# Patient Record
Sex: MALE | Race: BLACK OR AFRICAN AMERICAN | NOT HISPANIC OR LATINO | Employment: OTHER | URBAN - METROPOLITAN AREA
[De-identification: names, ages, dates, MRNs, and addresses within clinical notes are randomized per-mention and may not be internally consistent; named-entity substitution may affect disease eponyms.]

---

## 2016-09-08 LAB — CRC RECOMMENDATION EXT: NORMAL

## 2017-01-19 PROBLEM — G89.29 CHRONIC RIGHT SHOULDER PAIN: Status: ACTIVE | Noted: 2017-01-19

## 2017-01-19 PROBLEM — N52.1 ERECTILE DISORDER DUE TO MEDICAL CONDITION IN MALE: Status: ACTIVE | Noted: 2017-01-19

## 2017-01-19 PROBLEM — M25.511 CHRONIC RIGHT SHOULDER PAIN: Status: ACTIVE | Noted: 2017-01-19

## 2017-02-04 PROBLEM — M25.512 CHRONIC LEFT SHOULDER PAIN: Status: ACTIVE | Noted: 2017-01-19

## 2017-04-27 PROBLEM — E55.9 VITAMIN D DEFICIENCY: Status: ACTIVE | Noted: 2017-04-27

## 2017-05-25 PROBLEM — Z01.30 BP CHECK: Status: ACTIVE | Noted: 2017-05-25

## 2017-12-07 PROBLEM — Z01.30 BP CHECK: Status: RESOLVED | Noted: 2017-05-25 | Resolved: 2017-12-07

## 2018-04-26 PROBLEM — Z91.148 NON COMPLIANCE W MEDICATION REGIMEN: Status: ACTIVE | Noted: 2018-04-26

## 2018-04-26 PROBLEM — Z01.30 BP CHECK: Status: RESOLVED | Noted: 2017-05-25 | Resolved: 2018-04-26

## 2018-08-02 PROBLEM — Z53.21 PATIENT LEFT WITHOUT BEING SEEN: Status: ACTIVE | Noted: 2018-08-02

## 2019-10-25 PROBLEM — E87.5 HYPERKALEMIA: Status: ACTIVE | Noted: 2019-10-25

## 2019-10-25 PROBLEM — E87.70 VOLUME OVERLOAD: Status: ACTIVE | Noted: 2019-10-25

## 2019-10-26 PROBLEM — E87.5 HYPERKALEMIA: Status: RESOLVED | Noted: 2019-10-25 | Resolved: 2019-10-26

## 2020-05-26 PROBLEM — N18.5 CKD (CHRONIC KIDNEY DISEASE), STAGE V: Status: ACTIVE | Noted: 2020-05-26

## 2020-06-17 PROBLEM — H21.41: Status: ACTIVE | Noted: 2020-06-17

## 2020-06-17 PROBLEM — H40.51X3: Status: ACTIVE | Noted: 2020-06-17

## 2020-06-30 ENCOUNTER — NURSE TRIAGE (OUTPATIENT)
Dept: ADMINISTRATIVE | Facility: CLINIC | Age: 60
End: 2020-06-30

## 2020-09-14 ENCOUNTER — OFFICE VISIT (OUTPATIENT)
Dept: URGENT CARE | Facility: CLINIC | Age: 60
End: 2020-09-14
Payer: MEDICARE

## 2020-09-14 VITALS
BODY MASS INDEX: 30.61 KG/M2 | TEMPERATURE: 98 F | HEART RATE: 101 BPM | OXYGEN SATURATION: 99 % | WEIGHT: 195 LBS | SYSTOLIC BLOOD PRESSURE: 135 MMHG | HEIGHT: 67 IN | DIASTOLIC BLOOD PRESSURE: 78 MMHG

## 2020-09-14 DIAGNOSIS — H60.331 ACUTE SWIMMER'S EAR OF RIGHT SIDE: Primary | ICD-10-CM

## 2020-09-14 DIAGNOSIS — H61.23 EXCESSIVE CERUMEN IN EAR CANAL, BILATERAL: ICD-10-CM

## 2020-09-14 PROCEDURE — 99214 PR OFFICE/OUTPT VISIT, EST, LEVL IV, 30-39 MIN: ICD-10-PCS | Mod: 25,S$GLB,, | Performed by: NURSE PRACTITIONER

## 2020-09-14 PROCEDURE — 69209 EAR CERUMEN REMOVAL: ICD-10-PCS | Mod: 50,S$GLB,, | Performed by: NURSE PRACTITIONER

## 2020-09-14 PROCEDURE — 69209 REMOVE IMPACTED EAR WAX UNI: CPT | Mod: 50,S$GLB,, | Performed by: NURSE PRACTITIONER

## 2020-09-14 PROCEDURE — 99214 OFFICE O/P EST MOD 30 MIN: CPT | Mod: 25,S$GLB,, | Performed by: NURSE PRACTITIONER

## 2020-09-14 RX ORDER — NEOMYCIN SULFATE, POLYMYXIN B SULFATE, HYDROCORTISONE 3.5; 10000; 1 MG/ML; [USP'U]/ML; MG/ML
4 SOLUTION/ DROPS AURICULAR (OTIC) 3 TIMES DAILY
Qty: 10 ML | Refills: 0 | Status: SHIPPED | OUTPATIENT
Start: 2020-09-14 | End: 2020-09-24

## 2020-09-14 NOTE — PATIENT INSTRUCTIONS
External Ear Infection (Adult)    External otitis (also called swimmers ear) is an infection in the ear canal. It is often caused by bacteria or fungus. It can occur a few days after water gets trapped in the ear canal (from swimming or bathing). It can also occur after cleaning too deeply in the ear canal with a cotton swab or other object. Sometimes, hair care products get into the ear canal and cause this problem.  Symptoms can include pain, fever, itching, redness, drainage, or swelling of the ear canal. Temporary hearing loss may also occur.  Home care  Do not try to clean the ear canal. This can push pus and bacteria deeper into the canal.  Use prescribed ear drops as directed. These help reduce swelling and fight the infection. If an ear wick was placed in the ear canal, apply drops right onto the end of the wick. The wick will draw the medication into the ear canal even if it is swollen closed.  A cotton ball may be loosely placed in the outer ear to absorb any drainage.  You may use acetaminophen or ibuprofen to control pain, unless another medication was prescribed. Note: If you have chronic liver or kidney disease or ever had a stomach ulcer or GI bleeding, talk to your health care provider before taking any of these medications.  Do not allow water to get into your ear when bathing. Also, avoid swimming until the infection has cleared.  Prevention  Keep your ears dry. This helps lower the risk of infection. Dry your ears with a towel or hair dryer after getting wet. Also, use ear plugs when swimming.  Do not stick any objects in the ear to remove wax.  If you feel water trapped in your ear, use ear drops right away. You can get these drops over the counter at most drugstores. They work by removing water from the ear canal.  Follow-up care  Follow up with your health care provider in one week, or as advised.  When to seek medical advice  Call your health care provider right away if any of these  occur:  Ear pain becomes worse or doesnt improve after 3 days of treatment  Redness or swelling of the outer ear occurs or gets worse  Headache  Painful or stiff neck  Drowsiness or confusion  Fever of 100.4ºF (38ºC) or higher, or as directed by your health care provider  Seizure  Date Last Reviewed: 3/22/2015  © 2046-9904 [employer]. [address] [place] [address]. All rights reserved. This information is not intended as a substitute for professional medical care. Always follow your healthcare professional's instructions.        Earwax Removal    The ear canal makes earwax from the canals lining. The ears make wax to lubricate and protect the ear canal. The ear canal is the tube that connects the middle ear to the outside of the ear. The wax protects the ear from bacteria, infection, and damage from water or trauma.  The wax that forms in the canal naturally moves toward the outside of the ear and falls out. In some cases, the ear may make too much wax. If the wax causes problems or keeps the healthcare provider from seeing into the ear, the extra wax may be removed.  Too much wax can affect your hearing. It can cause itching. In rare cases, it can be painful. Earwax should not be removed unless it is causing a problem. You should not stick objects into your ear to remove wax unless told to do so by your healthcare provider.  Healthcare providers can remove earwax safely. It is important to stay still during the procedure to avoid damage to the ear canal. But removing earwax generally doesnt hurt. You will not usually need anesthesia or pain medicine when the provider removes the earwax.  A number of conditions lead to earwax buildup. These include some skin problems, a narrow ear canal, or ears that make too much earwax. Using cotton swabs in the canal pushes earwax deeper into the ear and contributes to the buildup of earwax.  Home care  The healthcare provider may recommend mineral  oil or an over-the-counter eardrop to use at home to soften the earwax. Use these products only if the provider recommends them. Use these products only if the provider recommends them. Carefully follow the instructions given.  Dont use mineral oil or OTC eardrops if you might have an ear infection or a ruptured eardrum. Tell your healthcare provider right away if you have diabetes or an immune disorder.  Dont use cotton swabs in your ears. Cotton swabs may push wax deeper into the ear canal or damage the eardrum. Use cotton gauze or a wet washcloth  to gently remove wax on the outside of the ear and around the opening to the ear canal.  Don't use any probing device or object such as cotton-tipped swabs or kailee pins to clean the inside of your ears.  Dont use ear candles to clean your ears. Candling can be dangerous. It can burn the ear canal. It can also make the condition worse instead of better.  Dont use cold water to rinse the ear. This will make you dizzy. If your provider tells you to rinse your ear, use only warm water or follow his or her instructions.  Check the ear for signs of infection or irritation listed below under When to seek medical advice.  Steps for using eardrops  Warm the medicine bottle by rubbing it between your hands for a few minutes.  Lie down on your side, with the affected ear up.  Place the recommended number of drops in the ear. Wet a cotton ball with the medicine. Gently put the cotton ball into the ear opening.  Follow-up care  Follow up with your healthcare provider, or as directed.  When to seek medical advice  Call the provider right away if you have:  Ear pain that gets worse  Fever of 100.4F°F (38°C) or higher, or as directed by your healthcare provider  Worsening wax buildup  Severe pain, dizziness, or nausea  Bleeding from the ear  Hearing problems  Signs of irritation from the eardrops, such as burning, stinging, or swelling and tenderness  Foul-smelling fluid draining  from the ear  Swelling, redness, or tenderness of the outer ear  Headache, neck pain, or stiff neck  Date Last Reviewed: 3/22/2015  © 0896-9297 [employer]. [address] [place] [address], [place] [address]. All rights reserved. This information is not intended as a substitute for professional medical care. Always follow your healthcare professional's instructions.      ·   ·   · Follow up with your primary care in 2-5 days if symptoms have not improved, or you may return here.  · If you were referred to a specialist, please follow up with that specialty.  · If you were prescribed antibiotics, please take them to completion.  · If you were prescribed a narcotic or any medication with sedative effects, do not drive or operate heavy equipment or machinery while taking these medications.  · You must understand that you have received treatment at an Urgent Care facility only, and that you may be released before all of your medical problems are known or treated. Urgent Care facilities are not equipped to handle life threatening emergencies. It is recommended that you go to an Emergency Department for further evaluation of worsening or concerning symptoms, or possibly life threatening conditions as discussed.                                        If you  smoke, please stop smoking

## 2020-09-14 NOTE — PROGRESS NOTES
"Subjective:       Patient ID: Murphy Ha is a 60 y.o. male.    Vitals:  height is 5' 7" (1.702 m) and weight is 88.5 kg (195 lb). His temperature is 98.4 °F (36.9 °C). His blood pressure is 135/78 and his pulse is 101. His oxygen saturation is 99%.     Chief Complaint: Otalgia    Pt c/o right ear pain/popping sensation, ringing in ear x3 days with decreased hearing. H/o cerumen impaction to left ear approx 3-4 years ago.  Pt diabetic, blood sugar 137 today.    Otalgia   There is pain in the right ear. This is a new problem. The current episode started in the past 7 days (2-3 days). The problem has been unchanged. There has been no fever. The pain is mild. Associated symptoms include hearing loss (decreased hearing to right ear). Pertinent negatives include no abdominal pain, coughing, diarrhea, ear discharge, headaches, neck pain, rash, rhinorrhea, sore throat or vomiting. He has tried ear drops and NSAIDs for the symptoms. The treatment provided no relief.       Constitution: Negative for activity change, appetite change, chills, sweating, fatigue, fever, unexpected weight change, generalized weakness and international travel in last 60 days.   HENT: Positive for ear pain (AD/popping sensation ), tinnitus (right ear) and hearing loss (decreased hearing to right ear). Negative for ear discharge, foreign body in ear, dental problem, facial swelling, facial trauma, congestion, sinus pain, sinus pressure, sore throat and voice change.    Neck: Negative for neck pain, neck stiffness and painful lymph nodes.   Cardiovascular: Negative for chest pain and palpitations.   Eyes: Negative for eye discharge, eye itching, eye redness and vision loss.   Respiratory: Negative for chest tightness, cough, sputum production, bloody sputum, COPD, shortness of breath, stridor, wheezing and asthma.    Gastrointestinal: Negative for abdominal pain, nausea, vomiting and diarrhea.   Genitourinary: Negative for dysuria and " hematuria.   Musculoskeletal: Negative for joint pain, joint swelling and muscle ache.   Skin: Negative for pale and rash.   Allergic/Immunologic: Negative for seasonal allergies, asthma, immunizations up-to-date and flu shot.   Neurological: Negative for headaches and altered mental status.   Hematologic/Lymphatic: Negative for swollen lymph nodes and easy bruising/bleeding. Does not bruise/bleed easily.   Psychiatric/Behavioral: Negative for altered mental status.       Objective:      Physical Exam   Constitutional: He is oriented to person, place, and time.  Non-toxic appearance. He does not appear ill. No distress.   HENT:   Head: Normocephalic and atraumatic.   Ears:   Right Ear: There is tenderness (tragus ttp) and cerumen present. No drainage or swelling. No mastoid tenderness. Decreased hearing is noted. impacted cerumen  Left Ear: Hearing normal. There is cerumen present. No drainage, swelling or tenderness. No mastoid tenderness. No decreased hearing is noted. impacted cerumen  Nose: No rhinorrhea or congestion.   Mouth/Throat: Mucous membranes are moist.   Eyes: Conjunctivae are normal. Right eye exhibits no discharge. Left eye exhibits no discharge. No scleral icterus.   Neck: Normal range of motion. Neck supple. No muscular tenderness present. No neck rigidity.   Cardiovascular: Normal rate, regular rhythm and normal pulses.   No murmur heard.  Pulmonary/Chest: Effort normal and breath sounds normal. No respiratory distress.   Abdominal: Normal appearance. He exhibits no distension. There is no abdominal tenderness.   Musculoskeletal: Normal range of motion.         General: No swelling, tenderness or signs of injury.   Lymphadenopathy:        Head (right side): No submental, no submandibular, no tonsillar, no preauricular and no posterior auricular adenopathy present.        Head (left side): No submental, no submandibular, no tonsillar, no preauricular and no posterior auricular adenopathy present.  "    He has no cervical adenopathy.        Right cervical: No superficial cervical and no posterior cervical adenopathy present.       Left cervical: No superficial cervical and no posterior cervical adenopathy present.   Neurological: He is alert and oriented to person, place, and time. He displays no weakness. Gait normal.   Skin: Skin is warm, not diaphoretic and not pale. jaundicePsychiatric: His behavior is normal. Mood normal.   Nursing note and vitals reviewed.        Assessment:       1. Acute swimmer's ear of right side    2. Excessive cerumen in ear canal, bilateral        Plan:         Ear Cerumen Removal    Date/Time: 9/14/2020 3:20 PM  Performed by: Mel Haas NP  Authorized by: Mel Haas NP     Time out: Immediately prior to procedure a "time out" was called to verify the correct patient, procedure, equipment, support staff and site/side marked as required.    Consent Done?:  Yes (Verbal)    Local anesthetic:  None  Ceruminolytics applied: Ceruminolytics applied prior to the procedure    Location details:  Both ears  Procedure type: irrigation    Cerumen  Removal Results:  Cerumen completely removed  Patient tolerance:  Patient tolerated the procedure well with no immediate complications     Left TM and canal exam normal post cerumen removal. Right TM intact, normal with moderate erythema to ear canal, no swelling.             Acute swimmer's ear of right side  -     neomycin-polymyxin-hydrocortisone (CORTISPORIN) otic solution; Place 4 drops into the left ear 3 (three) times daily. for 10 days  Dispense: 10 mL; Refill: 0    Excessive cerumen in ear canal, bilateral  -     Ear wax removal         Patient Instructions     External Ear Infection (Adult)    External otitis (also called swimmers ear) is an infection in the ear canal. It is often caused by bacteria or fungus. It can occur a few days after water gets trapped in the ear canal (from swimming or bathing). It can also occur after " cleaning too deeply in the ear canal with a cotton swab or other object. Sometimes, hair care products get into the ear canal and cause this problem.  Symptoms can include pain, fever, itching, redness, drainage, or swelling of the ear canal. Temporary hearing loss may also occur.  Home care  Do not try to clean the ear canal. This can push pus and bacteria deeper into the canal.  Use prescribed ear drops as directed. These help reduce swelling and fight the infection. If an ear wick was placed in the ear canal, apply drops right onto the end of the wick. The wick will draw the medication into the ear canal even if it is swollen closed.  A cotton ball may be loosely placed in the outer ear to absorb any drainage.  You may use acetaminophen or ibuprofen to control pain, unless another medication was prescribed. Note: If you have chronic liver or kidney disease or ever had a stomach ulcer or GI bleeding, talk to your health care provider before taking any of these medications.  Do not allow water to get into your ear when bathing. Also, avoid swimming until the infection has cleared.  Prevention  Keep your ears dry. This helps lower the risk of infection. Dry your ears with a towel or hair dryer after getting wet. Also, use ear plugs when swimming.  Do not stick any objects in the ear to remove wax.  If you feel water trapped in your ear, use ear drops right away. You can get these drops over the counter at most drugstores. They work by removing water from the ear canal.  Follow-up care  Follow up with your health care provider in one week, or as advised.  When to seek medical advice  Call your health care provider right away if any of these occur:  Ear pain becomes worse or doesnt improve after 3 days of treatment  Redness or swelling of the outer ear occurs or gets worse  Headache  Painful or stiff neck  Drowsiness or confusion  Fever of 100.4ºF (38ºC) or higher, or as directed by your health care  provider  Seizure  Date Last Reviewed: 3/22/2015  © 4854-9878 [employer]. [address] [place] [address], [place] [address]. All rights reserved. This information is not intended as a substitute for professional medical care. Always follow your healthcare professional's instructions.        Earwax Removal    The ear canal makes earwax from the canals lining. The ears make wax to lubricate and protect the ear canal. The ear canal is the tube that connects the middle ear to the outside of the ear. The wax protects the ear from bacteria, infection, and damage from water or trauma.  The wax that forms in the canal naturally moves toward the outside of the ear and falls out. In some cases, the ear may make too much wax. If the wax causes problems or keeps the healthcare provider from seeing into the ear, the extra wax may be removed.  Too much wax can affect your hearing. It can cause itching. In rare cases, it can be painful. Earwax should not be removed unless it is causing a problem. You should not stick objects into your ear to remove wax unless told to do so by your healthcare provider.  Healthcare providers can remove earwax safely. It is important to stay still during the procedure to avoid damage to the ear canal. But removing earwax generally doesnt hurt. You will not usually need anesthesia or pain medicine when the provider removes the earwax.  A number of conditions lead to earwax buildup. These include some skin problems, a narrow ear canal, or ears that make too much earwax. Using cotton swabs in the canal pushes earwax deeper into the ear and contributes to the buildup of earwax.  Home care  The healthcare provider may recommend mineral oil or an over-the-counter eardrop to use at home to soften the earwax. Use these products only if the provider recommends them. Use these products only if the provider recommends them. Carefully follow the instructions given.  Dont use mineral oil or OTC eardrops  if you might have an ear infection or a ruptured eardrum. Tell your healthcare provider right away if you have diabetes or an immune disorder.  Dont use cotton swabs in your ears. Cotton swabs may push wax deeper into the ear canal or damage the eardrum. Use cotton gauze or a wet washcloth  to gently remove wax on the outside of the ear and around the opening to the ear canal.  Don't use any probing device or object such as cotton-tipped swabs or kailee pins to clean the inside of your ears.  Dont use ear candles to clean your ears. Candling can be dangerous. It can burn the ear canal. It can also make the condition worse instead of better.  Dont use cold water to rinse the ear. This will make you dizzy. If your provider tells you to rinse your ear, use only warm water or follow his or her instructions.  Check the ear for signs of infection or irritation listed below under When to seek medical advice.  Steps for using eardrops  Warm the medicine bottle by rubbing it between your hands for a few minutes.  Lie down on your side, with the affected ear up.  Place the recommended number of drops in the ear. Wet a cotton ball with the medicine. Gently put the cotton ball into the ear opening.  Follow-up care  Follow up with your healthcare provider, or as directed.  When to seek medical advice  Call the provider right away if you have:  Ear pain that gets worse  Fever of 100.4F°F (38°C) or higher, or as directed by your healthcare provider  Worsening wax buildup  Severe pain, dizziness, or nausea  Bleeding from the ear  Hearing problems  Signs of irritation from the eardrops, such as burning, stinging, or swelling and tenderness  Foul-smelling fluid draining from the ear  Swelling, redness, or tenderness of the outer ear  Headache, neck pain, or stiff neck  Date Last Reviewed: 3/22/2015  © 0656-8097 [employer]. [address] [place] [address], [place] [address]. All rights reserved. This information is not  intended as a substitute for professional medical care. Always follow your healthcare professional's instructions.      ·   ·   · Follow up with your primary care in 2-5 days if symptoms have not improved, or you may return here.  · If you were referred to a specialist, please follow up with that specialty.  · If you were prescribed antibiotics, please take them to completion.  · If you were prescribed a narcotic or any medication with sedative effects, do not drive or operate heavy equipment or machinery while taking these medications.  · You must understand that you have received treatment at an Urgent Care facility only, and that you may be released before all of your medical problems are known or treated. Urgent Care facilities are not equipped to handle life threatening emergencies. It is recommended that you go to an Emergency Department for further evaluation of worsening or concerning symptoms, or possibly life threatening conditions as discussed.                                        If you  smoke, please stop smoking

## 2020-09-14 NOTE — PROCEDURES
"Ear Cerumen Removal    Date/Time: 9/14/2020 3:20 PM  Performed by: Mel Haas NP  Authorized by: Mel Haas NP     Time out: Immediately prior to procedure a "time out" was called to verify the correct patient, procedure, equipment, support staff and site/side marked as required.    Consent Done?:  Yes (Verbal)    Local anesthetic:  None  Ceruminolytics applied: Ceruminolytics applied prior to the procedure    Location details:  Both ears  Procedure type: irrigation    Cerumen  Removal Results:  Cerumen completely removed  Patient tolerance:  Patient tolerated the procedure well with no immediate complications     Left TM and canal exam normal post cerumen removal. Right TM intact, normal with moderate erythema to ear canal, no swelling.       "

## 2020-09-28 ENCOUNTER — TELEPHONE (OUTPATIENT)
Dept: NEPHROLOGY | Facility: CLINIC | Age: 60
End: 2020-09-28

## 2020-09-28 NOTE — TELEPHONE ENCOUNTER
----- Message from Marlen Vázquez sent at 9/28/2020  9:21 AM CDT -----  Regarding: R/S 9/29/20 appabdulaziz  Pt called to r/s his 9/29/20 appts    Pt's ph#[phone]

## 2021-01-25 ENCOUNTER — PATIENT MESSAGE (OUTPATIENT)
Dept: TRANSPLANT | Facility: CLINIC | Age: 61
End: 2021-01-25

## 2021-01-25 DIAGNOSIS — Z76.82 ORGAN TRANSPLANT CANDIDATE: Primary | ICD-10-CM

## 2021-01-27 ENCOUNTER — TELEPHONE (OUTPATIENT)
Dept: TRANSPLANT | Facility: CLINIC | Age: 61
End: 2021-01-27

## 2021-02-09 DIAGNOSIS — Z76.82 ORGAN TRANSPLANT CANDIDATE: Primary | ICD-10-CM

## 2021-02-22 ENCOUNTER — LAB VISIT (OUTPATIENT)
Dept: LAB | Facility: HOSPITAL | Age: 61
End: 2021-02-22
Payer: MEDICARE

## 2021-02-22 DIAGNOSIS — R63.5 ABNORMAL WEIGHT GAIN: ICD-10-CM

## 2021-02-22 DIAGNOSIS — Z76.82 KIDNEY TRANSPLANT CANDIDATE: ICD-10-CM

## 2021-02-22 PROCEDURE — 86706 HEP B SURFACE ANTIBODY: CPT | Mod: NTX

## 2021-02-22 PROCEDURE — 80074 ACUTE HEPATITIS PANEL: CPT | Mod: TXP

## 2021-02-22 PROCEDURE — 86704 HEP B CORE ANTIBODY TOTAL: CPT | Mod: NTX

## 2021-02-23 LAB
HAV IGM SERPL QL IA: NEGATIVE
HBV CORE AB SERPL QL IA: NEGATIVE
HBV CORE IGM SERPL QL IA: NEGATIVE
HBV SURFACE AB SER-ACNC: NEGATIVE M[IU]/ML
HBV SURFACE AG SERPL QL IA: NEGATIVE
HCV AB SERPL QL IA: NEGATIVE

## 2021-03-03 NOTE — PROGRESS NOTES
Spoke to patient to complete history for his upcoming appointment his wife will come with him to his appointment he is aware that he have to bring a small breakfast/snack to eat after blood is drawn he will not need a

## 2021-03-04 ENCOUNTER — HOSPITAL ENCOUNTER (OUTPATIENT)
Dept: RADIOLOGY | Facility: HOSPITAL | Age: 61
Discharge: HOME | End: 2021-03-04
Attending: NURSE PRACTITIONER
Payer: MEDICARE

## 2021-03-04 ENCOUNTER — TELEPHONE (OUTPATIENT)
Dept: TRANSPLANT | Facility: CLINIC | Age: 61
End: 2021-03-04

## 2021-03-04 ENCOUNTER — OFFICE VISIT (OUTPATIENT)
Dept: TRANSPLANT | Facility: CLINIC | Age: 61
End: 2021-03-04
Payer: MEDICARE

## 2021-03-04 VITALS
SYSTOLIC BLOOD PRESSURE: 150 MMHG | WEIGHT: 194.69 LBS | OXYGEN SATURATION: 100 % | TEMPERATURE: 98 F | HEART RATE: 76 BPM | HEIGHT: 67 IN | RESPIRATION RATE: 18 BRPM | DIASTOLIC BLOOD PRESSURE: 73 MMHG | BODY MASS INDEX: 30.56 KG/M2

## 2021-03-04 DIAGNOSIS — N18.5 CKD (CHRONIC KIDNEY DISEASE), STAGE V: ICD-10-CM

## 2021-03-04 DIAGNOSIS — M25.512 CHRONIC LEFT SHOULDER PAIN: ICD-10-CM

## 2021-03-04 DIAGNOSIS — Z76.82 ORGAN TRANSPLANT CANDIDATE: ICD-10-CM

## 2021-03-04 DIAGNOSIS — G89.29 CHRONIC LEFT SHOULDER PAIN: ICD-10-CM

## 2021-03-04 DIAGNOSIS — I15.0 RENOVASCULAR HYPERTENSION: Chronic | ICD-10-CM

## 2021-03-04 DIAGNOSIS — N25.81 SECONDARY HYPERPARATHYROIDISM: ICD-10-CM

## 2021-03-04 DIAGNOSIS — Z01.818 PRE-TRANSPLANT EVALUATION FOR KIDNEY TRANSPLANT: Primary | ICD-10-CM

## 2021-03-04 DIAGNOSIS — E11.3299 NONPROLIFERATIVE DIABETIC RETINOPATHY ASSOCIATED WITH TYPE 2 DIABETES MELLITUS: ICD-10-CM

## 2021-03-04 LAB
CREAT UR-MCNC: 57 MG/DL (ref 23–375)
PROT UR-MCNC: 147 MG/DL (ref 0–15)
PROT/CREAT UR: 2.58 MG/G{CREAT} (ref 0–0.2)

## 2021-03-04 PROCEDURE — 76770 US EXAM ABDO BACK WALL COMP: CPT | Mod: 26,TXP,, | Performed by: RADIOLOGY

## 2021-03-04 PROCEDURE — 72170 X-RAY EXAM OF PELVIS: CPT | Mod: 26,TXP,, | Performed by: RADIOLOGY

## 2021-03-04 PROCEDURE — 72170 XR PELVIS ROUTINE AP: ICD-10-PCS | Mod: 26,TXP,, | Performed by: RADIOLOGY

## 2021-03-04 PROCEDURE — 99215 OFFICE O/P EST HI 40 MIN: CPT | Mod: S$PBB,TXP,, | Performed by: NURSE PRACTITIONER

## 2021-03-04 PROCEDURE — 99215 OFFICE O/P EST HI 40 MIN: CPT | Mod: PBBFAC,25,TXP | Performed by: NURSE PRACTITIONER

## 2021-03-04 PROCEDURE — 93978 VASCULAR STUDY: CPT | Mod: 26,TXP,, | Performed by: RADIOLOGY

## 2021-03-04 PROCEDURE — 72170 X-RAY EXAM OF PELVIS: CPT | Mod: TC,TXP

## 2021-03-04 PROCEDURE — 93978 US DOPP ILIACS BILATERAL: ICD-10-PCS | Mod: 26,TXP,, | Performed by: RADIOLOGY

## 2021-03-04 PROCEDURE — 93978 VASCULAR STUDY: CPT | Mod: TC,TXP

## 2021-03-04 PROCEDURE — 99203 PR OFFICE/OUTPT VISIT, NEW, LEVL III, 30-44 MIN: ICD-10-PCS | Mod: S$PBB,TXP,, | Performed by: TRANSPLANT SURGERY

## 2021-03-04 PROCEDURE — 99999 PR PBB SHADOW E&M-EST. PATIENT-LVL V: CPT | Mod: PBBFAC,TXP,, | Performed by: NURSE PRACTITIONER

## 2021-03-04 PROCEDURE — 99215 PR OFFICE/OUTPT VISIT, EST, LEVL V, 40-54 MIN: ICD-10-PCS | Mod: S$PBB,TXP,, | Performed by: NURSE PRACTITIONER

## 2021-03-04 PROCEDURE — 76770 US EXAM ABDO BACK WALL COMP: CPT | Mod: TC,TXP

## 2021-03-04 PROCEDURE — 82570 ASSAY OF URINE CREATININE: CPT | Mod: TXP | Performed by: NURSE PRACTITIONER

## 2021-03-04 PROCEDURE — 99203 OFFICE O/P NEW LOW 30 MIN: CPT | Mod: S$PBB,TXP,, | Performed by: TRANSPLANT SURGERY

## 2021-03-04 PROCEDURE — 76770 US RETROPERITONEAL COMPLETE: ICD-10-PCS | Mod: 26,TXP,, | Performed by: RADIOLOGY

## 2021-03-04 PROCEDURE — 99999 PR PBB SHADOW E&M-EST. PATIENT-LVL V: ICD-10-PCS | Mod: PBBFAC,TXP,, | Performed by: NURSE PRACTITIONER

## 2021-03-04 RX ORDER — INSULIN ASPART 100 [IU]/ML
4-8 INJECTION, SOLUTION INTRAVENOUS; SUBCUTANEOUS
COMMUNITY
End: 2021-07-06

## 2021-03-04 RX ORDER — CARVEDILOL 25 MG/1
12.5 TABLET ORAL 2 TIMES DAILY WITH MEALS
COMMUNITY
End: 2021-04-05 | Stop reason: SDUPTHER

## 2021-03-04 RX ORDER — HYDRALAZINE HYDROCHLORIDE 25 MG/1
12.5 TABLET, FILM COATED ORAL 2 TIMES DAILY
COMMUNITY
End: 2021-04-05

## 2021-03-04 RX ORDER — DORZOLAMIDE HCL 20 MG/ML
1 SOLUTION/ DROPS OPHTHALMIC 2 TIMES DAILY
COMMUNITY
End: 2021-10-27 | Stop reason: ALTCHOICE

## 2021-03-22 ENCOUNTER — TELEPHONE (OUTPATIENT)
Dept: TRANSPLANT | Facility: CLINIC | Age: 61
End: 2021-03-22

## 2021-04-01 ENCOUNTER — TELEPHONE (OUTPATIENT)
Dept: TRANSPLANT | Facility: CLINIC | Age: 61
End: 2021-04-01

## 2021-04-27 ENCOUNTER — OFFICE VISIT (OUTPATIENT)
Dept: CARDIOLOGY | Facility: CLINIC | Age: 61
End: 2021-04-27
Payer: MEDICARE

## 2021-04-27 ENCOUNTER — HOSPITAL ENCOUNTER (OUTPATIENT)
Dept: CARDIOLOGY | Facility: HOSPITAL | Age: 61
Discharge: HOME | End: 2021-04-27
Attending: NURSE PRACTITIONER
Payer: MEDICARE

## 2021-04-27 VITALS
WEIGHT: 193.13 LBS | HEIGHT: 67 IN | BODY MASS INDEX: 30.31 KG/M2 | DIASTOLIC BLOOD PRESSURE: 78 MMHG | SYSTOLIC BLOOD PRESSURE: 154 MMHG | HEART RATE: 72 BPM | RESPIRATION RATE: 16 BRPM | OXYGEN SATURATION: 97 %

## 2021-04-27 VITALS — BODY MASS INDEX: 30.25 KG/M2 | HEIGHT: 67 IN

## 2021-04-27 DIAGNOSIS — N18.5 CKD (CHRONIC KIDNEY DISEASE), STAGE V: ICD-10-CM

## 2021-04-27 DIAGNOSIS — Z76.82 ORGAN TRANSPLANT CANDIDATE: ICD-10-CM

## 2021-04-27 DIAGNOSIS — N18.6 ESRD (END STAGE RENAL DISEASE) ON DIALYSIS: Primary | ICD-10-CM

## 2021-04-27 DIAGNOSIS — N18.4 STAGE 4 CHRONIC KIDNEY DISEASE: Chronic | ICD-10-CM

## 2021-04-27 DIAGNOSIS — E78.2 MIXED HYPERLIPIDEMIA: ICD-10-CM

## 2021-04-27 DIAGNOSIS — E11.8 TYPE 2 DIABETES WITH COMPLICATION: ICD-10-CM

## 2021-04-27 DIAGNOSIS — E87.5 HYPERKALEMIA: ICD-10-CM

## 2021-04-27 DIAGNOSIS — I15.0 RENOVASCULAR HYPERTENSION: Chronic | ICD-10-CM

## 2021-04-27 DIAGNOSIS — E11.42 DIABETIC POLYNEUROPATHY ASSOCIATED WITH TYPE 2 DIABETES MELLITUS: ICD-10-CM

## 2021-04-27 DIAGNOSIS — G47.33 OSA (OBSTRUCTIVE SLEEP APNEA): Chronic | ICD-10-CM

## 2021-04-27 DIAGNOSIS — Z99.2 ESRD (END STAGE RENAL DISEASE) ON DIALYSIS: Primary | ICD-10-CM

## 2021-04-27 DIAGNOSIS — E87.70 HYPERVOLEMIA, UNSPECIFIED HYPERVOLEMIA TYPE: ICD-10-CM

## 2021-04-27 LAB
AORTIC ROOT ANNULUS: 2.86 CM
AORTIC VALVE CUSP SEPERATION: 1.4 CM
AV INDEX (PROSTH): 0.91
AV MEAN GRADIENT: 6 MMHG
AV PEAK GRADIENT: 10 MMHG
AV VALVE AREA: 2.87 CM2
AV VELOCITY RATIO: 0.87
BSA FOR ECHO PROCEDURE: 2.03 M2
CV ECHO LV RWT: 0.42 CM
DOP CALC AO PEAK VEL: 1.59 M/S
DOP CALC AO VTI: 34.71 CM
DOP CALC LVOT AREA: 3.1 CM2
DOP CALC LVOT DIAMETER: 2 CM
DOP CALC LVOT PEAK VEL: 1.39 M/S
DOP CALC LVOT STROKE VOLUME: 99.48 CM3
DOP CALCLVOT PEAK VEL VTI: 31.68 CM
E WAVE DECELERATION TIME: 194.86 MSEC
E/A RATIO: 1.15
E/E' RATIO: 10.22 M/S
ECHO LV POSTERIOR WALL: 1.1 CM (ref 0.6–1.1)
EJECTION FRACTION: 55 %
FRACTIONAL SHORTENING: 32 % (ref 28–44)
INTERVENTRICULAR SEPTUM: 1.1 CM (ref 0.6–1.1)
IVRT: 92.27 MSEC
LA MAJOR: 5.59 CM
LA MINOR: 4.85 CM
LA WIDTH: 4.31 CM
LEFT ATRIUM SIZE: 3.61 CM
LEFT ATRIUM VOLUME INDEX: 34.5 ML/M2
LEFT ATRIUM VOLUME: 68.69 CM3
LEFT INTERNAL DIMENSION IN SYSTOLE: 3.5 CM (ref 2.1–4)
LEFT VENTRICLE DIASTOLIC VOLUME INDEX: 64.63 ML/M2
LEFT VENTRICLE DIASTOLIC VOLUME: 128.62 ML
LEFT VENTRICLE MASS INDEX: 110 G/M2
LEFT VENTRICLE SYSTOLIC VOLUME INDEX: 25.5 ML/M2
LEFT VENTRICLE SYSTOLIC VOLUME: 50.7 ML
LEFT VENTRICULAR INTERNAL DIMENSION IN DIASTOLE: 5.18 CM (ref 3.5–6)
LEFT VENTRICULAR MASS: 219.38 G
LV LATERAL E/E' RATIO: 8.36 M/S
LV SEPTAL E/E' RATIO: 13.14 M/S
MV PEAK A VEL: 0.8 M/S
MV PEAK E VEL: 0.92 M/S
MV STENOSIS PRESSURE HALF TIME: 56.51 MS
MV VALVE AREA P 1/2 METHOD: 3.89 CM2
PISA TR MAX VEL: 2.77 M/S
PV PEAK VELOCITY: 1.24 CM/S
RA MAJOR: 5.35 CM
RA PRESSURE: 8 MMHG
RA WIDTH: 4.14 CM
TDI LATERAL: 0.11 M/S
TDI SEPTAL: 0.07 M/S
TDI: 0.09 M/S
TR MAX PG: 31 MMHG
TV REST PULMONARY ARTERY PRESSURE: 39 MMHG

## 2021-04-27 PROCEDURE — 99204 PR OFFICE/OUTPT VISIT, NEW, LEVL IV, 45-59 MIN: ICD-10-PCS | Mod: S$PBB,TXP,, | Performed by: INTERNAL MEDICINE

## 2021-04-27 PROCEDURE — 99204 OFFICE O/P NEW MOD 45 MIN: CPT | Mod: S$PBB,TXP,, | Performed by: INTERNAL MEDICINE

## 2021-04-27 PROCEDURE — 99999 PR PBB SHADOW E&M-EST. PATIENT-LVL IV: CPT | Mod: PBBFAC,TXP,, | Performed by: INTERNAL MEDICINE

## 2021-04-27 PROCEDURE — 93306 ECHO (CUPID ONLY): ICD-10-PCS | Mod: 26,TXP,, | Performed by: INTERNAL MEDICINE

## 2021-04-27 PROCEDURE — 99999 PR PBB SHADOW E&M-EST. PATIENT-LVL IV: ICD-10-PCS | Mod: PBBFAC,TXP,, | Performed by: INTERNAL MEDICINE

## 2021-04-27 PROCEDURE — 93306 TTE W/DOPPLER COMPLETE: CPT | Mod: TXP

## 2021-04-27 PROCEDURE — 93306 TTE W/DOPPLER COMPLETE: CPT | Mod: 26,TXP,, | Performed by: INTERNAL MEDICINE

## 2021-04-27 PROCEDURE — 99214 OFFICE O/P EST MOD 30 MIN: CPT | Mod: PBBFAC,TXP,25 | Performed by: INTERNAL MEDICINE

## 2021-04-27 RX ORDER — HYDRALAZINE HYDROCHLORIDE 50 MG/1
50 TABLET, FILM COATED ORAL EVERY 12 HOURS
Qty: 60 TABLET | Refills: 6 | Status: SHIPPED | OUTPATIENT
Start: 2021-04-27 | End: 2021-08-25

## 2021-05-19 ENCOUNTER — TELEPHONE (OUTPATIENT)
Dept: TRANSPLANT | Facility: CLINIC | Age: 61
End: 2021-05-19

## 2021-06-09 ENCOUNTER — SOCIAL WORK (OUTPATIENT)
Dept: TRANSPLANT | Facility: CLINIC | Age: 61
End: 2021-06-09

## 2021-06-11 ENCOUNTER — COMMITTEE REVIEW (OUTPATIENT)
Dept: TRANSPLANT | Facility: CLINIC | Age: 61
End: 2021-06-11

## 2021-06-14 ENCOUNTER — TELEPHONE (OUTPATIENT)
Dept: TRANSPLANT | Facility: CLINIC | Age: 61
End: 2021-06-14

## 2021-06-15 ENCOUNTER — TELEPHONE (OUTPATIENT)
Dept: TRANSPLANT | Facility: CLINIC | Age: 61
End: 2021-06-15

## 2021-06-15 DIAGNOSIS — Z76.82 ORGAN TRANSPLANT CANDIDATE: Primary | ICD-10-CM

## 2021-07-08 ENCOUNTER — TELEPHONE (OUTPATIENT)
Dept: TRANSPLANT | Facility: CLINIC | Age: 61
End: 2021-07-08

## 2021-07-08 DIAGNOSIS — Z76.82 ORGAN TRANSPLANT CANDIDATE: Primary | ICD-10-CM

## 2021-07-15 DIAGNOSIS — Z76.82 PRE-KIDNEY TRANSPLANT, LISTED: Primary | ICD-10-CM

## 2021-07-21 DIAGNOSIS — Z76.82 ORGAN TRANSPLANT CANDIDATE: Primary | ICD-10-CM

## 2021-11-10 PROBLEM — H43.12 VITREOUS HEMORRHAGE, LEFT: Status: ACTIVE | Noted: 2021-11-10

## 2021-11-10 PROBLEM — E11.3292 MILD NONPROLIFERATIVE DIABETIC RETINOPATHY OF LEFT EYE WITHOUT MACULAR EDEMA ASSOCIATED WITH TYPE 2 DIABETES MELLITUS: Status: ACTIVE | Noted: 2021-11-10

## 2021-11-10 PROBLEM — Z90.01 H/O ENUCLEATION OF RIGHT EYEBALL: Status: ACTIVE | Noted: 2021-11-10

## 2021-11-10 PROBLEM — H40.1121 PRIMARY OPEN ANGLE GLAUCOMA (POAG) OF LEFT EYE, MILD STAGE: Status: ACTIVE | Noted: 2021-11-10

## 2021-11-18 ENCOUNTER — OFFICE VISIT (OUTPATIENT)
Dept: OPHTHALMOLOGY | Facility: CLINIC | Age: 61
End: 2021-11-18
Payer: MEDICARE

## 2021-11-18 DIAGNOSIS — H43.12 VITREOUS HEMORRHAGE, LEFT EYE: Primary | ICD-10-CM

## 2021-11-18 DIAGNOSIS — Z90.01 H/O ENUCLEATION OF RIGHT EYEBALL: ICD-10-CM

## 2021-11-18 DIAGNOSIS — H40.1121 PRIMARY OPEN ANGLE GLAUCOMA (POAG) OF LEFT EYE, MILD STAGE: ICD-10-CM

## 2021-11-18 DIAGNOSIS — E11.3592 PROLIFERATIVE DIABETIC RETINOPATHY OF LEFT EYE ASSOCIATED WITH TYPE 2 DIABETES MELLITUS, MACULAR EDEMA PRESENCE UNSPECIFIED: ICD-10-CM

## 2021-11-18 PROCEDURE — 99204 OFFICE O/P NEW MOD 45 MIN: CPT | Mod: S$PBB,,, | Performed by: OPHTHALMOLOGY

## 2021-11-18 PROCEDURE — 99999 PR PBB SHADOW E&M-EST. PATIENT-LVL III: ICD-10-PCS | Mod: PBBFAC,,, | Performed by: OPHTHALMOLOGY

## 2021-11-18 PROCEDURE — 99204 PR OFFICE/OUTPT VISIT, NEW, LEVL IV, 45-59 MIN: ICD-10-PCS | Mod: S$PBB,,, | Performed by: OPHTHALMOLOGY

## 2021-11-18 PROCEDURE — 99213 OFFICE O/P EST LOW 20 MIN: CPT | Mod: PBBFAC,25 | Performed by: OPHTHALMOLOGY

## 2021-11-18 PROCEDURE — 99999 PR PBB SHADOW E&M-EST. PATIENT-LVL III: CPT | Mod: PBBFAC,,, | Performed by: OPHTHALMOLOGY

## 2021-11-18 PROCEDURE — 92235 FLUORESCEIN ANGIOGRAPHY - OS - LEFT EYE: ICD-10-PCS | Mod: 26,S$PBB,, | Performed by: OPHTHALMOLOGY

## 2021-11-18 PROCEDURE — 92235 FLUORESCEIN ANGRPH MLTIFRAME: CPT | Mod: PBBFAC | Performed by: OPHTHALMOLOGY

## 2021-11-18 RX ORDER — FLUORESCEIN 500 MG/ML
5 INJECTION INTRAVENOUS
Status: COMPLETED | OUTPATIENT
Start: 2021-11-18 | End: 2021-11-18

## 2021-11-18 RX ADMIN — FLUORESCEIN 500 MG: 500 INJECTION INTRAVENOUS at 02:11

## 2022-01-27 DIAGNOSIS — Z76.82 ORGAN TRANSPLANT CANDIDATE: Primary | ICD-10-CM

## 2022-02-02 ENCOUNTER — TELEPHONE (OUTPATIENT)
Dept: TRANSPLANT | Facility: CLINIC | Age: 62
End: 2022-02-02
Payer: MEDICARE

## 2022-04-07 ENCOUNTER — EPISODE CHANGES (OUTPATIENT)
Dept: TRANSPLANT | Facility: CLINIC | Age: 62
End: 2022-04-07

## 2022-04-08 ENCOUNTER — DOCUMENTATION ONLY (OUTPATIENT)
Dept: CARDIOLOGY | Facility: HOSPITAL | Age: 62
End: 2022-04-08
Payer: MEDICARE

## 2022-04-08 ENCOUNTER — HOSPITAL ENCOUNTER (OUTPATIENT)
Dept: CARDIOLOGY | Facility: HOSPITAL | Age: 62
Discharge: HOME | End: 2022-04-08
Attending: NURSE PRACTITIONER
Payer: MEDICARE

## 2022-04-08 ENCOUNTER — OFFICE VISIT (OUTPATIENT)
Dept: TRANSPLANT | Facility: CLINIC | Age: 62
End: 2022-04-08
Payer: MEDICARE

## 2022-04-08 ENCOUNTER — HOSPITAL ENCOUNTER (OUTPATIENT)
Dept: RADIOLOGY | Facility: HOSPITAL | Age: 62
Discharge: HOME | End: 2022-04-08
Attending: NURSE PRACTITIONER
Payer: MEDICARE

## 2022-04-08 ENCOUNTER — TELEPHONE (OUTPATIENT)
Dept: CARDIOLOGY | Facility: HOSPITAL | Age: 62
End: 2022-04-08

## 2022-04-08 VITALS
SYSTOLIC BLOOD PRESSURE: 159 MMHG | HEIGHT: 67 IN | RESPIRATION RATE: 16 BRPM | HEART RATE: 70 BPM | WEIGHT: 190.94 LBS | TEMPERATURE: 97 F | DIASTOLIC BLOOD PRESSURE: 80 MMHG | BODY MASS INDEX: 29.97 KG/M2 | OXYGEN SATURATION: 98 %

## 2022-04-08 VITALS
HEART RATE: 68 BPM | BODY MASS INDEX: 29.66 KG/M2 | HEIGHT: 67 IN | SYSTOLIC BLOOD PRESSURE: 112 MMHG | WEIGHT: 189 LBS | DIASTOLIC BLOOD PRESSURE: 64 MMHG

## 2022-04-08 DIAGNOSIS — I15.0 RENOVASCULAR HYPERTENSION: Chronic | ICD-10-CM

## 2022-04-08 DIAGNOSIS — N25.81 SECONDARY HYPERPARATHYROIDISM: ICD-10-CM

## 2022-04-08 DIAGNOSIS — N18.6 ESRD ON PERITONEAL DIALYSIS: ICD-10-CM

## 2022-04-08 DIAGNOSIS — Z76.82 PATIENT ON WAITING LIST FOR KIDNEY TRANSPLANT: Primary | ICD-10-CM

## 2022-04-08 DIAGNOSIS — Z76.82 ORGAN TRANSPLANT CANDIDATE: ICD-10-CM

## 2022-04-08 DIAGNOSIS — Z99.2 ESRD ON PERITONEAL DIALYSIS: ICD-10-CM

## 2022-04-08 DIAGNOSIS — E11.8 TYPE 2 DIABETES WITH COMPLICATION: ICD-10-CM

## 2022-04-08 LAB
ASCENDING AORTA: 3.14 CM
AV INDEX (PROSTH): 0.85
AV MEAN GRADIENT: 4 MMHG
AV PEAK GRADIENT: 7 MMHG
AV VALVE AREA: 2.59 CM2
AV VELOCITY RATIO: 0.76
BSA FOR ECHO PROCEDURE: 2.01 M2
CV ECHO LV RWT: 0.45 CM
DOP CALC AO PEAK VEL: 1.32 M/S
DOP CALC AO VTI: 25.52 CM
DOP CALC LVOT AREA: 3 CM2
DOP CALC LVOT DIAMETER: 1.97 CM
DOP CALC LVOT PEAK VEL: 1 M/S
DOP CALC LVOT STROKE VOLUME: 66.08 CM3
DOP CALCLVOT PEAK VEL VTI: 21.69 CM
E WAVE DECELERATION TIME: 220.97 MSEC
E/A RATIO: 1.03
E/E' RATIO: 12.2 M/S
ECHO LV POSTERIOR WALL: 1.05 CM (ref 0.6–1.1)
EJECTION FRACTION: 65 %
FRACTIONAL SHORTENING: 39 % (ref 28–44)
INTERVENTRICULAR SEPTUM: 1.01 CM (ref 0.6–1.1)
LA MAJOR: 5.3 CM
LA MINOR: 4.91 CM
LA WIDTH: 3.88 CM
LEFT ATRIUM SIZE: 3.6 CM
LEFT ATRIUM VOLUME INDEX MOD: 27.5 ML/M2
LEFT ATRIUM VOLUME INDEX: 30.7 ML/M2
LEFT ATRIUM VOLUME MOD: 54.14 CM3
LEFT ATRIUM VOLUME: 60.52 CM3
LEFT INTERNAL DIMENSION IN SYSTOLE: 2.85 CM (ref 2.1–4)
LEFT VENTRICLE DIASTOLIC VOLUME INDEX: 52.06 ML/M2
LEFT VENTRICLE DIASTOLIC VOLUME: 102.56 ML
LEFT VENTRICLE MASS INDEX: 87 G/M2
LEFT VENTRICLE SYSTOLIC VOLUME INDEX: 15.6 ML/M2
LEFT VENTRICLE SYSTOLIC VOLUME: 30.82 ML
LEFT VENTRICULAR INTERNAL DIMENSION IN DIASTOLE: 4.7 CM (ref 3.5–6)
LEFT VENTRICULAR MASS: 171.24 G
LV LATERAL E/E' RATIO: 12.2 M/S
LV SEPTAL E/E' RATIO: 12.2 M/S
MV PEAK A VEL: 0.59 M/S
MV PEAK E VEL: 0.61 M/S
MV STENOSIS PRESSURE HALF TIME: 64.08 MS
MV VALVE AREA P 1/2 METHOD: 3.43 CM2
PISA TR MAX VEL: 2.31 M/S
RA MAJOR: 4.45 CM
RA PRESSURE: 3 MMHG
RA WIDTH: 3.96 CM
RIGHT VENTRICULAR END-DIASTOLIC DIMENSION: 4.6 CM
RV TISSUE DOPPLER FREE WALL SYSTOLIC VELOCITY 1 (APICAL 4 CHAMBER VIEW): 9.47 CM/S
SINUS: 2.91 CM
STJ: 2.58 CM
TDI LATERAL: 0.05 M/S
TDI SEPTAL: 0.05 M/S
TDI: 0.05 M/S
TR MAX PG: 21 MMHG
TRICUSPID ANNULAR PLANE SYSTOLIC EXCURSION: 1.91 CM
TV REST PULMONARY ARTERY PRESSURE: 24 MMHG

## 2022-04-08 PROCEDURE — 71046 X-RAY EXAM CHEST 2 VIEWS: CPT | Mod: 26,TXP,, | Performed by: RADIOLOGY

## 2022-04-08 PROCEDURE — 71046 XR CHEST PA AND LATERAL: ICD-10-PCS | Mod: 26,TXP,, | Performed by: RADIOLOGY

## 2022-04-08 PROCEDURE — 99999 PR PBB SHADOW E&M-EST. PATIENT-LVL V: ICD-10-PCS | Mod: PBBFAC,TXP,, | Performed by: NURSE PRACTITIONER

## 2022-04-08 PROCEDURE — 71046 X-RAY EXAM CHEST 2 VIEWS: CPT | Mod: TC,TXP

## 2022-04-08 PROCEDURE — 76770 US EXAM ABDO BACK WALL COMP: CPT | Mod: 26,TXP,, | Performed by: RADIOLOGY

## 2022-04-08 PROCEDURE — 76770 US RETROPERITONEAL COMPLETE: ICD-10-PCS | Mod: 26,TXP,, | Performed by: RADIOLOGY

## 2022-04-08 PROCEDURE — 93306 ECHO (CUPID ONLY): ICD-10-PCS | Mod: 26,TXP,, | Performed by: INTERNAL MEDICINE

## 2022-04-08 PROCEDURE — 76770 US EXAM ABDO BACK WALL COMP: CPT | Mod: TC,TXP

## 2022-04-08 PROCEDURE — 99215 OFFICE O/P EST HI 40 MIN: CPT | Mod: PBBFAC,25,TXP | Performed by: NURSE PRACTITIONER

## 2022-04-08 PROCEDURE — 93306 TTE W/DOPPLER COMPLETE: CPT | Mod: 26,TXP,, | Performed by: INTERNAL MEDICINE

## 2022-04-08 PROCEDURE — 99215 PR OFFICE/OUTPT VISIT, EST, LEVL V, 40-54 MIN: ICD-10-PCS | Mod: S$PBB,TXP,, | Performed by: NURSE PRACTITIONER

## 2022-04-08 PROCEDURE — 99215 OFFICE O/P EST HI 40 MIN: CPT | Mod: S$PBB,TXP,, | Performed by: NURSE PRACTITIONER

## 2022-04-08 PROCEDURE — 99999 PR PBB SHADOW E&M-EST. PATIENT-LVL V: CPT | Mod: PBBFAC,TXP,, | Performed by: NURSE PRACTITIONER

## 2022-04-08 PROCEDURE — 93306 TTE W/DOPPLER COMPLETE: CPT | Mod: TXP

## 2022-04-08 NOTE — PROGRESS NOTES
Pt nuclear stress r/s'd sec. From today To pt drank 12 oz coke around MN last pm.  Pt instructed in person regarding nuclear stress test r/s'd to 4/12/22 at 8 am. Fast after MN except meds with a sip of water, hold diabetic meds. NO caffeine/decaf or chocolate for 24 hrs before test. Verbal and written instructions given to pt and also explained to wife. Both VU

## 2022-04-08 NOTE — PROGRESS NOTES
Kidney Transplant Recipient Reevalulation    Referring Physician: Cathryn Adair  Current Nephrologist: Cathryn Adair  Waitlist Status: active  Dialysis Start Date: 2/24/2021    Subjective:     CC:  Annual reassessment of kidney transplant candidacy.    HPI:  Mr. Ha is a 61 y.o. year old Black or  male with ESRD secondary to diabetic nephropathy.  He has been on the wait list for a kidney transplant at [facility] since 2/24/2021. Patient is currently on peritoneal dialysis started on 2/24/2021. Patient is dialyzing on cyclic peritoneal dialysis.  Patient reports that he is tolerating dialysis well. . He has a PD catheter. Patient denies any recent hospitalizations or ED visits. Denies peritonitis or exit site infections.    Remains active including cutting grass, painting, doing housework. Looks good, not frail.    CXR 4/8/2022: Left basilar subsegmental atelectasis versus scar.    Renal US 4/8/2022: Findings consistent with chronic medical renal disease. Bilateral renal simple cysts.  No evidence of mass. Prostatomegaly.    Echo 4/8/2022: EF 65%, PA 24    Stress 4/8/2022: has to be rescheduled due to drinking coke before test    Had cataract sx on bilateral eyes. 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 Has pain contract with Dr. Trevino for [facility].    Current Outpatient Medications   Medication Sig Dispense Refill    aspirin (ECOTRIN) 81 MG EC tablet Take 1 tablet (81 mg total) by mouth once daily.      atorvastatin (LIPITOR) 80 MG tablet Take 1 tablet (80 mg total) by mouth every evening. 90 tablet 3    blood sugar diagnostic Strp True Metrix test strips; 3 times a day 100 strip 5    calcitRIOL (ROCALTROL) 0.25 MCG Cap Take by mouth.      carvediloL (COREG) 25 MG tablet Take 1 tablet (25 mg total) by mouth 2 (two) times daily with meals. 60 tablet 2     dicyclomine (BENTYL) 20 mg tablet Take 20 mg by mouth daily as needed.       dorzolamide-timolol 2-0.5% (COSOPT) 22.3-6.8 mg/mL ophthalmic solution Place 1 drop into the left eye 2 (two) times daily. 10 mL 1    furosemide (LASIX) 40 MG tablet Take 1 tablet (40 mg total) by mouth once daily. 90 tablet 3    gabapentin (NEURONTIN) 300 MG capsule Take 1 capsule (300 mg total) by mouth 3 (three) times daily. 90 capsule 11    gentamicin (GARAMYCIN) 0.1 % cream Apply topically.      HYDROcodone-acetaminophen (NORCO) 5-325 mg per tablet Take 1 tablet by mouth every 12 (twelve) hours as needed for Pain. 120 tablet 0    insulin lispro (HUMALOG KWIKPEN INSULIN) 100 unit/mL pen Inject 6-10 units sq with meals. (Patient taking differently: Inject 4-6 units sq with meals.) 15 mL 5    lancets (ACCU-CHEK SOFTCLIX LANCETS) Misc True metrix lancets; 3 times a day 100 each 5    LEVEMIR U-100 INSULIN 100 unit/mL injection INJECT 34-38 UNITS INTO THE SKIN EVERY EVENING 10 mL 3    NIFEdipine (PROCARDIA-XL) 60 MG (OSM) 24 hr tablet Take 1 tablet (60 mg total) by mouth once daily. 30 tablet 11    ondansetron (ZOFRAN-ODT) 4 MG TbDL Take 4 mg by mouth every 6 (six) hours as needed.       tamsulosin (FLOMAX) 0.4 mg Cap Take 1 capsule by mouth once daily 90 capsule 3    TRADJENTA 5 mg Tab tablet TAKE 1 TABLET BY MOUTH EVERY DAY 30 tablet 2    AURYXIA 210 mg iron Tab Take 1 tablet by mouth once daily.       insulin lispro 100 unit/mL injection Inject 6-10 Units into the skin 3 (three) times daily before meals.        Current Facility-Administered Medications   Medication Dose Route Frequency Provider Last Rate Last Admin    bevacizumab (AVASTIN) 2.5 mg/0.10 mL injection 1.25 mg  1.25 mg Intraocular 1 time in Clinic/HOD Kayden Street MD           Past Medical History:   Diagnosis Date    Anemia     Aqueous misdirection of right eye     Arthritis     Blind painful eye     Cataract     CKD (chronic kidney disease)      "Diabetes mellitus, type 2     Disorder of kidney and ureter     Fever blister     Hyperlipidemia     Hypertension     Joint pain     Neuropathy        Review of Systems   Constitutional: Positive for fatigue. Negative for activity change, appetite change and fever.   HENT: Negative for congestion, mouth sores and sore throat.    Eyes: Positive for visual disturbance.        Right eye enucleation and now with prosthesis.   Wears glasses   Respiratory: Negative for cough, chest tightness and shortness of breath.    Cardiovascular: Negative for chest pain, palpitations and leg swelling.   Gastrointestinal: Negative for abdominal distention, abdominal pain, constipation, diarrhea and nausea.   Genitourinary: Negative for difficulty urinating, frequency and hematuria.        Reports no decrease in urine production   Musculoskeletal: Positive for arthralgias. Negative for gait problem.        Chronic left shoulder pain   Skin: Negative for wound.   Allergic/Immunologic: Negative for environmental allergies, food allergies and immunocompromised state.   Neurological: Negative for dizziness, weakness and numbness.   Hematological: Bruises/bleeds easily (on ASA ).   Psychiatric/Behavioral: Negative for sleep disturbance. The patient is not nervous/anxious.        Objective:   body mass index is 29.55 kg/m².  BP (!) 159/80 (BP Location: Right arm, Patient Position: Sitting, BP Method: Medium (Automatic))   Pulse 70   Temp 97.2 °F (36.2 °C) (Tympanic)   Resp 16   Ht 5' 7.4" (1.712 m)   Wt 86.6 kg (190 lb 14.7 oz)   SpO2 98%   BMI 29.55 kg/m²     Physical Exam  Vitals and nursing note reviewed.   Constitutional:       Appearance: Normal appearance.   HENT:      Head: Normocephalic.   Eyes:      Pupils: Pupils are equal, round, and reactive to light.      Comments: Right eye prosthesis   Cardiovascular:      Rate and Rhythm: Normal rate and regular rhythm.      Heart sounds: Normal heart sounds.   Pulmonary:      " Effort: Pulmonary effort is normal.      Breath sounds: Normal breath sounds.   Abdominal:      General: Bowel sounds are normal. There is no distension.      Palpations: Abdomen is soft.      Tenderness: There is no abdominal tenderness.      Comments: PD catheter , no erythema, edema, tenderness or drainage.    Musculoskeletal:         General: Normal range of motion.      Cervical back: Normal range of motion and neck supple.      Right lower leg: No edema.      Left lower leg: No edema.   Skin:     General: Skin is warm and dry.   Neurological:      General: No focal deficit present.      Mental Status: He is alert and oriented to person, place, and time.   Psychiatric:         Behavior: Behavior normal.         Labs:  Lab Results   Component Value Date    WBC 6.04 03/04/2021    HGB 8.8 (L) 03/04/2021    HCT 26.8 (L) 03/04/2021     03/04/2021    K 4.9 03/04/2021     (H) 03/04/2021    CO2 17 (L) 03/04/2021    BUN 56 (H) 03/04/2021    CREATININE 7.9 (H) 03/04/2021    EGFRNONAA 6.7 (A) 03/04/2021    CALCIUM 7.7 (L) 03/04/2021    PHOS 5.5 (H) 03/04/2021    MG 2.1 05/26/2020    ALBUMIN 2.9 (L) 03/04/2021    AST 14 03/04/2021    ALT 10 03/04/2021    UTPCR 2.58 (H) 03/04/2021    .0 (H) 03/04/2021       Lab Results   Component Value Date    BILIRUBINUA Negative 10/26/2019    PROTEINUA 2+ (A) 10/26/2019    NITRITE neg 07/07/2021    RBCUA 2 10/26/2019    WBCUA 1 10/26/2019       Lab Results   Component Value Date    CPRA 0 02/04/2022    VM2QTSG Negative 02/04/2022    CIABCLM A11 10/06/2021    CIIAB DQA1*03:02 12/02/2021    ABCMT DQ6 02/04/2022       Labs were reviewed with the patient.    Pre-transplant Workup:   Reviewed with the patient.    Assessment:     1. Patient on waiting list for kidney transplant    2. ESRD on peritoneal dialysis    3. Renovascular hypertension    4. Type 2 diabetes with complication    5. Secondary hyperparathyroidism    6. BMI 29.0-29.9,adult        Plan:   Lexiscan  cancelled this morning, would like to have it re-scheduled in [place].     Transplant Candidacy:   Mr. Ha is a suitable kidney transplant candidate.  Meets center eligibility for accepting HCV+ donor offer - yes.  Patient educated on HCV+ donors. Murphy is willing  to accept HCV+ donor offer -  yes   Patient is a candidate for KDPI > 85 kidney donor offer - yes.  He remains in overall stable health, and will remain active on the transplant list.    Patient advised that it is recommended that all transplant candidates, and their close contacts and household members receive Covid vaccination.    Samia Wilkinson NP       Follow-up:   In addition to the tests noted in the plan, Mr. Ha will continue to have reevaluation as per the standing pre-kidney transplant protocol:  1. Monthly blood for PRA  2. Annual return to clinic, except HIV positive, > 65 years of age, or pancreas transplant candidates who will be scheduled to see transplant every 6 months while in pre-transplant phase  3. Annual re-testing: CXR, EKG, yearly mammograms for women over 40 and PSA for males over 40, cardiology follow-up as recommended by initial cardiology pre-transplant evaluation  4. Renal ultrasound every 2 years  5. Baseline colonoscopy after age 50 and repeated as recommended    [facility] Patient Status  Functional Status: 60% - Requires occasional assistance but is able to care for needs  Physical Capacity: No Limitations

## 2022-04-12 ENCOUNTER — HOSPITAL ENCOUNTER (OUTPATIENT)
Dept: CARDIOLOGY | Facility: HOSPITAL | Age: 62
Discharge: HOME | End: 2022-04-12
Attending: NURSE PRACTITIONER
Payer: MEDICARE

## 2022-04-12 VITALS
HEIGHT: 67 IN | DIASTOLIC BLOOD PRESSURE: 63 MMHG | SYSTOLIC BLOOD PRESSURE: 165 MMHG | WEIGHT: 189 LBS | HEART RATE: 90 BPM | BODY MASS INDEX: 29.66 KG/M2

## 2022-04-12 LAB
CV PHARM DOSE: 0.4 MG
CV STRESS BASE HR: 78 BPM
DIASTOLIC BLOOD PRESSURE: 89 MMHG
EJECTION FRACTION- HIGH: 65 %
END DIASTOLIC INDEX-HIGH: 153 ML/M2
END DIASTOLIC INDEX-LOW: 93 ML/M2
END SYSTOLIC INDEX-HIGH: 71 ML/M2
END SYSTOLIC INDEX-LOW: 31 ML/M2
NUC REST DIASTOLIC VOLUME INDEX: 60
NUC REST EJECTION FRACTION: 60
NUC REST SYSTOLIC VOLUME INDEX: 24
NUC STRESS DIASTOLIC VOLUME INDEX: 63
NUC STRESS EJECTION FRACTION: 67 %
NUC STRESS SYSTOLIC VOLUME INDEX: 21
OHS CV CPX 85 PERCENT MAX PREDICTED HEART RATE MALE: 136
OHS CV CPX MAX PREDICTED HEART RATE: 160
OHS CV CPX PATIENT IS FEMALE: 0
OHS CV CPX PATIENT IS MALE: 1
OHS CV CPX PEAK DIASTOLIC BLOOD PRESSURE: 71 MMHG
OHS CV CPX PEAK HEAR RATE: 83 BPM
OHS CV CPX PEAK RATE PRESSURE PRODUCT: NORMAL
OHS CV CPX PEAK SYSTOLIC BLOOD PRESSURE: 171 MMHG
OHS CV CPX PERCENT MAX PREDICTED HEART RATE ACHIEVED: 52
OHS CV CPX RATE PRESSURE PRODUCT PRESENTING: NORMAL
RETIRED EF AND QEF - SEE NOTES: 53 %
SYSTOLIC BLOOD PRESSURE: 158 MMHG

## 2022-04-12 PROCEDURE — 93016 STRESS TEST WITH MYOCARDIAL PERFUSION (CUPID ONLY): ICD-10-PCS | Mod: TXP,,, | Performed by: INTERNAL MEDICINE

## 2022-04-12 PROCEDURE — 78452 HT MUSCLE IMAGE SPECT MULT: CPT | Mod: 26,TXP,, | Performed by: INTERNAL MEDICINE

## 2022-04-12 PROCEDURE — 78452 STRESS TEST WITH MYOCARDIAL PERFUSION (CUPID ONLY): ICD-10-PCS | Mod: 26,TXP,, | Performed by: INTERNAL MEDICINE

## 2022-04-12 PROCEDURE — 93018 CV STRESS TEST I&R ONLY: CPT | Mod: TXP,,, | Performed by: INTERNAL MEDICINE

## 2022-04-12 PROCEDURE — A9502 TC99M TETROFOSMIN: HCPCS | Mod: TXP

## 2022-04-12 PROCEDURE — 63600175 PHARM REV CODE 636 W HCPCS: Mod: TXP | Performed by: NURSE PRACTITIONER

## 2022-04-12 PROCEDURE — 93018 STRESS TEST WITH MYOCARDIAL PERFUSION (CUPID ONLY): ICD-10-PCS | Mod: TXP,,, | Performed by: INTERNAL MEDICINE

## 2022-04-12 PROCEDURE — 93016 CV STRESS TEST SUPVJ ONLY: CPT | Mod: TXP,,, | Performed by: INTERNAL MEDICINE

## 2022-04-12 RX ORDER — REGADENOSON 0.08 MG/ML
0.4 INJECTION, SOLUTION INTRAVENOUS ONCE
Status: COMPLETED | OUTPATIENT
Start: 2022-04-12 | End: 2022-04-12

## 2022-04-12 RX ORDER — AMINOPHYLLINE 25 MG/ML
75 INJECTION, SOLUTION INTRAVENOUS ONCE
Status: COMPLETED | OUTPATIENT
Start: 2022-04-12 | End: 2022-04-12

## 2022-04-12 RX ADMIN — AMINOPHYLLINE 75 MG: 25 INJECTION, SOLUTION INTRAVENOUS at 10:04

## 2022-04-12 RX ADMIN — REGADENOSON 0.4 MG: 0.08 INJECTION, SOLUTION INTRAVENOUS at 09:04

## 2022-04-12 NOTE — NURSING
0825 Pt states he felt that his sugar might be low, accucheck 63.  Pt denies any complaints prior to testing, during testing, and during recovery from testing.  NAD, VSS. ASG.    1000 Pt returned for last set of imaging and states he has a little headache, Aminophylline given, no neuro deficits noted, VSS, NAD.    1005 Pt denies any complaints prior to discharge, NAD, VSS, ASG, No neuro deficits noted.

## 2022-05-19 NOTE — PROGRESS NOTES
Transplant Psychosocial Evaluation Update:  Last psychosocial evaluation for transplant was completed on 3/4/21 by GOYO Steven    Pt presents today in company of Marjorie Ha, wife. Pt and wife  present as alert, oriented to person, place, time, purpose of visit. Pt and wife deny concerns about going through with transplant and state motivation and sense of preparedness for transplantation, caregiver role, psychosocial risk factors, medical risk factors, financial aspects, and lifetime commitments. All concerns and education points as per transplant psychosocial evaluation process addressed (also refer to psychosocial dated 3/12/2021). Pt actively participated in today's interview, with wife participating as caregiver. Pt asking questions appropriately and denies changes to previous psychosocial evaluation for transplantation which we reviewed line by line with pt and, per pt choice, with pts caregiver today.    Primary Caregivers and Transportation for Transplant:  1. Marjorie Ha, 65, wife, [phone], drives  2. Homer Stewart, 23, dtr, [facility] [phone], drives.  3. Amber Goetz, 63, sister, [place], [phone], drives    Both pt and caregiver/s plan to stay locally at [facility] - information reviewed in depth. Caregivers plan to stay at hospital as appropriate for transplant and post-transplant process.    Pts Vocation: Pt is disabled Last day worked:  3/1/17.    DME: diabetic supplies and equipment, PD supplies and equipment.     ADLS:  Pt states ability to independently accomplish all adls.    Household and Dependents: pt resides with wife and has no dependents at this time.    Income: Pt states ability to afford all monthly expenses and costs including for medications now and if transplanted based on income and on savings and assets.    Dialysis Adherence: Pt states current and expected compliance with all healthcare recommendations.    Pt and wife deny any additional concerns,  stating preparedness and motivation to proceed with organ transplant.     Suitability for Transplant:   Pt remains low risk for transplant at this time based on psychosocial risk factors and strengths.    Pt ananth well overall with health needs and life in general, Pt denies current or past suicidal/homicidal ideation.has stable supports, adequate insurance, financial stability, transportation and caregiver plans in place, and is using no substances unless prescribed.     Payer/Plan Subscr  Sex Relation Sub. Ins. ID Effective Group Num   1. [place] - [place]* VELMA TORRESDERIC* 1960 Male Self [id number]                                    [address]   2. [place]* 1960 Male Self [id number]* [id number]                                   [address] [address]

## 2022-06-06 ENCOUNTER — TELEPHONE (OUTPATIENT)
Dept: OPHTHALMOLOGY | Facility: CLINIC | Age: 62
End: 2022-06-06
Payer: MEDICARE

## 2022-06-06 NOTE — TELEPHONE ENCOUNTER
----- Message from Pilar Mosqueda sent at 6/6/2022 10:32 AM CDT -----  Contact: Murphy @[phone]  Pt needs to be seen today as per our [facility] location due to having a bleed in the left eye which is his good eye . Please contact the pt with the appt Marjorie @ [phone]

## 2022-06-06 NOTE — TELEPHONE ENCOUNTER
----- Message from Pilar Mosqueda sent at 6/6/2022 11:47 AM CDT -----  Contact: Carla @[phone]  Pt returning phone call regarding a same day appt for bleed in the eye

## 2022-06-06 NOTE — TELEPHONE ENCOUNTER
----- Message from Taylor Mosqueda sent at 6/6/2022 12:01 PM CDT -----  Regarding: rtn call  Pt returning call to Raquel Amador (wife)  @  [phone]

## 2022-06-07 ENCOUNTER — OFFICE VISIT (OUTPATIENT)
Dept: OPHTHALMOLOGY | Facility: CLINIC | Age: 62
End: 2022-06-07
Payer: MEDICARE

## 2022-06-07 DIAGNOSIS — E11.3592 PROLIFERATIVE DIABETIC RETINOPATHY OF LEFT EYE WITHOUT MACULAR EDEMA ASSOCIATED WITH TYPE 2 DIABETES MELLITUS: ICD-10-CM

## 2022-06-07 DIAGNOSIS — H40.1121 PRIMARY OPEN ANGLE GLAUCOMA (POAG) OF LEFT EYE, MILD STAGE: ICD-10-CM

## 2022-06-07 DIAGNOSIS — H43.12 VITREOUS HEMORRHAGE, LEFT EYE: Primary | ICD-10-CM

## 2022-06-07 PROCEDURE — 76512 B SCAN: ICD-10-PCS | Mod: 26,50,S$PBB, | Performed by: OPHTHALMOLOGY

## 2022-06-07 PROCEDURE — 99211 OFF/OP EST MAY X REQ PHY/QHP: CPT | Mod: PBBFAC,25 | Performed by: OPHTHALMOLOGY

## 2022-06-07 PROCEDURE — 67028 INJECTION EYE DRUG: CPT | Mod: S$PBB,LT,, | Performed by: OPHTHALMOLOGY

## 2022-06-07 PROCEDURE — 99999 PR PBB SHADOW E&M-EST. PATIENT-LVL I: ICD-10-PCS | Mod: PBBFAC,,, | Performed by: OPHTHALMOLOGY

## 2022-06-07 PROCEDURE — 67028 INJECTION EYE DRUG: CPT | Mod: PBBFAC,LT | Performed by: OPHTHALMOLOGY

## 2022-06-07 PROCEDURE — 99214 PR OFFICE/OUTPT VISIT, EST, LEVL IV, 30-39 MIN: ICD-10-PCS | Mod: 25,S$PBB,, | Performed by: OPHTHALMOLOGY

## 2022-06-07 PROCEDURE — 99999 PR PBB SHADOW E&M-EST. PATIENT-LVL I: CPT | Mod: PBBFAC,,, | Performed by: OPHTHALMOLOGY

## 2022-06-07 PROCEDURE — 76512 OPH US DX B-SCAN: CPT | Mod: 50,PBBFAC | Performed by: OPHTHALMOLOGY

## 2022-06-07 PROCEDURE — 99214 OFFICE O/P EST MOD 30 MIN: CPT | Mod: 25,S$PBB,, | Performed by: OPHTHALMOLOGY

## 2022-06-07 PROCEDURE — 67028 PR INJECT INTRAVITREAL PHARMCOLOGIC: ICD-10-PCS | Mod: S$PBB,LT,, | Performed by: OPHTHALMOLOGY

## 2022-06-07 RX ADMIN — BEVACIZUMAB 1.25 MG: 100 INJECTION, SOLUTION INTRAVENOUS at 11:06

## 2022-06-07 NOTE — PROGRESS NOTES
HPI     Patient began to have floaters and blurred VA OS on Sunday.   He went to ER because that is his only good eye due to enucleation OD in   2014   He was told he had a bleed in back of eye and he remembers having   something like this happen OS before that was previously treated with   laser.   He presents today to discuss options for treatment for VH OS.    Cosopt BID OS    Last edited by Bethany Mosley on 6/7/2022 10:07 AM. (History)         A/P    ICD-10-CM ICD-9-CM   1. Vitreous hemorrhage, left eye  H43.12 379.23   2. Proliferative diabetic retinopathy of left eye without macular edema associated with type 2 diabetes mellitus  E11.3592 250.50     362.02   3. Primary open angle glaucoma (POAG) of left eye, mild stage  H40.1121 365.11     365.71       1. Vitreous hemorrhage, left eye  2. Proliferative diabetic retinopathy of left eye without macular edema associated with type 2 diabetes mellitus  Triage visit, patient was seen at [facility] and concern for new VH in only seeing eye  Last BARNEY was 11/2022, has had PRP since    Today new VH, retina attached, B scan with subhyaloid heme     Needs BARNEY OS for new VH, discussed possible need for PPV in coming weeks if not improving    Based on todays exam, diagnostic studies, and review of records, the determination was made for treatment today.  Schedule Avastin Injection today Left Eye Patient chooses to proceed with injection R/B/A discussed include infection retinal detachment and stroke    Patient identified.  Timeout performed.    Risks, benefits, and alternatives to treatment were discussed in detail with the patient, including bleeding/infection (endophthalmitis)/etc.  The patient voiced understanding and wished to proceed with the procedure.  See separate consent form.    Injection Procedure Note:  Diagnosis: vitreous hemorrhage Left Eye    Topical Proparacaine drop placed then topical 5% Betadine and then subconjunctival lidocaine 2% injection  Sterile gloves  used, and sterile lid speculum placed.  5% Betadine placed at injection site again prior to injection.  Avastin 1.25mg in 0.05cc Injected inferotemporally 3.5-4mm posterior to the limbus.  Complications: None  Va at least CF at 5 feet post injection.  Retina, ONH, IOP normal after injection.    Followup as below.  Patient should return immediately PRN.  Retinal Detachment and Endophthalmitis precautions given.     Sleep HOB elevated    3. Primary open angle glaucoma (POAG) of left eye, mild stage  IOP 20, followed by Hilary  Plan: Continue Present Management     RTC next Friday 6/17/22 DFE OS      I saw and examined the patient and reviewed in detail the findings documented. The final examination findings, image interpretations, and plan as documented in the record represent my personal judgment and conclusions.    Kayden Street MD  Vitreoretinal Surgery   [facility]

## 2022-07-06 ENCOUNTER — TELEPHONE (OUTPATIENT)
Dept: TRANSPLANT | Facility: CLINIC | Age: 62
End: 2022-07-06
Payer: MEDICARE

## 2022-12-15 LAB
CHOLEST SERPL-MSCNC: 97 MG/DL (ref 0–200)
HBA1C MFR BLD: 9.8 % (ref 4–6)
HDLC SERPL-MCNC: 40 MG/DL
LDLC SERPL CALC-MCNC: 44 MG/DL (ref 0–160)
TRIGL SERPL-MCNC: 63 MG/DL

## 2023-03-02 ENCOUNTER — PATIENT OUTREACH (OUTPATIENT)
Dept: FAMILY MEDICINE | Facility: CLINIC | Age: 63
End: 2023-03-02
Payer: MEDICARE

## 2023-03-06 ENCOUNTER — TELEPHONE (OUTPATIENT)
Dept: ADMINISTRATIVE | Facility: HOSPITAL | Age: 63
End: 2023-03-06
Payer: MEDICARE

## 2023-03-06 ENCOUNTER — PATIENT OUTREACH (OUTPATIENT)
Dept: ADMINISTRATIVE | Facility: HOSPITAL | Age: 63
End: 2023-03-06
Payer: MEDICARE

## 2023-03-06 DIAGNOSIS — E11.8 TYPE 2 DIABETES WITH COMPLICATION: Primary | ICD-10-CM

## 2023-03-15 ENCOUNTER — TELEPHONE (OUTPATIENT)
Dept: TRANSPLANT | Facility: CLINIC | Age: 63
End: 2023-03-15
Payer: MEDICARE

## 2023-03-15 DIAGNOSIS — Z12.5 SCREENING PSA (PROSTATE SPECIFIC ANTIGEN): Primary | ICD-10-CM

## 2023-03-15 DIAGNOSIS — Z76.82 ORGAN TRANSPLANT CANDIDATE: ICD-10-CM

## 2023-04-13 DIAGNOSIS — Z76.82 PRE-KIDNEY TRANSPLANT, LISTED: Primary | ICD-10-CM

## 2023-04-27 ENCOUNTER — TELEPHONE (OUTPATIENT)
Dept: TRANSPLANT | Facility: CLINIC | Age: 63
End: 2023-04-27
Payer: MEDICARE

## 2023-05-23 ENCOUNTER — TELEPHONE (OUTPATIENT)
Dept: TRANSPLANT | Facility: CLINIC | Age: 63
End: 2023-05-23
Payer: MEDICARE

## 2023-05-23 NOTE — TELEPHONE ENCOUNTER
Spoke to pt confirming rescheduled appts on 07/28/2023. Appt reminders were mailed on 05/23/2023 and pt is aware to bring care giver.

## 2023-06-07 ENCOUNTER — SOCIAL WORK (OUTPATIENT)
Dept: TRANSPLANT | Facility: CLINIC | Age: 63
End: 2023-06-07
Payer: MEDICARE

## 2023-06-20 PROBLEM — H04.122: Status: ACTIVE | Noted: 2023-06-20

## 2023-07-21 ENCOUNTER — TELEPHONE (OUTPATIENT)
Dept: TRANSPLANT | Facility: CLINIC | Age: 63
End: 2023-07-21
Payer: MEDICARE

## 2023-07-24 PROBLEM — H18.49: Status: ACTIVE | Noted: 2023-07-24

## 2023-07-27 ENCOUNTER — DOCUMENTATION ONLY (OUTPATIENT)
Dept: TRANSPLANT | Facility: CLINIC | Age: 63
End: 2023-07-27
Payer: MEDICARE

## 2023-07-27 ENCOUNTER — TELEPHONE (OUTPATIENT)
Dept: TRANSPLANT | Facility: CLINIC | Age: 63
End: 2023-07-27
Payer: MEDICARE

## 2023-07-27 NOTE — TELEPHONE ENCOUNTER
.MA notes per adherence form     FOR THE PAST THREE MONTHS:    0-AMA's  2-No-shows both missed tx's were approved by the doctor    No concerns with care giving, transportation, or mental health    Brought over to clinic to be scanned in.    Mary Ann Sanchez  Abdominal Transplant MA

## 2023-07-27 NOTE — PROGRESS NOTES
Medical record, including care everywhere, reviewed for lab results which meet criteria for an eGFR Waiting Time Modification based on OPTN Policy 8.3.A  Lab testing from 8/23/19 found to meet criteria. Reviewed with nephrology and Waiting Time Modification Form was submitted to [employer].

## 2023-07-28 ENCOUNTER — HOSPITAL ENCOUNTER (OUTPATIENT)
Dept: RADIOLOGY | Facility: HOSPITAL | Age: 63
Discharge: HOME | End: 2023-07-28
Attending: NURSE PRACTITIONER
Payer: MEDICARE

## 2023-07-28 ENCOUNTER — HOSPITAL ENCOUNTER (OUTPATIENT)
Dept: CARDIOLOGY | Facility: HOSPITAL | Age: 63
Discharge: HOME | End: 2023-07-28
Attending: NURSE PRACTITIONER
Payer: MEDICARE

## 2023-07-28 ENCOUNTER — OFFICE VISIT (OUTPATIENT)
Dept: TRANSPLANT | Facility: CLINIC | Age: 63
End: 2023-07-28
Payer: MEDICARE

## 2023-07-28 VITALS
OXYGEN SATURATION: 99 % | HEIGHT: 68 IN | DIASTOLIC BLOOD PRESSURE: 63 MMHG | HEART RATE: 70 BPM | WEIGHT: 194 LBS | RESPIRATION RATE: 20 BRPM | TEMPERATURE: 98 F | SYSTOLIC BLOOD PRESSURE: 117 MMHG | BODY MASS INDEX: 29.4 KG/M2

## 2023-07-28 VITALS
HEIGHT: 67 IN | BODY MASS INDEX: 29.98 KG/M2 | DIASTOLIC BLOOD PRESSURE: 63 MMHG | SYSTOLIC BLOOD PRESSURE: 117 MMHG | HEART RATE: 70 BPM | WEIGHT: 191 LBS

## 2023-07-28 DIAGNOSIS — Z76.82 ORGAN TRANSPLANT CANDIDATE: ICD-10-CM

## 2023-07-28 DIAGNOSIS — N18.6 ESRD (END STAGE RENAL DISEASE) ON DIALYSIS: ICD-10-CM

## 2023-07-28 DIAGNOSIS — E11.8 TYPE 2 DIABETES WITH COMPLICATION: ICD-10-CM

## 2023-07-28 DIAGNOSIS — Z99.2 ESRD (END STAGE RENAL DISEASE) ON DIALYSIS: ICD-10-CM

## 2023-07-28 DIAGNOSIS — Z76.82 PATIENT ON WAITING LIST FOR KIDNEY TRANSPLANT: Primary | ICD-10-CM

## 2023-07-28 LAB
ASCENDING AORTA: 2.72 CM
AV INDEX (PROSTH): 0.74
AV MEAN GRADIENT: 4 MMHG
AV PEAK GRADIENT: 8 MMHG
AV VALVE AREA: 2.19 CM2
AV VELOCITY RATIO: 0.7
BSA FOR ECHO PROCEDURE: 2.02 M2
CV ECHO LV RWT: 0.44 CM
DOP CALC AO PEAK VEL: 1.39 M/S
DOP CALC AO VTI: 32.08 CM
DOP CALC LVOT AREA: 3 CM2
DOP CALC LVOT DIAMETER: 1.94 CM
DOP CALC LVOT PEAK VEL: 0.97 M/S
DOP CALC LVOT STROKE VOLUME: 70.4 CM3
DOP CALCLVOT PEAK VEL VTI: 23.83 CM
E WAVE DECELERATION TIME: 225.25 MSEC
E/A RATIO: 0.85
E/E' RATIO: 8.12 M/S
ECHO LV POSTERIOR WALL: 1.03 CM (ref 0.6–1.1)
EJECTION FRACTION: 63 %
FRACTIONAL SHORTENING: 26 % (ref 28–44)
INTERVENTRICULAR SEPTUM: 0.66 CM (ref 0.6–1.1)
IVRT: 91.34 MSEC
LA MAJOR: 5.5 CM
LA MINOR: 5.44 CM
LA WIDTH: 3.91 CM
LEFT ATRIUM SIZE: 3.55 CM
LEFT ATRIUM VOLUME INDEX MOD: 25 ML/M2
LEFT ATRIUM VOLUME INDEX: 32.6 ML/M2
LEFT ATRIUM VOLUME MOD: 49.43 CM3
LEFT ATRIUM VOLUME: 64.54 CM3
LEFT INTERNAL DIMENSION IN SYSTOLE: 3.44 CM (ref 2.1–4)
LEFT VENTRICLE DIASTOLIC VOLUME INDEX: 50.6 ML/M2
LEFT VENTRICLE DIASTOLIC VOLUME: 100.18 ML
LEFT VENTRICLE MASS INDEX: 65 G/M2
LEFT VENTRICLE SYSTOLIC VOLUME INDEX: 24.6 ML/M2
LEFT VENTRICLE SYSTOLIC VOLUME: 48.73 ML
LEFT VENTRICULAR INTERNAL DIMENSION IN DIASTOLE: 4.66 CM (ref 3.5–6)
LEFT VENTRICULAR MASS: 129.44 G
LV LATERAL E/E' RATIO: 6.9 M/S
LV SEPTAL E/E' RATIO: 9.86 M/S
MV A" WAVE DURATION": 8.56 MSEC
MV PEAK A VEL: 0.81 M/S
MV PEAK E VEL: 0.69 M/S
MV STENOSIS PRESSURE HALF TIME: 65.32 MS
MV VALVE AREA P 1/2 METHOD: 3.37 CM2
PISA TR MAX VEL: 2.38 M/S
PULM VEIN S/D RATIO: 0.86
PV PEAK D VEL: 0.49 M/S
PV PEAK S VEL: 0.42 M/S
RA MAJOR: 4.96 CM
RA PRESSURE: 3 MMHG
RA WIDTH: 4.18 CM
RIGHT VENTRICULAR END-DIASTOLIC DIMENSION: 3.76 CM
SINUS: 2.59 CM
STJ: 2.3 CM
TDI LATERAL: 0.1 M/S
TDI SEPTAL: 0.07 M/S
TDI: 0.09 M/S
TR MAX PG: 23 MMHG
TRICUSPID ANNULAR PLANE SYSTOLIC EXCURSION: 2.11 CM
TV REST PULMONARY ARTERY PRESSURE: 26 MMHG

## 2023-07-28 PROCEDURE — 3046F PR MOST RECENT HEMOGLOBIN A1C LEVEL > 9.0%: ICD-10-PCS | Mod: CPTII,S$GLB,TXP, | Performed by: NURSE PRACTITIONER

## 2023-07-28 PROCEDURE — 72170 X-RAY EXAM OF PELVIS: CPT | Mod: TC,TXP

## 2023-07-28 PROCEDURE — 3060F POS MICROALBUMINURIA REV: CPT | Mod: CPTII,S$GLB,TXP, | Performed by: NURSE PRACTITIONER

## 2023-07-28 PROCEDURE — 3046F HEMOGLOBIN A1C LEVEL >9.0%: CPT | Mod: CPTII,S$GLB,TXP, | Performed by: NURSE PRACTITIONER

## 2023-07-28 PROCEDURE — 76770 US EXAM ABDO BACK WALL COMP: CPT | Mod: TC,TXP

## 2023-07-28 PROCEDURE — 3008F PR BODY MASS INDEX (BMI) DOCUMENTED: ICD-10-PCS | Mod: CPTII,S$GLB,TXP, | Performed by: NURSE PRACTITIONER

## 2023-07-28 PROCEDURE — 93306 TTE W/DOPPLER COMPLETE: CPT | Mod: TXP

## 2023-07-28 PROCEDURE — 71046 X-RAY EXAM CHEST 2 VIEWS: CPT | Mod: 26,TXP,, | Performed by: RADIOLOGY

## 2023-07-28 PROCEDURE — 71046 XR CHEST PA AND LATERAL: ICD-10-PCS | Mod: 26,TXP,, | Performed by: RADIOLOGY

## 2023-07-28 PROCEDURE — 99215 PR OFFICE/OUTPT VISIT, EST, LEVL V, 40-54 MIN: ICD-10-PCS | Mod: S$GLB,TXP,, | Performed by: NURSE PRACTITIONER

## 2023-07-28 PROCEDURE — 72170 XR PELVIS ROUTINE AP: ICD-10-PCS | Mod: 26,TXP,, | Performed by: RADIOLOGY

## 2023-07-28 PROCEDURE — 3066F NEPHROPATHY DOC TX: CPT | Mod: CPTII,S$GLB,TXP, | Performed by: NURSE PRACTITIONER

## 2023-07-28 PROCEDURE — 99999 PR PBB SHADOW E&M-EST. PATIENT-LVL III: CPT | Mod: PBBFAC,TXP,, | Performed by: NURSE PRACTITIONER

## 2023-07-28 PROCEDURE — 93306 ECHO (CUPID ONLY): ICD-10-PCS | Mod: 26,TXP,, | Performed by: INTERNAL MEDICINE

## 2023-07-28 PROCEDURE — 1159F MED LIST DOCD IN RCRD: CPT | Mod: CPTII,S$GLB,TXP, | Performed by: NURSE PRACTITIONER

## 2023-07-28 PROCEDURE — 3074F PR MOST RECENT SYSTOLIC BLOOD PRESSURE < 130 MM HG: ICD-10-PCS | Mod: CPTII,S$GLB,TXP, | Performed by: NURSE PRACTITIONER

## 2023-07-28 PROCEDURE — 99215 OFFICE O/P EST HI 40 MIN: CPT | Mod: S$GLB,TXP,, | Performed by: NURSE PRACTITIONER

## 2023-07-28 PROCEDURE — 72170 X-RAY EXAM OF PELVIS: CPT | Mod: 26,TXP,, | Performed by: RADIOLOGY

## 2023-07-28 PROCEDURE — 3066F PR DOCUMENTATION OF TREATMENT FOR NEPHROPATHY: ICD-10-PCS | Mod: CPTII,S$GLB,TXP, | Performed by: NURSE PRACTITIONER

## 2023-07-28 PROCEDURE — 99999 PR PBB SHADOW E&M-EST. PATIENT-LVL III: ICD-10-PCS | Mod: PBBFAC,TXP,, | Performed by: NURSE PRACTITIONER

## 2023-07-28 PROCEDURE — 3008F BODY MASS INDEX DOCD: CPT | Mod: CPTII,S$GLB,TXP, | Performed by: NURSE PRACTITIONER

## 2023-07-28 PROCEDURE — 76770 US EXAM ABDO BACK WALL COMP: CPT | Mod: 26,TXP,, | Performed by: RADIOLOGY

## 2023-07-28 PROCEDURE — 3074F SYST BP LT 130 MM HG: CPT | Mod: CPTII,S$GLB,TXP, | Performed by: NURSE PRACTITIONER

## 2023-07-28 PROCEDURE — 3078F PR MOST RECENT DIASTOLIC BLOOD PRESSURE < 80 MM HG: ICD-10-PCS | Mod: CPTII,S$GLB,TXP, | Performed by: NURSE PRACTITIONER

## 2023-07-28 PROCEDURE — 3060F PR POS MICROALBUMINURIA RESULT DOCUMENTED/REVIEW: ICD-10-PCS | Mod: CPTII,S$GLB,TXP, | Performed by: NURSE PRACTITIONER

## 2023-07-28 PROCEDURE — 76770 US RETROPERITONEAL COMPLETE: ICD-10-PCS | Mod: 26,TXP,, | Performed by: RADIOLOGY

## 2023-07-28 PROCEDURE — 71046 X-RAY EXAM CHEST 2 VIEWS: CPT | Mod: TC,TXP

## 2023-07-28 PROCEDURE — 3078F DIAST BP <80 MM HG: CPT | Mod: CPTII,S$GLB,TXP, | Performed by: NURSE PRACTITIONER

## 2023-07-28 PROCEDURE — 1159F PR MEDICATION LIST DOCUMENTED IN MEDICAL RECORD: ICD-10-PCS | Mod: CPTII,S$GLB,TXP, | Performed by: NURSE PRACTITIONER

## 2023-07-28 PROCEDURE — 93306 TTE W/DOPPLER COMPLETE: CPT | Mod: 26,TXP,, | Performed by: INTERNAL MEDICINE

## 2023-07-28 NOTE — LETTER
July 31, 2023        Cathryn Adair  [address]  [place] [address]  Phone: [phone]  Fax: [phone]             [facility] [address]  [address]  [facility] [address]  Phone: [phone]   Patient: Murphy Ha   MR Number: [id number]   YOB: 1960   Date of Visit: 7/28/2023       Dear Dr. Cathryn Adair    Thank you for referring Murphy Ha to me for evaluation. Attached you will find relevant portions of my assessment and plan of care.    If you have questions, please do not hesitate to call me. I look forward to following Murphy Ha along with you.    Sincerely,    Paz Paez, NP    Enclosure    If you would like to receive this communication electronically, please contact [email or web address] or [phone] to request [employer] access.    [employer] is a tool which provides read-only access to select patient information with whom you have a relationship. Its easy to use and provides real time access to review your patients record including encounter summaries, notes, results, and demographic information.    If you feel you have received this communication in error or would no longer like to receive these types of communications, please e-mail [email or web address]

## 2023-07-28 NOTE — PROGRESS NOTES
Kidney Transplant Recipient Reevalulation    Referring Physician: Cathryn Adair  Current Nephrologist: Cathryn Adair  Waitlist Status: active  Dialysis Start Date: 2/24/2021    Subjective:     CC:  Annual reassessment of kidney transplant candidacy.    HPI:  Mr. Ha is a 62 y.o. year old Black or  male with ESRD secondary to diabetic nephropathy.  He has been on the wait list for a kidney transplant at [facility] since 2/24/2021. Patient is currently on peritoneal dialysis started on 2/24/2021. Patient is dialyzing on cyclic peritoneal dialysis.  Patient reports that he is tolerating dialysis well. . He has a PD catheter. Patient denies any recent hospitalizations or ED visits. Denies peritonitis or exit site infections.    Remains active including cutting grass, painting, doing housework. Looks good, not frail.    Hospitalizations/ED visits:  none    Interval History:   Reports doing well. PD going well no infections. Stays active. Not frail.     CXR: 7/28/23 unremarkable  PXR: 7/28/23 No significant atherosclerotic calcification.  Renal US: 7/28/23 Left renal simple cyst.  Iliac Doppler: triphasic bilaterally   Echo:  Scheduled at 4pm      Stress:  Results for orders placed during the hospital encounter of 04/08/22    Lexiscan Card Interp    Interpretation Summary    Normal myocardial perfusion scan. There is no evidence of myocardial ischemia or infarction.    There is a  mild intensity fixed perfusion abnormality in the inferobasilar wall of the left ventricle secondary to diaphragm attenuation.    The gated perfusion images showed an ejection fraction of 60% at rest. The gated perfusion images showed an ejection fraction of 67% post stress. Normal ejection fraction is greater than 53%.    There is normal wall motion at rest and post stress.    LV cavity size is normal at rest and normal at stress.    The EKG portion of this study is negative for ischemia.    The patient reported no  chest pain during the stress test.    There were no arrhythmias during stress.    When compared to the previous study from 6/3/2020, there are no significant changes.      Colonoscopy:9/8/16:Description of the findings of the procedure. Normal colon , no abnormality noted   Recommendation:Repeat screening colonoscopy in 7-10 years   PTH:  Lab Results   Component Value Date    .1 (H) 07/28/2023    CALCIUM 9.1 07/17/2023    PHOS 5.5 (H) 03/04/2021     PSA:  PSA, Screen (ng/mL)   Date Value   07/28/2023 2.2         Current Outpatient Medications:     aspirin (ECOTRIN) 81 MG EC tablet, Take 1 tablet (81 mg total) by mouth once daily., Disp: , Rfl:     atorvastatin (LIPITOR) 80 MG tablet, Take 1 tablet (80 mg total) by mouth every evening., Disp: 90 tablet, Rfl: 3    AURYXIA 210 mg iron Tab, Take 1 tablet by mouth once daily. , Disp: , Rfl:     blood sugar diagnostic (TRUE METRIX GLUCOSE TEST STRIP) Strp, 1 each by Misc.(Non-Drug; Combo Route) route 3 (three) times daily. Dx E11.65 (Patient not taking: Reported on 7/24/2023), Disp: 300 strip, Rfl: 1    blood-glucose meter,continuous (DEXCOM G7 ) Misc, Use to check glucose with sensors, Disp: 1 each, Rfl: 0    blood-glucose sensor (DEXCOM G7 SENSOR) Lois, 1 Device by Misc.(Non-Drug; Combo Route) route every 10 days., Disp: 3 each, Rfl: 11    calcitRIOL (ROCALTROL) 0.25 MCG Cap, Take by mouth., Disp: , Rfl:     carvediloL (COREG) 25 MG tablet, Take 1 tablet (25 mg total) by mouth 2 (two) times daily with meals., Disp: 180 tablet, Rfl: 3    dicyclomine (BENTYL) 20 mg tablet, Take 20 mg by mouth daily as needed. , Disp: , Rfl:     divalproex (DEPAKOTE) 250 MG EC tablet, Take 250 mg by mouth 2 (two) times daily., Disp: , Rfl:     dorzolamide-timolol 2-0.5% (COSOPT) 22.3-6.8 mg/mL ophthalmic solution, Place 1 drop into the left eye 2 (two) times daily., Disp: 10 mL, Rfl: 5    erythromycin (ROMYCIN) ophthalmic ointment, Place into the left eye 4 (four) times  "daily., Disp: 3.5 g, Rfl: 2    fluorometholone 0.1% (FML) 0.1 % DrpS, INSTILL 1 DROP INTO LEFT EYE 4 TIMES DAILY FOR 10 DAYS, Disp: , Rfl:     furosemide (LASIX) 40 MG tablet, Take 1 tablet (40 mg total) by mouth once daily., Disp: 90 tablet, Rfl: 3    gabapentin (NEURONTIN) 300 MG capsule, Take 1 capsule (300 mg total) by mouth once daily. (Patient taking differently: Take 300 mg by mouth 3 (three) times daily.), Disp: 90 capsule, Rfl: 0    gentamicin (GARAMYCIN) 0.1 % cream, Apply topically., Disp: , Rfl:     glucagon 3 mg/actuation Spry, 1 each by Nasal route as needed., Disp: 1 each, Rfl: 11    HYDROcodone-acetaminophen (NORCO)  mg per tablet, Take 0.5 tablets by mouth every 12 (twelve) hours as needed for Pain., Disp: 30 tablet, Rfl: 0    insulin aspart U-100 (NOVOLOG FLEXPEN U-100 INSULIN) 100 unit/mL (3 mL) InPn pen, Inject 4-6 Units into the skin 3 (three) times daily with meals., Disp: 16.2 mL, Rfl: 3    lancets 32 gauge Misc, 1 lancet by Misc.(Non-Drug; Combo Route) route 2 (two) times a day., Disp: 100 each, Rfl: 3    LEVEMIR U-100 INSULIN 100 unit/mL injection, INJECT 34-38 UNITS INTO THE SKIN EVERY EVENING, Disp: 10 mL, Rfl: 3    LORazepam (ATIVAN) 1 MG tablet, Take 1 mg by mouth., Disp: , Rfl:     ofloxacin (OCUFLOX) 0.3 % ophthalmic solution, Place 1 drop into the right eye 2 (two) times a day. (Patient not taking: Reported on 7/24/2023), Disp: 5 mL, Rfl: 0    pen needle, diabetic 32 gauge x 5/32" Ndle, 1 each by Misc.(Non-Drug; Combo Route) route 2 hours after meals and at bedtime., Disp: 360 each, Rfl: 3    sevelamer carbonate (RENVELA) 800 mg Tab, Take 2 tablets by mouth., Disp: , Rfl:     tamsulosin (FLOMAX) 0.4 mg Cap, Take 1 capsule (0.4 mg total) by mouth once daily., Disp: 90 capsule, Rfl: 3    TRADJENTA 5 mg Tab tablet, Take 1 tablet by mouth once daily, Disp: 90 tablet, Rfl: 3    valproic acid (DEPAKENE) 250 mg capsule, Take 500 mg by mouth 2 (two) times daily., Disp: , Rfl:       Past " Medical History:   Diagnosis Date    Anemia     Aqueous misdirection of right eye     Arthritis     Blind painful eye     Cataract     CKD (chronic kidney disease)     Diabetes mellitus, type 2     Disorder of kidney and ureter     Fever blister     Hyperlipidemia     Hypertension     Joint pain     Neuropathy        Past Surgical History:   Procedure Laterality Date    AIR FLUID EXCHANGE OF EYE Left 6/24/2022    Procedure: AIR FLUID EXCHANGE, EYE;  Surgeon: Arun Veronica MD;  Location: [facility];  Service: Ophthalmology;  Laterality: Left;    CATARACT EXTRACTION Left 8/27/2014    COLONOSCOPY N/A 9/8/2016    Procedure: COLONOSCOPY;  Surgeon: Luis Bogran-Reyes, MD;  [facility];  Service: Endoscopy;  Laterality: N/A;    CONJUNCTIVOPLASTY Right 6/17/2020    Procedure: CONJUNCTIVOPLASTY;  Surgeon: Myrna Alba MD;  Location: [facility];  Service: Ophthalmology;  Laterality: Right;    ENDOLASER PHOTOCOAGULATION Left 6/24/2022    Procedure: PHOTOCOAGULATION, ENDOLASER;  Surgeon: Arun Veronica MD;  Location: [facility];  Service: Ophthalmology;  Laterality: Left;    ENUCLEATION Right 6/17/2020    Procedure: ENUCLEATION, EYE;  Surgeon: Myrna Alba MD;  Location: [facility];  Service: Ophthalmology;  Laterality: Right;    EXPLORATION OF ORBIT Right 6/17/2020    Procedure: EXPLORATION, ORBIT;  Surgeon: Myrna Alba MD;  Location: [facility];  Service: Ophthalmology;  Laterality: Right;    EYE SURGERY      TARSORRHAPHY Right 6/17/2020    Procedure: BLEPHARORRHAPHY;  Surgeon: Myrna Alba MD;  Location: [facility];  Service: Ophthalmology;  Laterality: Right;    VITRECTOMY Right     VITRECTOMY BY PARS PLANA APPROACH Left 6/24/2022    Procedure: VITRECTOMY, PARS PLANA APPROACH;  Surgeon: Arun Veronica MD;  Location: [facility];  Service: Ophthalmology;  Laterality: Left;         Review of Systems   Constitutional:  Positive for fatigue. Negative for activity change, appetite change and  "fever.   HENT:  Negative for congestion, mouth sores and sore throat.    Eyes:  Positive for visual disturbance.        Right eye enucleation and now with prosthesis.   Wears glasses   Respiratory:  Negative for cough, chest tightness and shortness of breath.    Cardiovascular:  Negative for chest pain, palpitations and leg swelling.   Gastrointestinal:  Negative for abdominal distention, abdominal pain, constipation, diarrhea and nausea.   Genitourinary:  Negative for difficulty urinating, frequency and hematuria.        Reports no decrease in urine production   Musculoskeletal:  Positive for arthralgias. Negative for gait problem.        Chronic left shoulder pain   Skin:  Negative for wound.   Allergic/Immunologic: Negative for environmental allergies, food allergies and immunocompromised state.   Neurological:  Negative for dizziness, weakness and numbness.   Hematological:  Bruises/bleeds easily (on ASA ).   Psychiatric/Behavioral:  Negative for sleep disturbance. The patient is not nervous/anxious.      Objective:   body mass index is 29.78 kg/m².  /63 (BP Location: Right arm, Patient Position: Sitting, BP Method: Medium (Automatic))   Pulse 70   Temp 97.6 °F (36.4 °C) (Skin)   Resp 20   Ht 5' 7.68" (1.719 m)   Wt 88 kg (194 lb 0.1 oz)   SpO2 99%   BMI 29.78 kg/m²     Physical Exam  Vitals and nursing note reviewed.   Constitutional:       Appearance: Normal appearance.   HENT:      Head: Normocephalic.   Eyes:      Pupils: Pupils are equal, round, and reactive to light.      Comments: Right eye prosthesis   Cardiovascular:      Rate and Rhythm: Normal rate and regular rhythm.      Heart sounds: Normal heart sounds.   Pulmonary:      Effort: Pulmonary effort is normal.      Breath sounds: Normal breath sounds.   Abdominal:      General: Bowel sounds are normal. There is no distension.      Palpations: Abdomen is soft.      Tenderness: There is no abdominal tenderness.      Comments: PD catheter , " no erythema, edema, tenderness or drainage.    Musculoskeletal:         General: Normal range of motion.      Cervical back: Normal range of motion and neck supple.      Right lower leg: No edema.      Left lower leg: No edema.   Skin:     General: Skin is warm and dry.   Neurological:      General: No focal deficit present.      Mental Status: He is alert and oriented to person, place, and time.   Psychiatric:         Behavior: Behavior normal.       Labs:  Lab Results   Component Value Date    WBC 7.48 07/17/2023    HGB 12.1 (L) 07/17/2023    HCT 37.0 (L) 07/17/2023     07/17/2023    K 4.4 07/17/2023     07/17/2023    CO2 22 (L) 07/17/2023    BUN 60 (H) 07/17/2023    CREATININE 10.3 (H) 07/17/2023    EGFRNONAA 5.6 (A) 06/17/2022    CALCIUM 9.1 07/17/2023    PHOS 5.5 (H) 03/04/2021    MG 2.1 05/26/2020    ALBUMIN 3.2 (L) 07/17/2023    AST 12 07/17/2023    ALT 29 07/17/2023    UTPCR 2.58 (H) 03/04/2021    .1 (H) 07/28/2023       Lab Results   Component Value Date    BILIRUBINUA Negative 03/23/2023    PROTEINUA 1+ (A) 03/23/2023    NITRITE Negative 03/23/2023    NITRITE Negative 03/23/2023    RBCUA 0 03/23/2023    WBCUA 0 03/23/2023       Lab Results   Component Value Date    CPRA 0 04/06/2023    BB5GTYY Negative 04/06/2023    CIABCLM WEAK---A11 05/04/2022    CIIAB DQ6,DQA1*03:02 09/14/2022    ABCMT DQ6 04/06/2023       Labs were reviewed with the patient.    Pre-transplant Workup:   Reviewed with the patient.    Assessment:     1. Patient on waiting list for kidney transplant    2. ESRD (end stage renal disease) on dialysis    3. Type 2 diabetes with complication        Plan:   Pending echo that is scheduled 4pm    Transplant Candidacy:   Mr. Ha is a suitable kidney transplant candidate.  Meets center eligibility for accepting HCV+ donor offer - yes.  Patient educated on HCV+ donors. Murphy is willing  to accept HCV+ donor offer -  yes   Patient is a candidate for KDPI > 85 kidney donor  offer - yes.  He remains in overall stable health, and will remain active on the transplant list.    Patient advised that it is recommended that all transplant candidates, and their close contacts and household members receive Covid vaccination.    Paz Paez NP       Follow-up:   In addition to the tests noted in the plan, Mr. Ha will continue to have reevaluation as per the standing pre-kidney transplant protocol:  Monthly blood for PRA  Annual return to clinic, except HIV positive, > 65 years of age, or pancreas transplant candidates who will be scheduled to see transplant every 6 months while in pre-transplant phase  Annual re-testing: CXR, EKG, yearly mammograms for women over 40 and PSA for males over 40, cardiology follow-up as recommended by initial cardiology pre-transplant evaluation  Renal ultrasound every 2 years  Baseline colonoscopy after age 50 and repeated as recommended    [facility] Patient Status  Functional Status: 60% - Requires occasional assistance but is able to care for needs  Physical Capacity: No Limitations

## 2023-07-31 NOTE — PROGRESS NOTES
Transplant Psychosocial Evaluation Update:  Last psychosocial evaluation for transplant was completed on 4/8/2022 by CHEO Cerda LCSW.    Pt presents today in company of wife/caregiver, Marjorie Ha. Pt and wife  present as alert, oriented to person, place, time, purpose of visit. Pt and wife deny concerns about going through with transplant and state motivation and sense of preparedness for transplantation, caregiver role, psychosocial risk factors, medical risk factors, financial aspects, and lifetime commitments. All concerns and education points as per transplant psychosocial evaluation process addressed (also refer to psychosocial dated 4/8/2022). Pt actively participated in today's interview, with wife, Marjorie participating as caregiver. Pt asking questions appropriately and denies changes to previous psychosocial evaluation for transplantation which we reviewed line by line with pt and, per pt choice, with pts caregiver today.    Primary Caregivers and Transportation for Transplant:  1. Marjorie Ha ([phone]) 64 y/o wife. Resides with patient  in [place], [place]. Drives and has reliable vehicle.  2. Wilma Ha ([phone]) 25 y/o daughter. Resides in [place], [place]. Drives and has reliable vehicle.   3. Amber Goetz ([phone]) 62 y/o sister. Resides in [place], [place].     Both pt and caregiver/s plan to stay locally at lodging arranged by themselves - information reviewed in depth. Caregivers plan to stay at hospital as appropriate for transplant and post-transplant process.    Pts Vocation: Pt is disabled since 4/20/2018 Last day worked:  3/1/2017 as a .    DME: [employer], diabetic equipment and supplies, PD equipment and supplies.     ADLS:  Pt reports independent with all ADLS, drives, and handles own medication management.      Household and Dependents: pt resides with wife and has no dependents at this time.    Income: Pt states ability to afford all monthly expenses and  "costs including for medications now and if transplanted based on income and on savings and assets.    Dialysis Adherence: Dialysis adherence form indicates 0 AMAs and 2 no-shows "approved by the doctor." (See note dated 7/27/2023 by CHEO Jordan MA)    Pt and wife deny any additional concerns, stating preparedness and motivation to proceed with organ transplant.     Suitability for Transplant:   Pt remains suitable for transplant at this time based on lack psychosocial risk factors and strengths.    Pt ananth well overall with health needs and life in general, has stable supports, adequate insurance, financial stability, transportation and caregiver plans in place, and reports using no substances unless prescribed. Pt denies hx of experiencing SI/HI. Pt and caregiver presents with no acute mental health concerns at time of assessment.        "

## 2023-08-01 ENCOUNTER — TELEPHONE (OUTPATIENT)
Dept: TRANSPLANT | Facility: CLINIC | Age: 63
End: 2023-08-01
Payer: MEDICARE

## 2023-08-01 DIAGNOSIS — Z76.82 AWAITING ORGAN TRANSPLANT STATUS: Primary | ICD-10-CM

## 2023-08-01 DIAGNOSIS — Z76.82 ORGAN TRANSPLANT CANDIDATE: Primary | ICD-10-CM

## 2023-08-02 ENCOUNTER — TELEPHONE (OUTPATIENT)
Dept: TRANSPLANT | Facility: CLINIC | Age: 63
End: 2023-08-02
Payer: MEDICARE

## 2023-08-02 DIAGNOSIS — Z76.82 PRE-KIDNEY TRANSPLANT, LISTED: Primary | ICD-10-CM

## 2023-08-02 NOTE — PROGRESS NOTES
INITIAL PATIENT EDUCATION NOTE    Mr. Murphy Ha was seen in pre-kidney transplant clinic for evaluation for kidney, kidney/pancreas or pancreas only transplant.  The patient attended a an individual video education session that discussed/reviewed the following aspects of transplantation: evaluation including diagnostic and laboratory testing,( Chemistries, Hematology, Serologies including HIV and Hepatitis and HLA) required for transplantation and selection committee process, [facility] waitlist management/multiple listings, types of organs offered (KDPI < 85%, KDPI > 85%, PHS risk, DCD, HCV+, HIV+ for HIV+ recipients and enbloc/dual), financial aspects, surgical procedures, dietary instruction pre- and post-transplant, health maintenance pre- and post-transplant, post-transplant hospitalization and outpatient follow-up, potential to participate in a research protocol, and medication management and side effects.  A question and answer session was provided after the presentation.    The patient was seen by all members of the multi-disciplinary team to include: Nephrologist/REESE, Surgeon, , Transplant Coordinator, , Pharmacist and Dietician (if applicable).    The patient reviewed and signed all consents for evaluation which were witnessed and sent to scanning into the Psychiatric chart.    The patient was given an education book and plan for further evaluation based on his individual assessment.      Reviewed program requirement for complete COVID vaccination with documentation prior to listing.  COVID education information reviewed with patient. Patient encouraged to be up to date on all vaccinations.       The patient was informed that the transplant team would manage immediate post op pain. If the patient requires long term pain management, they will need to have that pain management addressed by their PCP or previous provider who wrote for long term pain medicines.    The patient was  encouraged to call with any questions or concerns.

## 2023-08-02 NOTE — TELEPHONE ENCOUNTER
ON-CALL NOTE    [facility]# [id number]    0900 - Received case from CHEO Tavarez. Pt on standby at home as backup.    1405 - Primary recipient transplanted. Notified by Jaime WHALEY, , to release backups. Called and notified pt, informed him he is still active on waitlist. Pt verbalized understanding.

## 2023-08-02 NOTE — PROGRESS NOTES
YEARLY LIST MANAGEMENT NOTE    Murphy Leland's kidney transplant listing status reviewed.  Patient is due for follow-up appointments on 7/28/24.  Appointments will be scheduled per protocol.

## 2023-08-02 NOTE — TELEPHONE ENCOUNTER
ON-CALL NOTE    [facility]# [id number]    Notified by Jaime Perez, , that Murphy Ha is eligible for kidney offer.  Spoke with patient and identified no acute medical issues with telephone assessment. Protocol script read to patient regarding  risk criteria . Patient verbalized understanding, all questions answered, patient accepts organ offer. Notified by Jaime Perez that virtual crossmatch is  will be drawn . Current sample of blood is available from date July 17, 2023 for crossmatch.  Patient reports no sensitizing event since last blood sample for PRA received. Notified Floyd Lim in HLA Lab to perform a prospective  crossmatch per guideline. Orders placed    Patient was asked if they have had a positive COVID-19 test or if they have any signs or symptoms. Informed patient that they will be tested for COVID-19 upon arrival to the hospital, unless have a previous positive result. If tested and result is positive, the transplant will not be able to occur, they will be inactivated on the wait list for 21 days per protocol and required to quarantine.     Patient notified of plan to remain at home and continue normal activities and await updates. Verbalized understanding.

## 2023-08-03 ENCOUNTER — TELEPHONE (OUTPATIENT)
Dept: TRANSPLANT | Facility: CLINIC | Age: 63
End: 2023-08-03
Payer: MEDICARE

## 2023-08-03 NOTE — TELEPHONE ENCOUNTER
Called patient, explained what happens during an organ offer when he is called as a backup. Informed him that he can be called as a backup several times before he actually gets transplanted. We discussed that it is never a guarantee until he wakes up from the surgery with a transplanted kidney in place. Patient appreciated the explanation and voiced his understanding.

## 2023-08-03 NOTE — TELEPHONE ENCOUNTER
----- Message from Tomas Feliciano RN sent at 8/3/2023  1:55 PM CDT -----  Regarding: FW: Questions    ----- Message -----  From: Kirsten Cox  Sent: 8/3/2023  12:06 PM CDT  To: [facility] Post-Kidney Transplant Clinical  Subject: Questions                                            Name Of Caller:   Patient      Contact Preference:  [phone]       Nature of call:   Pt has questions about the Transplant Backup process. He was on standby yesterday.

## 2023-08-08 ENCOUNTER — DOCUMENTATION ONLY (OUTPATIENT)
Dept: TRANSPLANT | Facility: CLINIC | Age: 63
End: 2023-08-08
Payer: MEDICARE

## 2023-08-08 NOTE — PROGRESS NOTES
Patient notified that the review of their medical record was completed and it was found that additional time may be due based on past lab results. Informed that the information was submitted to [facility] and approved.    Their waiting time for a kidney transplant previously began on 2/24/21. This has now been adjusted to 8/23/19 based on past lab values.

## 2023-08-17 ENCOUNTER — TELEPHONE (OUTPATIENT)
Dept: TRANSPLANT | Facility: CLINIC | Age: 63
End: 2023-08-17
Payer: MEDICARE

## 2023-08-17 NOTE — TELEPHONE ENCOUNTER
MA notes per adherence form    FOR THE PAST THREE MONTHS:    0-AMA's    12-No-shows 05/21/23, 05/22/23, 05/28/23, 05/30/23, 06/02/23, 06/09/23, 06/18/23, 06/23/23, 06/24/23, 07/02/23, 07/07/23, 08/06/23 due to grandchild playing with machine and pulled card    No concerns with care giving, transportation, or mental health    Brought over to clinic to be scanned in.    Mary Ann Sanchez  Abdominal Transplant MA

## 2023-08-26 ENCOUNTER — TELEPHONE (OUTPATIENT)
Dept: TRANSPLANT | Facility: CLINIC | Age: 63
End: 2023-08-26
Payer: MEDICARE

## 2023-08-26 DIAGNOSIS — Z76.82 AWAITING ORGAN TRANSPLANT STATUS: Primary | ICD-10-CM

## 2023-08-27 ENCOUNTER — TELEPHONE (OUTPATIENT)
Dept: TRANSPLANT | Facility: CLINIC | Age: 63
End: 2023-08-27
Payer: MEDICARE

## 2023-08-27 NOTE — TELEPHONE ENCOUNTER
ON-CALL NOTE    [facility]# [id number]    Notified by Jaime Harley, , that Murphy Ha is eligible for kidney offer.  Spoke with patient and identified no acute medical issues with telephone assessment. Protocol script read to patient regarding N/A, standard donor offer. Patient verbalized understanding, all questions answered, patient accepts organ offer. Notified by Jaime Harley that virtual crossmatch is negative.  Current sample of blood is available from date 8/14/23 for crossmatch.  Patient reports no sensitizing event since last blood sample for PRA received. Notified Sergio in HLA Lab to perform a prospective  crossmatch per guideline.    Patient was asked if they have had a positive COVID-19 test or if they have any signs or symptoms. Informed patient that they will be tested for COVID-19 upon arrival to the hospital, unless have a previous positive result. If tested and result is positive, the transplant will not be able to occur, they will be inactivated on the wait list for 21 days per protocol and required to quarantine.     Patient notified of plan to standby but hold ASA, he states understanding.

## 2023-08-27 NOTE — TELEPHONE ENCOUNTER
Case shut down d/t donor quality. Pt verbalizes understanding he is no longer NPO and can resume meds. Pt verbalized understanding.

## 2023-08-29 ENCOUNTER — EPISODE CHANGES (OUTPATIENT)
Dept: TRANSPLANT | Facility: CLINIC | Age: 63
End: 2023-08-29

## 2023-10-04 ENCOUNTER — EPISODE CHANGES (OUTPATIENT)
Dept: TRANSPLANT | Facility: CLINIC | Age: 63
End: 2023-10-04

## 2023-10-04 ENCOUNTER — TELEPHONE (OUTPATIENT)
Dept: TRANSPLANT | Facility: CLINIC | Age: 63
End: 2023-10-04
Payer: MEDICARE

## 2023-10-04 DIAGNOSIS — Z76.82 AWAITING ORGAN TRANSPLANT STATUS: Primary | ICD-10-CM

## 2023-10-05 ENCOUNTER — NURSE TRIAGE (OUTPATIENT)
Dept: ADMINISTRATIVE | Facility: CLINIC | Age: 63
End: 2023-10-05
Payer: MEDICARE

## 2023-10-05 NOTE — TELEPHONE ENCOUNTER
ON-CALL NOTE    [facility]# [id number]    Notified by Zabrina Ellis, , that Murphy Ha is eligible for kidney offer.  Spoke with patient and identified no acute medical issues with telephone assessment. Protocol script read to patient regarding N/A, standard donor offer. Patient verbalized understanding, all questions answered, patient accepts organ offer. Notified by Zabrina Ellis that virtual crossmatch is positive for weak DSAs.  Current sample of blood is available from date 9/13/23 for crossmatch.  Patient reports no sensitizing event since last blood sample for PRA received. Notified Regi in HLA Lab to perform a prospective  crossmatch per guideline.    Patient was asked if they have had a positive COVID-19 test or if they have any signs or symptoms. Informed patient that they will be tested for COVID-19 upon arrival to the hospital, unless have a previous positive result. If tested and result is positive, the transplant will not be able to occur, they will be inactivated on the wait list for 21 days per protocol and required to quarantine.     Patient notified of plan to standby at home as backup, HOLD aspirin, and continue cyclic PD; patient states understanding.

## 2023-10-05 NOTE — TELEPHONE ENCOUNTER
Patient is calling because he is awaiting a call from the Transplant as to velia he is the recipient of a kidney.   I called the coordinator she said she was about to call patient. I informed patient he would be getting a call from the coordinator shortly. Patient was appreciative.    Reason for Disposition   RN needs further essential information from caller in order to complete triage    Additional Information   Negative: [1] Caller is not with the adult (patient) AND [2] reporting urgent symptoms   Negative: Lab result questions   Negative: Medication questions   Negative: Caller can't be reached by phone   Negative: Caller has already spoken to PCP or another triager    Protocols used: Information Only Call-A-

## 2023-10-06 ENCOUNTER — TELEPHONE (OUTPATIENT)
Dept: TRANSPLANT | Facility: CLINIC | Age: 63
End: 2023-10-06
Payer: MEDICARE

## 2023-10-06 NOTE — TELEPHONE ENCOUNTER
[facility] # [id number]    On call update:  Notified patient and wife that the kidney for this case has gone to an individual higher on list. Advised patient to resume ASA. He is released from this case and remains actively listed on transplant wait list. They state understanding and deny any questions at this time.

## 2023-10-09 ENCOUNTER — EPISODE CHANGES (OUTPATIENT)
Dept: TRANSPLANT | Facility: CLINIC | Age: 63
End: 2023-10-09

## 2023-10-09 ENCOUNTER — TELEPHONE (OUTPATIENT)
Dept: TRANSPLANT | Facility: CLINIC | Age: 63
End: 2023-10-09
Payer: MEDICARE

## 2023-10-09 DIAGNOSIS — Z76.82 AWAITING ORGAN TRANSPLANT STATUS: Primary | ICD-10-CM

## 2023-10-09 NOTE — TELEPHONE ENCOUNTER
ON-CALL NOTE     UNOS# [id number]    1600 - Called and updated pt about donor OR time for 10/9 1600. Informed pt he is to remain standing by at home as backup and await further updates.    10/10 @ 0110 - Notified by Jaime PICHARDO, , that case is being shut down due to poor donor organ quality. Called and notified patient - instructed him to continue meds/HD as normal. Patient verbalized understanding.

## 2023-10-09 NOTE — TELEPHONE ENCOUNTER
ON-CALL NOTE    UNOS# [id number]    Notified by Zabrina Ellis, , that [facility] is eligible for kidney offer.  Spoke with patient and identified no acute medical issues with telephone assessment. Protocol script read to patient regarding N/A, standard donor offer. Patient verbalized understanding, all questions answered, patient accepts organ offer. Notified by Zabrina Ellis that virtual crossmatch is positive for weak DSAs.  Current sample of blood is available from date 9/13/23 for crossmatch.  Patient reports no sensitizing event since last blood sample for PRA received. Notified Sergio in [facility] to perform a prospective  crossmatch per guideline.    Patient was asked if they have had a positive COVID-19 test or if they have any signs or symptoms. Informed patient that they will be tested for COVID-19 upon arrival to the hospital, unless have a previous positive result. If tested and result is positive, the transplant will not be able to occur, they will be inactivated on the wait list for 21 days per protocol and required to quarantine.     Patient notified of plan to standby at home as backup and hold aspirin until notified. Patient states understanding.  Patient is on cyclic PD.

## 2023-10-12 ENCOUNTER — HOSPITAL ENCOUNTER (EMERGENCY)
Facility: HOSPITAL | Age: 63
Discharge: HOME | End: 2023-10-12
Payer: MEDICARE

## 2023-10-12 ENCOUNTER — TELEPHONE (OUTPATIENT)
Dept: TRANSPLANT | Facility: CLINIC | Age: 63
End: 2023-10-12
Payer: MEDICARE

## 2023-10-12 DIAGNOSIS — Z76.82 AWAITING ORGAN TRANSPLANT STATUS: Primary | ICD-10-CM

## 2023-10-12 PROCEDURE — 99999 HC NO LEVEL OF SERVICE - ED ONLY: CPT | Mod: NTX

## 2023-10-12 PROCEDURE — 36415 COLL VENOUS BLD VENIPUNCTURE: CPT | Mod: NTX

## 2023-10-12 NOTE — PROGRESS NOTES
ON-CALL NOTE    UNOS# [id number]    Notified by Aaron Christianson, , that Murphy Ha is eligible for kidney offer.  Spoke with patient and identified no acute medical issues with telephone assessment. Protocol script read to patient regarding N/A, standard donor offer. Patient verbalized understanding, all questions answered, patient accepts organ offer. Notified by Aaron Christianson that virtual crossmatch is negative.  Current sample of blood is available for crossmatch, but Patient reports that he received covid vaccination since last blood sample for PRA received, instructed him to come to [facility] for lab draw to collect fresh sample for prospective crossmatch, pt verbalized understanding and agreed, estimated time of arrival = 1:00am-1:30am.  Orders placed and notified Nohemy in [facility] to perform a prospective  crossmatch per guideline.  draw                                               v]=    Patient was asked if they have had a positive COVID-19 test or if they have any signs or symptoms. Informed patient that they will be tested for COVID-19 upon arrival to the hospital, unless have a previous positive result. If tested and result is positive, the transplant will not be able to occur, they will be inactivated on the wait list for 21 days per protocol and required to quarantine.     10/12/23- @13:10: Patient notified of plan to remain on standby as backup candidate, he verbalized understanding and agreed.

## 2023-10-13 ENCOUNTER — TELEPHONE (OUTPATIENT)
Dept: TRANSPLANT | Facility: CLINIC | Age: 63
End: 2023-10-13
Payer: MEDICARE

## 2023-10-13 NOTE — TELEPHONE ENCOUNTER
[facility]# [id number]    On call note:  Patient made aware that the kidney for this case has gone to an individual higher on wait list. He is released from this case and remains actively listed on transplant wait list.

## 2023-10-13 NOTE — TELEPHONE ENCOUNTER
ON-CALL NOTE    OS# [id number]    Notified by Jaime Perez, , that Murphy Ha is eligible for kidney offer.  Spoke with patient and identified no acute medical issues with telephone assessment. Protocol script read to patient regarding N/A, standard donor offer. Patient verbalized understanding, all questions answered, patient accepts organ offer. Notified by Jaime Perez that virtual crossmatch is negative.  Current sample of blood is available from date 10/12/23 for crossmatch.  Patient reports no sensitizing event since last blood sample for PRA received. Notified Nohemy in [facility] to perform a retrospective  crossmatch per guideline.    Patient was asked if they have had a positive COVID-19 test or if they have any signs or symptoms. Informed patient that they will be tested for COVID-19 upon arrival to the hospital, unless have a previous positive result. If tested and result is positive, the transplant will not be able to occur, they will be inactivated on the wait list for 21 days per protocol and required to quarantine.     Patient notified of plan to remain at home on standby and states understanding. Dialysis unit will need to be notified if patient is transplanted.      On call update 10/13/23 @ 7:30pm  Patient notified that donor OR time has been set for this case for 7am on 10/14/23. Coordinator will likely call with updates tomorrow. Patient states understanding and will remain at home on standby.    On call update 10/14/23 @7:00am  Report handed off to Buster Tam RN and Lali Fallon RN to continue this case.

## 2023-10-13 NOTE — TELEPHONE ENCOUNTER
Patient phoned and informed no new updates at this time. Patient instructed to remain at home on standby and await further updates. Patient instructed to allow 250-300 cc of fluid to instill after PD in case he is called in for admit. Verbalized understanding. Instructed to call for any questions or concerns.

## 2023-10-14 ENCOUNTER — TELEPHONE (OUTPATIENT)
Dept: TRANSPLANT | Facility: CLINIC | Age: 63
End: 2023-10-14
Payer: MEDICARE

## 2023-10-14 NOTE — TELEPHONE ENCOUNTER
ON-CALL NOTE    UNOS# [id number]    Notified by Jaime Perez, , that Murphy Ha is to be released from  donor kidney transplant offer. Donor not acceptable quality per Dr. Lala.   Murphy Ha is aware of cancellation of transplant surgery at this time.

## 2023-10-18 ENCOUNTER — TELEPHONE (OUTPATIENT)
Dept: TRANSPLANT | Facility: CLINIC | Age: 63
End: 2023-10-18
Payer: MEDICARE

## 2023-10-18 NOTE — TELEPHONE ENCOUNTER
Called patient to remind him to schedule his colonoscopy. States he received his letter today and will call his doctor in [place] to get it scheduled. Will let me know date, time, and location once scheduled.

## 2023-10-20 ENCOUNTER — TELEPHONE (OUTPATIENT)
Dept: TRANSPLANT | Facility: CLINIC | Age: 63
End: 2023-10-20
Payer: MEDICARE

## 2023-10-20 NOTE — TELEPHONE ENCOUNTER
----- Message from Yusra De La Torre sent at 10/20/2023  2:17 PM CDT -----  Regarding: speak to Nurse  Contact: PT  [phone]  The patient called requesting to speak to Nurse Holly regarding colonoscopy    No further information provided      Patient can be contacted @# [phone]

## 2023-10-25 DIAGNOSIS — Z76.82 ORGAN TRANSPLANT CANDIDATE: ICD-10-CM

## 2023-10-25 DIAGNOSIS — Z12.11 COLON CANCER SCREENING: Primary | ICD-10-CM

## 2023-11-09 ENCOUNTER — DOCUMENTATION ONLY (OUTPATIENT)
Dept: TRANSPLANT | Facility: CLINIC | Age: 63
End: 2023-11-09
Payer: MEDICARE

## 2023-11-09 DIAGNOSIS — Z76.82 AWAITING ORGAN TRANSPLANT STATUS: Primary | ICD-10-CM

## 2023-11-09 NOTE — PROGRESS NOTES
ON-CALL NOTE    UNOS# [id number]    Notified by Aaron Christianson, , that Murphy Ha is eligible for kidney offer.  Spoke with patient and identified no acute medical issues with telephone assessment. Protocol script read to patient regarding N/A, standard donor offer. Patient verbalized understanding, all questions answered, patient accepts organ offer. Notified by Aaron Christianson that virtual crossmatch is negative.  Current sample of blood is available from date 10/4/23 for crossmatch.  Patient reports no sensitizing event since last blood sample for PRA received. Will notify [facility] to perform a prospective  crossmatch per guideline.    Patient was asked if they have had a positive COVID-19 test or if they have any signs or symptoms. Informed patient that they will be tested for COVID-19 upon arrival to the hospital, unless have a previous positive result. If tested and result is positive, the transplant will not be able to occur, they will be inactivated on the wait list for 21 days per protocol and required to quarantine.     Patient notified of plan to hold 81 mg of aspirin and continue all other normal activies and states understanding.  Dialysis unit will be notified if transplanted.

## 2023-11-10 ENCOUNTER — TELEPHONE (OUTPATIENT)
Dept: TRANSPLANT | Facility: CLINIC | Age: 63
End: 2023-11-10
Payer: MEDICARE

## 2023-11-11 NOTE — TELEPHONE ENCOUNTER
On call note  LEIGH [id number]      1500 I called patient with update on case. Patient was notified that he is still back-up at this time.Verbalized understanding.  1920 patient released from case because the primary recipient received the kidney. Patient verbalized understanding.

## 2023-12-11 ENCOUNTER — OFFICE VISIT (OUTPATIENT)
Dept: URGENT CARE | Facility: CLINIC | Age: 63
End: 2023-12-11
Payer: MEDICARE

## 2023-12-11 VITALS
BODY MASS INDEX: 29.7 KG/M2 | HEIGHT: 68 IN | DIASTOLIC BLOOD PRESSURE: 79 MMHG | WEIGHT: 196 LBS | OXYGEN SATURATION: 97 % | TEMPERATURE: 98 F | SYSTOLIC BLOOD PRESSURE: 145 MMHG | RESPIRATION RATE: 19 BRPM | HEART RATE: 91 BPM

## 2023-12-11 DIAGNOSIS — J10.1 INFLUENZA A: Primary | ICD-10-CM

## 2023-12-11 DIAGNOSIS — R05.9 COUGH, UNSPECIFIED TYPE: ICD-10-CM

## 2023-12-11 LAB
CTP QC/QA: YES
POC MOLECULAR INFLUENZA A AGN: POSITIVE
POC MOLECULAR INFLUENZA B AGN: NEGATIVE

## 2023-12-11 PROCEDURE — 99213 OFFICE O/P EST LOW 20 MIN: CPT | Mod: S$GLB,TXP,,

## 2023-12-11 PROCEDURE — 87502 POCT INFLUENZA A/B MOLECULAR: ICD-10-PCS | Mod: QW,S$GLB,TXP,

## 2023-12-11 PROCEDURE — 99213 PR OFFICE/OUTPT VISIT, EST, LEVL III, 20-29 MIN: ICD-10-PCS | Mod: S$GLB,TXP,,

## 2023-12-11 PROCEDURE — 87502 INFLUENZA DNA AMP PROBE: CPT | Mod: QW,S$GLB,TXP,

## 2023-12-11 RX ORDER — BALOXAVIR MARBOXIL 40 MG/1
40 TABLET, FILM COATED ORAL ONCE
Qty: 1 TABLET | Refills: 0 | Status: SHIPPED | OUTPATIENT
Start: 2023-12-11 | End: 2023-12-11

## 2023-12-11 RX ORDER — PROMETHAZINE HYDROCHLORIDE AND DEXTROMETHORPHAN HYDROBROMIDE 6.25; 15 MG/5ML; MG/5ML
5 SYRUP ORAL EVERY 6 HOURS PRN
Qty: 118 ML | Refills: 0 | Status: SHIPPED | OUTPATIENT
Start: 2023-12-11 | End: 2023-12-18

## 2023-12-11 NOTE — PROGRESS NOTES
"Subjective:      Patient ID: Murphy Ha is a 63 y.o. male.    Vitals:  height is 5' 7.5" (1.715 m) and weight is 88.9 kg (196 lb). His oral temperature is 98.4 °F (36.9 °C). His blood pressure is 145/79 (abnormal) and his pulse is 91. His respiration is 19 and oxygen saturation is 97%.     Chief Complaint: Cough    Pt is coming in for a cough, sore throat and headache that began last night. Pt's daughter has flu like symptoms and was exposed to her recently.    Cough  This is a new problem. The current episode started yesterday. The problem has been unchanged. The problem occurs every few minutes. The cough is Non-productive. Associated symptoms include headaches, nasal congestion and a sore throat. Pertinent negatives include no ear pain or fever. Nothing aggravates the symptoms. Treatments tried: throat spray. The treatment provided no relief. There is no history of asthma, bronchitis or pneumonia.       Constitution: Negative for fever.   HENT:  Positive for sore throat. Negative for ear pain.    Respiratory:  Positive for cough.    Neurological:  Positive for headaches.      Objective:     Physical Exam   Constitutional: He is oriented to person, place, and time. He appears well-developed. He is cooperative.  Non-toxic appearance. He does not appear ill. No distress.   HENT:   Head: Normocephalic and atraumatic.   Ears:   Right Ear: Hearing, tympanic membrane, external ear and ear canal normal.   Left Ear: Hearing, tympanic membrane, external ear and ear canal normal.   Nose: Congestion present. No mucosal edema, rhinorrhea or nasal deformity. No epistaxis. Right sinus exhibits no maxillary sinus tenderness and no frontal sinus tenderness. Left sinus exhibits no maxillary sinus tenderness and no frontal sinus tenderness.   Mouth/Throat: Uvula is midline, oropharynx is clear and moist and mucous membranes are normal. No trismus in the jaw. Normal dentition. No uvula swelling. No oropharyngeal exudate, " posterior oropharyngeal edema or posterior oropharyngeal erythema.   Eyes: Conjunctivae and lids are normal. No scleral icterus.   Neck: Trachea normal and phonation normal. Neck supple. No edema present. No erythema present. No neck rigidity present.   Cardiovascular: Normal rate and regular rhythm.   Pulmonary/Chest: Effort normal and breath sounds normal. No respiratory distress. He has no decreased breath sounds. He has no wheezes. He has no rhonchi.   Abdominal: Normal appearance.   Musculoskeletal: Normal range of motion.         General: No deformity. Normal range of motion.   Neurological: He is alert and oriented to person, place, and time. He exhibits normal muscle tone. Coordination normal.   Skin: Skin is warm, dry, intact, not diaphoretic and not pale.   Psychiatric: His speech is normal and behavior is normal. Judgment and thought content normal.   Nursing note and vitals reviewed.    Results for orders placed or performed in visit on 12/11/23   POCT Influenza A/B MOLECULAR   Result Value Ref Range    POC Molecular Influenza A Ag Positive (A) Negative, Not Reported    POC Molecular Influenza B Ag Negative Negative, Not Reported     Acceptable Yes      *Note: Due to a large number of results and/or encounters for the requested time period, some results have not been displayed. A complete set of results can be found in Results Review.       Assessment:     1. Influenza A    2. Cough, unspecified type        Plan:       Influenza A  -     baloxavir marboxiL (XOFLUZA) 40 mg tablet; Take 1 tablet (40 mg total) by mouth once. for 1 dose  Dispense: 1 tablet; Refill: 0  -     promethazine-dextromethorphan (PROMETHAZINE-DM) 6.25-15 mg/5 mL Syrp; Take 5 mLs by mouth every 6 (six) hours as needed (cough).  Dispense: 118 mL; Refill: 0    Cough, unspecified type  -     POCT Influenza A/B MOLECULAR           Flu A is positive, discussed results with  patient.     The following are suggestions to help  you with upper respiratory symptoms.    Congestion:    Nasal Saline  Nasal saline is available over the counter. There are several different commercial preparations such as Ocean spray and Ayr spray. There is no limit on the use of Nasal saline. Saline is used by snorting the mist up into the nose then later gently blowing the nose to get rid of any secretions that it has loosened.     Mucous Thinners and Decongestants  Mucous thinners and decongestants are used to shrink down the tissues and promote sinus drainage. There are multiple prescription and over-the-counter medications available. A mucous thinner will tend to be drying unless you are also drinking plenty of water when taking these. If you have high blood pressure, it is very important to monitor your pressure while on decongestants. The mucous thinner/decongestant combinations are typically given twice per day. However, some people will be unable to tolerate these at night and should only take them once per day.    Decongestant Nasal Sprays  Over-the-counter decongestant nasal spray such as Afrin, may be helpful as an initial step in treating upper respiratory infections. This spray can be used for up to approximately 3 days and is used no more than twice per day. Topical nasal decongestant spray for longer than 5 days will result in a physical addiction, in which the nasal lining will become significantly swollen and irritated until the spray is used again. May cause elevated blood pressure    Use pseudoephedrine (behind the counter)  for sinus pressure and congestion- Pseudoephedrine 30 mg up to 240 mg /day. Common brands include Sudafed, Zephrex-D Wal-phed.  Warning: It can raise your blood pressure and give you palpitations, avoid with history of high blood pressure, palpitations, and severe cardiac disease.  Coricidin HBP is okay to use if you have high blood pressure.     Nasal Steroids  Nasal steroid medications such as Flonase are useful for  upper respiratory infections, allergies, and sensitivities to airborne irritants. Unfortunately, they do not begin to work for 1-2 days, and they do not reach their maximum benefit for approximately 2-3 weeks. Initial therapy is typically 2 puffs per nostril twice per day. This should be used for only a few days, then the maintenance dosage is one or two puffs per nostril once per day. This can be done at any time of the day. The most effective way to use any nasal medication is to look down at your toes when spraying it in. Aim slightly away from the septum (dividing plate between the nostrils), and gently inhale. This ensures that the spray will go into the sinus cavities and not straight up into the nose. A good way to avoid spraying onto the septum is to use the right hand to spray into the left nostril, and vice versa for the right nostril. Occasionally, nasal steroids can increase the risk of nosebleeds, but in general they are very well tolerated. If you develop a bloody nose, stop using the medication immediately.    Clear Runny Nose/Allergic Rhinitis:  Use an antihistamine to help dry you out.   Antihistamines  Antihistamines are available both over-the-counter and as a prescription. There are also various decongestant and antihistamine combinations available such as Claritin, Allegra, and Zyrtec. It is best to take any antihistamine-decongestant combination in the morning to avoid insomnia. Zyrtec should probably be taken at night, in order to reduce the chance of sleepiness during the daytime. If there is a significant infection present and secretions are already thickened, it is recommended to discontinue antihistamines and use a mucous thinner/decongestant combination.    Body Aches/Pains/Fever  For patients who are not allergic to and are not on anticoagulants, you can alternate Tylenol every 4 hours and Motrin every 6 hours for fever above 100.4F and/or pain.  For patients who are allergic or  intolerant to NSAIDS, have gastritis, gastric ulcers, or history of GI bleeds, are pregnant, or are on anticoagulant therapy, you can take Tylenol every 4 hours as needed for fever above 100.4F and/or pain.     Maintain adequate hydration -  Rest and keep yourself/patient well hydrated. For adults, it is recommended to drink at least 8 glasses of water daily.  This may help thin secretions and soothe the respiratory mucosa.     Sore Throat  Perform warm, salt water gargles (1/2 tsp salt to 1 cup warm water) to help reduce inflammation and throat discomfort. Chloraseptic sprays and throat lozenges will also help with your throat pain.    COUGH  A viral cough may linger for 3 to 4 weeks but should steadily improve over time. Coughing is the body's natural way to clear mucus and help get rid of bacteria and viruses. Therefore, cough suppressants are usually not recommended.      Use mucinex (guaifenisin) up to 2400mg/day to help clear and break up/loosen mucus/congestion from the chest when you have a cold or flu.      Common cough suppressants include the ingredient dextromethorphan or DM, (such as Mucinex DM) available over-the-counter and can be used for cough to stop the tickle in the back of your throat.      ? Honey may be beneficial, especially on nocturnal cough 1 to 2 teaspoons can be taken straight or diluted in tea, juice or other liquid.    The antioxidants in honey are an important contributor to its decongestant properties. Generally speaking, darker honey contains more antioxidants. Buckwheat and avocado honey are particularly good choices. If these honeys are not available in your area, choose the darkest honey you can find.    Take all medications as directed. If you have been prescribed antibiotics, make sure to complete them.     If your condition fails to improve in a timely manner, you should receive another evaluation by your Primary Care Provider to discuss your concerns or return to urgent care  for a recheck.      **You must understand that you have received Urgent Care treatment only and that you may be released before all of your medical problems are known or treated. You, the patient, are responsible to arrange for follow-up care as instructed. If your condition worsens at any time, you should report immediately to your nearest Emergency Department for further evaluation.

## 2023-12-12 ENCOUNTER — TELEPHONE (OUTPATIENT)
Dept: TRANSPLANT | Facility: CLINIC | Age: 63
End: 2023-12-12
Payer: MEDICARE

## 2023-12-12 NOTE — TELEPHONE ENCOUNTER
LEIGH [id number]  Organ OFFER NOTE:    [facility] medical records reviewed. I called and spoke to patient's wife wife who stated that patient was in bed. I discussed that patient had an organ offer but I noticed he was recently at an urgent care facility. His wife stated that patient currently is in bed with the Flu. Iain notified that patient not candidate for transplant at this time due to currently ill with the flu.  Note routed to patient's waitlist coordinator for follow-up.

## 2023-12-15 ENCOUNTER — TELEPHONE (OUTPATIENT)
Dept: TRANSPLANT | Facility: CLINIC | Age: 63
End: 2023-12-15
Payer: MEDICARE

## 2023-12-15 DIAGNOSIS — Z76.82 AWAITING ORGAN TRANSPLANT STATUS: Primary | ICD-10-CM

## 2023-12-16 ENCOUNTER — TELEPHONE (OUTPATIENT)
Dept: TRANSPLANT | Facility: CLINIC | Age: 63
End: 2023-12-16
Payer: MEDICARE

## 2023-12-16 NOTE — TELEPHONE ENCOUNTER
On Call NOTE:  LEIGH  [id number]  Updated patient on case. No donor OR time set as of now. Patient on standby without restrictions. Instructed to keep phone handy and I will call with updates as she receive them. Verbalized understanding and will call with any questions.

## 2023-12-16 NOTE — TELEPHONE ENCOUNTER
ON-CALL NOTE    UNOS# [id number]    Notified by Jaime Perez, , that Murphy Ha is eligible for kidney offer.  Spoke with patient and identified no acute medical issues with telephone assessment. Protocol script read to patient regarding N/A, standard donor offer. Patient verbalized understanding, all questions answered, patient accepts organ offer. Notified by Jaime Perez that virtual crossmatch is negative. Current sample of blood is available from date 11/17/2023 for crossmatch.  Patient reports no sensitizing event since last blood sample for PRA received. Notified Sergio in [facility] to perform a prospective  crossmatch per guideline.    Patient was asked if they have had a positive COVID-19 test or if they have any signs or symptoms. Informed patient that they will be tested for COVID-19 upon arrival to the hospital, unless have a previous positive result. If tested and result is positive, the transplant will not be able to occur, they will be inactivated on the wait list for 21 days per protocol and required to quarantine.     Patient instructed to hold ASA, last dose 12/15/23 in the am, and to remain at home on standby as backup. All questions answered to patient's satisfaction. Continue normal ADL's until further instructed.    12/15/2023 @ 2110 pm- Phoned patient no new updates at this time. Verbalized understanding. Patient had no further questions or concerns at this time.    12/16/2023 @ 0730 am - Report given to Lori WEINSTEIN

## 2023-12-16 NOTE — TELEPHONE ENCOUNTER
ON-CALL NOTE    UNOS# [id number]    Notified by {:[id number]}, , that Murphy Ha is eligible for {Organ:74604} offer.  Spoke with patient and identified no acute medical issues with telephone assessment. Protocol script read to patient regarding {Donor offer type :29842}. Patient verbalized understanding, all questions answered, patient {accept decline:44849} organ offer. Notified by {:[id number]} that virtual crossmatch is {negative/positive:40487}.  Current sample of blood is available from date *** for crossmatch.  Patient reports no sensitizing event since last blood sample for PRA received. Notified *** in [facility] to perform a {retrospective/prospective:11514} crossmatch per guideline.    Patient was asked if they have had a positive COVID-19 test or if they have any signs or symptoms. Informed patient that they will be tested for COVID-19 upon arrival to the hospital, unless have a previous positive result. If tested and result is positive, the transplant will not be able to occur, they will be inactivated on the wait list for 21 days per protocol and required to quarantine.     Patient notified of plan *** and states understanding.  Dialysis unit notified.

## 2023-12-17 ENCOUNTER — TELEPHONE (OUTPATIENT)
Dept: TRANSPLANT | Facility: CLINIC | Age: 63
End: 2023-12-17
Payer: MEDICARE

## 2023-12-17 NOTE — TELEPHONE ENCOUNTER
On call note LEIGH [id number]    12/17/2023 0945    Call pt with update, pt remains backup on stand by with no restrictions. Instructed to keep phone near by for continued updates. Pt verbalized understanding.

## 2023-12-18 ENCOUNTER — TELEPHONE (OUTPATIENT)
Dept: TRANSPLANT | Facility: CLINIC | Age: 63
End: 2023-12-18
Payer: MEDICARE

## 2023-12-18 NOTE — TELEPHONE ENCOUNTER
On call note:  LEIGH [id number]  Patient called and released from case. Primary recipient getting transplanted.  He verbalized understanding.

## 2024-01-02 ENCOUNTER — TELEPHONE (OUTPATIENT)
Dept: TRANSPLANT | Facility: CLINIC | Age: 64
End: 2024-01-02
Payer: MEDICARE

## 2024-01-02 DIAGNOSIS — Z76.82 AWAITING ORGAN TRANSPLANT STATUS: Primary | ICD-10-CM

## 2024-01-02 NOTE — TELEPHONE ENCOUNTER
ON-CALL NOTE    UNOS# [id number]    Notified by Aaron Christianson, , that Murphy Ha is eligible for kidney offer.  Spoke with patient and identified no acute medical issues with telephone assessment. Protocol script read to patient regarding N/A, standard donor offer. Patient verbalized understanding, all questions answered, patient accepts organ offer. Notified by Aaron Christianson that virtual crossmatch is negative.  Current sample of blood is available from date 11/17/23 for crossmatch.  Patient reports no sensitizing event since last blood sample for PRA received. Notified Toi in [facility] to perform a prospective  crossmatch per guideline.    Patient was asked if they have had a positive COVID-19 test or if they have any signs or symptoms. Informed patient that they will be tested for COVID-19 upon arrival to the hospital, unless have a previous positive result. If tested and result is positive, the transplant will not be able to occur, they will be inactivated on the wait list for 21 days per protocol and required to quarantine.     Patient notified of plan to remain on standby at home and states understanding.      1820: pt released from case, poor donor organ quality.

## 2024-01-17 ENCOUNTER — TELEPHONE (OUTPATIENT)
Dept: TRANSPLANT | Facility: CLINIC | Age: 64
End: 2024-01-17
Payer: MEDICARE

## 2024-01-17 DIAGNOSIS — Z76.82 AWAITING ORGAN TRANSPLANT STATUS: Primary | ICD-10-CM

## 2024-01-17 NOTE — TELEPHONE ENCOUNTER
ON-CALL NOTE    UNOS# [id number]    Notified by Jaime Perez, , that Murphy Ha is eligible for kidney offer.  Spoke with patient and identified no acute medical issues with telephone assessment. Protocol script read to patient regarding PHS risk and HCV+ donor offer . Patient verbalized understanding, all questions answered, patient accepts organ offer. Notified by Jaime Perez that virtual crossmatch is negative.  Current sample of blood is available from date 1/3/24 for crossmatch.  Patient reports no sensitizing event since last blood sample for PRA received. Notified Regi in [facility] to perform a prospective  crossmatch per guideline.    Patient was asked if they have had a positive COVID-19 test or if they have any signs or symptoms. Informed patient that they will be tested for COVID-19 upon arrival to the hospital, unless have a previous positive result. If tested and result is positive, the transplant will not be able to occur, they will be inactivated on the wait list for 21 days per protocol and required to quarantine.     Patient notified of plan to remain on standby at home and states understanding.      1/17/24 - 2015: crossmatch negative, pt called and informed that he remains on standby at home as a backup and that there are no times on the case.     1/18/24 - 0800- report given to Gabrielle Tavarez RN

## 2024-01-18 ENCOUNTER — TELEPHONE (OUTPATIENT)
Dept: TRANSPLANT | Facility: CLINIC | Age: 64
End: 2024-01-18
Payer: MEDICARE

## 2024-01-19 ENCOUNTER — TELEPHONE (OUTPATIENT)
Dept: TRANSPLANT | Facility: CLINIC | Age: 64
End: 2024-01-19
Payer: MEDICARE

## 2024-01-19 NOTE — TELEPHONE ENCOUNTER
UNOS ID: [id number]    01/18/2024 @ 0700 am - Report received from Holly CHANDLER RN.    01/18/2024 @ 1000 am- Phoned patient and informed him that I would be coordinator in call, No new updates received as of yet. Patient verbalized understanding.    01/18/2024 @ 1935 pm- Patient phoned and informed no new updates have been received. Verbalized understanding.

## 2024-01-19 NOTE — TELEPHONE ENCOUNTER
Updated patient on case OS# [id number] - still no donor OR time. Encouraged patient to go about normal routine and remain on standby.

## 2024-01-20 ENCOUNTER — TELEPHONE (OUTPATIENT)
Dept: TRANSPLANT | Facility: CLINIC | Age: 64
End: 2024-01-20
Payer: MEDICARE

## 2024-01-20 NOTE — TELEPHONE ENCOUNTER
On Call Note  Unos :[id number]  Patient released from case. Primary patient received the kidney transplant. Patient to resume any medications if instructed to hold. Verbalized understanding.

## 2024-01-29 PROBLEM — Z99.2 TYPE 2 DIABETES MELLITUS WITH CHRONIC KIDNEY DISEASE ON CHRONIC DIALYSIS, WITHOUT LONG-TERM CURRENT USE OF INSULIN: Status: ACTIVE | Noted: 2024-01-29

## 2024-01-29 PROBLEM — N18.6 TYPE 2 DIABETES MELLITUS WITH CHRONIC KIDNEY DISEASE ON CHRONIC DIALYSIS, WITHOUT LONG-TERM CURRENT USE OF INSULIN: Status: ACTIVE | Noted: 2024-01-29

## 2024-01-29 PROBLEM — E66.9 CLASS 1 OBESITY WITHOUT SERIOUS COMORBIDITY WITH BODY MASS INDEX (BMI) OF 30.0 TO 30.9 IN ADULT: Status: ACTIVE | Noted: 2019-05-21

## 2024-01-29 PROBLEM — Z53.21 PATIENT LEFT WITHOUT BEING SEEN: Status: RESOLVED | Noted: 2018-08-02 | Resolved: 2024-01-29

## 2024-01-29 PROBLEM — E66.811 CLASS 1 OBESITY WITHOUT SERIOUS COMORBIDITY WITH BODY MASS INDEX (BMI) OF 30.0 TO 30.9 IN ADULT: Status: ACTIVE | Noted: 2019-05-21

## 2024-01-29 PROBLEM — E87.5 HYPERKALEMIA: Status: RESOLVED | Noted: 2019-10-25 | Resolved: 2024-01-29

## 2024-01-29 PROBLEM — Z91.148 NON COMPLIANCE W MEDICATION REGIMEN: Status: RESOLVED | Noted: 2018-04-26 | Resolved: 2024-01-29

## 2024-01-29 PROBLEM — E11.22 TYPE 2 DIABETES MELLITUS WITH CHRONIC KIDNEY DISEASE ON CHRONIC DIALYSIS, WITHOUT LONG-TERM CURRENT USE OF INSULIN: Status: ACTIVE | Noted: 2024-01-29

## 2024-01-29 PROBLEM — E66.09 CLASS 1 OBESITY DUE TO EXCESS CALORIES WITH SERIOUS COMORBIDITY AND BODY MASS INDEX (BMI) OF 30.0 TO 30.9 IN ADULT: Status: ACTIVE | Noted: 2019-05-21

## 2024-01-29 PROBLEM — E87.70 VOLUME OVERLOAD: Status: RESOLVED | Noted: 2019-10-25 | Resolved: 2024-01-29

## 2024-03-12 DIAGNOSIS — Z76.82 ORGAN TRANSPLANT CANDIDATE: Primary | ICD-10-CM

## 2024-04-13 ENCOUNTER — LAB VISIT (OUTPATIENT)
Dept: LAB | Facility: HOSPITAL | Age: 64
End: 2024-04-13
Payer: MEDICARE

## 2024-04-13 ENCOUNTER — TELEPHONE (OUTPATIENT)
Dept: TRANSPLANT | Facility: CLINIC | Age: 64
End: 2024-04-13
Payer: MEDICARE

## 2024-04-13 DIAGNOSIS — Z76.82 ORGAN TRANSPLANT CANDIDATE: ICD-10-CM

## 2024-04-13 DIAGNOSIS — Z76.82 AWAITING ORGAN TRANSPLANT STATUS: Primary | ICD-10-CM

## 2024-04-13 LAB
HLA VIRTUAL CROSSMATCH INTERPRETATION: NORMAL
HLVXM TESTING DATE: NORMAL

## 2024-04-13 NOTE — TELEPHONE ENCOUNTER
ON-CALL NOTE    UNOS# [id number]    Notified by Macario Boyer, , that Murphy Ha is eligible for kidney offer.  Spoke with patient and identified no acute medical issues with telephone assessment. Protocol script read to patient regarding N/A, standard donor offer. Patient verbalized understanding, all questions answered, patient accepts organ offer. Notified by Macario Boyer that virtual crossmatch is positive .  Current sample of blood is available from date 4/5/2024 for crossmatch.  Patient reports no sensitizing event since last blood sample for PRA received. Notified Mika in [facility] to perform a prospective  crossmatch per guideline.    Patient was asked if they have had a positive COVID-19 test or if they have any signs or symptoms. Informed patient that they will be tested for COVID-19 upon arrival to the hospital, unless have a previous positive result. If tested and result is positive, the transplant will not be able to occur, they will be inactivated on the wait list for 21 days per protocol and required to quarantine.     Patient notified of plan to hold aspirin dose in the morning and remain at home on standby and states understanding.     Update: 4/13 23:30- Notified by Jaime PICHARDO In procurement that patient has become primary and was to be admitted for transplant with an OR time of 0800 on 4/14.  Spoke with patient, asked him to stop eating or drinking and to come in for transplant as soon as he is able.  Asked patient to have leave 300 mL in abdomen for PD cultures.  Patient's hospital ETA is 0200 on 4/14.  Directions given for direct admit.  Hospital staff notified of upcoming admission/surgery.

## 2024-04-14 ENCOUNTER — ANESTHESIA EVENT (OUTPATIENT)
Dept: SURGERY | Facility: HOSPITAL | Age: 64
DRG: 651 | End: 2024-04-14
Payer: MEDICARE

## 2024-04-14 ENCOUNTER — ANESTHESIA (OUTPATIENT)
Dept: SURGERY | Facility: HOSPITAL | Age: 64
DRG: 651 | End: 2024-04-14
Payer: MEDICARE

## 2024-04-14 ENCOUNTER — HOSPITAL ENCOUNTER (INPATIENT)
Facility: HOSPITAL | Age: 64
LOS: 4 days | Discharge: HOME HEALTH | DRG: 651 | End: 2024-04-18
Attending: TRANSPLANT SURGERY | Admitting: TRANSPLANT SURGERY
Payer: MEDICARE

## 2024-04-14 DIAGNOSIS — Z94.0 KIDNEY REPLACED BY TRANSPLANT: Primary | ICD-10-CM

## 2024-04-14 DIAGNOSIS — E11.649 TYPE 2 DIABETES MELLITUS WITH HYPOGLYCEMIA WITHOUT COMA, WITH LONG-TERM CURRENT USE OF INSULIN: ICD-10-CM

## 2024-04-14 DIAGNOSIS — E11.65 TYPE 2 DIABETES MELLITUS WITH HYPERGLYCEMIA, WITH LONG-TERM CURRENT USE OF INSULIN: ICD-10-CM

## 2024-04-14 DIAGNOSIS — D63.8 ANEMIA OF CHRONIC DISEASE: ICD-10-CM

## 2024-04-14 DIAGNOSIS — N18.6 ESRD (END STAGE RENAL DISEASE) ON DIALYSIS: Primary | ICD-10-CM

## 2024-04-14 DIAGNOSIS — I15.0 RENOVASCULAR HYPERTENSION: Chronic | ICD-10-CM

## 2024-04-14 DIAGNOSIS — N18.6 ESRD (END STAGE RENAL DISEASE) ON DIALYSIS: ICD-10-CM

## 2024-04-14 DIAGNOSIS — Z79.4 TYPE 2 DIABETES MELLITUS WITH OTHER SPECIFIED COMPLICATION, WITH LONG-TERM CURRENT USE OF INSULIN: ICD-10-CM

## 2024-04-14 DIAGNOSIS — N18.6 TYPE 2 DIABETES MELLITUS WITH CHRONIC KIDNEY DISEASE ON CHRONIC DIALYSIS, WITHOUT LONG-TERM CURRENT USE OF INSULIN: ICD-10-CM

## 2024-04-14 DIAGNOSIS — Z79.4 TYPE 2 DIABETES MELLITUS WITH HYPERGLYCEMIA, WITH LONG-TERM CURRENT USE OF INSULIN: ICD-10-CM

## 2024-04-14 DIAGNOSIS — Z87.898 HISTORY OF SEIZURE: ICD-10-CM

## 2024-04-14 DIAGNOSIS — E66.09 CLASS 1 OBESITY DUE TO EXCESS CALORIES WITH SERIOUS COMORBIDITY AND BODY MASS INDEX (BMI) OF 31.0 TO 31.9 IN ADULT: ICD-10-CM

## 2024-04-14 DIAGNOSIS — E11.69 TYPE 2 DIABETES MELLITUS WITH OTHER SPECIFIED COMPLICATION, WITH LONG-TERM CURRENT USE OF INSULIN: ICD-10-CM

## 2024-04-14 DIAGNOSIS — Z91.89 AT RISK FOR OPPORTUNISTIC INFECTIONS: ICD-10-CM

## 2024-04-14 DIAGNOSIS — Z99.2 ESRD (END STAGE RENAL DISEASE) ON DIALYSIS: Primary | ICD-10-CM

## 2024-04-14 DIAGNOSIS — Z99.2 ESRD (END STAGE RENAL DISEASE) ON DIALYSIS: ICD-10-CM

## 2024-04-14 DIAGNOSIS — D62 ACUTE BLOOD LOSS ANEMIA: ICD-10-CM

## 2024-04-14 DIAGNOSIS — Z01.818 PRE-OP EVALUATION: ICD-10-CM

## 2024-04-14 DIAGNOSIS — Z99.2 TYPE 2 DIABETES MELLITUS WITH CHRONIC KIDNEY DISEASE ON CHRONIC DIALYSIS, WITHOUT LONG-TERM CURRENT USE OF INSULIN: ICD-10-CM

## 2024-04-14 DIAGNOSIS — T38.0X5A ADVERSE EFFECT OF CORTICOSTEROIDS, INITIAL ENCOUNTER: ICD-10-CM

## 2024-04-14 DIAGNOSIS — T86.19 DELAYED GRAFT FUNCTION OF KIDNEY: ICD-10-CM

## 2024-04-14 DIAGNOSIS — Z29.89 PROPHYLACTIC IMMUNOTHERAPY: ICD-10-CM

## 2024-04-14 DIAGNOSIS — E78.2 MIXED HYPERLIPIDEMIA: ICD-10-CM

## 2024-04-14 DIAGNOSIS — E11.22 TYPE 2 DIABETES MELLITUS WITH CHRONIC KIDNEY DISEASE ON CHRONIC DIALYSIS, WITHOUT LONG-TERM CURRENT USE OF INSULIN: ICD-10-CM

## 2024-04-14 DIAGNOSIS — E87.20 METABOLIC ACIDOSIS: ICD-10-CM

## 2024-04-14 DIAGNOSIS — Z79.4 TYPE 2 DIABETES MELLITUS WITH HYPOGLYCEMIA WITHOUT COMA, WITH LONG-TERM CURRENT USE OF INSULIN: ICD-10-CM

## 2024-04-14 LAB
ABO + RH BLD: NORMAL
ALBUMIN SERPL BCP-MCNC: 2.3 G/DL (ref 3.5–5.2)
ALBUMIN SERPL BCP-MCNC: 2.6 G/DL (ref 3.5–5.2)
ALBUMIN SERPL BCP-MCNC: 2.8 G/DL (ref 3.5–5.2)
ALP SERPL-CCNC: 78 U/L (ref 55–135)
ALT SERPL W/O P-5'-P-CCNC: 22 U/L (ref 10–44)
ANION GAP SERPL CALC-SCNC: 10 MMOL/L (ref 8–16)
APPEARANCE FLD: CLEAR
AST SERPL-CCNC: 12 U/L (ref 10–40)
BASOPHILS # BLD AUTO: 0.01 K/UL (ref 0–0.2)
BASOPHILS # BLD AUTO: 0.03 K/UL (ref 0–0.2)
BASOPHILS NFR BLD: 0.1 % (ref 0–1.9)
BASOPHILS NFR BLD: 0.4 % (ref 0–1.9)
BILIRUB SERPL-MCNC: 0.2 MG/DL (ref 0.1–1)
BLD GP AB SCN CELLS X3 SERPL QL: NORMAL
BODY FLD TYPE: NORMAL
BUN SERPL-MCNC: 67 MG/DL (ref 8–23)
BUN SERPL-MCNC: 67 MG/DL (ref 8–23)
BUN SERPL-MCNC: 74 MG/DL (ref 8–23)
CALCIUM SERPL-MCNC: 8 MG/DL (ref 8.7–10.5)
CALCIUM SERPL-MCNC: 8.1 MG/DL (ref 8.7–10.5)
CALCIUM SERPL-MCNC: 8.3 MG/DL (ref 8.7–10.5)
CHLORIDE SERPL-SCNC: 106 MMOL/L (ref 95–110)
CHLORIDE SERPL-SCNC: 108 MMOL/L (ref 95–110)
CHLORIDE SERPL-SCNC: 108 MMOL/L (ref 95–110)
CO2 SERPL-SCNC: 17 MMOL/L (ref 23–29)
CO2 SERPL-SCNC: 19 MMOL/L (ref 23–29)
CO2 SERPL-SCNC: 20 MMOL/L (ref 23–29)
COLOR FLD: COLORLESS
CREAT SERPL-MCNC: 8.9 MG/DL (ref 0.5–1.4)
CREAT SERPL-MCNC: 9.2 MG/DL (ref 0.5–1.4)
CREAT SERPL-MCNC: 9.4 MG/DL (ref 0.5–1.4)
CREAT UR-MCNC: 96 MG/DL (ref 23–375)
DIFFERENTIAL METHOD BLD: ABNORMAL
DIFFERENTIAL METHOD BLD: ABNORMAL
EOSINOPHIL # BLD AUTO: 0 K/UL (ref 0–0.5)
EOSINOPHIL # BLD AUTO: 0.2 K/UL (ref 0–0.5)
EOSINOPHIL NFR BLD: 0 % (ref 0–8)
EOSINOPHIL NFR BLD: 2.5 % (ref 0–8)
ERYTHROCYTE [DISTWIDTH] IN BLOOD BY AUTOMATED COUNT: 12.9 % (ref 11.5–14.5)
ERYTHROCYTE [DISTWIDTH] IN BLOOD BY AUTOMATED COUNT: 13.5 % (ref 11.5–14.5)
EST. GFR  (NO RACE VARIABLE): 5.8 ML/MIN/1.73 M^2
EST. GFR  (NO RACE VARIABLE): 5.9 ML/MIN/1.73 M^2
EST. GFR  (NO RACE VARIABLE): 6.2 ML/MIN/1.73 M^2
ESTIMATED AVG GLUCOSE: 200 MG/DL (ref 68–131)
GLUCOSE SERPL-MCNC: 139 MG/DL (ref 70–110)
GLUCOSE SERPL-MCNC: 232 MG/DL (ref 70–110)
GLUCOSE SERPL-MCNC: 253 MG/DL (ref 70–110)
HBA1C MFR BLD: 8.6 % (ref 4–5.6)
HBV CORE AB SERPL QL IA: NORMAL
HBV CORE IGM SERPL QL IA: NORMAL
HBV SURFACE AG SERPL QL IA: NORMAL
HCT VFR BLD AUTO: 27.6 % (ref 40–54)
HCT VFR BLD AUTO: 28.4 % (ref 40–54)
HCT VFR BLD AUTO: 29.2 % (ref 40–54)
HCV AB SERPL QL IA: NORMAL
HGB BLD-MCNC: 9.6 G/DL (ref 14–18)
HGB BLD-MCNC: 9.9 G/DL (ref 14–18)
HIV 1+2 AB+HIV1 P24 AG SERPL QL IA: NORMAL
IMM GRANULOCYTES # BLD AUTO: 0.02 K/UL (ref 0–0.04)
IMM GRANULOCYTES # BLD AUTO: 0.07 K/UL (ref 0–0.04)
IMM GRANULOCYTES NFR BLD AUTO: 0.3 % (ref 0–0.5)
IMM GRANULOCYTES NFR BLD AUTO: 0.5 % (ref 0–0.5)
LYMPHOCYTES # BLD AUTO: 0.1 K/UL (ref 1–4.8)
LYMPHOCYTES # BLD AUTO: 2.3 K/UL (ref 1–4.8)
LYMPHOCYTES NFR BLD: 0.7 % (ref 18–48)
LYMPHOCYTES NFR BLD: 34.2 % (ref 18–48)
MCH RBC QN AUTO: 30.7 PG (ref 27–31)
MCH RBC QN AUTO: 31.1 PG (ref 27–31)
MCHC RBC AUTO-ENTMCNC: 33.8 G/DL (ref 32–36)
MCHC RBC AUTO-ENTMCNC: 33.9 G/DL (ref 32–36)
MCV RBC AUTO: 91 FL (ref 82–98)
MCV RBC AUTO: 92 FL (ref 82–98)
MONOCYTES # BLD AUTO: 0.8 K/UL (ref 0.3–1)
MONOCYTES # BLD AUTO: 1 K/UL (ref 0.3–1)
MONOCYTES NFR BLD: 14.7 % (ref 4–15)
MONOCYTES NFR BLD: 5.3 % (ref 4–15)
MONOS+MACROS NFR FLD MANUAL: 100 %
NEUTROPHILS # BLD AUTO: 14 K/UL (ref 1.8–7.7)
NEUTROPHILS # BLD AUTO: 3.3 K/UL (ref 1.8–7.7)
NEUTROPHILS NFR BLD: 47.9 % (ref 38–73)
NEUTROPHILS NFR BLD: 93.4 % (ref 38–73)
NRBC BLD-RTO: 0 /100 WBC
NRBC BLD-RTO: 0 /100 WBC
OHS QRS DURATION: 78 MS
OHS QTC CALCULATION: 426 MS
PHOSPHATE SERPL-MCNC: 4.3 MG/DL (ref 2.7–4.5)
PHOSPHATE SERPL-MCNC: 4.4 MG/DL (ref 2.7–4.5)
PHOSPHATE SERPL-MCNC: 6.8 MG/DL (ref 2.7–4.5)
PLATELET # BLD AUTO: 166 K/UL (ref 150–450)
PLATELET # BLD AUTO: 193 K/UL (ref 150–450)
PMV BLD AUTO: 10.8 FL (ref 9.2–12.9)
PMV BLD AUTO: 11.1 FL (ref 9.2–12.9)
POCT GLUCOSE: 128 MG/DL (ref 70–110)
POCT GLUCOSE: 147 MG/DL (ref 70–110)
POCT GLUCOSE: 220 MG/DL (ref 70–110)
POCT GLUCOSE: 226 MG/DL (ref 70–110)
POCT GLUCOSE: 247 MG/DL (ref 70–110)
POCT GLUCOSE: 279 MG/DL (ref 70–110)
POTASSIUM SERPL-SCNC: 4.8 MMOL/L (ref 3.5–5.1)
POTASSIUM SERPL-SCNC: 5.4 MMOL/L (ref 3.5–5.1)
POTASSIUM SERPL-SCNC: 7.1 MMOL/L (ref 3.5–5.1)
PROT SERPL-MCNC: 6.4 G/DL (ref 6–8.4)
PROT UR-MCNC: 38 MG/DL (ref 0–15)
PROT/CREAT UR: 0.4 MG/G{CREAT} (ref 0–0.2)
RBC # BLD AUTO: 3.09 M/UL (ref 4.6–6.2)
RBC # BLD AUTO: 3.22 M/UL (ref 4.6–6.2)
SARS-COV-2 RDRP RESP QL NAA+PROBE: NEGATIVE
SODIUM SERPL-SCNC: 133 MMOL/L (ref 136–145)
SODIUM SERPL-SCNC: 137 MMOL/L (ref 136–145)
SODIUM SERPL-SCNC: 138 MMOL/L (ref 136–145)
SPECIMEN OUTDATE: NORMAL
WBC # BLD AUTO: 15.01 K/UL (ref 3.9–12.7)
WBC # BLD AUTO: 6.79 K/UL (ref 3.9–12.7)
WBC # FLD: 4 /CU MM

## 2024-04-14 PROCEDURE — U0002 COVID-19 LAB TEST NON-CDC: HCPCS | Mod: NTX

## 2024-04-14 PROCEDURE — 5A1D70Z PERFORMANCE OF URINARY FILTRATION, INTERMITTENT, LESS THAN 6 HOURS PER DAY: ICD-10-PCS | Performed by: TRANSPLANT SURGERY

## 2024-04-14 PROCEDURE — 94799 UNLISTED PULMONARY SVC/PX: CPT | Mod: XB

## 2024-04-14 PROCEDURE — D9220A PRA ANESTHESIA: Mod: ANES,,, | Performed by: ANESTHESIOLOGY

## 2024-04-14 PROCEDURE — 80069 RENAL FUNCTION PANEL: CPT | Mod: 91

## 2024-04-14 PROCEDURE — 87075 CULTR BACTERIA EXCEPT BLOOD: CPT | Mod: NTX

## 2024-04-14 PROCEDURE — 87522 HEPATITIS C REVRS TRNSCRPJ: CPT

## 2024-04-14 PROCEDURE — 71000033 HC RECOVERY, INTIAL HOUR: Performed by: TRANSPLANT SURGERY

## 2024-04-14 PROCEDURE — 93010 ELECTROCARDIOGRAM REPORT: CPT | Mod: NTX,,, | Performed by: INTERNAL MEDICINE

## 2024-04-14 PROCEDURE — D9220A PRA ANESTHESIA: Mod: CRNA,,, | Performed by: NURSE ANESTHETIST, CERTIFIED REGISTERED

## 2024-04-14 PROCEDURE — 50360 RNL ALTRNSPLJ W/O RCP NFRCT: CPT | Mod: RT,,, | Performed by: TRANSPLANT SURGERY

## 2024-04-14 PROCEDURE — 20600001 HC STEP DOWN PRIVATE ROOM

## 2024-04-14 PROCEDURE — 82962 GLUCOSE BLOOD TEST: CPT | Performed by: TRANSPLANT SURGERY

## 2024-04-14 PROCEDURE — 25000003 PHARM REV CODE 250: Performed by: NURSE ANESTHETIST, CERTIFIED REGISTERED

## 2024-04-14 PROCEDURE — 25000003 PHARM REV CODE 250: Mod: NTX

## 2024-04-14 PROCEDURE — 36415 COLL VENOUS BLD VENIPUNCTURE: CPT | Mod: NTX

## 2024-04-14 PROCEDURE — 87205 SMEAR GRAM STAIN: CPT | Mod: NTX

## 2024-04-14 PROCEDURE — 0WPG03Z REMOVAL OF INFUSION DEVICE FROM PERITONEAL CAVITY, OPEN APPROACH: ICD-10-PCS | Performed by: TRANSPLANT SURGERY

## 2024-04-14 PROCEDURE — 63600175 PHARM REV CODE 636 W HCPCS: Mod: NTX

## 2024-04-14 PROCEDURE — S5010 5% DEXTROSE AND 0.45% SALINE: HCPCS | Performed by: STUDENT IN AN ORGANIZED HEALTH CARE EDUCATION/TRAINING PROGRAM

## 2024-04-14 PROCEDURE — 27200950 HC CAPD SUPPORT: Mod: NTX

## 2024-04-14 PROCEDURE — 50605 INSERT URETERAL SUPPORT: CPT | Mod: 51,RT,, | Performed by: TRANSPLANT SURGERY

## 2024-04-14 PROCEDURE — 83036 HEMOGLOBIN GLYCOSYLATED A1C: CPT | Mod: NTX | Performed by: TRANSPLANT SURGERY

## 2024-04-14 PROCEDURE — 63600175 PHARM REV CODE 636 W HCPCS: Performed by: STUDENT IN AN ORGANIZED HEALTH CARE EDUCATION/TRAINING PROGRAM

## 2024-04-14 PROCEDURE — 86705 HEP B CORE ANTIBODY IGM: CPT

## 2024-04-14 PROCEDURE — 84100 ASSAY OF PHOSPHORUS: CPT

## 2024-04-14 PROCEDURE — 86704 HEP B CORE ANTIBODY TOTAL: CPT

## 2024-04-14 PROCEDURE — 63600175 PHARM REV CODE 636 W HCPCS: Mod: JZ,JG

## 2024-04-14 PROCEDURE — 80100014 HC HEMODIALYSIS 1:1

## 2024-04-14 PROCEDURE — 25000003 PHARM REV CODE 250: Performed by: NURSE PRACTITIONER

## 2024-04-14 PROCEDURE — 63600175 PHARM REV CODE 636 W HCPCS: Performed by: NURSE ANESTHETIST, CERTIFIED REGISTERED

## 2024-04-14 PROCEDURE — 94761 N-INVAS EAR/PLS OXIMETRY MLT: CPT

## 2024-04-14 PROCEDURE — 82570 ASSAY OF URINE CREATININE: CPT

## 2024-04-14 PROCEDURE — 81200001 HC KIDNEY ACQUISITION - CADAVER

## 2024-04-14 PROCEDURE — 86706 HEP B SURFACE ANTIBODY: CPT | Mod: NTX

## 2024-04-14 PROCEDURE — 25000003 PHARM REV CODE 250: Performed by: STUDENT IN AN ORGANIZED HEALTH CARE EDUCATION/TRAINING PROGRAM

## 2024-04-14 PROCEDURE — 80053 COMPREHEN METABOLIC PANEL: CPT

## 2024-04-14 PROCEDURE — 36556 INSERT NON-TUNNEL CV CATH: CPT | Mod: 59,,, | Performed by: ANESTHESIOLOGY

## 2024-04-14 PROCEDURE — 36000930 HC OR TIME LEV VII 1ST 15 MIN: Performed by: TRANSPLANT SURGERY

## 2024-04-14 PROCEDURE — 86803 HEPATITIS C AB TEST: CPT

## 2024-04-14 PROCEDURE — 71000039 HC RECOVERY, EACH ADD'L HOUR: Performed by: TRANSPLANT SURGERY

## 2024-04-14 PROCEDURE — 87070 CULTURE OTHR SPECIMN AEROBIC: CPT | Mod: NTX

## 2024-04-14 PROCEDURE — 89051 BODY FLUID CELL COUNT: CPT | Mod: NTX

## 2024-04-14 PROCEDURE — 63600175 PHARM REV CODE 636 W HCPCS: Performed by: NURSE PRACTITIONER

## 2024-04-14 PROCEDURE — 86850 RBC ANTIBODY SCREEN: CPT | Mod: NTX

## 2024-04-14 PROCEDURE — 36000931 HC OR TIME LEV VII EA ADD 15 MIN: Performed by: TRANSPLANT SURGERY

## 2024-04-14 PROCEDURE — 63600175 PHARM REV CODE 636 W HCPCS: Performed by: ANESTHESIOLOGY

## 2024-04-14 PROCEDURE — 37000008 HC ANESTHESIA 1ST 15 MINUTES: Performed by: TRANSPLANT SURGERY

## 2024-04-14 PROCEDURE — 25000003 PHARM REV CODE 250

## 2024-04-14 PROCEDURE — 99223 1ST HOSP IP/OBS HIGH 75: CPT | Mod: 57,AI,NTX,

## 2024-04-14 PROCEDURE — 71000016 HC POSTOP RECOV ADDL HR: Performed by: TRANSPLANT SURGERY

## 2024-04-14 PROCEDURE — 87340 HEPATITIS B SURFACE AG IA: CPT

## 2024-04-14 PROCEDURE — 63600175 PHARM REV CODE 636 W HCPCS: Performed by: TRANSPLANT SURGERY

## 2024-04-14 PROCEDURE — C2617 STENT, NON-COR, TEM W/O DEL: HCPCS | Performed by: TRANSPLANT SURGERY

## 2024-04-14 PROCEDURE — 93005 ELECTROCARDIOGRAM TRACING: CPT | Mod: NTX

## 2024-04-14 PROCEDURE — 87389 HIV-1 AG W/HIV-1&-2 AB AG IA: CPT

## 2024-04-14 PROCEDURE — 71000015 HC POSTOP RECOV 1ST HR: Performed by: TRANSPLANT SURGERY

## 2024-04-14 PROCEDURE — 85014 HEMATOCRIT: CPT | Performed by: STUDENT IN AN ORGANIZED HEALTH CARE EDUCATION/TRAINING PROGRAM

## 2024-04-14 PROCEDURE — 27000207 HC ISOLATION

## 2024-04-14 PROCEDURE — 37000009 HC ANESTHESIA EA ADD 15 MINS: Performed by: TRANSPLANT SURGERY

## 2024-04-14 PROCEDURE — 85025 COMPLETE CBC W/AUTO DIFF WBC: CPT

## 2024-04-14 PROCEDURE — 80069 RENAL FUNCTION PANEL: CPT | Performed by: STUDENT IN AN ORGANIZED HEALTH CARE EDUCATION/TRAINING PROGRAM

## 2024-04-14 PROCEDURE — 81300002 HC KIDNEY TRANSPORT, GROUND 4-5 HOURS

## 2024-04-14 PROCEDURE — 27201037 HC PRESSURE MONITORING SET UP: Mod: NTX

## 2024-04-14 PROCEDURE — 27201423 OPTIME MED/SURG SUP & DEVICES STERILE SUPPLY: Performed by: TRANSPLANT SURGERY

## 2024-04-14 PROCEDURE — 0TY00Z0 TRANSPLANTATION OF RIGHT KIDNEY, ALLOGENEIC, OPEN APPROACH: ICD-10-PCS | Performed by: TRANSPLANT SURGERY

## 2024-04-14 DEVICE — STENT DOUBLE J 7FRX12CM
Type: IMPLANTABLE DEVICE | Site: URETER | Status: FUNCTIONAL
Removed: 2024-05-09

## 2024-04-14 RX ORDER — GLUCAGON 1 MG
1 KIT INJECTION
Status: DISCONTINUED | OUTPATIENT
Start: 2024-04-14 | End: 2024-04-18 | Stop reason: HOSPADM

## 2024-04-14 RX ORDER — HALOPERIDOL 5 MG/ML
0.5 INJECTION INTRAMUSCULAR EVERY 10 MIN PRN
Status: DISCONTINUED | OUTPATIENT
Start: 2024-04-14 | End: 2024-04-14 | Stop reason: HOSPADM

## 2024-04-14 RX ORDER — SODIUM CHLORIDE 0.9 % (FLUSH) 0.9 %
10 SYRINGE (ML) INJECTION
Status: DISCONTINUED | OUTPATIENT
Start: 2024-04-14 | End: 2024-05-27

## 2024-04-14 RX ORDER — INSULIN ASPART 100 [IU]/ML
0-5 INJECTION, SOLUTION INTRAVENOUS; SUBCUTANEOUS
Status: DISCONTINUED | OUTPATIENT
Start: 2024-04-14 | End: 2024-04-15

## 2024-04-14 RX ORDER — METHYLPREDNISOLONE SOD SUCC 125 MG
250 VIAL (EA) INJECTION ONCE
Status: COMPLETED | OUTPATIENT
Start: 2024-04-15 | End: 2024-04-15

## 2024-04-14 RX ORDER — SODIUM CHLORIDE 0.9 % (FLUSH) 0.9 %
10 SYRINGE (ML) INJECTION
Status: DISCONTINUED | OUTPATIENT
Start: 2024-04-14 | End: 2024-04-18 | Stop reason: HOSPADM

## 2024-04-14 RX ORDER — MIDAZOLAM HYDROCHLORIDE 1 MG/ML
INJECTION INTRAMUSCULAR; INTRAVENOUS
Status: DISCONTINUED | OUTPATIENT
Start: 2024-04-14 | End: 2024-04-14

## 2024-04-14 RX ORDER — CARVEDILOL 25 MG/1
25 TABLET ORAL 2 TIMES DAILY WITH MEALS
Status: DISCONTINUED | OUTPATIENT
Start: 2024-04-14 | End: 2024-04-18 | Stop reason: HOSPADM

## 2024-04-14 RX ORDER — ONDANSETRON HYDROCHLORIDE 2 MG/ML
INJECTION, SOLUTION INTRAVENOUS
Status: DISCONTINUED | OUTPATIENT
Start: 2024-04-14 | End: 2024-04-14

## 2024-04-14 RX ORDER — TRAMADOL HYDROCHLORIDE 50 MG/1
50 TABLET ORAL EVERY 6 HOURS PRN
Status: DISCONTINUED | OUTPATIENT
Start: 2024-04-14 | End: 2024-04-14

## 2024-04-14 RX ORDER — DIPHENHYDRAMINE HYDROCHLORIDE 50 MG/ML
25 INJECTION INTRAMUSCULAR; INTRAVENOUS EVERY 6 HOURS PRN
Status: DISCONTINUED | OUTPATIENT
Start: 2024-04-14 | End: 2024-04-14 | Stop reason: HOSPADM

## 2024-04-14 RX ORDER — DOCUSATE SODIUM 100 MG/1
100 CAPSULE, LIQUID FILLED ORAL 3 TIMES DAILY
Status: DISCONTINUED | OUTPATIENT
Start: 2024-04-14 | End: 2024-04-18 | Stop reason: HOSPADM

## 2024-04-14 RX ORDER — IBUPROFEN 200 MG
16 TABLET ORAL
Status: DISCONTINUED | OUTPATIENT
Start: 2024-04-14 | End: 2024-04-18 | Stop reason: HOSPADM

## 2024-04-14 RX ORDER — TACROLIMUS 1 MG/1
6 CAPSULE ORAL EVERY 12 HOURS
Qty: 360 CAPSULE | Refills: 11 | Status: SHIPPED | OUTPATIENT
Start: 2024-04-14 | End: 2024-04-18

## 2024-04-14 RX ORDER — PREDNISONE 20 MG/1
20 TABLET ORAL DAILY
Status: DISCONTINUED | OUTPATIENT
Start: 2024-04-17 | End: 2024-04-18 | Stop reason: HOSPADM

## 2024-04-14 RX ORDER — FUROSEMIDE 10 MG/ML
INJECTION INTRAMUSCULAR; INTRAVENOUS
Status: DISCONTINUED | OUTPATIENT
Start: 2024-04-14 | End: 2024-04-14

## 2024-04-14 RX ORDER — PROPOFOL 10 MG/ML
VIAL (ML) INTRAVENOUS
Status: DISCONTINUED | OUTPATIENT
Start: 2024-04-14 | End: 2024-04-14

## 2024-04-14 RX ORDER — SODIUM CHLORIDE 9 MG/ML
INJECTION, SOLUTION INTRAVENOUS
OUTPATIENT
Start: 2024-04-14

## 2024-04-14 RX ORDER — FAMOTIDINE 20 MG/1
20 TABLET, FILM COATED ORAL NIGHTLY
Status: DISCONTINUED | OUTPATIENT
Start: 2024-04-14 | End: 2024-04-18 | Stop reason: HOSPADM

## 2024-04-14 RX ORDER — HEPARIN SODIUM 1000 [USP'U]/ML
2400 INJECTION, SOLUTION INTRAVENOUS; SUBCUTANEOUS
Status: DISCONTINUED | OUTPATIENT
Start: 2024-04-14 | End: 2024-04-18 | Stop reason: HOSPADM

## 2024-04-14 RX ORDER — HEPARIN SODIUM 5000 [USP'U]/ML
5000 INJECTION, SOLUTION INTRAVENOUS; SUBCUTANEOUS ONCE
Status: COMPLETED | OUTPATIENT
Start: 2024-04-14 | End: 2024-04-14

## 2024-04-14 RX ORDER — GLUCAGON 1 MG
1 KIT INJECTION CONTINUOUS PRN
Status: DISCONTINUED | OUTPATIENT
Start: 2024-04-14 | End: 2024-04-18 | Stop reason: HOSPADM

## 2024-04-14 RX ORDER — DEXMEDETOMIDINE HYDROCHLORIDE 100 UG/ML
INJECTION, SOLUTION INTRAVENOUS
Status: DISCONTINUED | OUTPATIENT
Start: 2024-04-14 | End: 2024-04-14

## 2024-04-14 RX ORDER — SODIUM CHLORIDE 9 MG/ML
INJECTION, SOLUTION INTRAVENOUS ONCE
OUTPATIENT
Start: 2024-04-14 | End: 2024-04-14

## 2024-04-14 RX ORDER — FAMOTIDINE 20 MG/1
20 TABLET, FILM COATED ORAL NIGHTLY
Qty: 30 TABLET | Refills: 11 | Status: SHIPPED | OUTPATIENT
Start: 2024-04-15 | End: 2025-04-15

## 2024-04-14 RX ORDER — MUPIROCIN 20 MG/G
1 OINTMENT TOPICAL 2 TIMES DAILY
Status: DISCONTINUED | OUTPATIENT
Start: 2024-04-14 | End: 2024-04-18 | Stop reason: HOSPADM

## 2024-04-14 RX ORDER — DIPHENHYDRAMINE HCL 25 MG
50 CAPSULE ORAL ONCE AS NEEDED
Status: DISCONTINUED | OUTPATIENT
Start: 2024-04-14 | End: 2024-04-18

## 2024-04-14 RX ORDER — MYCOPHENOLATE MOFETIL 250 MG/1
1000 CAPSULE ORAL 2 TIMES DAILY
Status: DISCONTINUED | OUTPATIENT
Start: 2024-04-14 | End: 2024-04-18 | Stop reason: HOSPADM

## 2024-04-14 RX ORDER — OXYCODONE HYDROCHLORIDE 5 MG/1
5 TABLET ORAL EVERY 6 HOURS PRN
Status: DISCONTINUED | OUTPATIENT
Start: 2024-04-14 | End: 2024-04-14

## 2024-04-14 RX ORDER — PREDNISONE 5 MG/1
TABLET ORAL
Qty: 70 TABLET | Refills: 11 | Status: SHIPPED | OUTPATIENT
Start: 2024-04-17

## 2024-04-14 RX ORDER — CALCIUM CHLORIDE INJECTION 100 MG/ML
INJECTION, SOLUTION INTRAVENOUS
Status: DISCONTINUED | OUTPATIENT
Start: 2024-04-14 | End: 2024-04-14

## 2024-04-14 RX ORDER — ROCURONIUM BROMIDE 10 MG/ML
INJECTION, SOLUTION INTRAVENOUS
Status: DISCONTINUED | OUTPATIENT
Start: 2024-04-14 | End: 2024-04-14

## 2024-04-14 RX ORDER — METHYLPREDNISOLONE SOD SUCC 125 MG
125 VIAL (EA) INJECTION ONCE
Status: COMPLETED | OUTPATIENT
Start: 2024-04-16 | End: 2024-04-16

## 2024-04-14 RX ORDER — SODIUM CHLORIDE 9 MG/ML
INJECTION, SOLUTION INTRAVENOUS CONTINUOUS
Status: DISCONTINUED | OUTPATIENT
Start: 2024-04-14 | End: 2024-04-15

## 2024-04-14 RX ORDER — LABETALOL HCL 20 MG/4 ML
SYRINGE (ML) INTRAVENOUS
Status: COMPLETED
Start: 2024-04-14 | End: 2024-04-14

## 2024-04-14 RX ORDER — SODIUM CHLORIDE 0.9 % (FLUSH) 0.9 %
3 SYRINGE (ML) INJECTION
Status: DISCONTINUED | OUTPATIENT
Start: 2024-04-14 | End: 2024-04-14 | Stop reason: HOSPADM

## 2024-04-14 RX ORDER — MANNITOL 20 G/100ML
INJECTION, SOLUTION INTRAVENOUS
Status: DISCONTINUED | OUTPATIENT
Start: 2024-04-14 | End: 2024-04-14

## 2024-04-14 RX ORDER — SULFAMETHOXAZOLE AND TRIMETHOPRIM 400; 80 MG/1; MG/1
1 TABLET ORAL EVERY MORNING
Status: DISCONTINUED | OUTPATIENT
Start: 2024-04-24 | End: 2024-04-18 | Stop reason: HOSPADM

## 2024-04-14 RX ORDER — INSULIN ASPART 100 [IU]/ML
0-5 INJECTION, SOLUTION INTRAVENOUS; SUBCUTANEOUS
Status: DISCONTINUED | OUTPATIENT
Start: 2024-04-14 | End: 2024-04-18 | Stop reason: HOSPADM

## 2024-04-14 RX ORDER — LIDOCAINE HYDROCHLORIDE 20 MG/ML
INJECTION INTRAVENOUS
Status: DISCONTINUED | OUTPATIENT
Start: 2024-04-14 | End: 2024-04-14

## 2024-04-14 RX ORDER — TRAMADOL HYDROCHLORIDE 50 MG/1
50 TABLET ORAL EVERY 4 HOURS PRN
Status: DISCONTINUED | OUTPATIENT
Start: 2024-04-14 | End: 2024-04-18 | Stop reason: HOSPADM

## 2024-04-14 RX ORDER — MYCOPHENOLATE MOFETIL 250 MG/1
1000 CAPSULE ORAL 2 TIMES DAILY
Qty: 240 CAPSULE | Refills: 11 | Status: SHIPPED | OUTPATIENT
Start: 2024-04-15 | End: 2024-05-13 | Stop reason: SINTOL

## 2024-04-14 RX ORDER — CEFAZOLIN SODIUM 1 G/3ML
INJECTION, POWDER, FOR SOLUTION INTRAMUSCULAR; INTRAVENOUS
Status: DISCONTINUED | OUTPATIENT
Start: 2024-04-14 | End: 2024-04-14 | Stop reason: HOSPADM

## 2024-04-14 RX ORDER — ONDANSETRON HYDROCHLORIDE 2 MG/ML
4 INJECTION, SOLUTION INTRAVENOUS EVERY 6 HOURS PRN
Status: DISCONTINUED | OUTPATIENT
Start: 2024-04-14 | End: 2024-04-18 | Stop reason: HOSPADM

## 2024-04-14 RX ORDER — HEPARIN SODIUM 5000 [USP'U]/ML
5000 INJECTION, SOLUTION INTRAVENOUS; SUBCUTANEOUS EVERY 8 HOURS
Status: DISCONTINUED | OUTPATIENT
Start: 2024-04-14 | End: 2024-04-18 | Stop reason: HOSPADM

## 2024-04-14 RX ORDER — VALGANCICLOVIR 450 MG/1
900 TABLET, FILM COATED ORAL DAILY
Qty: 60 TABLET | Refills: 2 | Status: SHIPPED | OUTPATIENT
Start: 2024-04-14 | End: 2024-04-18

## 2024-04-14 RX ORDER — BUPIVACAINE HYDROCHLORIDE 2.5 MG/ML
INJECTION, SOLUTION EPIDURAL; INFILTRATION; INTRACAUDAL
Status: DISCONTINUED | OUTPATIENT
Start: 2024-04-14 | End: 2024-04-14 | Stop reason: HOSPADM

## 2024-04-14 RX ORDER — PROCHLORPERAZINE EDISYLATE 5 MG/ML
5 INJECTION INTRAMUSCULAR; INTRAVENOUS EVERY 30 MIN PRN
Status: DISCONTINUED | OUTPATIENT
Start: 2024-04-14 | End: 2024-04-14 | Stop reason: HOSPADM

## 2024-04-14 RX ORDER — ACETAMINOPHEN 650 MG/20.3ML
650 LIQUID ORAL ONCE
Status: COMPLETED | OUTPATIENT
Start: 2024-04-14 | End: 2024-04-14

## 2024-04-14 RX ORDER — TACROLIMUS 1 MG/1
3 CAPSULE ORAL 2 TIMES DAILY
Status: DISCONTINUED | OUTPATIENT
Start: 2024-04-14 | End: 2024-04-15

## 2024-04-14 RX ORDER — OXYCODONE HYDROCHLORIDE 5 MG/1
5 TABLET ORAL EVERY 4 HOURS PRN
Status: DISCONTINUED | OUTPATIENT
Start: 2024-04-14 | End: 2024-04-15

## 2024-04-14 RX ORDER — IBUPROFEN 200 MG
24 TABLET ORAL
Status: DISCONTINUED | OUTPATIENT
Start: 2024-04-14 | End: 2024-04-18 | Stop reason: HOSPADM

## 2024-04-14 RX ORDER — VALGANCICLOVIR 450 MG/1
450 TABLET, FILM COATED ORAL EVERY MORNING
Status: DISCONTINUED | OUTPATIENT
Start: 2024-04-24 | End: 2024-04-18 | Stop reason: HOSPADM

## 2024-04-14 RX ORDER — MUPIROCIN 20 MG/G
OINTMENT TOPICAL
Status: DISCONTINUED | OUTPATIENT
Start: 2024-04-14 | End: 2024-05-27

## 2024-04-14 RX ORDER — PHENYLEPHRINE HYDROCHLORIDE 10 MG/ML
INJECTION INTRAVENOUS
Status: DISCONTINUED | OUTPATIENT
Start: 2024-04-14 | End: 2024-04-14

## 2024-04-14 RX ORDER — FENTANYL CITRATE 50 UG/ML
25 INJECTION, SOLUTION INTRAMUSCULAR; INTRAVENOUS EVERY 5 MIN PRN
Status: DISCONTINUED | OUTPATIENT
Start: 2024-04-14 | End: 2024-04-14 | Stop reason: HOSPADM

## 2024-04-14 RX ORDER — FUROSEMIDE 10 MG/ML
100 INJECTION INTRAMUSCULAR; INTRAVENOUS ONCE
Status: COMPLETED | OUTPATIENT
Start: 2024-04-14 | End: 2024-04-14

## 2024-04-14 RX ORDER — DIPHENHYDRAMINE HYDROCHLORIDE 50 MG/ML
INJECTION INTRAMUSCULAR; INTRAVENOUS
Status: DISCONTINUED | OUTPATIENT
Start: 2024-04-14 | End: 2024-04-14

## 2024-04-14 RX ORDER — ACETAMINOPHEN 325 MG/1
650 TABLET ORAL EVERY 8 HOURS
Status: ACTIVE | OUTPATIENT
Start: 2024-04-14 | End: 2024-04-16

## 2024-04-14 RX ORDER — DEXTROSE MONOHYDRATE AND SODIUM CHLORIDE 5; .45 G/100ML; G/100ML
INJECTION, SOLUTION INTRAVENOUS CONTINUOUS
Status: DISCONTINUED | OUTPATIENT
Start: 2024-04-14 | End: 2024-04-15

## 2024-04-14 RX ORDER — EPINEPHRINE 1 MG/ML
1 INJECTION, SOLUTION, CONCENTRATE INTRAVENOUS ONCE AS NEEDED
Status: DISCONTINUED | OUTPATIENT
Start: 2024-04-14 | End: 2024-04-18

## 2024-04-14 RX ORDER — ACETAMINOPHEN 325 MG/1
650 TABLET ORAL
Status: COMPLETED | OUTPATIENT
Start: 2024-04-15 | End: 2024-04-16

## 2024-04-14 RX ORDER — HYDROMORPHONE HYDROCHLORIDE 1 MG/ML
0.2 INJECTION, SOLUTION INTRAMUSCULAR; INTRAVENOUS; SUBCUTANEOUS EVERY 5 MIN PRN
Status: DISCONTINUED | OUTPATIENT
Start: 2024-04-14 | End: 2024-04-14 | Stop reason: HOSPADM

## 2024-04-14 RX ORDER — HYDROMORPHONE HYDROCHLORIDE 1 MG/ML
0.5 INJECTION, SOLUTION INTRAMUSCULAR; INTRAVENOUS; SUBCUTANEOUS ONCE
Status: COMPLETED | OUTPATIENT
Start: 2024-04-14 | End: 2024-04-14

## 2024-04-14 RX ORDER — INSULIN ASPART 100 [IU]/ML
8 INJECTION, SOLUTION INTRAVENOUS; SUBCUTANEOUS
Status: DISCONTINUED | OUTPATIENT
Start: 2024-04-14 | End: 2024-04-15

## 2024-04-14 RX ORDER — FENTANYL CITRATE 50 UG/ML
INJECTION, SOLUTION INTRAMUSCULAR; INTRAVENOUS
Status: DISCONTINUED | OUTPATIENT
Start: 2024-04-14 | End: 2024-04-14

## 2024-04-14 RX ORDER — HEPARIN SODIUM 1000 [USP'U]/ML
INJECTION, SOLUTION INTRAVENOUS; SUBCUTANEOUS
Status: DISCONTINUED | OUTPATIENT
Start: 2024-04-14 | End: 2024-04-14 | Stop reason: HOSPADM

## 2024-04-14 RX ORDER — INDOMETHACIN 25 MG/1
CAPSULE ORAL
Status: DISCONTINUED | OUTPATIENT
Start: 2024-04-14 | End: 2024-04-14

## 2024-04-14 RX ORDER — LABETALOL HCL 20 MG/4 ML
10 SYRINGE (ML) INTRAVENOUS
Status: DISCONTINUED | OUTPATIENT
Start: 2024-04-14 | End: 2024-04-15

## 2024-04-14 RX ORDER — METHYLPREDNISOLONE SODIUM SUCCINATE 500 MG/8ML
INJECTION INTRAMUSCULAR; INTRAVENOUS
Status: DISCONTINUED | OUTPATIENT
Start: 2024-04-14 | End: 2024-04-14

## 2024-04-14 RX ORDER — DIPHENHYDRAMINE HYDROCHLORIDE 50 MG/ML
50 INJECTION INTRAMUSCULAR; INTRAVENOUS ONCE
Status: COMPLETED | OUTPATIENT
Start: 2024-04-14 | End: 2024-04-14

## 2024-04-14 RX ORDER — PREGABALIN 75 MG/1
75 CAPSULE ORAL
Status: DISCONTINUED | OUTPATIENT
Start: 2024-04-14 | End: 2024-04-15

## 2024-04-14 RX ORDER — SULFAMETHOXAZOLE AND TRIMETHOPRIM 400; 80 MG/1; MG/1
1 TABLET ORAL DAILY
Qty: 30 TABLET | Refills: 5 | Status: SHIPPED | OUTPATIENT
Start: 2024-04-14 | End: 2024-04-18

## 2024-04-14 RX ORDER — TAMSULOSIN HYDROCHLORIDE 0.4 MG/1
1 CAPSULE ORAL DAILY
Status: DISPENSED | OUTPATIENT
Start: 2024-04-15

## 2024-04-14 RX ORDER — SODIUM BICARBONATE 650 MG/1
1300 TABLET ORAL 2 TIMES DAILY
Status: DISCONTINUED | OUTPATIENT
Start: 2024-04-14 | End: 2024-04-18 | Stop reason: HOSPADM

## 2024-04-14 RX ORDER — BISACODYL 5 MG
10 TABLET, DELAYED RELEASE (ENTERIC COATED) ORAL NIGHTLY
Status: DISCONTINUED | OUTPATIENT
Start: 2024-04-14 | End: 2024-04-18 | Stop reason: HOSPADM

## 2024-04-14 RX ORDER — DIPHENHYDRAMINE HCL 25 MG
25 CAPSULE ORAL
Status: COMPLETED | OUTPATIENT
Start: 2024-04-15 | End: 2024-04-16

## 2024-04-14 RX ADMIN — CALCIUM CHLORIDE 250 G: 100 INJECTION, SOLUTION INTRAVENOUS at 12:04

## 2024-04-14 RX ADMIN — SODIUM BICARBONATE 1300 MG: 650 TABLET ORAL at 04:04

## 2024-04-14 RX ADMIN — HYDROMORPHONE HYDROCHLORIDE 0.5 MG: 1 INJECTION, SOLUTION INTRAMUSCULAR; INTRAVENOUS; SUBCUTANEOUS at 04:04

## 2024-04-14 RX ADMIN — TACROLIMUS 3 MG: 1 CAPSULE ORAL at 05:04

## 2024-04-14 RX ADMIN — ACETAMINOPHEN 650 MG: 325 TABLET ORAL at 10:04

## 2024-04-14 RX ADMIN — TRAMADOL HYDROCHLORIDE 50 MG: 50 TABLET, COATED ORAL at 09:04

## 2024-04-14 RX ADMIN — PHENYLEPHRINE HYDROCHLORIDE 0.2 MCG/KG/MIN: 10 INJECTION INTRAVENOUS at 08:04

## 2024-04-14 RX ADMIN — SODIUM CHLORIDE, SODIUM ACETATE ANHYDROUS, SODIUM GLUCONATE, POTASSIUM CHLORIDE, AND MAGNESIUM CHLORIDE: 526; 222; 502; 37; 30 INJECTION, SOLUTION INTRAVENOUS at 08:04

## 2024-04-14 RX ADMIN — LIDOCAINE HYDROCHLORIDE 100 MG: 20 INJECTION INTRAVENOUS at 08:04

## 2024-04-14 RX ADMIN — DOCUSATE SODIUM 100 MG: 100 CAPSULE, LIQUID FILLED ORAL at 08:04

## 2024-04-14 RX ADMIN — FENTANYL CITRATE 50 MCG: 50 INJECTION, SOLUTION INTRAMUSCULAR; INTRAVENOUS at 11:04

## 2024-04-14 RX ADMIN — PHENYLEPHRINE HYDROCHLORIDE 100 MCG: 10 INJECTION INTRAVENOUS at 08:04

## 2024-04-14 RX ADMIN — MYCOPHENOLATE MOFETIL 1000 MG: 250 CAPSULE ORAL at 10:04

## 2024-04-14 RX ADMIN — ROCURONIUM BROMIDE 20 MG: 10 INJECTION, SOLUTION INTRAVENOUS at 11:04

## 2024-04-14 RX ADMIN — DOCUSATE SODIUM 100 MG: 100 CAPSULE, LIQUID FILLED ORAL at 02:04

## 2024-04-14 RX ADMIN — FAMOTIDINE 20 MG: 20 TABLET ORAL at 08:04

## 2024-04-14 RX ADMIN — LABETALOL HYDROCHLORIDE 10 MG: 5 INJECTION, SOLUTION INTRAVENOUS at 02:04

## 2024-04-14 RX ADMIN — INSULIN HUMAN 9.23 UNITS: 100 INJECTION, SOLUTION PARENTERAL at 06:04

## 2024-04-14 RX ADMIN — FENTANYL CITRATE 50 MCG: 50 INJECTION, SOLUTION INTRAMUSCULAR; INTRAVENOUS at 01:04

## 2024-04-14 RX ADMIN — CEFAZOLIN 2 G: 2 INJECTION, POWDER, FOR SOLUTION INTRAMUSCULAR; INTRAVENOUS at 08:04

## 2024-04-14 RX ADMIN — ACETAMINOPHEN 650 MG: 325 TABLET ORAL at 02:04

## 2024-04-14 RX ADMIN — INSULIN ASPART 1 UNITS: 100 INJECTION, SOLUTION INTRAVENOUS; SUBCUTANEOUS at 10:04

## 2024-04-14 RX ADMIN — MUPIROCIN 1 G: 20 OINTMENT TOPICAL at 08:04

## 2024-04-14 RX ADMIN — PROPOFOL 20 MG: 10 INJECTION, EMULSION INTRAVENOUS at 11:04

## 2024-04-14 RX ADMIN — DEXMEDETOMIDINE 4 MCG: 100 INJECTION, SOLUTION, CONCENTRATE INTRAVENOUS at 09:04

## 2024-04-14 RX ADMIN — ROCURONIUM BROMIDE 10 MG: 10 INJECTION, SOLUTION INTRAVENOUS at 09:04

## 2024-04-14 RX ADMIN — THERA TABS 1 TABLET: TAB at 02:04

## 2024-04-14 RX ADMIN — FENTANYL CITRATE 50 MCG: 50 INJECTION, SOLUTION INTRAMUSCULAR; INTRAVENOUS at 09:04

## 2024-04-14 RX ADMIN — HYDROMORPHONE HYDROCHLORIDE 0.2 MG: 1 INJECTION, SOLUTION INTRAMUSCULAR; INTRAVENOUS; SUBCUTANEOUS at 03:04

## 2024-04-14 RX ADMIN — SODIUM CHLORIDE: 0.9 INJECTION, SOLUTION INTRAVENOUS at 08:04

## 2024-04-14 RX ADMIN — PHENYLEPHRINE HYDROCHLORIDE 100 MCG: 10 INJECTION INTRAVENOUS at 12:04

## 2024-04-14 RX ADMIN — OXYCODONE 5 MG: 5 TABLET ORAL at 02:04

## 2024-04-14 RX ADMIN — FUROSEMIDE 100 MG: 10 INJECTION, SOLUTION INTRAMUSCULAR; INTRAVENOUS at 11:04

## 2024-04-14 RX ADMIN — CARVEDILOL 25 MG: 25 TABLET, FILM COATED ORAL at 02:04

## 2024-04-14 RX ADMIN — ANTI-THYMOCYTE GLOBULIN (RABBIT) 125 MG: 5 INJECTION, POWDER, LYOPHILIZED, FOR SOLUTION INTRAVENOUS at 09:04

## 2024-04-14 RX ADMIN — INSULIN HUMAN 5 UNITS: 100 INJECTION, SOLUTION PARENTERAL at 12:04

## 2024-04-14 RX ADMIN — SODIUM BICARBONATE 50 MEQ: 84 INJECTION, SOLUTION INTRAVENOUS at 12:04

## 2024-04-14 RX ADMIN — CALCIUM CHLORIDE 500 G: 100 INJECTION, SOLUTION INTRAVENOUS at 09:04

## 2024-04-14 RX ADMIN — DEXTROSE AND SODIUM CHLORIDE: 5; 450 INJECTION, SOLUTION INTRAVENOUS at 02:04

## 2024-04-14 RX ADMIN — INSULIN DETEMIR 24 UNITS: 100 INJECTION, SOLUTION SUBCUTANEOUS at 10:04

## 2024-04-14 RX ADMIN — ROCURONIUM BROMIDE 5 MG: 10 INJECTION, SOLUTION INTRAVENOUS at 10:04

## 2024-04-14 RX ADMIN — PROPOFOL 40 MG: 10 INJECTION, EMULSION INTRAVENOUS at 11:04

## 2024-04-14 RX ADMIN — FENTANYL CITRATE 50 MCG: 50 INJECTION, SOLUTION INTRAMUSCULAR; INTRAVENOUS at 08:04

## 2024-04-14 RX ADMIN — MYCOPHENOLATE MOFETIL 1000 MG: 250 CAPSULE ORAL at 02:04

## 2024-04-14 RX ADMIN — FUROSEMIDE 100 MG: 10 INJECTION, SOLUTION INTRAMUSCULAR; INTRAVENOUS at 08:04

## 2024-04-14 RX ADMIN — ONDANSETRON 4 MG: 2 INJECTION INTRAMUSCULAR; INTRAVENOUS at 12:04

## 2024-04-14 RX ADMIN — PHENYLEPHRINE HYDROCHLORIDE 50 MCG: 10 INJECTION INTRAVENOUS at 10:04

## 2024-04-14 RX ADMIN — ROCURONIUM BROMIDE 50 MG: 10 INJECTION, SOLUTION INTRAVENOUS at 08:04

## 2024-04-14 RX ADMIN — INSULIN ASPART 2 UNITS: 100 INJECTION, SOLUTION INTRAVENOUS; SUBCUTANEOUS at 06:04

## 2024-04-14 RX ADMIN — DEXTROSE MONOHYDRATE 500 ML: 100 INJECTION, SOLUTION INTRAVENOUS at 06:04

## 2024-04-14 RX ADMIN — CEFAZOLIN 2 G: 2 INJECTION, POWDER, FOR SOLUTION INTRAMUSCULAR; INTRAVENOUS at 12:04

## 2024-04-14 RX ADMIN — MIDAZOLAM HYDROCHLORIDE 2 MG: 2 INJECTION, SOLUTION INTRAMUSCULAR; INTRAVENOUS at 08:04

## 2024-04-14 RX ADMIN — ACETAMINOPHEN 650 MG: 650 SOLUTION ORAL at 08:04

## 2024-04-14 RX ADMIN — DEXMEDETOMIDINE 4 MCG: 100 INJECTION, SOLUTION, CONCENTRATE INTRAVENOUS at 08:04

## 2024-04-14 RX ADMIN — DIPHENHYDRAMINE HYDROCHLORIDE 50 MG: 50 INJECTION, SOLUTION INTRAMUSCULAR; INTRAVENOUS at 08:04

## 2024-04-14 RX ADMIN — ROCURONIUM BROMIDE 10 MG: 10 INJECTION, SOLUTION INTRAVENOUS at 12:04

## 2024-04-14 RX ADMIN — METHYLPREDNISOLONE SODIUM SUCCINATE 500 MG: 500 INJECTION, POWDER, FOR SOLUTION INTRAMUSCULAR; INTRAVENOUS at 08:04

## 2024-04-14 RX ADMIN — ROCURONIUM BROMIDE 10 MG: 10 INJECTION, SOLUTION INTRAVENOUS at 11:04

## 2024-04-14 RX ADMIN — MANNITOL 25 G: 20 INJECTION, SOLUTION INTRAVENOUS at 11:04

## 2024-04-14 RX ADMIN — ROCURONIUM BROMIDE 25 MG: 10 INJECTION, SOLUTION INTRAVENOUS at 09:04

## 2024-04-14 RX ADMIN — CALCIUM CHLORIDE 50 G: 100 INJECTION, SOLUTION INTRAVENOUS at 12:04

## 2024-04-14 RX ADMIN — HEPARIN SODIUM 5000 UNITS: 5000 INJECTION INTRAVENOUS; SUBCUTANEOUS at 09:04

## 2024-04-14 RX ADMIN — SUGAMMADEX 200 MG: 100 INJECTION, SOLUTION INTRAVENOUS at 01:04

## 2024-04-14 RX ADMIN — PROPOFOL 200 MG: 10 INJECTION, EMULSION INTRAVENOUS at 08:04

## 2024-04-14 RX ADMIN — SODIUM CHLORIDE: 9 INJECTION, SOLUTION INTRAVENOUS at 02:04

## 2024-04-14 RX ADMIN — HEPARIN SODIUM 5000 UNITS: 5000 INJECTION INTRAVENOUS; SUBCUTANEOUS at 04:04

## 2024-04-14 RX ADMIN — BISACODYL 10 MG: 5 TABLET, COATED ORAL at 08:04

## 2024-04-14 RX ADMIN — HEPARIN SODIUM 5000 UNITS: 5000 INJECTION INTRAVENOUS; SUBCUTANEOUS at 02:04

## 2024-04-14 NOTE — HPI
Reason for Consult: Management of T2DM, Hyperglycemia     Surgical Procedure and Date: Kidney Transplant on 4/14/24    Diabetes diagnosis year: 1995    Home Diabetes Medications:  Novolog 4-6 units TID with mels, Lantus 28-35 untis q HS, Tradjenta 5 mg daily     How often checking glucose at home? Dexcom G7    BG readings on regimen: mostly 140-150s  Hypoglycemia on the regimen?  No  Missed doses on regimen?  No    Diabetes Complications include:     Hyperglycemia, Diabetic nephropathy  , Diabetic chronic kidney disease     , Diabetic cataract , and Diabetic peripheral neuropathy     Complicating diabetes co morbidities:   ESRD and Glucocorticoid use       HPI:   Patient is a 63 y.o. 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  Now presents for the above procedure. Endocrinology consulted for management of T2DM.      Lab Results   Component Value Date    HGBA1C 8.6 (H) 04/14/2024

## 2024-04-14 NOTE — PROGRESS NOTES
Nursing Transfer Note        Reason patient is being transferred: back to room post recovery    Transfer To: [id number]    Transfer via bed    Transfer with cardiac monitoring    Transported by escort x2    Telemetry: Box Number [id number], Rate 68, and Rhythm NSR    Medicines sent: IVF infusing    Any special needs or follow-up needed: **Trialysis pigtails need to be flushed w/ heparin (concentration not in PACU pyxis)    Patient belongings transferred with patient: No    Chart send with patient: Yes    Notified: spouse    Patient reassessed at: to be done by receiving RN (date, time)

## 2024-04-14 NOTE — PROGRESS NOTES
Pt arrived back to TSU 80005 from PACU. Hourly intake/output continued per orders. Pt is AAOx4, easily falls asleep. See flowsheets for vitals, pt is hypertensive. NSR on tele. Afebrile. On RA. L abd incision and RLQ abd incision both with island/border dressings. Scant sanguinous drainage noted and outline on both. Marinelli to gravity with pink uop. SCDs/TEDs on. Spouse at bedside. Taught pt IS use, he verbalized understanding. Instructed pt to call for assistance before getting OOB. Bed locked/lowest position, nonskid socks on, call denson within reach. EDEN MONTERROSO notified of pt's arrival to unit. Pt with reports of 10/10 pain. See MAR for pain med admin.

## 2024-04-14 NOTE — PROGRESS NOTES
SBP >200. Dr. Woodall notified and responded with new order for labetalol 10mg ivp x1 now. Will follow through and continue to monitor.

## 2024-04-14 NOTE — H&P
[facility] - Transplant Stepdown  Kidney Transplant  H&P      Subjective:     Chief Complaint/Reason for Admission: ESRD     History of Present Illness:  Mr. Ha is a 63 y.o. Black or  male with ESRD secondary to diabetic nephropathy. He is presenting as a primary candidate for a kidney transplant with Dr. Burt 4/14/24 at 8am.  He has been on the wait list for a kidney transplant at [facility] since 2/24/2021. Patient is currently on peritoneal dialysis started on 2/24/2021. Patient reports that he is tolerating dialysis well. He has a PD catheter. Patient denies any recent hospitalizations or ED visits. Denies peritonitis or exit site infections. Pre op labs and imaging reveal K 5.4. PD cut short due to transplant offer. Patient shifted in preparation for surgery, anesthesiology aware.       Dialysis History: Mr. Arrington with ESRD, requiring chronic dialysis who is on peritoneal dialysis started on 2021. Patient is dialyzing on cyclic peritoneal dialysis.  Patient reports that he is tolerating dialysis well.   Date of Last Dialysis: 4/13/24pm    Native urine output per day: ~1L    Previous Transplant: no    Current Facility-Administered Medications   Medication Dose Route Frequency Provider Last Rate Last Admin    acetaminophen oral solution 650 mg  650 mg Per NG tube Once Lorelei Cha PA-C        ceFAZolin 2 g in dextrose 5 % in water (D5W) 50 mL IVPB (MB+)  2 g Intravenous On Call Procedure Lorelei Cha PA-C        dextrose 10% bolus 500 mL 500 mL  50 g Intravenous Once Lorelei Cha PA-C 999 mL/hr at 04/14/24 0607 500 mL at 04/14/24 0607    And    dextrose 10% bolus 250 mL 250 mL  25 g Intravenous PRN Lorelei Cha PA-C        diphenhydrAMINE injection 50 mg  50 mg Intravenous Once Lorelei Cha PA-C        methylPREDNISolone sodium succinate (SOLU-MEDROL) 500 mg in dextrose 5 % (D5W) 100 mL IVPB  500 mg Intravenous Once Starla  Lorelei Miller PA-C        mupirocin 2 % ointment   Nasal On Call Procedure Lorelei Cha PA-C        pregabalin capsule 75 mg  75 mg Oral On Call Procedure Lorelei Cha PA-C        sodium chloride 0.9% flush 10 mL  10 mL Intravenous PRN Lorelei Cha PA-C        [START ON 4/15/2024] tamsulosin 24 hr capsule 0.4 mg  1 capsule Oral Daily Lorelei Cha PA-C           Review of patient's allergies indicates:  No Known Allergies    Past Medical History:   Diagnosis Date    Anemia     Aqueous misdirection of right eye     Arthritis     Blind painful eye     Cataract     CKD (chronic kidney disease)     Diabetes mellitus, type 2     Disorder of kidney and ureter     Fever blister     GERD (gastroesophageal reflux disease)     Hyperlipidemia     Hypertension     Joint pain     Neuropathy      Past Surgical History:   Procedure Laterality Date    AIR FLUID EXCHANGE OF EYE Left 6/24/2022    Procedure: AIR FLUID EXCHANGE, EYE;  Surgeon: Arun Veronica MD;  Location: [facility];  Service: Ophthalmology;  Laterality: Left;    CATARACT EXTRACTION Left 8/27/2014    COLONOSCOPY N/A 9/8/2016    Procedure: COLONOSCOPY;  Surgeon: Luis Bogran-Reyes, MD;  Location: [facility];  Service: Endoscopy;  Laterality: N/A;    COLONOSCOPY N/A 11/10/2023    Procedure: COLONOSCOPY;  Surgeon: Nomi Del Real MD;  Location: [facility];  Service: Endoscopy;  Laterality: N/A;    CONJUNCTIVOPLASTY Right 6/17/2020    Procedure: CONJUNCTIVOPLASTY;  Surgeon: Myrna Alba MD;  Location: [facility];  Service: Ophthalmology;  Laterality: Right;    ENDOLASER PHOTOCOAGULATION Left 6/24/2022    Procedure: PHOTOCOAGULATION, ENDOLASER;  Surgeon: Arun Veronica MD;  Location: [facility];  Service: Ophthalmology;  Laterality: Left;    ENUCLEATION Right 6/17/2020    Procedure: ENUCLEATION, EYE;  Surgeon: Myrna Alba MD;  Location: [facility];  Service: Ophthalmology;  Laterality: Right;    EXPLORATION  "OF ORBIT Right 6/17/2020    Procedure: EXPLORATION, ORBIT;  Surgeon: Myrna Alba MD;  Location: [facility];  Service: Ophthalmology;  Laterality: Right;    EYE SURGERY      TARSORRHAPHY Right 6/17/2020    Procedure: BLEPHARORRHAPHY;  Surgeon: Myrna Alba MD;  Location: [facility];  Service: Ophthalmology;  Laterality: Right;    VITRECTOMY Right     VITRECTOMY BY PARS PLANA APPROACH Left 6/24/2022    Procedure: VITRECTOMY, PARS PLANA APPROACH;  Surgeon: Arun Veronica MD;  Location: [facility];  Service: Ophthalmology;  Laterality: Left;     Family History       Problem Relation (Age of Onset)    Cataracts Mother, Father    Diabetes Mother, Father    Glaucoma Mother    Hypertension Mother, Sister, Brother    Kidney failure Father          Tobacco Use    Smoking status: Never     Passive exposure: Never    Smokeless tobacco: Never   Substance and Sexual Activity    Alcohol use: Not Currently     Alcohol/week: 0.0 standard drinks of alcohol     Comment: rarely    Drug use: No    Sexual activity: Yes        Review of Systems   Constitutional:  Negative for appetite change, fatigue and fever.   Eyes:  Positive for visual disturbance (chronic).   Respiratory:  Negative for cough and shortness of breath.    Cardiovascular:  Negative for chest pain and leg swelling.   Gastrointestinal:  Negative for abdominal distention, abdominal pain, diarrhea, nausea and vomiting.   Genitourinary:  Positive for decreased urine volume. Negative for difficulty urinating, dysuria and frequency.   Skin:  Negative for wound.   Neurological:  Negative for dizziness and weakness.   Psychiatric/Behavioral:  Negative for confusion.      Objective:     Vital Signs (Most Recent):  Temp: 97.8 °F (36.6 °C) (04/14/24 0555)  Pulse: 74 (04/14/24 0557)  Resp: 19 (04/14/24 0557)  BP: (!) 168/83 (04/14/24 0557)  SpO2: 96 % (04/14/24 0555)  Height: 5' 7" (170.2 cm)  Weight: 92.3 kg (203 lb 6 oz)  Body mass index is 31.85 kg/m².      Physical " "Exam  Vitals and nursing note reviewed.   Constitutional:       General: He is not in acute distress.     Appearance: He is not ill-appearing.   HENT:      Head: Normocephalic.      Mouth/Throat:      Mouth: Mucous membranes are moist.   Eyes:      Extraocular Movements: Extraocular movements intact.      Conjunctiva/sclera: Conjunctivae normal.   Cardiovascular:      Rate and Rhythm: Normal rate and regular rhythm.      Pulses: Normal pulses.      Heart sounds: Normal heart sounds.   Pulmonary:      Effort: Pulmonary effort is normal. No respiratory distress.      Breath sounds: No wheezing or rales.   Abdominal:      General: Bowel sounds are normal. There is no distension.      Palpations: Abdomen is soft.      Tenderness: There is no abdominal tenderness. There is no guarding or rebound.   Musculoskeletal:         General: No swelling. Normal range of motion.      Cervical back: Normal range of motion.   Skin:     General: Skin is warm and dry.   Neurological:      Mental Status: He is alert and oriented to person, place, and time.      Motor: No weakness.   Psychiatric:         Mood and Affect: Mood normal.         Behavior: Behavior normal.         Thought Content: Thought content normal.         Judgment: Judgment normal.          Laboratory  CBC:   Recent Labs   Lab 04/14/24  0348   WBC 6.79   RBC 3.09*   HGB 9.6*   HCT 28.4*      MCV 92   MCH 31.1*   MCHC 33.8     CMP:   Recent Labs   Lab 04/14/24  0348   *   CALCIUM 8.3*   ALBUMIN 2.8*   PROT 6.4      K 5.4*   CO2 19*      BUN 67*   CREATININE 9.2*   ALKPHOS 78   ALT 22   AST 12     Coagulation: No results for input(s): "PT", "APTT" in the last 168 hours.  Labs within the past 24 hours have been reviewed.    Diagnostic Results:  EKG NSR, CXR without acute CP process     Patient was SARS-CoV-2 /COVID-19 tested with negative results.   Assessment/Plan:     Cardiac/Vascular  Hypertension  - resume/adjust home medications post op "       Renal/  * ESRD (end stage renal disease) on dialysis  - primary for ktx 8am with Dr. Burt   - pre op labs and imaging stable  - K slightly elevated, shifted pre op   - strict Is and Os       Endocrine  Type 2 diabetes mellitus with hypoglycemia without coma, with long-term current use of insulin  - endocrine consulted, appreciate assistance           The patient presents for kidney transplant.  There are no apparent contraindications to proceeding with the planned transplant.  The patient understands that the transplant could potentially be cancelled pending detailed assessment of the donor organ.  He will receive Thymoglobulin induction.  A complete discussion of the transplant procedure, including risks, complications, and alternatives, as well as any donor-specific risk factors requiring specific disclosure, will be carried out by the responsible staff surgeon prior to the procedure.       Medical decision making for this encounter includes review of pertinent labs and diagnostic studies, assessment and planning, discussions with consulting providers, discussion with patient/family, and participation in multidisciplinary rounds. Time spent caring for patient: 30 minutes    Lorelei Cha PA-C  Kidney Transplant  [facility] - Transplant Stepdown

## 2024-04-14 NOTE — CARE UPDATE
Endocrinology consult acknowledged.     Patient remains in OR for kidney txp at this time. Full consult note to follow.     HPI:   Patient is a 63 y.o. 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  Now presents for kidney txp. Endocrinology consulted for management of T2DM.     Home DM regimen: Novolog 10 units TID with mels, Lantus 28 untis q HS, Tradjenta 5 mg daily (per chart review)     Plan:  -Start Novolog 8 units TID with meals (HOLD if eating < 25% or BG  <100) 0.5 u/kg dosing  -Start Levemir 24 units q HS (0.5 /kg dosing)  -Low Dose Correction Scale  -BG monitoring ac/hs    ** Please call Endocrine for any BG related issues **

## 2024-04-14 NOTE — TRANSFER OF CARE
"Anesthesia Transfer of Care Note    Patient: Murphy Ha    Procedure(s) Performed: Procedure(s) (LRB):  TRANSPLANT, KIDNEY (N/A)    Patient location: PACU    Anesthesia Type: general    Transport from OR: Transported from OR on 6-10 L/min O2 by face mask with adequate spontaneous ventilation    Post pain: adequate analgesia    Post assessment: no apparent anesthetic complications    Post vital signs: stable    Level of consciousness: sedated    Nausea/Vomiting: no nausea/vomiting    Complications: none    Transfer of care protocol was followed      Last vitals: Visit Vitals  BP (!) 189/93   Pulse 78   Temp 36.8 °C (98.2 °F) (Temporal)   Resp 12   Ht 5' 7" (1.702 m)   Wt 92.3 kg (203 lb 6 oz)   SpO2 100%   BMI 31.85 kg/m²     "

## 2024-04-14 NOTE — Clinical Note
The right chest and right neck was prepped. The site was prepped with ChloraPrep and Betadine. The site was clipped. The patient was draped.

## 2024-04-14 NOTE — ANESTHESIA POSTPROCEDURE EVALUATION
Anesthesia Post Evaluation    Patient: Murphy Ha    Procedure(s) Performed: Procedure(s) (LRB):  TRANSPLANT, KIDNEY (N/A)    Final Anesthesia Type: general      Patient location during evaluation: PACU  Patient participation: Yes- Able to Participate  Level of consciousness: awake and alert  Post-procedure vital signs: reviewed and stable  Pain management: adequate  Airway patency: patent    PONV status at discharge: No PONV  Anesthetic complications: no      Cardiovascular status: blood pressure returned to baseline  Respiratory status: unassisted  Follow-up not needed.              Vitals Value Taken Time   /82 04/14/24 1600   Temp 36.9 °C (98.5 °F) 04/14/24 1600   Pulse 90 04/14/24 1624   Resp 15 04/14/24 1600   SpO2 100 % 04/14/24 1624   Vitals shown include unfiled device data.      Event Time   Out of Recovery 14:45:00         Pain/Bhaskar Score: Pain Rating Prior to Med Admin: 7 (4/14/2024  3:16 PM)  Pain Rating Post Med Admin: 10 (4/14/2024  3:55 PM)  Bhaskar Score: 9 (4/14/2024  2:45 PM)

## 2024-04-14 NOTE — ASSESSMENT & PLAN NOTE
- primary for ktx 8am with Dr. Burt   - pre op labs and imaging stable  - K slightly elevated, shifted pre op   - strict Is and Os

## 2024-04-14 NOTE — PLAN OF CARE
Problem: Adult Inpatient Plan of Care  Goal: Plan of Care Review  Outcome: Ongoing, Progressing  Goal: Patient-Specific Goal (Individualized)  Outcome: Ongoing, Progressing  Goal: Absence of Hospital-Acquired Illness or Injury  Outcome: Ongoing, Progressing  Goal: Optimal Comfort and Wellbeing  Outcome: Ongoing, Progressing  Goal: Readiness for Transition of Care  Outcome: Ongoing, Progressing     Problem: Diabetes Comorbidity  Goal: Blood Glucose Level Within Targeted Range  Outcome: Ongoing, Progressing     Problem: Neutropenia  Goal: Absence of Infection  Outcome: Ongoing, Progressing     Problem: Infection  Goal: Absence of Infection Signs and Symptoms  Outcome: Ongoing, Progressing

## 2024-04-14 NOTE — PLAN OF CARE
PLAN OF CARE NOTE     Fall bundle in place. Pt. Remained free from falls/trauma/injuries. No complaints of CP/ SOB/discomfort. VSS. RA. A&Ox4. On bedside tele, NSR. Pt. (+) orthostatics. Pt. denies pain this shift. Pts' K+ was 5.4, pt. Shifted this AM. Heparin given. Pt. Is DM2, ACHS Q2H; BG this shift were 128 & 226. CHG bath complete, w/ TEDs/ SCDs applied. Anethesia consent completed. CXR, EKG, LABs, posted. The POC reviewed w/ pt. & pts' wife @ bedside, questions were answered & encouraged. No further concerns at this time.

## 2024-04-14 NOTE — PROGRESS NOTES
Nurses Note -- 4 Eyes      4/14/2024   3:00 AM      Skin assessed during: Admit      [x] No Altered Skin Integrity Present    [x]Prevention Measures Documented      [] Yes- Altered Skin Integrity Present or Discovered   [] LDA Added if Not in Epic (Describe Wound)   [] New Altered Skin Integrity was Present on Admit and Documented in LDA   [] Wound Image Taken    Wound Care Consulted? No    Attending Nurse:  Archie Brian RN     Second RN/Staff Member: Thuy Multani RN      Pt. Was a direct admit to the unit pending Ktx. Pt. Accompanied by wife. Pt. Oriented to unit/ room. Call light within reach. VSS. Bed locked in place. No further concerns at this time.

## 2024-04-14 NOTE — ANESTHESIA PREPROCEDURE EVALUATION
[facility]  Anesthesia Pre-Operative Evaluation         Patient Name: Murphy Ha  YOB: 1960  MRN: [id number]    SUBJECTIVE:     Pre-operative evaluation for Procedure(s) (LRB):  TRANSPLANT, KIDNEY (N/A)     04/14/2024    Murphy Ha is a 63 y.o. male w/ a significant PMHx of HTN, HLD, Cataract, and ESRD 2/2 diabetic nephropathy; on PD.      Patient now presents for the above procedure(s). Of note, potassium 5.4 this AM. Primary team shifted the patient.     Echo Summary 2023  · The left ventricle is normal in size with concentric remodeling and normal systolic function.  · The estimated ejection fraction is 63%.  · Normal left ventricular diastolic function.  · Normal right ventricular size with normal right ventricular systolic function.  · Mild right atrial enlargement.  · Normal central venous pressure (3 mmHg).  · The estimated PA systolic pressure is 26 mmHg.  · Trivial posterior pericardial effusion.       Prev airway: None documented.    LDA:        Peripheral IV - Single Lumen 04/14/24 0400 22 G Anterior;Left Forearm (Active)   Number of days: 0       Drips: None documented.      Patient Active Problem List   Diagnosis    Type 2 diabetes mellitus with hypoglycemia without coma, with long-term current use of insulin    Hypertension    Cataract    Corneal laceration of left eye    Pseudophakia of both eyes    Retained lens material following cataract surgery of right eye    Aqueous misdirection of eye    Blind painful eye    ANDREY (obstructive sleep apnea)    Trigger ring finger of left hand    Nonproliferative diabetic retinopathy associated with type 2 diabetes mellitus    Stage 4 chronic kidney disease    HLD (hyperlipidemia)    Diabetic polyneuropathy associated with type 2 diabetes mellitus    Chronic left shoulder pain    Erectile disorder due to medical condition in male    Vitamin D deficiency    Class 1 obesity due to excess calories with serious  comorbidity and body mass index (BMI) of 30.0 to 30.9 in adult    CKD (chronic kidney disease), stage V    Glaucoma with pupillary block, right, severe stage    Conjunctival scarring of socket of right eye    Keratitis, superficial, punctate, right    ESRD (end stage renal disease) on dialysis    Vitreous hemorrhage, left eye    Mild nonproliferative diabetic retinopathy of left eye without macular edema associated with type 2 diabetes mellitus    Primary open angle glaucoma (POAG) of left eye, mild stage    H/O enucleation of right eyeball    Patient on waiting list for kidney transplant    Chronically dry eyes, left    Corneal dellen of left eye    Type 2 diabetes mellitus with chronic kidney disease on chronic dialysis, without long-term current use of insulin       Review of patient's allergies indicates:  No Known Allergies    Current Inpatient Medications:  Current Facility-Administered Medications   Medication Dose Route Frequency Provider Last Rate Last Admin    acetaminophen oral solution 650 mg  650 mg Per NG tube Once Lorelei Cha PA-C        ceFAZolin 2 g in dextrose 5 % in water (D5W) 50 mL IVPB (MB+)  2 g Intravenous On Call Procedure Lorelei Cha PA-C        dextrose 10% bolus 500 mL 500 mL  50 g Intravenous Once Lorelei Cha PA-C        And    dextrose 10% bolus 250 mL 250 mL  25 g Intravenous PRN Lorelei Cha PA-C        And    insulin regular injection 9.23 Units 0.0923 mL  0.1 Units/kg Intravenous Once Lorelei Cha PA-C        diphenhydrAMINE injection 50 mg  50 mg Intravenous Once Lorelei Cha PA-C        methylPREDNISolone sodium succinate (SOLU-MEDROL) 500 mg in dextrose 5 % (D5W) 100 mL IVPB  500 mg Intravenous Once Lorelei Cha PA-C        mupirocin 2 % ointment   Nasal On Call Procedure Lorelei Cha PA-C        pregabalin capsule 75 mg  75 mg Oral On Call Procedure Lorelei Cha  CARLOS Miller        sodium chloride 0.9% flush 10 mL  10 mL Intravenous PRN Lorelei Cha PA-C        [START ON 4/15/2024] tamsulosin 24 hr capsule 0.4 mg  1 capsule Oral Daily Lorelei Cha PA-C           No current facility-administered medications on file prior to encounter.     Current Outpatient Medications on File Prior to Encounter   Medication Sig Dispense Refill    aspirin (ECOTRIN) 81 MG EC tablet Take 1 tablet (81 mg total) by mouth once daily.      atorvastatin (LIPITOR) 80 MG tablet Take 1 tablet (80 mg total) by mouth every evening. 90 tablet 3    blood-glucose meter,continuous (DEXCOM G7 ) Misc Use to check glucose with sensors 1 each 0    blood-glucose sensor (DEXCOM G7 SENSOR) Lois 1 Device by Misc.(Non-Drug; Combo Route) route every 10 days. 9 each 3    calcitRIOL (ROCALTROL) 0.25 MCG Cap Take 0.25 mcg by mouth once daily.      carvediloL (COREG) 25 MG tablet Take 1 tablet (25 mg total) by mouth 2 (two) times daily with meals. 180 tablet 3    cholecalciferol, vitamin D3, 1,250 mcg (50,000 unit) capsule Take 1 capsule (50,000 Units total) by mouth every 7 days. 4 capsule 2    divalproex (DEPAKOTE) 250 MG EC tablet Take 250 mg by mouth 2 (two) times daily.      dorzolamide-timolol 2-0.5% (COSOPT) 22.3-6.8 mg/mL ophthalmic solution Place 1 drop into the left eye 2 (two) times daily. 10 mL 5    ferric citrate (AURYXIA) 210 mg iron Tab Take 3 tablets by mouth.      furosemide (LASIX) 40 MG tablet Take 1 tablet (40 mg total) by mouth once daily. 90 tablet 3    gabapentin (NEURONTIN) 300 MG capsule Take 1 capsule (300 mg total) by mouth every evening. 90 capsule 3    glucagon 3 mg/actuation Spry 1 each by Nasal route as needed. 1 each 11    HYDROcodone-acetaminophen (NORCO) 5-325 mg per tablet Take two tabs daily for pain. 60 tablet 0    insulin aspart U-100 (NOVOLOG U-100 INSULIN ASPART) 100 unit/mL injection Inject 10 Units into the skin 3 (three) times daily before  "meals. 27 mL 3    insulin glargine (LANTUS U-100 INSULIN) 100 unit/mL injection Inject 28 Units into the skin every evening. 25.2 mL 1    lancets 32 gauge Misc 1 lancet by Misc.(Non-Drug; Combo Route) route 2 (two) times a day. 100 each 3    linaGLIPtin (TRADJENTA) 5 mg Tab tablet Take 1 tablet (5 mg total) by mouth once daily. 90 tablet 3    LORazepam (ATIVAN) 1 MG tablet Take 1 mg by mouth.      pantoprazole (PROTONIX) 40 MG tablet Take 1 tablet (40 mg total) by mouth once daily. 90 tablet 3    pen needle, diabetic 32 gauge x 5/32" Ndle 1 each by Misc.(Non-Drug; Combo Route) route 2 hours after meals and at bedtime. 360 each 3    sevelamer carbonate (RENVELA) 800 mg Tab Take 2 tablets by mouth.      tamsulosin (FLOMAX) 0.4 mg Cap Take 1 capsule (0.4 mg total) by mouth once daily. 90 capsule 3    valproic acid (DEPAKENE) 250 mg capsule Take 500 mg by mouth 2 (two) times daily.         Past Surgical History:   Procedure Laterality Date    AIR FLUID EXCHANGE OF EYE Left 6/24/2022    Procedure: AIR FLUID EXCHANGE, EYE;  Surgeon: Arun Veronica MD;  Location: [facility];  Service: Ophthalmology;  Laterality: Left;    CATARACT EXTRACTION Left 8/27/2014    COLONOSCOPY N/A 9/8/2016    Procedure: COLONOSCOPY;  Surgeon: Luis Bogran-Reyes, MD;  Location: [facility];  Service: Endoscopy;  Laterality: N/A;    COLONOSCOPY N/A 11/10/2023    Procedure: COLONOSCOPY;  Surgeon: Nomi Del Real MD;  Location: [facility];  Service: Endoscopy;  Laterality: N/A;    CONJUNCTIVOPLASTY Right 6/17/2020    Procedure: CONJUNCTIVOPLASTY;  Surgeon: Myrna Alba MD;  Location: [facility];  Service: Ophthalmology;  Laterality: Right;    ENDOLASER PHOTOCOAGULATION Left 6/24/2022    Procedure: PHOTOCOAGULATION, ENDOLASER;  Surgeon: Arun Veronica MD;  Location: [facility];  Service: Ophthalmology;  Laterality: Left;    ENUCLEATION Right 6/17/2020    Procedure: ENUCLEATION, EYE;  Surgeon: Myrna Alba MD;  Location: [facility];  " Service: Ophthalmology;  Laterality: Right;    EXPLORATION OF ORBIT Right 6/17/2020    Procedure: EXPLORATION, ORBIT;  Surgeon: Myrna Alba MD;  [facility];  Service: Ophthalmology;  Laterality: Right;    EYE SURGERY      TARSORRHAPHY Right 6/17/2020    Procedure: BLEPHARORRHAPHY;  Surgeon: Myrna Alba MD;  [facility];  Service: Ophthalmology;  Laterality: Right;    VITRECTOMY Right     VITRECTOMY BY PARS PLANA APPROACH Left 6/24/2022    Procedure: VITRECTOMY, PARS PLANA APPROACH;  Surgeon: Arun Veronica MD;  [facility];  Service: Ophthalmology;  Laterality: Left;       Social History:  Tobacco Use: Low Risk  (3/12/2024)    Patient History     Smoking Tobacco Use: Never     Smokeless Tobacco Use: Never     Passive Exposure: Never      Alcohol Use: Not on file        OBJECTIVE:     Vital Signs Range (Last 24H):  Temp:  [36.6 °C (97.9 °F)]   Pulse:  [78]   Resp:  [18]   BP: (172-189)/(82-90)   SpO2:  [98 %]       Significant Labs:  Lab Results   Component Value Date    WBC 6.79 04/14/2024    HGB 9.6 (L) 04/14/2024    HCT 28.4 (L) 04/14/2024     04/14/2024    CHOL 82 (L) 01/29/2024    TRIG 62 01/29/2024    HDL 31 (L) 01/29/2024    ALT 22 04/14/2024    AST 12 04/14/2024     04/14/2024    K 5.4 (H) 04/14/2024     04/14/2024    CREATININE 9.2 (H) 04/14/2024    BUN 67 (H) 04/14/2024    CO2 19 (L) 04/14/2024    TSH 2.051 11/12/2019    PSA 2.2 07/28/2023    INR 1.1 03/04/2021    HGBA1C 8.6 (H) 04/14/2024       Diagnostic Studies: No relevant studies.    EKG:   Results for orders placed or performed during the hospital encounter of 06/17/22   EKG 12-lead    Collection Time: 06/17/22 10:56 AM    Narrative    Test Reason : E11.3599,H43.10,    Vent. Rate : 071 BPM     Atrial Rate : 071 BPM     P-R Int : 154 ms          QRS Dur : 078 ms      QT Int : 388 ms       P-R-T Axes : 072 070 040 degrees     QTc Int : 421 ms    Normal sinus rhythm  Nonspecific T wave  "abnormality  When compared with ECG of 12-APR-2022 09:26,  No significant change was found  Confirmed by Jani Suarez MD [id number]) on 6/18/2022 7:54:51 AM    Referred By: ELISABETH MATHEW           Confirmed By:Jani Suarez MD       2D ECHO:  TTE:  Results for orders placed or performed during the hospital encounter of 07/28/23   Echo   Result Value Ref Range    Ascending aorta 2.72 cm    STJ 2.30 cm    AV mean gradient 4 mmHg    Ao peak triston 1.39 m/s    Ao VTI 32.08 cm    IVRT 91.34 msec    IVS 0.66 0.6 - 1.1 cm    LA size 3.55 cm    Left Atrium Major Axis 5.50 cm    Left Atrium Minor Axis 5.44 cm    LVIDd 4.66 3.5 - 6.0 cm    LVIDs 3.44 2.1 - 4.0 cm    LVOT diameter 1.94 cm    LVOT peak VTI 23.83 cm    Posterior Wall 1.03 0.6 - 1.1 cm    MV Peak A Triston 0.81 m/s    E wave deceleration time 225.25 msec    MV Peak E Triston 0.69 m/s    PV Peak D Triston 0.49 m/s    PV Peak S Triston 0.42 m/s    RA Major Axis 4.96 cm    RA Width 4.18 cm    RVDD 3.76 cm    Sinus 2.59 cm    TAPSE 2.11 cm    TR Max Triston 2.38 m/s    LA WIDTH 3.91 cm    MV stenosis pressure 1/2 time 65.32 ms    LV Diastolic Volume 100.18 mL    LV Systolic Volume 48.73 mL    LVOT peak triston 0.97 m/s    TDI LATERAL 0.10 m/s    TDI SEPTAL 0.07 m/s    LA volume (mod) 49.43 cm3    MV "A" wave duration 8.56 msec    LV LATERAL E/E' RATIO 6.90 m/s    LV SEPTAL E/E' RATIO 9.86 m/s    FS 26 %    LA volume 64.54 cm3    LV mass 129.44 g    Left Ventricle Relative Wall Thickness 0.44 cm    AV valve area 2.19 cm2    AV Velocity Ratio 0.70     AV index (prosthetic) 0.74     MV valve area p 1/2 method 3.37 cm2    E/A ratio 0.85     Mean e' 0.09 m/s    Pulm vein S/D ratio 0.86     LVOT area 3.0 cm2    LVOT stroke volume 70.40 cm3    AV peak gradient 8 mmHg    E/E' ratio 8.12 m/s    LV Systolic Volume Index 24.6 mL/m2    LV Diastolic Volume Index 50.60 mL/m2    LA Volume Index 32.6 mL/m2    LV Mass Index 65 g/m2    Triscuspid Valve Regurgitation Peak Gradient 23 mmHg    LA Volume Index (Mod) " 25.0 mL/m2    BSA 2.02 m2    Right Atrial Pressure (from IVC) 3 mmHg    EF 63 %    TV resting pulmonary artery pressure 26 mmHg    Narrative    · The left ventricle is normal in size with concentric remodeling and   normal systolic function.  · The estimated ejection fraction is 63%.  · Normal left ventricular diastolic function.  · Normal right ventricular size with normal right ventricular systolic   function.  · Mild right atrial enlargement.  · Normal central venous pressure (3 mmHg).  · The estimated PA systolic pressure is 26 mmHg.  · Trivial posterior pericardial effusion.          LATRICE:  No results found. However, due to the size of the patient record, not all encounters were searched. Please check Results Review for a complete set of results.    ASSESSMENT/PLAN:           Pre-op Assessment    I have reviewed the Patient Summary Reports.     I have reviewed the Nursing Notes. I have reviewed the NPO Status.   I have reviewed the Medications.     Review of Systems  Anesthesia Hx:  No problems with previous Anesthesia   History of prior surgery of interest to airway management or planning:          Denies Family Hx of Anesthesia complications.    Denies Personal Hx of Anesthesia complications.                    Hematology/Oncology:  Hematology Normal   Oncology Normal                                   EENT/Dental:  EENT/Dental Normal           Cardiovascular:     Hypertension                                        Pulmonary:  Pulmonary Normal                       Renal/:  Chronic Renal Disease, ESRD, Dialysis                Hepatic/GI:     GERD             Musculoskeletal:  Musculoskeletal Normal                Neurological:  Neurology Normal                                      Endocrine:  Diabetes, type 2           Dermatological:  Skin Normal    Psych:  Psychiatric Normal                    Physical Exam  General: Well nourished    Airway:  Mallampati: III   Mouth Opening: Normal  TM Distance:  Normal  Tongue: Normal  Neck ROM: Normal ROM    Dental:  Intact    Chest/Lungs:  Clear to auscultation, Normal Respiratory Rate    Heart:  Rate: Normal  Rhythm: Regular Rhythm  Sounds: Normal    Abdomen:  Normal, Nontender        Anesthesia Plan  Type of Anesthesia, risks & benefits discussed:    Anesthesia Type: Gen ETT  Intra-op Monitoring Plan: Standard ASA Monitors and Art Line  Post Op Pain Control Plan: multimodal analgesia  Induction:  IV  Airway Plan: Direct, Post-Induction  Informed Consent: Informed consent signed with the Patient and all parties understand the risks and agree with anesthesia plan.  All questions answered.   ASA Score: 3  Day of Surgery Review of History & Physical: H&P Update referred to the surgeon/provider.    Ready For Surgery From Anesthesia Perspective.     .

## 2024-04-14 NOTE — HPI
Mr. Ha is a 63 y.o. Black or  male with ESRD secondary to diabetic nephropathy. He is presenting as a primary candidate for a kidney transplant with Dr. Burt 4/14/24 at 8am.  He has been on the wait list for a kidney transplant at [facility] since 2/24/2021. Patient is currently on peritoneal dialysis started on 2/24/2021. Patient reports that he is tolerating dialysis well. He has a PD catheter. Patient denies any recent hospitalizations or ED visits. Denies peritonitis or exit site infections. Pre op labs and imaging reveal K 5.4. PD cut short due to transplant offer. Patient shifted in preparation for surgery, anesthesiology aware.

## 2024-04-14 NOTE — OP NOTE
Operative Report    Date of Procedure: 4/14/2024  Date of Transplant (UNOS): 4/14/24    Surgeons:  Surgeons and Role:     * Trent Burt MD - Primary     * Rosemary Foss MD - Resident - Assisting    First Assistant Attestation:  The indicated resident served as first assistant for this procedure.    Pre-operative Diagnosis: ESRD, requiring chronic dialysis secondary to Diabetes Mellitus - Type II  Post-operative Diagnosis: Same    Procedure(s) Performed:   Back Table Preparation of Right Kidney     Donation after Circulatory Death  Kidney transplant  PD catheter removal    Anesthesia: General endotracheal    Preamble  Indications and Patient Counseling: The patient is a 63 y.o. year-old male with end-stage kidney disease secondary to Diabetes Mellitus - Type II who has been evaluated for a kidney transplant.  The procedure was thoroughly discussed with the patient, including potential risks, complications, and alternatives.  Specific complications mentioned included death, graft non-function, bleeding, infection, and rejection, as well as the possibility of other complications not specifically mentioned.    Donor Risk Factors: Prior to the operation, the patient was advised of any donor-specific risk factors requiring specific disclosure.  Factors in this case included donation after cardiac death.      Specific PHS donor risk criteria for the organ donor include:  None    All questions were answered, the patient voiced appropriate understanding, and he agreed to proceed with the planned procedure.    ABO Confirmation: Immediately following arrival of the donor organ and prior to implantation, a formal ABO confirmation was done according to hospital and UNOS policies.  I confirmed the UNOS ID number ([id number]) of the donor organ and the donor and recipient ABO types, directly verifying these data by comparison with the UNOS Match Run report ([id number]).  This confirmation was personally done by an attending  surgeon and circulating nurse, and is officially documented elsewhere.    Time-Out: A complete time out was carried out prior to incision, with confirmation of patient identity, correct procedure, correct operative site, appropriate antibiotic prophylaxis, review of any known allergies, and presence of all needed equipment.    Procedure in Detail  Prior to starting the operation, the right kidney  was prepared on the back table. Arterial anatomy was single. Venous anatomy was single. Ureteral anatomy was single. Back table vascular reconstruction was not required  .  Unneeded fat was removed from the kidney, the vessels were cleaned of adherent tissue and tested for leaks, and the kidney was maintained at ice temperature in organ preservation solution until it was brought to the operative field.     The patient was brought into the operating room and placed in a supine position on the OR table.  After the induction of general endotracheal anesthesia, lines were placed by the anesthesiologist.  The urinary bladder was catheterized and irrigated with antibiotic solution.  There was no tension on the axillae and all pressure points were padded.  Sequential compression boots were used as were Wesley Huggers.  The abdomen was prepped and draped in the usual sterile fashion.  Skin was incised over the right with a knife and deepened with electrocautery.  The peritoneum and its contents were swept medially, exposing the right external iliac artery and the right external iliac vein.  The Bookwalter retractor was used to provide exposure.  Overlying lymphatics were ligated or cauterized and the vessels were dissected free for a length compatible with anastomosis.  The kidney was brought to the OR table at 4/14/2024 10:45 AM.  Venous control was obtained with a vascular clamp.  A venotomy was made, the vein irrigated, and an end renal to right external iliac vein anastomosis was created with 5-0 polypropylene.  Arterial control  was obtained with a vascular clamp.  Arteriotomy was made, the artery irrigated, and an end renal to right external iliac artery anastomosis was created with 6-0 polypropylene.  The kidney was unclamped and reperfused at 4/14/2024 11:03 AM.  Reperfusion quality was good. Intraoperative urine production was observed.  After hemostasis was obtained, a Lich uretero-neocystostomy was created.  The bladder was filled and identified, opened, and the anastomosis created using 6-0 PDS.  The bladder muscle was closed over the distal ureter to create an antireflux tunnel.  A ureteral stent was used.  With the kidney well perfused and sitting appropriately without tension on the anastomoses, viscera were replaced in their usual position.  The wound was closed after a final check for hemostasis.    The peritoneal catheter was removed without complications. An small fascia was closed using 2-0 Vicryl. Skin was closed using staples.   Overall, the graft quality was assessed to be good. At the end of the case the needle, sponge and instrument counts were all correct.  Sterile dressings were applied and the patient was brought to the recovery room/ICU in good condition.    Estimated Blood Loss: 10 mL  Fluids Administered:   Crystalloid (mL) 1,500      Drains: None  Specimens: none    Findings:    Organ Transplanted: Right Kidney    Arterial Anatomy: single  Number of Arteries: 1  Configuration of Multiple Arteries: not applicable  Venous Anatomy: single  Number of Veins: 1  Ureteral Anatomy: single  Number of Ureters: 1  Reperfusion Quality: good  Overall Graft Quality: good  Intraoperative Urine Production: yes  Marinelli: not to be removed before 2 days.  Ureteral Stent: Yes    Ischemic Times:   Anastomosis (warm ischemia) time: 18 minutes   Cold ischemia time: 1,030 minutes  Total ischemia time: 1048 minutes    Donor Data:  UNOS ID:   [id number]  UNOS Match Run:   [id number]  Donor Type:   Donation after Circulatory Death   Donor CMV  Status:   Positive  Donor HBcAB:   Negative  Donor HCV Status:   Negative

## 2024-04-14 NOTE — ANESTHESIA PROCEDURE NOTES
Central Line    Diagnosis: ESRD  Patient location during procedure: done in OR    Staffing  Authorizing Provider: Sher Hanna MD  Performing Provider: Sher Hanna MD    Staffing  Anesthesiologist: Sher Hanna MD  Performed by: Sher Hanna MD  Authorized by: Sher Hanna MD    Anesthesiologist was present at the time of the procedure.  Preanesthetic Checklist  Completed: patient identified, IV checked, site marked, risks and benefits discussed, surgical consent, monitors and equipment checked, pre-op evaluation, timeout performed and anesthesia consent given  Indication   Indication: vascular access, hemodialysis     Anesthesia   general anesthesia    Central Line   Skin Prep: skin prepped with ChloraPrep, skin prep agent completely dried prior to procedure  Sterile Barriers Followed: Yes    All five maximal barriers used- gloves, gown, cap, mask, and large sterile sheet    hand hygiene performed prior to central venous catheter insertion  Location: right internal jugular.   Catheter type: triple lumen  Catheter Size: 14 Fr  Inserted Catheter Length: 15 cm  Ultrasound: vascular probe with ultrasound   Vessel Caliber: large, patent  Vascular Doppler:  not done, compressibility normal  Needle advanced into vessel with real time Ultrasound guidance.  Guidewire confirmed in vessel.  sterile gel and probe cover used in ultrasound-guided central venous catheter insertion   Manometry: Venous cannualation confirmed by visual estimation of blood vessel pressure using manometry.  Insertion Attempts: 1   Securement:line sutured, chlorhexidine patch, sterile dressing applied and blood return through all ports    Post-Procedure   X-Ray: no pneumothorax on x-ray   Adverse Events:none      Guidewire Guidewire removed intact.              
Intubation    Date/Time: 4/14/2024 8:09 AM    Performed by: Hamlet Barriga CRNA  Authorized by: Sher Hanna MD    Intubation:     Induction:  Intravenous    Intubated:  Postinduction    Mask Ventilation:  Easy with oral airway    Attempts:  1    Attempted By:  CRNA    Method of Intubation:  Video laryngoscopy    Blade:  Glidescope 4    Laryngeal View Grade: Grade I - full view of cords      Difficult Airway Encountered?: No      Complications:  None    Airway Device:  Oral endotracheal tube    Airway Device Size:  7.5    Style/Cuff Inflation:  Cuffed    Inflation Amount (mL):  7    Tube secured:  23    Secured at:  The lips    Placement Verified By:  Capnometry    Complicating Factors:  None    Findings Post-Intubation:  BS equal bilateral      
Her/She

## 2024-04-14 NOTE — SUBJECTIVE & OBJECTIVE
Subjective:     Chief Complaint/Reason for Admission: ESRD     History of Present Illness:  Mr. Ha is a 63 y.o. Black or  male with ESRD secondary to diabetic nephropathy. He is presenting as a primary candidate for a kidney transplant with Dr. Burt 4/14/24 at 8am.  He has been on the wait list for a kidney transplant at [facility] since 2/24/2021. Patient is currently on peritoneal dialysis started on 2/24/2021. Patient reports that he is tolerating dialysis well. He has a PD catheter. Patient denies any recent hospitalizations or ED visits. Denies peritonitis or exit site infections. Pre op labs and imaging reveal K 5.4. PD cut short due to transplant offer. Patient shifted in preparation for surgery, anesthesiology aware.       Dialysis History: Mr. Arrington with ESRD, requiring chronic dialysis who is on peritoneal dialysis started on 2021. Patient is dialyzing on cyclic peritoneal dialysis.  Patient reports that he is tolerating dialysis well.   Date of Last Dialysis: 4/13/24pm    Native urine output per day: ~1L    Previous Transplant: no    Current Facility-Administered Medications   Medication Dose Route Frequency Provider Last Rate Last Admin    acetaminophen oral solution 650 mg  650 mg Per NG tube Once Lorelei Cha PA-C        ceFAZolin 2 g in dextrose 5 % in water (D5W) 50 mL IVPB (MB+)  2 g Intravenous On Call Procedure Lorelei Cha PA-C        dextrose 10% bolus 500 mL 500 mL  50 g Intravenous Once Lorelei Cha PA-C 999 mL/hr at 04/14/24 0607 500 mL at 04/14/24 0607    And    dextrose 10% bolus 250 mL 250 mL  25 g Intravenous PRN Lorelei Cha PA-C        diphenhydrAMINE injection 50 mg  50 mg Intravenous Once Lorelei Cha PA-C        methylPREDNISolone sodium succinate (SOLU-MEDROL) 500 mg in dextrose 5 % (D5W) 100 mL IVPB  500 mg Intravenous Once Lorelei Cha PA-C        mupirocin 2 % ointment   Nasal  On Call Procedure Lorelei Cha PA-C        pregabalin capsule 75 mg  75 mg Oral On Call Procedure Lorelei Cha PA-C        sodium chloride 0.9% flush 10 mL  10 mL Intravenous PRN Lorelei Cha PA-C        [START ON 4/15/2024] tamsulosin 24 hr capsule 0.4 mg  1 capsule Oral Daily Lorelei Cha PA-C           Review of patient's allergies indicates:  No Known Allergies    Past Medical History:   Diagnosis Date    Anemia     Aqueous misdirection of right eye     Arthritis     Blind painful eye     Cataract     CKD (chronic kidney disease)     Diabetes mellitus, type 2     Disorder of kidney and ureter     Fever blister     GERD (gastroesophageal reflux disease)     Hyperlipidemia     Hypertension     Joint pain     Neuropathy      Past Surgical History:   Procedure Laterality Date    AIR FLUID EXCHANGE OF EYE Left 6/24/2022    Procedure: AIR FLUID EXCHANGE, EYE;  Surgeon: Arun Veronica MD;  Location: [facility];  Service: Ophthalmology;  Laterality: Left;    CATARACT EXTRACTION Left 8/27/2014    COLONOSCOPY N/A 9/8/2016    Procedure: COLONOSCOPY;  Surgeon: Luis Bogran-Reyes, MD;  Location: [facility];  Service: Endoscopy;  Laterality: N/A;    COLONOSCOPY N/A 11/10/2023    Procedure: COLONOSCOPY;  Surgeon: Nomi Del Real MD;  Location: [facility];  Service: Endoscopy;  Laterality: N/A;    CONJUNCTIVOPLASTY Right 6/17/2020    Procedure: CONJUNCTIVOPLASTY;  Surgeon: Myrna Alba MD;  Location: [facility];  Service: Ophthalmology;  Laterality: Right;    ENDOLASER PHOTOCOAGULATION Left 6/24/2022    Procedure: PHOTOCOAGULATION, ENDOLASER;  Surgeon: Arun Veronica MD;  Location: [facility];  Service: Ophthalmology;  Laterality: Left;    ENUCLEATION Right 6/17/2020    Procedure: ENUCLEATION, EYE;  Surgeon: Myrna Alba MD;  Location: [facility];  Service: Ophthalmology;  Laterality: Right;    EXPLORATION OF ORBIT Right 6/17/2020    Procedure: EXPLORATION, ORBIT;  " Surgeon: Myrna Alba MD;  Location: [facility];  Service: Ophthalmology;  Laterality: Right;    EYE SURGERY      TARSORRHAPHY Right 6/17/2020    Procedure: BLEPHARORRHAPHY;  Surgeon: Myrna Alba MD;  Location: [facility];  Service: Ophthalmology;  Laterality: Right;    VITRECTOMY Right     VITRECTOMY BY PARS PLANA APPROACH Left 6/24/2022    Procedure: VITRECTOMY, PARS PLANA APPROACH;  Surgeon: Arun Veronica MD;  Location: [facility];  Service: Ophthalmology;  Laterality: Left;     Family History       Problem Relation (Age of Onset)    Cataracts Mother, Father    Diabetes Mother, Father    Glaucoma Mother    Hypertension Mother, Sister, Brother    Kidney failure Father          Tobacco Use    Smoking status: Never     Passive exposure: Never    Smokeless tobacco: Never   Substance and Sexual Activity    Alcohol use: Not Currently     Alcohol/week: 0.0 standard drinks of alcohol     Comment: rarely    Drug use: No    Sexual activity: Yes        Review of Systems   Constitutional:  Negative for appetite change, fatigue and fever.   Eyes:  Positive for visual disturbance (chronic).   Respiratory:  Negative for cough and shortness of breath.    Cardiovascular:  Negative for chest pain and leg swelling.   Gastrointestinal:  Negative for abdominal distention, abdominal pain, diarrhea, nausea and vomiting.   Genitourinary:  Positive for decreased urine volume. Negative for difficulty urinating, dysuria and frequency.   Skin:  Negative for wound.   Neurological:  Negative for dizziness and weakness.   Psychiatric/Behavioral:  Negative for confusion.      Objective:     Vital Signs (Most Recent):  Temp: 97.8 °F (36.6 °C) (04/14/24 0555)  Pulse: 74 (04/14/24 0557)  Resp: 19 (04/14/24 0557)  BP: (!) 168/83 (04/14/24 0557)  SpO2: 96 % (04/14/24 0555)  Height: 5' 7" (170.2 cm)  Weight: 92.3 kg (203 lb 6 oz)  Body mass index is 31.85 kg/m².      Physical Exam  Vitals and nursing note reviewed.   Constitutional:     " "  General: He is not in acute distress.     Appearance: He is not ill-appearing.   HENT:      Head: Normocephalic.      Mouth/Throat:      Mouth: Mucous membranes are moist.   Eyes:      Extraocular Movements: Extraocular movements intact.      Conjunctiva/sclera: Conjunctivae normal.   Cardiovascular:      Rate and Rhythm: Normal rate and regular rhythm.      Pulses: Normal pulses.      Heart sounds: Normal heart sounds.   Pulmonary:      Effort: Pulmonary effort is normal. No respiratory distress.      Breath sounds: No wheezing or rales.   Abdominal:      General: Bowel sounds are normal. There is no distension.      Palpations: Abdomen is soft.      Tenderness: There is no abdominal tenderness. There is no guarding or rebound.   Musculoskeletal:         General: No swelling. Normal range of motion.      Cervical back: Normal range of motion.   Skin:     General: Skin is warm and dry.   Neurological:      Mental Status: He is alert and oriented to person, place, and time.      Motor: No weakness.   Psychiatric:         Mood and Affect: Mood normal.         Behavior: Behavior normal.         Thought Content: Thought content normal.         Judgment: Judgment normal.          Laboratory  CBC:   Recent Labs   Lab 04/14/24  0348   WBC 6.79   RBC 3.09*   HGB 9.6*   HCT 28.4*      MCV 92   MCH 31.1*   MCHC 33.8     CMP:   Recent Labs   Lab 04/14/24  0348   *   CALCIUM 8.3*   ALBUMIN 2.8*   PROT 6.4      K 5.4*   CO2 19*      BUN 67*   CREATININE 9.2*   ALKPHOS 78   ALT 22   AST 12     Coagulation: No results for input(s): "PT", "APTT" in the last 168 hours.  Labs within the past 24 hours have been reviewed.    Diagnostic Results:  EKG NSR, CXR without acute CP process     Patient was SARS-CoV-2 /COVID-19 tested with negative results.   "

## 2024-04-14 NOTE — OP NOTE
Pre-operative Discussion Note  Kidney Transplant Surgery    Murphy Ha is a 63 y.o. male with ESRD, requiring chronic dialysis admitted for kidney transplant.  I discussed the planned procedure in detail, including expected hospital course and outcomes, benefits, risks, and potential complications.  Complications discussed included death, graft failure, bleeding, infection, vascular thrombosis, and rejection.  I discussed the risks of anesthesia, as well as the potential need for re-operation.  The possibility of other complications not specifically mentioned was also discussed.  Also, I discussed the need for lifelong immunosuppression and the possibility of serious complications from immunosuppressive drugs.    The discussion included the risks that the patient will incur if he elects to not have the proposed procedure.    Relevant donor-specific risk factors were disclosed and discussed with the patient, including:   DCD: I discussed the use of organs recovered by donation after cardiac death (DCD), including an increased chance of delayed graft function, with similar long-term outcomes to non-DCD organs.  The patient is willing to consider such grafts.    Specific [facility] donor risk criteria for the organ donor include:  None    HCV: Non-viremic recipient: I discussed the use of HCV-positive organs in naive recipients, including the risk of viral transmission to the patients or others, potential insurance barriers for antiviral medication coverage, risk for fibrosing cholestatic hepatitis, death or graft loss. The potential advantage to the recipient is the possibility of receiving a transplant sooner with decreased mortality risk by accepting such an organ. The patient is willing to consider such grafts.    The patient was SARS-CoV-2 /COVID-19 tested with negative results.    COVID-19: I discussed the possibility of COVID-19 transmission with the patient. Although JOSE M testing is available for the virus  using a technique which in theory should be very accurate, there is no data yet regarding the likelihood of a  false-negative test leading to virus transmission. Based on accuracy of testing for other viruses, it is expected that this risk is extremely small.     I also discussed that transplant immunosuppression will increase susceptibility to COVID-19 and other viruses, and that although we use stringent precautions to protect patients from infection, it is possible for a transplant recipient to contract this infection. If COVID-19 infection should occur, it would be a serious matter with a significant risk of death.    All questions were answered.  The patient and available family members voice understanding and agree to proceed with the transplant.        [facility] Patient Status  Functional Status: 50% - Requires considerable assistance and frequent medical care  Physical Capacity: No Limitations

## 2024-04-14 NOTE — PROGRESS NOTES
Pt escorted off the unit in his bed for kidney transplant. All preop tasks completed. No distress noted.

## 2024-04-15 DIAGNOSIS — Z94.0 KIDNEY REPLACED BY TRANSPLANT: Primary | ICD-10-CM

## 2024-04-15 PROBLEM — N18.6 ESRD (END STAGE RENAL DISEASE) ON DIALYSIS: Status: RESOLVED | Noted: 2021-04-27 | Resolved: 2024-04-15

## 2024-04-15 PROBLEM — Z29.89 PROPHYLACTIC IMMUNOTHERAPY: Status: ACTIVE | Noted: 2024-04-15

## 2024-04-15 PROBLEM — Z99.2 ESRD (END STAGE RENAL DISEASE) ON DIALYSIS: Status: RESOLVED | Noted: 2021-04-27 | Resolved: 2024-04-15

## 2024-04-15 PROBLEM — Z87.898 HISTORY OF SEIZURE: Status: ACTIVE | Noted: 2024-04-15

## 2024-04-15 PROBLEM — D63.8 ANEMIA OF CHRONIC DISEASE: Status: ACTIVE | Noted: 2024-04-15

## 2024-04-15 PROBLEM — T38.0X5A ADVERSE EFFECT OF CORTICOSTEROIDS: Status: ACTIVE | Noted: 2024-04-15

## 2024-04-15 PROBLEM — Z01.818 PRE-OP EVALUATION: Status: RESOLVED | Noted: 2024-04-14 | Resolved: 2024-04-15

## 2024-04-15 PROBLEM — Z91.89 AT RISK FOR OPPORTUNISTIC INFECTIONS: Status: ACTIVE | Noted: 2024-04-15

## 2024-04-15 LAB
ALBUMIN SERPL BCP-MCNC: 2.2 G/DL (ref 3.5–5.2)
ALBUMIN SERPL BCP-MCNC: 2.3 G/DL (ref 3.5–5.2)
ANION GAP SERPL CALC-SCNC: 10 MMOL/L (ref 8–16)
ANION GAP SERPL CALC-SCNC: 8 MMOL/L (ref 8–16)
BACTERIA #/AREA URNS AUTO: ABNORMAL /HPF
BASOPHILS # BLD AUTO: 0.01 K/UL (ref 0–0.2)
BASOPHILS # BLD AUTO: 0.02 K/UL (ref 0–0.2)
BASOPHILS NFR BLD: 0.1 % (ref 0–1.9)
BASOPHILS NFR BLD: 0.2 % (ref 0–1.9)
BILIRUB UR QL STRIP: NEGATIVE
BUN SERPL-MCNC: 46 MG/DL (ref 8–23)
BUN SERPL-MCNC: 60 MG/DL (ref 8–23)
CALCIUM SERPL-MCNC: 7.6 MG/DL (ref 8.7–10.5)
CALCIUM SERPL-MCNC: 7.6 MG/DL (ref 8.7–10.5)
CHLORIDE SERPL-SCNC: 100 MMOL/L (ref 95–110)
CHLORIDE SERPL-SCNC: 101 MMOL/L (ref 95–110)
CLARITY UR REFRACT.AUTO: ABNORMAL
CO2 SERPL-SCNC: 22 MMOL/L (ref 23–29)
CO2 SERPL-SCNC: 23 MMOL/L (ref 23–29)
COLOR UR AUTO: ABNORMAL
CREAT SERPL-MCNC: 6.9 MG/DL (ref 0.5–1.4)
CREAT SERPL-MCNC: 8.2 MG/DL (ref 0.5–1.4)
DIFFERENTIAL METHOD BLD: ABNORMAL
DIFFERENTIAL METHOD BLD: ABNORMAL
EOSINOPHIL # BLD AUTO: 0 K/UL (ref 0–0.5)
EOSINOPHIL # BLD AUTO: 0 K/UL (ref 0–0.5)
EOSINOPHIL NFR BLD: 0 % (ref 0–8)
EOSINOPHIL NFR BLD: 0 % (ref 0–8)
ERYTHROCYTE [DISTWIDTH] IN BLOOD BY AUTOMATED COUNT: 12.9 % (ref 11.5–14.5)
ERYTHROCYTE [DISTWIDTH] IN BLOOD BY AUTOMATED COUNT: 13.1 % (ref 11.5–14.5)
EST. GFR  (NO RACE VARIABLE): 6.8 ML/MIN/1.73 M^2
EST. GFR  (NO RACE VARIABLE): 8.4 ML/MIN/1.73 M^2
GLUCOSE SERPL-MCNC: 176 MG/DL (ref 70–110)
GLUCOSE SERPL-MCNC: 185 MG/DL (ref 70–110)
GLUCOSE SERPL-MCNC: 210 MG/DL (ref 70–110)
GLUCOSE SERPL-MCNC: 237 MG/DL (ref 70–110)
GLUCOSE SERPL-MCNC: 270 MG/DL (ref 70–110)
GLUCOSE UR QL STRIP: ABNORMAL
HCO3 UR-SCNC: 19.5 MMOL/L (ref 24–28)
HCO3 UR-SCNC: 19.8 MMOL/L (ref 24–28)
HCO3 UR-SCNC: 20.9 MMOL/L (ref 24–28)
HCT VFR BLD AUTO: 25.6 % (ref 40–54)
HCT VFR BLD AUTO: 26.7 % (ref 40–54)
HCT VFR BLD CALC: 25 %PCV (ref 36–54)
HCT VFR BLD CALC: 26 %PCV (ref 36–54)
HCT VFR BLD CALC: 26 %PCV (ref 36–54)
HCV RNA SERPL QL NAA+PROBE: NOT DETECTED
HCV RNA SPEC NAA+PROBE-ACNC: NOT DETECTED IU/ML
HGB BLD-MCNC: 8.7 G/DL (ref 14–18)
HGB BLD-MCNC: 9.1 G/DL (ref 14–18)
HGB UR QL STRIP: ABNORMAL
HYALINE CASTS UR QL AUTO: 0 /LPF
IMM GRANULOCYTES # BLD AUTO: 0.03 K/UL (ref 0–0.04)
IMM GRANULOCYTES # BLD AUTO: 0.05 K/UL (ref 0–0.04)
IMM GRANULOCYTES NFR BLD AUTO: 0.4 % (ref 0–0.5)
IMM GRANULOCYTES NFR BLD AUTO: 0.5 % (ref 0–0.5)
KETONES UR QL STRIP: NEGATIVE
LEUKOCYTE ESTERASE UR QL STRIP: ABNORMAL
LYMPHOCYTES # BLD AUTO: 0.2 K/UL (ref 1–4.8)
LYMPHOCYTES # BLD AUTO: 0.3 K/UL (ref 1–4.8)
LYMPHOCYTES NFR BLD: 2.5 % (ref 18–48)
LYMPHOCYTES NFR BLD: 2.9 % (ref 18–48)
MAGNESIUM SERPL-MCNC: 1.7 MG/DL (ref 1.6–2.6)
MCH RBC QN AUTO: 30.6 PG (ref 27–31)
MCH RBC QN AUTO: 30.7 PG (ref 27–31)
MCHC RBC AUTO-ENTMCNC: 34 G/DL (ref 32–36)
MCHC RBC AUTO-ENTMCNC: 34.1 G/DL (ref 32–36)
MCV RBC AUTO: 90 FL (ref 82–98)
MCV RBC AUTO: 91 FL (ref 82–98)
MICROSCOPIC COMMENT: ABNORMAL
MONOCYTES # BLD AUTO: 0.2 K/UL (ref 0.3–1)
MONOCYTES # BLD AUTO: 1 K/UL (ref 0.3–1)
MONOCYTES NFR BLD: 10.4 % (ref 4–15)
MONOCYTES NFR BLD: 2.4 % (ref 4–15)
NEUTROPHILS # BLD AUTO: 7.4 K/UL (ref 1.8–7.7)
NEUTROPHILS # BLD AUTO: 8.5 K/UL (ref 1.8–7.7)
NEUTROPHILS NFR BLD: 86.4 % (ref 38–73)
NEUTROPHILS NFR BLD: 94.2 % (ref 38–73)
NITRITE UR QL STRIP: NEGATIVE
NRBC BLD-RTO: 0 /100 WBC
NRBC BLD-RTO: 0 /100 WBC
PCO2 BLDA: 38.7 MMHG (ref 35–45)
PCO2 BLDA: 38.8 MMHG (ref 35–45)
PCO2 BLDA: 43.4 MMHG (ref 35–45)
PH SMN: 7.29 [PH] (ref 7.35–7.45)
PH SMN: 7.31 [PH] (ref 7.35–7.45)
PH SMN: 7.32 [PH] (ref 7.35–7.45)
PH UR STRIP: 5 [PH] (ref 5–8)
PHOSPHATE SERPL-MCNC: 5.6 MG/DL (ref 2.7–4.5)
PHOSPHATE SERPL-MCNC: 6.4 MG/DL (ref 2.7–4.5)
PLATELET # BLD AUTO: 121 K/UL (ref 150–450)
PLATELET # BLD AUTO: 141 K/UL (ref 150–450)
PMV BLD AUTO: 11.3 FL (ref 9.2–12.9)
PMV BLD AUTO: 11.6 FL (ref 9.2–12.9)
PO2 BLDA: 34 MMHG (ref 40–60)
PO2 BLDA: 38 MMHG (ref 40–60)
PO2 BLDA: 42 MMHG (ref 40–60)
POC BE: -6 MMOL/L
POC BE: -6 MMOL/L
POC BE: -7 MMOL/L
POC IONIZED CALCIUM: 1.18 MMOL/L (ref 1.06–1.42)
POC IONIZED CALCIUM: 1.21 MMOL/L (ref 1.06–1.42)
POC IONIZED CALCIUM: 1.23 MMOL/L (ref 1.06–1.42)
POC SATURATED O2: 61 % (ref 95–100)
POC SATURATED O2: 67 % (ref 95–100)
POC SATURATED O2: 71 % (ref 95–100)
POC TCO2: 21 MMOL/L (ref 24–29)
POC TCO2: 21 MMOL/L (ref 24–29)
POC TCO2: 22 MMOL/L (ref 24–29)
POCT GLUCOSE: 155 MG/DL (ref 70–110)
POCT GLUCOSE: 212 MG/DL (ref 70–110)
POCT GLUCOSE: 244 MG/DL (ref 70–110)
POCT GLUCOSE: 346 MG/DL (ref 70–110)
POTASSIUM BLD-SCNC: 4.7 MMOL/L (ref 3.5–5.1)
POTASSIUM BLD-SCNC: 4.9 MMOL/L (ref 3.5–5.1)
POTASSIUM BLD-SCNC: 5.4 MMOL/L (ref 3.5–5.1)
POTASSIUM SERPL-SCNC: 4.6 MMOL/L (ref 3.5–5.1)
POTASSIUM SERPL-SCNC: 4.7 MMOL/L (ref 3.5–5.1)
POTASSIUM SERPL-SCNC: 5 MMOL/L (ref 3.5–5.1)
POTASSIUM SERPL-SCNC: 5.2 MMOL/L (ref 3.5–5.1)
PROT UR QL STRIP: ABNORMAL
RBC # BLD AUTO: 2.83 M/UL (ref 4.6–6.2)
RBC # BLD AUTO: 2.97 M/UL (ref 4.6–6.2)
RBC #/AREA URNS AUTO: >100 /HPF (ref 0–4)
SAMPLE: ABNORMAL
SODIUM BLD-SCNC: 134 MMOL/L (ref 136–145)
SODIUM BLD-SCNC: 137 MMOL/L (ref 136–145)
SODIUM BLD-SCNC: 138 MMOL/L (ref 136–145)
SODIUM SERPL-SCNC: 131 MMOL/L (ref 136–145)
SODIUM SERPL-SCNC: 133 MMOL/L (ref 136–145)
SP GR UR STRIP: 1.02 (ref 1–1.03)
TACROLIMUS BLD-MCNC: <2 NG/ML (ref 5–15)
URN SPEC COLLECT METH UR: ABNORMAL
WBC # BLD AUTO: 7.84 K/UL (ref 3.9–12.7)
WBC # BLD AUTO: 9.83 K/UL (ref 3.9–12.7)
WBC #/AREA URNS AUTO: 53 /HPF (ref 0–5)

## 2024-04-15 PROCEDURE — 25000003 PHARM REV CODE 250: Performed by: NURSE PRACTITIONER

## 2024-04-15 PROCEDURE — 81001 URINALYSIS AUTO W/SCOPE: CPT | Performed by: CLINICAL NURSE SPECIALIST

## 2024-04-15 PROCEDURE — 83735 ASSAY OF MAGNESIUM: CPT | Performed by: STUDENT IN AN ORGANIZED HEALTH CARE EDUCATION/TRAINING PROGRAM

## 2024-04-15 PROCEDURE — 85025 COMPLETE CBC W/AUTO DIFF WBC: CPT | Mod: 91 | Performed by: CLINICAL NURSE SPECIALIST

## 2024-04-15 PROCEDURE — 20600001 HC STEP DOWN PRIVATE ROOM

## 2024-04-15 PROCEDURE — 84132 ASSAY OF SERUM POTASSIUM: CPT | Mod: 91 | Performed by: CLINICAL NURSE SPECIALIST

## 2024-04-15 PROCEDURE — 80069 RENAL FUNCTION PANEL: CPT | Mod: 91 | Performed by: CLINICAL NURSE SPECIALIST

## 2024-04-15 PROCEDURE — 63600175 PHARM REV CODE 636 W HCPCS: Performed by: INTERNAL MEDICINE

## 2024-04-15 PROCEDURE — 25000003 PHARM REV CODE 250: Performed by: CLINICAL NURSE SPECIALIST

## 2024-04-15 PROCEDURE — 63600175 PHARM REV CODE 636 W HCPCS: Performed by: NURSE PRACTITIONER

## 2024-04-15 PROCEDURE — 25000003 PHARM REV CODE 250

## 2024-04-15 PROCEDURE — 85025 COMPLETE CBC W/AUTO DIFF WBC: CPT | Performed by: STUDENT IN AN ORGANIZED HEALTH CARE EDUCATION/TRAINING PROGRAM

## 2024-04-15 PROCEDURE — 80197 ASSAY OF TACROLIMUS: CPT | Performed by: STUDENT IN AN ORGANIZED HEALTH CARE EDUCATION/TRAINING PROGRAM

## 2024-04-15 PROCEDURE — 27000207 HC ISOLATION

## 2024-04-15 PROCEDURE — 87086 URINE CULTURE/COLONY COUNT: CPT | Performed by: CLINICAL NURSE SPECIALIST

## 2024-04-15 PROCEDURE — 99233 SBSQ HOSP IP/OBS HIGH 50: CPT | Mod: 24,,, | Performed by: CLINICAL NURSE SPECIALIST

## 2024-04-15 PROCEDURE — 84132 ASSAY OF SERUM POTASSIUM: CPT | Mod: 91 | Performed by: NURSE PRACTITIONER

## 2024-04-15 PROCEDURE — 84132 ASSAY OF SERUM POTASSIUM: CPT | Performed by: TRANSPLANT SURGERY

## 2024-04-15 PROCEDURE — 63600175 PHARM REV CODE 636 W HCPCS: Performed by: STUDENT IN AN ORGANIZED HEALTH CARE EDUCATION/TRAINING PROGRAM

## 2024-04-15 PROCEDURE — 99222 1ST HOSP IP/OBS MODERATE 55: CPT | Mod: ,,, | Performed by: NURSE PRACTITIONER

## 2024-04-15 PROCEDURE — 80069 RENAL FUNCTION PANEL: CPT | Performed by: STUDENT IN AN ORGANIZED HEALTH CARE EDUCATION/TRAINING PROGRAM

## 2024-04-15 PROCEDURE — 25000003 PHARM REV CODE 250: Performed by: TRANSPLANT SURGERY

## 2024-04-15 PROCEDURE — 25000003 PHARM REV CODE 250: Performed by: STUDENT IN AN ORGANIZED HEALTH CARE EDUCATION/TRAINING PROGRAM

## 2024-04-15 PROCEDURE — S5010 5% DEXTROSE AND 0.45% SALINE: HCPCS | Performed by: STUDENT IN AN ORGANIZED HEALTH CARE EDUCATION/TRAINING PROGRAM

## 2024-04-15 RX ORDER — INSULIN ASPART 100 [IU]/ML
12 INJECTION, SOLUTION INTRAVENOUS; SUBCUTANEOUS
Status: DISCONTINUED | OUTPATIENT
Start: 2024-04-15 | End: 2024-04-15

## 2024-04-15 RX ORDER — INSULIN ASPART 100 [IU]/ML
10 INJECTION, SOLUTION INTRAVENOUS; SUBCUTANEOUS
Status: DISCONTINUED | OUTPATIENT
Start: 2024-04-15 | End: 2024-04-15

## 2024-04-15 RX ORDER — PROCHLORPERAZINE EDISYLATE 5 MG/ML
5 INJECTION INTRAMUSCULAR; INTRAVENOUS EVERY 6 HOURS PRN
Status: DISCONTINUED | OUTPATIENT
Start: 2024-04-15 | End: 2024-04-18 | Stop reason: HOSPADM

## 2024-04-15 RX ORDER — DORZOLAMIDE HYDROCHLORIDE AND TIMOLOL MALEATE 20; 5 MG/ML; MG/ML
1 SOLUTION/ DROPS OPHTHALMIC 2 TIMES DAILY
Status: DISCONTINUED | OUTPATIENT
Start: 2024-04-15 | End: 2024-04-15

## 2024-04-15 RX ORDER — GABAPENTIN 300 MG/1
300 CAPSULE ORAL NIGHTLY PRN
Status: DISCONTINUED | OUTPATIENT
Start: 2024-04-15 | End: 2024-04-18 | Stop reason: HOSPADM

## 2024-04-15 RX ORDER — DORZOLAMIDE HCL 20 MG/ML
1 SOLUTION/ DROPS OPHTHALMIC 2 TIMES DAILY
Status: DISCONTINUED | OUTPATIENT
Start: 2024-04-15 | End: 2024-04-18 | Stop reason: HOSPADM

## 2024-04-15 RX ORDER — INSULIN ASPART 100 [IU]/ML
12 INJECTION, SOLUTION INTRAVENOUS; SUBCUTANEOUS
Status: DISCONTINUED | OUTPATIENT
Start: 2024-04-15 | End: 2024-04-17

## 2024-04-15 RX ORDER — INSULIN ASPART 100 [IU]/ML
0-10 INJECTION, SOLUTION INTRAVENOUS; SUBCUTANEOUS
Status: DISCONTINUED | OUTPATIENT
Start: 2024-04-15 | End: 2024-04-15

## 2024-04-15 RX ORDER — OXYCODONE HYDROCHLORIDE 5 MG/1
5 TABLET ORAL EVERY 4 HOURS PRN
Status: DISCONTINUED | OUTPATIENT
Start: 2024-04-15 | End: 2024-04-18 | Stop reason: HOSPADM

## 2024-04-15 RX ORDER — TACROLIMUS 1 MG/1
4 CAPSULE ORAL 2 TIMES DAILY
Status: DISCONTINUED | OUTPATIENT
Start: 2024-04-15 | End: 2024-04-16

## 2024-04-15 RX ORDER — NIFEDIPINE 30 MG/1
30 TABLET, EXTENDED RELEASE ORAL 2 TIMES DAILY
Status: DISCONTINUED | OUTPATIENT
Start: 2024-04-15 | End: 2024-04-18 | Stop reason: HOSPADM

## 2024-04-15 RX ORDER — DIVALPROEX SODIUM 250 MG/1
250 TABLET, DELAYED RELEASE ORAL EVERY 12 HOURS
Status: DISCONTINUED | OUTPATIENT
Start: 2024-04-15 | End: 2024-04-18 | Stop reason: HOSPADM

## 2024-04-15 RX ORDER — OXYCODONE HYDROCHLORIDE 10 MG/1
10 TABLET ORAL EVERY 4 HOURS PRN
Status: DISCONTINUED | OUTPATIENT
Start: 2024-04-15 | End: 2024-04-18 | Stop reason: HOSPADM

## 2024-04-15 RX ORDER — TIMOLOL MALEATE 5 MG/ML
1 SOLUTION/ DROPS OPHTHALMIC 2 TIMES DAILY
Status: DISCONTINUED | OUTPATIENT
Start: 2024-04-15 | End: 2024-04-18 | Stop reason: HOSPADM

## 2024-04-15 RX ADMIN — HEPARIN SODIUM 5000 UNITS: 5000 INJECTION INTRAVENOUS; SUBCUTANEOUS at 05:04

## 2024-04-15 RX ADMIN — TIMOLOL MALEATE 1 DROP: 5 SOLUTION OPHTHALMIC at 08:04

## 2024-04-15 RX ADMIN — ONDANSETRON 4 MG: 2 INJECTION INTRAMUSCULAR; INTRAVENOUS at 11:04

## 2024-04-15 RX ADMIN — TAMSULOSIN HYDROCHLORIDE 0.4 MG: 0.4 CAPSULE ORAL at 08:04

## 2024-04-15 RX ADMIN — INSULIN ASPART 12 UNITS: 100 INJECTION, SOLUTION INTRAVENOUS; SUBCUTANEOUS at 05:04

## 2024-04-15 RX ADMIN — SODIUM BICARBONATE 1300 MG: 650 TABLET ORAL at 08:04

## 2024-04-15 RX ADMIN — ACETAMINOPHEN 650 MG: 325 TABLET ORAL at 11:04

## 2024-04-15 RX ADMIN — HEPARIN SODIUM 5000 UNITS: 5000 INJECTION INTRAVENOUS; SUBCUTANEOUS at 08:04

## 2024-04-15 RX ADMIN — INSULIN ASPART 4 UNITS: 100 INJECTION, SOLUTION INTRAVENOUS; SUBCUTANEOUS at 08:04

## 2024-04-15 RX ADMIN — DOCUSATE SODIUM 100 MG: 100 CAPSULE, LIQUID FILLED ORAL at 02:04

## 2024-04-15 RX ADMIN — DEXTROSE AND SODIUM CHLORIDE: 5; 450 INJECTION, SOLUTION INTRAVENOUS at 02:04

## 2024-04-15 RX ADMIN — FAMOTIDINE 20 MG: 20 TABLET ORAL at 08:04

## 2024-04-15 RX ADMIN — DOCUSATE SODIUM 100 MG: 100 CAPSULE, LIQUID FILLED ORAL at 08:04

## 2024-04-15 RX ADMIN — TACROLIMUS 4 MG: 1 CAPSULE ORAL at 05:04

## 2024-04-15 RX ADMIN — NIFEDIPINE 30 MG: 30 TABLET, FILM COATED, EXTENDED RELEASE ORAL at 08:04

## 2024-04-15 RX ADMIN — ACETAMINOPHEN 650 MG: 325 TABLET ORAL at 02:04

## 2024-04-15 RX ADMIN — CARVEDILOL 25 MG: 25 TABLET, FILM COATED ORAL at 05:04

## 2024-04-15 RX ADMIN — PROCHLORPERAZINE EDISYLATE 5 MG: 5 INJECTION INTRAMUSCULAR; INTRAVENOUS at 08:04

## 2024-04-15 RX ADMIN — BISACODYL 10 MG: 5 TABLET, COATED ORAL at 08:04

## 2024-04-15 RX ADMIN — OXYCODONE HYDROCHLORIDE 10 MG: 10 TABLET ORAL at 05:04

## 2024-04-15 RX ADMIN — MUPIROCIN 1 G: 20 OINTMENT TOPICAL at 08:04

## 2024-04-15 RX ADMIN — ANTI-THYMOCYTE GLOBULIN (RABBIT) 125 MG: 5 INJECTION, POWDER, LYOPHILIZED, FOR SOLUTION INTRAVENOUS at 11:04

## 2024-04-15 RX ADMIN — CARVEDILOL 25 MG: 25 TABLET, FILM COATED ORAL at 08:04

## 2024-04-15 RX ADMIN — ONDANSETRON 4 MG: 2 INJECTION INTRAMUSCULAR; INTRAVENOUS at 07:04

## 2024-04-15 RX ADMIN — INSULIN ASPART 2 UNITS: 100 INJECTION, SOLUTION INTRAVENOUS; SUBCUTANEOUS at 01:04

## 2024-04-15 RX ADMIN — THERA TABS 1 TABLET: TAB at 08:04

## 2024-04-15 RX ADMIN — OXYCODONE 5 MG: 5 TABLET ORAL at 04:04

## 2024-04-15 RX ADMIN — ACETAMINOPHEN 650 MG: 325 TABLET ORAL at 05:04

## 2024-04-15 RX ADMIN — SODIUM CHLORIDE 500 ML: 9 INJECTION, SOLUTION INTRAVENOUS at 12:04

## 2024-04-15 RX ADMIN — HEPARIN SODIUM 5000 UNITS: 5000 INJECTION INTRAVENOUS; SUBCUTANEOUS at 02:04

## 2024-04-15 RX ADMIN — INSULIN ASPART 8 UNITS: 100 INJECTION, SOLUTION INTRAVENOUS; SUBCUTANEOUS at 09:04

## 2024-04-15 RX ADMIN — TRAMADOL HYDROCHLORIDE 50 MG: 50 TABLET, COATED ORAL at 08:04

## 2024-04-15 RX ADMIN — MYCOPHENOLATE MOFETIL 1000 MG: 250 CAPSULE ORAL at 08:04

## 2024-04-15 RX ADMIN — DIPHENHYDRAMINE HYDROCHLORIDE 25 MG: 25 CAPSULE ORAL at 11:04

## 2024-04-15 RX ADMIN — METHYLPREDNISOLONE SODIUM SUCCINATE 250 MG: 125 INJECTION, POWDER, FOR SOLUTION INTRAMUSCULAR; INTRAVENOUS at 10:04

## 2024-04-15 RX ADMIN — NIFEDIPINE 30 MG: 30 TABLET, FILM COATED, EXTENDED RELEASE ORAL at 10:04

## 2024-04-15 RX ADMIN — MUPIROCIN 1 G: 20 OINTMENT TOPICAL at 09:04

## 2024-04-15 RX ADMIN — INSULIN ASPART 12 UNITS: 100 INJECTION, SOLUTION INTRAVENOUS; SUBCUTANEOUS at 01:04

## 2024-04-15 RX ADMIN — CEFAZOLIN 2 G: 2 INJECTION, POWDER, FOR SOLUTION INTRAMUSCULAR; INTRAVENOUS at 04:04

## 2024-04-15 RX ADMIN — INSULIN ASPART 2 UNITS: 100 INJECTION, SOLUTION INTRAVENOUS; SUBCUTANEOUS at 05:04

## 2024-04-15 RX ADMIN — OXYCODONE 5 MG: 5 TABLET ORAL at 02:04

## 2024-04-15 RX ADMIN — TACROLIMUS 3 MG: 1 CAPSULE ORAL at 08:04

## 2024-04-15 RX ADMIN — DIVALPROEX SODIUM 250 MG: 250 TABLET, DELAYED RELEASE ORAL at 08:04

## 2024-04-15 RX ADMIN — DORZOLAMIDE HCL 1 DROP: 20 SOLUTION/ DROPS OPHTHALMIC at 08:04

## 2024-04-15 NOTE — SUBJECTIVE & OBJECTIVE
Subjective:   History of Present Illness:  Mr. Ha is a 63 y.o. Black or  male with ESRD secondary to diabetic nephropathy. He is presenting as a primary candidate for a kidney transplant with Dr. Burt 4/14/24 at 8am.  He has been on the wait list for a kidney transplant at [facility] since 2/24/2021. Patient is currently on peritoneal dialysis started on 2/24/2021. Patient reports that he is tolerating dialysis well. He has a PD catheter. Patient denies any recent hospitalizations or ED visits. Denies peritonitis or exit site infections. Pre op labs and imaging reveal K 5.4. PD cut short due to transplant offer. Patient shifted in preparation for surgery, anesthesiology aware.       Hospital Course:  s/p DCD KTX d/t DM, also PD cath removal on 4/14/24 (CIT 17h, KDPI 83%). 2 day johnston, no drains.     Interval note:   - Hyperkalemic post-op, K 7.1, HD done POD#0. K stable today.   - DGF noted, not yet clearing with oliguria. Kidney US today stable, reviewed with surgeon. Expect DGF from DCD status plus prolonged CIT.   - Mild serosang drainage noted from incision, H/H stable. Monitor  - Pt ambulating halls without issue. Pain adequately controlled. Passing flatus.   - Restart home nifedipine for better BP control      Past Medical, Surgical, Family, and Social History:   Unchanged from H&P.    Scheduled Meds:  Current Facility-Administered Medications   Medication Dose Route Frequency Provider Last Rate Last Admin    acetaminophen tablet 650 mg  650 mg Oral Q8H Rosemary Foss MD   650 mg at 04/15/24 1409    acetaminophen tablet 650 mg  650 mg Oral Q24H Rosemary Foss MD   650 mg at 04/15/24 1113    antithymocyte globulin (rabbit) 125 mg, hydrocortisone sodium succinate (SOLU-CORTEF) 20 mg in sodium chloride 0.9% 500 mL (FOR PERIPHERAL LINE ADMINISTRATION ONLY)  1.5 mg/kg (Adjusted) Intravenous Daily Rosemary Foss MD 83.3 mL/hr at 04/15/24 1149 125 mg at 04/15/24 1149    bisacodyL EC tablet 10 mg  10 mg  Oral QHS Rosemary Foss MD   10 mg at 04/14/24 2018    carvediloL tablet 25 mg  25 mg Oral BID WM Rosemary Foss MD   25 mg at 04/15/24 0832    dextrose 10% bolus 125 mL 125 mL  12.5 g Intravenous PRN Isatu Guardado, NP        dextrose 10% bolus 125 mL 125 mL  12.5 g Intravenous PRN Rosemary Foss MD        dextrose 10% bolus 250 mL 250 mL  25 g Intravenous PRN Isatu Guardado, NP        dextrose 10% bolus 250 mL 250 mL  25 g Intravenous PRN Rosemary Foss MD        diphenhydrAMINE capsule 25 mg  25 mg Oral Q24H Rosemary Foss MD   25 mg at 04/15/24 1113    diphenhydrAMINE capsule 50 mg  50 mg Oral Once PRN Rosemary Foss MD        divalproex EC tablet 250 mg  250 mg Oral Q12H Juanito Zavala NP        docusate sodium capsule 100 mg  100 mg Oral TID Rosemary Foss MD   100 mg at 04/15/24 1411    EPINEPHrine (PF) injection 1 mg  1 mg Subcutaneous Once PRN Rosemary Foss MD        famotidine tablet 20 mg  20 mg Oral QHS Rosemary Foss MD   20 mg at 04/14/24 2018    glucagon (human recombinant) injection 1 mg  1 mg Intramuscular PRN Isatu Guardado NP        glucagon (human recombinant) injection 1 mg  1 mg Intramuscular Continuous PRN Rosemary Foss MD        glucose chewable tablet 16 g  16 g Oral PRN Isatu Guardado, NP        glucose chewable tablet 16 g  16 g Oral PRN Rosemary Foss MD        glucose chewable tablet 16 g  16 g Oral PRN Rosemary Foss MD        glucose chewable tablet 24 g  24 g Oral PRN Isatu Guardado, NP        glucose chewable tablet 24 g  24 g Oral PRN Rosemary Foss MD        heparin (porcine) injection 2,400 Units  2,400 Units Intravenous PRN Sher Hanna MD        heparin (porcine) injection 5,000 Units  5,000 Units Subcutaneous Q8H Rosemary Foss MD   5,000 Units at 04/15/24 1411    hydrocortisone sodium succinate injection 100 mg  100 mg Intravenous Once PRN Rosemary Foss MD        insulin aspart U-100 pen 0-5 Units  0-5 Units Subcutaneous QID (AC + HS) PRN Rosemary Foss MD   2 Units at 04/15/24 1312    insulin  aspart U-100 pen 12 Units  12 Units Subcutaneous TIDWM Isatu Guardado NP   12 Units at 04/15/24 1309    insulin detemir U-100 (Levemir) pen 30 Units  30 Units Subcutaneous QHS Isatu Guardado NP        methylPREDNISolone sodium succinate (SOLU-MEDROL) 500 mg in dextrose 5 % (D5W) 100 mL IVPB  500 mg Intravenous Once Lorelei Cha PA-C        [START ON 4/16/2024] methylPREDNISolone sodium succinate injection 125 mg  125 mg Intravenous Once Rosemary Foss MD        multivitamin tablet  1 tablet Oral Daily Rosemary Foss MD   1 tablet at 04/15/24 0832    mupirocin 2 % ointment 1 g  1 g Nasal BID Rosemary Foss MD   1 g at 04/15/24 0906    mupirocin 2 % ointment   Nasal On Call Procedure Lorelei Cha PA-C        mycophenolate capsule 1,000 mg  1,000 mg Oral BID Rosemary Foss MD   1,000 mg at 04/15/24 0832    NIFEdipine 24 hr tablet 30 mg  30 mg Oral BID Juanito Zavala NP   30 mg at 04/15/24 1040    ondansetron injection 4 mg  4 mg Intravenous Q6H PRN Rosemary Foss MD   4 mg at 04/15/24 1126    oxyCODONE immediate release tablet 5 mg  5 mg Oral Q4H PRN Lorelei Cha PA-C   5 mg at 04/15/24 1413    [START ON 4/17/2024] predniSONE tablet 20 mg  20 mg Oral Daily Rosemary Foss MD        pregabalin capsule 75 mg  75 mg Oral On Call Procedure Lorelei Cha PA-C        sodium bicarbonate tablet 1,300 mg  1,300 mg Oral BID Lorelei Cha PA-C   1,300 mg at 04/15/24 0832    sodium chloride 0.9% flush 10 mL  10 mL Intravenous PRN Lorelei Cha PA-C        sodium chloride 0.9% flush 10 mL  10 mL Intravenous PRN Rosemary Foss MD        [START ON 4/24/2024] sulfamethoxazole-trimethoprim 400-80mg per tablet 1 tablet  1 tablet Oral Daily AM Rosemary Foss MD        tacrolimus capsule 4 mg  4 mg Oral BID Angela Powell MD        tamsulosin 24 hr capsule 0.4 mg  1 capsule Oral Daily Lorelei Cha PA-C   0.4 mg at 04/15/24 0835    traMADoL tablet 50 mg   50 mg Oral Q4H PRN Lorelei Cha PA-C   50 mg at 04/15/24 0849    [START ON 4/24/2024] valGANciclovir tablet 450 mg  450 mg Oral Daily AM Rosemary Foss MD         Continuous Infusions:  Current Facility-Administered Medications   Medication Dose Route Frequency Provider Last Rate Last Admin    acetaminophen tablet 650 mg  650 mg Oral Q8H Rosemary Foss MD   650 mg at 04/15/24 1409    acetaminophen tablet 650 mg  650 mg Oral Q24H Rosemary Foss MD   650 mg at 04/15/24 1113    antithymocyte globulin (rabbit) 125 mg, hydrocortisone sodium succinate (SOLU-CORTEF) 20 mg in sodium chloride 0.9% 500 mL (FOR PERIPHERAL LINE ADMINISTRATION ONLY)  1.5 mg/kg (Adjusted) Intravenous Daily Rosemary Foss MD 83.3 mL/hr at 04/15/24 1149 125 mg at 04/15/24 1149    bisacodyL EC tablet 10 mg  10 mg Oral QHS Rosemary Foss MD   10 mg at 04/14/24 2018    carvediloL tablet 25 mg  25 mg Oral BID WM Rosemary Foss MD   25 mg at 04/15/24 0832    dextrose 10% bolus 125 mL 125 mL  12.5 g Intravenous PRN Isatu Guardado NP        dextrose 10% bolus 125 mL 125 mL  12.5 g Intravenous PRN Rosemary Foss MD        dextrose 10% bolus 250 mL 250 mL  25 g Intravenous PRN Isatu Guardado NP        dextrose 10% bolus 250 mL 250 mL  25 g Intravenous PRN Rosemary Foss MD        diphenhydrAMINE capsule 25 mg  25 mg Oral Q24H Rosemary Foss MD   25 mg at 04/15/24 1113    diphenhydrAMINE capsule 50 mg  50 mg Oral Once PRN Rosemary Foss MD        divalproex EC tablet 250 mg  250 mg Oral Q12H Juanito Zavala NP        docusate sodium capsule 100 mg  100 mg Oral TID Rosemary Foss MD   100 mg at 04/15/24 1411    EPINEPHrine (PF) injection 1 mg  1 mg Subcutaneous Once PRN Rosemary Foss MD        famotidine tablet 20 mg  20 mg Oral QHS Rosemary Foss MD   20 mg at 04/14/24 2018    glucagon (human recombinant) injection 1 mg  1 mg Intramuscular PRN Isatu Guardado NP        glucagon (human recombinant) injection 1 mg  1 mg Intramuscular Continuous PRN Rosemary Foss MD         glucose chewable tablet 16 g  16 g Oral PRN Isatu Guardado NP        glucose chewable tablet 16 g  16 g Oral PRN Rosemary Foss MD        glucose chewable tablet 16 g  16 g Oral PRN Rosemary Foss MD        glucose chewable tablet 24 g  24 g Oral PRN Isatu Guardado NP        glucose chewable tablet 24 g  24 g Oral PRN Rosemary Foss MD        heparin (porcine) injection 2,400 Units  2,400 Units Intravenous PRN Sher Hanna MD        heparin (porcine) injection 5,000 Units  5,000 Units Subcutaneous Q8H Rosemary Foss MD   5,000 Units at 04/15/24 1411    hydrocortisone sodium succinate injection 100 mg  100 mg Intravenous Once PRN Rosemary Foss MD        insulin aspart U-100 pen 0-5 Units  0-5 Units Subcutaneous QID (AC + HS) PRN Rosemary Foss MD   2 Units at 04/15/24 1312    insulin aspart U-100 pen 12 Units  12 Units Subcutaneous TIDWM Isatu Guardado NP   12 Units at 04/15/24 1309    insulin detemir U-100 (Levemir) pen 30 Units  30 Units Subcutaneous QHS Isatu Guardado NP        methylPREDNISolone sodium succinate (SOLU-MEDROL) 500 mg in dextrose 5 % (D5W) 100 mL IVPB  500 mg Intravenous Once Lorelei Cha PA-C        [START ON 4/16/2024] methylPREDNISolone sodium succinate injection 125 mg  125 mg Intravenous Once Rosemary Foss MD        multivitamin tablet  1 tablet Oral Daily Rosemary Foss MD   1 tablet at 04/15/24 0832    mupirocin 2 % ointment 1 g  1 g Nasal BID Rosemary Foss MD   1 g at 04/15/24 0906    mupirocin 2 % ointment   Nasal On Call Procedure Lorelei Cha PA-C        mycophenolate capsule 1,000 mg  1,000 mg Oral BID Rosemary Foss MD   1,000 mg at 04/15/24 0832    NIFEdipine 24 hr tablet 30 mg  30 mg Oral BID Juanito Zavala NP   30 mg at 04/15/24 1040    ondansetron injection 4 mg  4 mg Intravenous Q6H PRN Rosemary Foss MD   4 mg at 04/15/24 1126    oxyCODONE immediate release tablet 5 mg  5 mg Oral Q4H PRN Lorelei Cha PA-C   5 mg at 04/15/24 1413    [START ON  4/17/2024] predniSONE tablet 20 mg  20 mg Oral Daily Rosemary Foss MD        pregabalin capsule 75 mg  75 mg Oral On Call Procedure Lorelei Cha PA-C        sodium bicarbonate tablet 1,300 mg  1,300 mg Oral BID Lorelei Cha PA-C   1,300 mg at 04/15/24 0832    sodium chloride 0.9% flush 10 mL  10 mL Intravenous PRN Lorelei Cha PA-C        sodium chloride 0.9% flush 10 mL  10 mL Intravenous PRN Rosemary Foss MD        [START ON 4/24/2024] sulfamethoxazole-trimethoprim 400-80mg per tablet 1 tablet  1 tablet Oral Daily AM Rosemary Foss MD        tacrolimus capsule 4 mg  4 mg Oral BID Angela Powell MD        tamsulosin 24 hr capsule 0.4 mg  1 capsule Oral Daily Lorelei Cha PA-C   0.4 mg at 04/15/24 0835    traMADoL tablet 50 mg  50 mg Oral Q4H PRN Lorelei Cha PA-C   50 mg at 04/15/24 0849    [START ON 4/24/2024] valGANciclovir tablet 450 mg  450 mg Oral Daily AM Rosemary Foss MD         PRN Meds:  Current Facility-Administered Medications   Medication Dose Route Frequency Provider Last Rate Last Admin    acetaminophen tablet 650 mg  650 mg Oral Q8H Rosemary Foss MD   650 mg at 04/15/24 1409    acetaminophen tablet 650 mg  650 mg Oral Q24H Rosemary Foss MD   650 mg at 04/15/24 1113    antithymocyte globulin (rabbit) 125 mg, hydrocortisone sodium succinate (SOLU-CORTEF) 20 mg in sodium chloride 0.9% 500 mL (FOR PERIPHERAL LINE ADMINISTRATION ONLY)  1.5 mg/kg (Adjusted) Intravenous Daily Rosemary Foss MD 83.3 mL/hr at 04/15/24 1149 125 mg at 04/15/24 1149    bisacodyL EC tablet 10 mg  10 mg Oral QHS Rosemary Foss MD   10 mg at 04/14/24 2018    carvediloL tablet 25 mg  25 mg Oral BID WM Rosemary Foss MD   25 mg at 04/15/24 0832    dextrose 10% bolus 125 mL 125 mL  12.5 g Intravenous PRN Isatu Guardado NP        dextrose 10% bolus 125 mL 125 mL  12.5 g Intravenous PRN Rosemary Foss MD        dextrose 10% bolus 250 mL 250 mL  25 g Intravenous PRN Isatu Guardado,  NP        dextrose 10% bolus 250 mL 250 mL  25 g Intravenous PRN Rosemary Foss MD        diphenhydrAMINE capsule 25 mg  25 mg Oral Q24H Rosemary Foss MD   25 mg at 04/15/24 1113    diphenhydrAMINE capsule 50 mg  50 mg Oral Once PRN Rosemary Foss MD        divalproex EC tablet 250 mg  250 mg Oral Q12H Juanito Zavala NP        docusate sodium capsule 100 mg  100 mg Oral TID Rosemary Foss MD   100 mg at 04/15/24 1411    EPINEPHrine (PF) injection 1 mg  1 mg Subcutaneous Once PRN Rosemary Foss MD        famotidine tablet 20 mg  20 mg Oral QHS Rosemary Foss MD   20 mg at 04/14/24 2018    glucagon (human recombinant) injection 1 mg  1 mg Intramuscular PRN Isatu Guardado NP        glucagon (human recombinant) injection 1 mg  1 mg Intramuscular Continuous PRN Rosemary Foss MD        glucose chewable tablet 16 g  16 g Oral PRN Isatu Guardado NP        glucose chewable tablet 16 g  16 g Oral PRN Rosemary Foss MD        glucose chewable tablet 16 g  16 g Oral PRN Rosemary Foss MD        glucose chewable tablet 24 g  24 g Oral PRN Isatu Guardado NP        glucose chewable tablet 24 g  24 g Oral PRN Rosemary Foss MD        heparin (porcine) injection 2,400 Units  2,400 Units Intravenous PRN Sher Hanna MD        heparin (porcine) injection 5,000 Units  5,000 Units Subcutaneous Q8H Rosemary Foss MD   5,000 Units at 04/15/24 1411    hydrocortisone sodium succinate injection 100 mg  100 mg Intravenous Once PRN Rosemary Foss MD        insulin aspart U-100 pen 0-5 Units  0-5 Units Subcutaneous QID (AC + HS) PRN Rosemary Foss MD   2 Units at 04/15/24 1312    insulin aspart U-100 pen 12 Units  12 Units Subcutaneous TIDWM Isatu Guardado NP   12 Units at 04/15/24 1309    insulin detemir U-100 (Levemir) pen 30 Units  30 Units Subcutaneous QHS Isatu Guardado NP        methylPREDNISolone sodium succinate (SOLU-MEDROL) 500 mg in dextrose 5 % (D5W) 100 mL IVPB  500 mg Intravenous Once Lorelei Cha PA-C        [START ON  4/16/2024] methylPREDNISolone sodium succinate injection 125 mg  125 mg Intravenous Once Rosemary Foss MD        multivitamin tablet  1 tablet Oral Daily Rosemary Foss MD   1 tablet at 04/15/24 0832    mupirocin 2 % ointment 1 g  1 g Nasal BID Rosemary Foss MD   1 g at 04/15/24 0906    mupirocin 2 % ointment   Nasal On Call Procedure Lorelei Cha PA-C        mycophenolate capsule 1,000 mg  1,000 mg Oral BID Rosemary Foss MD   1,000 mg at 04/15/24 0832    NIFEdipine 24 hr tablet 30 mg  30 mg Oral BID Juanito Zavala NP   30 mg at 04/15/24 1040    ondansetron injection 4 mg  4 mg Intravenous Q6H PRN Rosemary Foss MD   4 mg at 04/15/24 1126    oxyCODONE immediate release tablet 5 mg  5 mg Oral Q4H PRN Lorelei Cha PA-C   5 mg at 04/15/24 1413    [START ON 4/17/2024] predniSONE tablet 20 mg  20 mg Oral Daily Rosemary Foss MD        pregabalin capsule 75 mg  75 mg Oral On Call Procedure Lorelei Cha PA-C        sodium bicarbonate tablet 1,300 mg  1,300 mg Oral BID Lorelei Cha PA-C   1,300 mg at 04/15/24 0832    sodium chloride 0.9% flush 10 mL  10 mL Intravenous PRN Lorelei Cha PA-C        sodium chloride 0.9% flush 10 mL  10 mL Intravenous PRN Rosemary Foss MD        [START ON 4/24/2024] sulfamethoxazole-trimethoprim 400-80mg per tablet 1 tablet  1 tablet Oral Daily AM Rosemary Foss MD        tacrolimus capsule 4 mg  4 mg Oral BID Angela Powell MD        tamsulosin 24 hr capsule 0.4 mg  1 capsule Oral Daily Lorelei Cha PA-C   0.4 mg at 04/15/24 0835    traMADoL tablet 50 mg  50 mg Oral Q4H PRN Lorelei Cha PA-C   50 mg at 04/15/24 0849    [START ON 4/24/2024] valGANciclovir tablet 450 mg  450 mg Oral Daily AM Rosemary Foss MD           Intake/Output - Last 3 Shifts         04/13 0700 04/14 0659 04/14 0700  04/15 0659 04/15 0700 04/16 0659    P.O.  540     I.V. (mL/kg)  2157.7 (23.6)     Other  500     IV Piggyback  2055.7   "   Total Intake(mL/kg)  5253.4 (57.5)     Urine (mL/kg/hr) 220 1155 (0.5) 160 (0.2)    Emesis/NG output  0     Other  1000     Stool  0     Blood  50     Total Output 220 2205 160    Net -220 +3048.4 -160           Urine Occurrence  0 x     Stool Occurrence  0 x     Emesis Occurrence  0 x              Review of Systems   Constitutional:  Negative for appetite change, fatigue and fever.   Eyes:  Positive for visual disturbance (chronic).   Respiratory:  Negative for cough and shortness of breath.    Cardiovascular:  Negative for chest pain and leg swelling.   Gastrointestinal:  Positive for abdominal distention and abdominal pain (incisional). Negative for diarrhea, nausea and vomiting.   Genitourinary:  Positive for decreased urine volume. Negative for difficulty urinating, dysuria and frequency.   Skin:  Positive for wound.   Allergic/Immunologic: Positive for immunocompromised state.   Neurological:  Negative for dizziness and weakness.   Psychiatric/Behavioral:  Negative for confusion.       Objective:     Vital Signs (Most Recent):  Temp: 98.2 °F (36.8 °C) (04/15/24 1500)  Pulse: 80 (04/15/24 1500)  Resp: 16 (04/15/24 1500)  BP: (!) 145/75 (04/15/24 1500)  SpO2: 96 % (04/15/24 1500) Vital Signs (24h Range):  Temp:  [98.1 °F (36.7 °C)-99.3 °F (37.4 °C)] 98.2 °F (36.8 °C)  Pulse:  [] 80  Resp:  [10-19] 16  SpO2:  [95 %-100 %] 96 %  BP: (101-196)/() 145/75     Weight: 91.3 kg (201 lb 4.5 oz)  Height: 5' 7" (170.2 cm)  Body mass index is 31.52 kg/m².     Physical Exam  Vitals and nursing note reviewed.   Constitutional:       General: He is not in acute distress.     Appearance: He is not ill-appearing.   HENT:      Head: Normocephalic.      Mouth/Throat:      Mouth: Mucous membranes are moist.   Eyes:      Extraocular Movements: Extraocular movements intact.      Conjunctiva/sclera: Conjunctivae normal.   Cardiovascular:      Rate and Rhythm: Normal rate and regular rhythm.      Pulses: Normal pulses. "      Heart sounds: Normal heart sounds.   Pulmonary:      Effort: Pulmonary effort is normal. No respiratory distress.      Breath sounds: No wheezing or rales.   Abdominal:      General: Bowel sounds are normal. There is no distension.      Palpations: Abdomen is soft.      Tenderness: There is abdominal tenderness (appropriate post-op). There is no guarding or rebound.      Comments: RLQ kidney txp incision, KENNA with staples. Scant serosang drainage noted    Musculoskeletal:         General: No swelling. Normal range of motion.      Cervical back: Normal range of motion.      Right lower le+ Pitting Edema present.      Left lower le+ Pitting Edema present.   Skin:     General: Skin is warm and dry.   Neurological:      Mental Status: He is alert and oriented to person, place, and time.      Motor: No weakness.   Psychiatric:         Mood and Affect: Mood normal.         Behavior: Behavior normal.         Thought Content: Thought content normal.         Judgment: Judgment normal.          Laboratory:  CBC:   Recent Labs   Lab 04/14/24  2009 04/15/24  0511 04/15/24  1421   WBC 15.01* 9.83 7.84   RBC 3.22* 2.97* 2.83*   HGB 9.9* 9.1* 8.7*   HCT 29.2* 26.7* 25.6*    141* 121*   MCV 91 90 91   MCH 30.7 30.6 30.7   MCHC 33.9 34.1 34.0     CMP:   Recent Labs   Lab 24  0348 24  1355 04/14/24  2009 04/15/24  0230 04/15/24  0511 04/15/24  1421   *   < > 253*  --  270* 237*   CALCIUM 8.3*   < > 8.0*  --  7.6* 7.6*   ALBUMIN 2.8*   < > 2.6*  --  2.2* 2.3*   PROT 6.4  --   --   --   --   --       < > 133*  --  133* 131*   K 5.4*   < > 7.1* 4.6 4.7 5.0   CO2 19*   < > 17*  --  22* 23      < > 106  --  101 100   BUN 67*   < > 74*  --  46* 60*   CREATININE 9.2*   < > 9.4*  --  6.9* 8.2*   ALKPHOS 78  --   --   --   --   --    ALT 22  --   --   --   --   --    AST 12  --   --   --   --   --     < > = values in this interval not displayed.       Diagnostic Results:  US - Kidney: Results  for orders placed during the hospital encounter of 04/14/24    US Transplant Kidney With Doppler    Narrative  EXAMINATION:  US TRANSPLANT KIDNEY WITH DOPPLER    CLINICAL HISTORY:  POD#1 kidney transplant;    TECHNIQUE:  Real time gray scale and doppler ultrasound was performed over the patient's renal allograft.    COMPARISON:  None    FINDINGS:  Renal allograft in the right lower quadrant.  The allograft measures 11.3 cm. Normal perfusion. No hydronephrosis.  Stent is present.    Fluid collection adjacent to the superior pole measuring 3.9 x 4.7 x 2.3    Vasculature:    Resistive indices ranged from 0.77 to 0.83.    Main renal artery peak systolic velocity: 182cm/sec with normal waveform.    Renal artery/iliac ratio: 1.3.    The main renal vein is patent.    Impression  Minimally elevated intraparenchymal resistive indices likely due to edema in recent state.  Otherwise satisfactory gray scale and doppler evaluation of renal allograft.      Electronically signed by: Ti Ha MD  Date:    04/15/2024  Time:    08:11

## 2024-04-15 NOTE — ASSESSMENT & PLAN NOTE
May increase insulin resistance.        Please call patient or  with lab results.  All labs are in normal range except thyroid function test.  We need to decrease levothyroxine to 88 mcg daily, please call in number 30 tablets with 1 refill.  Please tell her she needs to come in for blood draw only in approximately 6 weeks to determine if this is correct dose for her.  I have ordered repeat labs for approximately second week in October, please tell her to come in at that time and cari this on her calendar.

## 2024-04-15 NOTE — NURSING
Pt's urine dropped to 0cc the last hour. NAI Haas notified. HD finished @ 0015. NP stated to flush johnston. Johnston flushed with no resistance. 500 cc bolus of NS ordered. Will continue to monitor.

## 2024-04-15 NOTE — PROGRESS NOTES
Admit Note     Met with patient and wife to assess needs. Patient is a 63 y.o.  male, admitted for for kidney transplant.      ESRD (end stage renal disease) on dialysis [N18.6, Z99.2]      Patient admitted from home on 4/14/2024 .  At this time, patient presents as alert and oriented x 4, pleasant, good eye contact, well groomed, recall good, concentration/judgement good, average intelligence, calm, communicative, cooperative, and asking and answering questions appropriately.  At this time, patients caregiver presents as alert and oriented x 4, pleasant, good eye contact, well groomed, recall good, concentration/judgement good, average intelligence, calm, communicative, cooperative, and asking and answering questions appropriately.    Household/Family Systems     Patient resides with patient's wife, at [address] [place] [address].  Support system includes his wife Marjorie Ha ([phone]), and his wife's daughter Wilma Ha ([phone]).  Patient does not have dependents that are need of being cared for.     Patients primary caregiver is Marjorie Ha, patients wife, phone number [phone].  Confirmed patients contact information is [phone] (home);   Telephone Information:   Mobile [phone]   .    During admission, patient's caregiver plans to stay in patient's room.  Confirmed patient and patients caregivers do have access to reliable transportation.    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  Patient reports patient learns best by multisensory.   Needed: No.   Highest education level: High School (9-12) or GED    Vocation/Disability   .  Working for Income: No  If no, reason not working: Disability  Patient is disabled due to vision loss since 2017.  Prior to disability,  patient  was employed as a plant scaffolding worker.    Adherence     Patient reports a high level of adherence to patients health care regimen.  Adherence counseling and education provided. Patient verbalizes understanding.    Substance Use    Patient reports the following substance usage.    Tobacco: none, patient denies any use.  Alcohol: none, patient denies any use.  Illicit Drugs/Non-prescribed Medications: none, patient denies any use.  Patient states clear understanding of the potential impact of substance use.  Substance abstinence/cessation counseling, education and resources provided and reviewed.     Services Utilizing/ADLS    Infusion Service: Prior to admission, patient utilizing? no  Home Health: Prior to admission, patient utilizing? no  DME: Prior to admission, yes : right eye prosthesis, dexcom  Pulmonary/Cardiac Rehab: Prior to admission, no  Dialysis:  Prior to admission, yes : Patient reports he was on PD at [facility] . Patient is DGF per MD referral sent to [facility] with patients verbal permission.   Transplant Specialty Pharmacy:  Prior to admission, no; patient provides permission to release necessary information to specialty pharmacy for medications post-tranplant. Resources provided and patient is choosing [facility] pharmacy at this time.    Prior to admission, patient reports patient was independent with ADLS and was driving.  Patient reports patient is not able to care for self at this time due to compromised medical condition (as documented in medical record) and physical weakness..  Patient indicates a willingness to care for self once medically cleared to do so.    Insurance/Medications    Insured by   Payer/Plan Subscr  Sex Relation Sub. Ins. ID Effective Group Num   1. HU[employer]NA MANAGE* YVONNE TORRESIC* 1960 Male Self [id number] 23 [id number]                                   [address] [address]   2. [place] - [place]* VELMA TORRESDERIC* 1960 Male Self [id number]* 23 [id number]                                    [address] [address]      Primary Insurance (for UNOS reporting): Public Insurance - Medicare & Choice  Secondary Insurance (for UNOS reporting): Public Insurance - Medicaid    Patient reports patient is able to obtain and afford medications at this time and at time of discharge.    Living Will/Healthcare Power of     Patient states patient does not have a LW and/or HCPA.   provided education regarding LW and HCPA and the completion of forms.    Coping/Mental Health    Patient is coping adequately with the aid of  family members and friends.  Patient denies mental health difficulties.     Discharge Planning    At time of discharge, patient plans to return to [facility] under the care of Marjorie Ha.  Patients wife will transport patient.  Per rounds today, expected discharge date has not been medically determined at this time. Patient and patients caregiver  verbalize understanding and are involved in treatment planning and discharge process.    Additional Concerns    Patient's caretaker denies additional needs and/or concerns at this time.  providing ongoing psychosocial support, education, resources and d/c planning as needed.  SW remains available.  remains available. Patient's caregiver verbalizes understanding and agreement with information reviewed,  availability and how to access available resources as needed. Patient denies additional needs and/or concerns at this time. Patient verbalizes understanding and agreement with information reviewed, social work availability, and how to access available resources as needed.

## 2024-04-15 NOTE — CONSULTS
[facility] - Transplant Stepdown  Adult Nutrition  Consult Note    SUMMARY     Recommendations    1. Continue Renal diet      2. If PO intake <50%, add ONS Novasource renal      3. RD to monitor and follow    Goals: Meet % EEN, EPN  Nutrition Goal Status: new  Communication of RD Recs:  (POC)    Assessment and Plan    Nutrition Problem  Increased nutrient needs     Related to (etiology):   Increased demand for nutrient due to sx    Signs and Symptoms (as evidenced by):   S/p DCD Kidney transplant (4/14)    Interventions/Recommendations (treatment strategy):  Collaboration with other providers  Nutrition education    Nutrition Diagnosis Status:   New      Malnutrition Assessment  Orbital Region (Subcutaneous Fat Loss): well nourished  Upper Arm Region (Subcutaneous Fat Loss): well nourished   [place] Region (Muscle Loss): well nourished  Clavicle Bone Region (Muscle Loss): well nourished  Clavicle and Acromion Bone Region (Muscle Loss): well nourished  Dorsal Hand (Muscle Loss): well nourished  Patellar Region (Muscle Loss): well nourished  Anterior Thigh Region (Muscle Loss): well nourished  Posterior Calf Region (Muscle Loss): well nourished     Reason for Assessment    Reason For Assessment: consult  Diagnosis:  (ESRD (end stage renal disease) on dialysis)  Relevant Medical History: T2DM, HTN  Interdisciplinary Rounds: did not attend    General Information Comments:   S/p DCD Kidney transplant (4/14). Required HD post op. Pt tolerated some food today. Reports good appetite, was on a low potassium, diabetic diet PTA. Denies N/V, chewing/swallowing difficulties. - BM/flatus. Stated  lb, wt stable. NFPE completed today, no wasting noted. No indicator of malnutrition. Educated pt on food safety and food-drug interaction. Pt verbalized understanding.    Nutrition Discharge Planning: Post transplant nutrition education provided on 4/15. Food safety/drug interactions emphasized. General healthy/low salt diet  "recommended. Education material left at bedside. No other needs identified.    Nutrition Risk Screen    Nutrition Risk Screen: no indicators present    Nutrition/Diet History    Patient Reported Diet/Restrictions/Preferences: renal  Spiritual, Cultural Beliefs, Lutheran Practices, Values that Affect Care: no    Anthropometrics    Temp: 98.1 °F (36.7 °C)  Height Method: Stated  Height: 5' 7" (170.2 cm)  Height (inches): 67 in  Weight Method: Standard Scale  Weight: 91.3 kg (201 lb 4.5 oz)  Weight (lb): 201.28 lb  Ideal Body Weight (IBW), Male: 148 lb  % Ideal Body Weight, Male (lb): 137.42 %  BMI (Calculated): 31.5     Lab/Procedures/Meds    Pertinent Labs Reviewed: reviewed  Pertinent Labs Comments: glucose 270, Na 133, BUN 46, Cr 6.9, albumin 2.2, phos 6.4, eGFR 8.4  Pertinent Medications Reviewed: reviewed  Pertinent Medications Comments:     Current Facility-Administered Medications   Medication Dose Route Frequency Provider Last Rate Last Admin    acetaminophen tablet 650 mg  650 mg Oral Q8H Rosemary Foss MD   650 mg at 04/15/24 0506    acetaminophen tablet 650 mg  650 mg Oral Q24H Rosemary Foss MD        antithymocyte globulin (rabbit) 125 mg, hydrocortisone sodium succinate (SOLU-CORTEF) 20 mg in sodium chloride 0.9% 500 mL (FOR PERIPHERAL LINE ADMINISTRATION ONLY)  1.5 mg/kg (Adjusted) Intravenous Daily Rosemary Foss MD        bisacodyL EC tablet 10 mg  10 mg Oral QHS Rosemary Foss MD   10 mg at 04/14/24 2018    carvediloL tablet 25 mg  25 mg Oral BID WM Rosemary Foss MD   25 mg at 04/15/24 0832    dextrose 10% bolus 125 mL 125 mL  12.5 g Intravenous PRN Hailee Guardadoe A., NP        dextrose 10% bolus 125 mL 125 mL  12.5 g Intravenous PRN Rosemary Foss MD        dextrose 10% bolus 250 mL 250 mL  25 g Intravenous PRN Sansarah Isatu A., NP        dextrose 10% bolus 250 mL 250 mL  25 g Intravenous PRN Rosemary Foss MD        diphenhydrAMINE capsule 25 mg  25 mg Oral Q24H Rosemary Foss MD        diphenhydrAMINE capsule 50 mg  50 " mg Oral Once PRN Rosemary Foss MD        docusate sodium capsule 100 mg  100 mg Oral TID Rosemary Foss MD   100 mg at 04/15/24 0832    EPINEPHrine (PF) injection 1 mg  1 mg Subcutaneous Once PRN Rosemary Foss MD        famotidine tablet 20 mg  20 mg Oral QHS Rosemary Foss MD   20 mg at 04/14/24 2018    glucagon (human recombinant) injection 1 mg  1 mg Intramuscular PRN Isatu Guardado NP        glucagon (human recombinant) injection 1 mg  1 mg Intramuscular Continuous PRN Rosemary Foss MD        glucose chewable tablet 16 g  16 g Oral PRN Isatu Guardado NP        glucose chewable tablet 16 g  16 g Oral PRN Rosemary Foss MD        glucose chewable tablet 16 g  16 g Oral PRN Rosemary Foss MD        glucose chewable tablet 24 g  24 g Oral PRN Isatu Guardado NP        glucose chewable tablet 24 g  24 g Oral PRN Rosemary Foss MD        heparin (porcine) injection 2,400 Units  2,400 Units Intravenous PRN Sher Hanna MD        heparin (porcine) injection 5,000 Units  5,000 Units Subcutaneous Q8H Rosemary Foss MD   5,000 Units at 04/15/24 0503    hydrocortisone sodium succinate injection 100 mg  100 mg Intravenous Once PRN Rosemary Foss MD        insulin aspart U-100 pen 0-10 Units  0-10 Units Subcutaneous QID (AC + HS) PRN Isatu Guardado NP        insulin aspart U-100 pen 0-5 Units  0-5 Units Subcutaneous QID (AC + HS) PRN Rosemary Foss MD   4 Units at 04/15/24 0841    insulin aspart U-100 pen 10 Units  10 Units Subcutaneous TIDWM Isatu Guardado NP        insulin detemir U-100 (Levemir) pen 30 Units  30 Units Subcutaneous QHS Isatu Guardado NP        methylPREDNISolone sodium succinate (SOLU-MEDROL) 500 mg in dextrose 5 % (D5W) 100 mL IVPB  500 mg Intravenous Once Lorelei hCa PA-C        [START ON 4/16/2024] methylPREDNISolone sodium succinate injection 125 mg  125 mg Intravenous Once Rosemary Foss MD        multivitamin tablet  1 tablet Oral Daily Rosemary Foss MD   1 tablet at 04/15/24 0832    mupirocin 2 %  ointment 1 g  1 g Nasal BID Rosemary Foss MD   1 g at 04/15/24 0906    mupirocin 2 % ointment   Nasal On Call Procedure Lorelei Cha PA-C        mycophenolate capsule 1,000 mg  1,000 mg Oral BID Rosemary Foss MD   1,000 mg at 04/15/24 0832    NIFEdipine 24 hr tablet 30 mg  30 mg Oral BID Juanito Zavala NP   30 mg at 04/15/24 1040    ondansetron injection 4 mg  4 mg Intravenous Q6H PRN Rosemary Foss MD        oxyCODONE immediate release tablet 5 mg  5 mg Oral Q4H PRN Lorelei Cha PA-C   5 mg at 04/15/24 0428    [START ON 4/17/2024] predniSONE tablet 20 mg  20 mg Oral Daily Rosemary Foss MD        pregabalin capsule 75 mg  75 mg Oral On Call Procedure Lorelei Cha PA-C        sodium bicarbonate tablet 1,300 mg  1,300 mg Oral BID Lorelei Cha PA-C   1,300 mg at 04/15/24 0832    sodium chloride 0.9% flush 10 mL  10 mL Intravenous PRN Lorelei Cha PA-C        sodium chloride 0.9% flush 10 mL  10 mL Intravenous PRN Rosemary Foss MD        [START ON 4/24/2024] sulfamethoxazole-trimethoprim 400-80mg per tablet 1 tablet  1 tablet Oral Daily AM Rosemary Foss MD        tacrolimus capsule 3 mg  3 mg Oral BID Rosemary Foss MD   3 mg at 04/15/24 0832    tamsulosin 24 hr capsule 0.4 mg  1 capsule Oral Daily Lorelei Cha PA-C   0.4 mg at 04/15/24 0835    traMADoL tablet 50 mg  50 mg Oral Q4H PRN Lorelei Cha PA-C   50 mg at 04/15/24 0849    [START ON 4/24/2024] valGANciclovir tablet 450 mg  450 mg Oral Daily AM Rosemary Foss MD          Estimated/Assessed Needs    Weight Used For Calorie Calculations: 91.3 kg (201 lb 4.5 oz)  Energy Calorie Requirements (kcal): 1838 kcal  Energy Need Method: Lavern Tang (PAL 1.1)  Protein Requirements: 81-101g (1.2-1.5g/kg IBW)  Weight Used For Protein Calculations: 67.1 kg (148 lb) (IBW)  Fluid Requirements (mL): 1ml/kcal or per MD  Estimated Fluid Requirement Method: RDA Method  RDA Method (mL): 1838  CHO  Requirement: 228g/day    Nutrition Prescription Ordered    Current Diet Order: Renal    Evaluation of Received Nutrient/Fluid Intake    I/O: +2.8 L since admit  Energy Calories Required: not meeting needs  Protein Required: not meeting needs  Comments: LBM 4/13  Tolerance: tolerating  % Intake of Estimated Energy Needs: 0 - 25 %  % Meal Intake: 0 - 25 %    Nutrition Risk    Level of Risk/Frequency of Follow-up:  (1x/week)     Monitor and Evaluation    Food and Nutrient Intake: energy intake, food and beverage intake  Food and Nutrient Adminstration: diet order  Knowledge/Beliefs/Attitudes: food and nutrition knowledge/skill  Physical Activity and Function: nutrition-related ADLs and IADLs  Anthropometric Measurements: height/length, weight, weight change, body mass index  Biochemical Data, Medical Tests and Procedures: electrolyte and renal panel, gastrointestinal profile, glucose/endocrine profile, inflammatory profile, lipid profile  Nutrition-Focused Physical Findings: overall appearance, extremities, muscles and bones, head and eyes, skin     Nutrition Follow-Up    RD Follow-up?: Yes

## 2024-04-15 NOTE — ASSESSMENT & PLAN NOTE
BG goal 140-180    Increase Levemir to 30 units q HS (20% dose increase)   Increase Novolog to 12 units TID with meals (0.8 u/kg dosing) Steroids contributing to significant BG excursions.  Moderate Dose Correction Scale  BG monitoring ac/hs    ** Please call Endocrine for any BG related issues **

## 2024-04-15 NOTE — ASSESSMENT & PLAN NOTE
- Continue prograf, cellcept, and steroid taper  - Check prograf level daily and adjust for therapeutic dosage. Monitor for toxic side effects

## 2024-04-15 NOTE — PLAN OF CARE
AAOx3, afebrile, c/o pain. Scheduled tylenol and PRN tramadol given. BG monitored achs and tx per orders. Endocrine is following. Tele monitor in place (NSR-sinus tach). Intake and output monitored hourly. At 1915, UOP was 50 cc. X1 dose of 100 mg IV lasix given with little response. Pt's K was 7.1 on repeat labs. Dr. Murphy verbally consented pt for HD on the phone. HD done at the bedside. Pt's UOP remained low/0 cc. 500 cc bolus of NS in process. Irrigated catheter with no resistance met. Plan to repeat K @0230 (2 hours after completion of HD per Dr. Murphy). NAI Haas still wants D51/2 NS infusing @50cc/hr. US ordered for the am. Marinelli in place with light pink urine. Marinelli care done. Flomax will start today. No drains.  RLQ incision (kidney incision) with island dressing-scant drainage marked. LLQ incision (old PD cath site) with island dressing-scant drainage marked. Dermabond incision noted below PD cath site. SCD/ BRETT on. Self meds set up and ready to be taught. Hypoactive bowel sounds. Colace and dulcolax given. Pt's abd is round and distended. IV ancef given. Pt having tremors-NAI Haas aware. Wife at the bedside. Pt in lowest position, side rails up x3, non-skid foot wear in place, call light within reach, pt verbalized understanding to call RN when needed. Hand hygiene practiced per protocol. Will continue to monitor.

## 2024-04-15 NOTE — PLAN OF CARE
Pt s/p kidney transplant from yesterday 4/14. Pt is aaox4, vss on bedside monitor, afebrile, on ra. Nsr on tele. Labs stable this am after HD overnight to R IJ trialysis line. Pt currently receiving second dose of thymo. Kidney us this am satisfactory. No tremors noted today. RLQ incision with staples and abd dressing for sanguinous drainage, see notes. L abd incision and PD cath removal site with island/border dressings with small amount of sang drainage, monitoring. Pt on pathway this am. Marinelli to gravity with pink tinged urine. Minimal uop noted. Pt ambulated in hallways with standby-assistance this am. He endorses passing flatus. PRN tramdol and oxy 5 for pain control. Remains free from falls so far today. Teds/scds on, bed locked/lowest position, nonskid socks on, call bell within reach. Pt's spouse pulled self meds this am 100%. Encouraging IS. See flowsheets for vitals. Pt and spouse verbalized understanding to call for needs/assistance.

## 2024-04-15 NOTE — PROGRESS NOTES
Pt back in TSU [id number] from US. No distress noted. See flowsheets for vitals. RLQ abd pad remains C/D/I. Pt up in chair.

## 2024-04-15 NOTE — SUBJECTIVE & OBJECTIVE
Interval HPI:   Overnight events: Remains in TSU. POD 1 s/p kidney transplant. BG significantly above goal ranges on current SQ insulin regimen. Receiving Solu Medrol 250 mg, standard steroid taper. Creatinine 8.4. Diet diabetic Consistent Carbohydrate, Renal; 2000 Calorie    Eatin%  Nausea: No  Hypoglycemia and intervention: No  Fever: No  TPN and/or TF: No  If yes, type of TF/TPN and rate: n/a    PMH, PSH, FH, SH reviewed     ROS:  Constitutional: Negative for weight changes.  Eyes: Negative for visual disturbance.  Respiratory: Negative for cough.   Cardiovascular: Negative for chest pain.  Gastrointestinal: Negative for nausea.  Endocrine: Negative for polyuria, polydipsia.  Musculoskeletal: Negative for back pain.  Skin: Negative for rash.  Neurological: Negative for syncope.  Psychiatric/Behavioral: Negative for depression.    Review of Systems    Current Medications and/or Treatments Impacting Glycemic Control  Immunotherapy:    Immunosuppressants           Stop Route Frequency     tacrolimus capsule 4 mg         -- Oral 2 times daily     antithymocyte globulin (rabbit) 125 mg, hydrocortisone sodium succinate (SOLU-CORTEF) 20 mg in sodium chloride 0.9% 500 mL (FOR PERIPHERAL LINE ADMINISTRATION ONLY)         24 0859 IV Daily     mycophenolate capsule 1,000 mg         -- Oral 2 times daily          Steroids:   Hormones (From admission, onward)      Start     Stop Route Frequency Ordered    24 0900  predniSONE tablet 20 mg  (IP TXP KIDNEY POST-OP THYMO WITH PERIPHERAL PRECHECKED)         -- Oral Daily 24 1354    24 0800  methylPREDNISolone sodium succinate injection 125 mg  (IP TXP KIDNEY POST-OP THYMO WITH PERIPHERAL PRECHECKED)         -- IV Once 24 1354    04/15/24 0900  antithymocyte globulin (rabbit) 125 mg, hydrocortisone sodium succinate (SOLU-CORTEF) 20 mg in sodium chloride 0.9% 500 mL (FOR PERIPHERAL LINE ADMINISTRATION ONLY)  (IP TXP KIDNEY POST-OP THYMO WITH  PERIPHERAL PRECHECKED)         04/17/24 0859 IV Daily 04/14/24 1354    04/14/24 1431  hydrocortisone sodium succinate injection 100 mg  (IP TXP KIDNEY POST-OP THYMO WITH PERIPHERAL PRECHECKED)         09/11/35 0531 IV Once as needed 04/14/24 1354    04/14/24 0300  methylPREDNISolone sodium succinate (SOLU-MEDROL) 500 mg in dextrose 5 % (D5W) 100 mL IVPB  (IP TXP INTRA-OP THYMO - PERIPHERAL THYMO PRECHECKED)         -- IV Once 04/14/24 0254          Pressors:    Autonomic Drugs (From admission, onward)      Start     Stop Route Frequency Ordered    04/14/24 1431  EPINEPHrine (PF) injection 1 mg  (IP TXP KIDNEY POST-OP THYMO WITH PERIPHERAL PRECHECKED)         09/11/35 0531 SubQ Once as needed 04/14/24 1354          Hyperglycemia/Diabetes Medications:   Antihyperglycemics (From admission, onward)      Start     Stop Route Frequency Ordered    04/15/24 2100  insulin detemir U-100 (Levemir) pen 30 Units         -- SubQ Nightly 04/15/24 1004    04/15/24 1130  insulin aspart U-100 pen 12 Units         -- SubQ 3 times daily with meals 04/15/24 1104    04/15/24 1104  insulin aspart U-100 pen 0-10 Units         -- SubQ Before meals & nightly PRN 04/15/24 1004    04/14/24 1430  insulin aspart U-100 pen 0-5 Units  (INSULIN - LOW CORRECTION DOSE (Recommended for BMI <25, GFR<15 or on dialysis, Age > 70, type 1 diabetes, ESLD, severe CHF or patients on <70 units of insulin daily))         -- SubQ Before meals & nightly PRN 04/14/24 1354             PHYSICAL EXAMINATION:  Vitals:    04/15/24 1245   BP:    Pulse:    Resp:    Temp: 98.2 °F (36.8 °C)     Body mass index is 31.52 kg/m².     Physical Exam  Constitutional: Well developed, well nourished, NAD.  ENT: External ears no masses with nose patent; normal hearing.  Neck: Supple; trachea midline.  Cardiovascular: Normal heart sounds, no LE edema. DP +2 bilaterally.  Lungs: Normal effort; lungs anterior bilaterally clear to auscultation.  Abdomen: Soft, no masses, no  hernias.  MS: No clubbing or cyanosis of nails noted; unable to assess gait.  Skin: No rashes, lesions, or ulcers; no nodules. Injection sites are ok. No lipo hypertropthy or atrophy.  Psychiatric: Good judgement and insight; normal mood and affect.  Neurological: Cranial nerves are grossly intact.  Foot: Nails in good condition, no amputations noted.

## 2024-04-15 NOTE — HOSPITAL COURSE
s/p DCD KTX d/t DM, also PD cath removal on 4/14/24 (CIT 17h, KDPI 83%). 2 day johnston, no drains. Hyperkalemic post-op, K 7.1, HD done POD#0. Kidney US POD 1#, minimally elevated intraparenchymal resistive indices likely due to edema in recent state, otherwise satisfactory. Pt with DGF d/t prolonged CIT and DCD kidney.      Interval History: No acute events overnight. Pt had a hypoglycemic episode overnight (BG= 85). Held insulin, given glucose. DGF noted. Johnston removed yesterday. Pt is voiding well (UOP= 695cc). K stable. Interventional neph placing tunnel cath tomorrow AM. NPO at midnight. Mild SS drainage from incision site on abdomen. Slight uptrend in Cr levels (9.4 to 9.9). Drop in H/H. Pt will receive a unit of RBC during dialysis today. Pt ambulating twice a day. Pain is adequately controlled. (+) flatus, (-) BM. Continue current bowel regimen. VSS. Will continue to monitor.

## 2024-04-15 NOTE — PLAN OF CARE
Recommendations    1. Continue Renal diet      2. If PO intake <50%, add ONS Novasource renal      3. RD to monitor and follow    Goals: Meet % EEN, EPN  Nutrition Goal Status: new  Communication of RD Recs:  (POC)

## 2024-04-15 NOTE — NURSING
Notified NP NIDIA Haas that pt's urine continues to be between 0-10cc/hr. US ordered for the am. Also updated NP on pt's most recent bp. Pt was not orthostatic this am. Pt's bp was higher standing, but could be related to pain upon moving. Noticied pt had more bloody drainage noted on island dressing of kidney incision. NP aware. H/H stable. US will be able to assess further. Will continue to monitor.

## 2024-04-15 NOTE — ASSESSMENT & PLAN NOTE
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

## 2024-04-15 NOTE — CONSULTS
[facility] - Transplant Stepdown  Endocrinology  Diabetes Consult Note    Consult Requested by: Trent Burt, *   Reason for admit: ESRD (end stage renal disease) on dialysis    HISTORY OF PRESENT ILLNESS:  Reason for Consult: Management of T2DM, Hyperglycemia     Surgical Procedure and Date: Kidney Transplant on 24    Diabetes diagnosis year:     Home Diabetes Medications:  Novolog 4-6 units TID with mels, Lantus 28-35 untis q HS, Tradjenta 5 mg daily     How often checking glucose at home? Dexcom G7    BG readings on regimen: mostly 140-150s  Hypoglycemia on the regimen?  No  Missed doses on regimen?  No    Diabetes Complications include:     Hyperglycemia, Diabetic nephropathy  , Diabetic chronic kidney disease     , Diabetic cataract , and Diabetic peripheral neuropathy     Complicating diabetes co morbidities:   ESRD and Glucocorticoid use       HPI:   Patient is a 63 y.o. 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  Now presents for the above procedure. Endocrinology consulted for management of T2DM.      Lab Results   Component Value Date    HGBA1C 8.6 (H) 2024         Interval HPI:   Overnight events: Remains in [facility]. POD 1 s/p kidney transplant. BG significantly above goal ranges on current SQ insulin regimen. Receiving Solu Medrol 250 mg, standard steroid taper. Creatinine 8.4. Diet diabetic Consistent Carbohydrate, Renal;  Calorie    Eatin%  Nausea: No  Hypoglycemia and intervention: No  Fever: No  TPN and/or TF: No  If yes, type of TF/TPN and rate: n/a    PMH, PSH, FH, SH reviewed     ROS:  Constitutional: Negative for weight changes.  Eyes: Negative for visual disturbance.  Respiratory: Negative for cough.   Cardiovascular: Negative for chest pain.  Gastrointestinal: Negative for nausea.  Endocrine: Negative for polyuria, polydipsia.  Musculoskeletal: Negative for back pain.  Skin: Negative for rash.  Neurological: Negative for  syncope.  Psychiatric/Behavioral: Negative for depression.    Review of Systems    Current Medications and/or Treatments Impacting Glycemic Control  Immunotherapy:    Immunosuppressants           Stop Route Frequency     tacrolimus capsule 4 mg         -- Oral 2 times daily     antithymocyte globulin (rabbit) 125 mg, hydrocortisone sodium succinate (SOLU-CORTEF) 20 mg in sodium chloride 0.9% 500 mL (FOR PERIPHERAL LINE ADMINISTRATION ONLY)         04/17/24 0859 IV Daily     mycophenolate capsule 1,000 mg         -- Oral 2 times daily          Steroids:   Hormones (From admission, onward)      Start     Stop Route Frequency Ordered    04/17/24 0900  predniSONE tablet 20 mg  (IP TXP KIDNEY POST-OP THYMO WITH PERIPHERAL PRECHECKED)         -- Oral Daily 04/14/24 1354    04/16/24 0800  methylPREDNISolone sodium succinate injection 125 mg  (IP TXP KIDNEY POST-OP THYMO WITH PERIPHERAL PRECHECKED)         -- IV Once 04/14/24 1354    04/15/24 0900  antithymocyte globulin (rabbit) 125 mg, hydrocortisone sodium succinate (SOLU-CORTEF) 20 mg in sodium chloride 0.9% 500 mL (FOR PERIPHERAL LINE ADMINISTRATION ONLY)  (IP TXP KIDNEY POST-OP THYMO WITH PERIPHERAL PRECHECKED)         04/17/24 0859 IV Daily 04/14/24 1354    04/14/24 1431  hydrocortisone sodium succinate injection 100 mg  (IP TXP KIDNEY POST-OP THYMO WITH PERIPHERAL PRECHECKED)         09/11/35 0531 IV Once as needed 04/14/24 1354    04/14/24 0300  methylPREDNISolone sodium succinate (SOLU-MEDROL) 500 mg in dextrose 5 % (D5W) 100 mL IVPB  (IP TXP INTRA-OP THYMO - PERIPHERAL THYMO PRECHECKED)         -- IV Once 04/14/24 0254          Pressors:    Autonomic Drugs (From admission, onward)      Start     Stop Route Frequency Ordered    04/14/24 1431  EPINEPHrine (PF) injection 1 mg  (IP TXP KIDNEY POST-OP THYMO WITH PERIPHERAL PRECHECKED)         09/11/35 0531 SubQ Once as needed 04/14/24 1354          Hyperglycemia/Diabetes Medications:   Antihyperglycemics (From  "admission, onward)      Start     Stop Route Frequency Ordered    04/15/24 2100  insulin detemir U-100 (Levemir) pen 30 Units         -- SubQ Nightly 04/15/24 1004    04/15/24 1130  insulin aspart U-100 pen 12 Units         -- SubQ 3 times daily with meals 04/15/24 1104    04/15/24 1104  insulin aspart U-100 pen 0-10 Units         -- SubQ Before meals & nightly PRN 04/15/24 1004    04/14/24 1430  insulin aspart U-100 pen 0-5 Units  (INSULIN - LOW CORRECTION DOSE (Recommended for BMI <25, GFR<15 or on dialysis, Age > 70, type 1 diabetes, ESLD, severe CHF or patients on <70 units of insulin daily))         -- SubQ Before meals & nightly PRN 04/14/24 1354             PHYSICAL EXAMINATION:  Vitals:    04/15/24 1245   BP:    Pulse:    Resp:    Temp: 98.2 °F (36.8 °C)     Body mass index is 31.52 kg/m².     Physical Exam  Constitutional: Well developed, well nourished, NAD.  ENT: External ears no masses with nose patent; normal hearing.  Neck: Supple; trachea midline.  Cardiovascular: Normal heart sounds, no LE edema. DP +2 bilaterally.  Lungs: Normal effort; lungs anterior bilaterally clear to auscultation.  Abdomen: Soft, no masses, no hernias.  MS: No clubbing or cyanosis of nails noted; unable to assess gait.  Skin: No rashes, lesions, or ulcers; no nodules. Injection sites are ok. No lipo hypertropthy or atrophy.  Psychiatric: Good judgement and insight; normal mood and affect.  Neurological: Cranial nerves are grossly intact.  Foot: Nails in good condition, no amputations noted.         Labs Reviewed and Include   Recent Labs   Lab 04/15/24  0511   *   CALCIUM 7.6*   ALBUMIN 2.2*   *   K 4.7   CO2 22*      BUN 46*   CREATININE 6.9*     Lab Results   Component Value Date    WBC 9.83 04/15/2024    HGB 9.1 (L) 04/15/2024    HCT 26.7 (L) 04/15/2024    MCV 90 04/15/2024     (L) 04/15/2024     No results for input(s): "TSH", "FREET4" in the last 168 hours.  Lab Results   Component Value Date    " "HGBA1C 8.6 (H) 04/14/2024       Nutritional status:   Body mass index is 31.52 kg/m².  Lab Results   Component Value Date    ALBUMIN 2.2 (L) 04/15/2024    ALBUMIN 2.6 (L) 04/14/2024    ALBUMIN 2.3 (L) 04/14/2024     No results found for: "PREALBUMIN"    Estimated Creatinine Clearance: 12 mL/min (A) (based on SCr of 6.9 mg/dL (H)).    Accu-Checks  Recent Labs     04/14/24  0358 04/14/24  0557 04/14/24  0658 04/14/24  1353 04/14/24  1755 04/14/24  2205 04/15/24  0839   POCTGLUCOSE 128* 226* 279* 147* 220* 247* 346*        ASSESSMENT and PLAN    Renal/  * ESRD (end stage renal disease) on dialysis  Lab Results   Component Value Date    CREATININE 6.9 (H) 04/15/2024     Avoid insulin stacking   Titrate insulin slowly       S/P kidney transplant  Managed per primary team  Avoid hypoglycemia        Immunology/Multi System  Prophylactic immunotherapy  May increase insulin resistance.         Endocrine  Class 1 obesity due to excess calories with serious comorbidity and body mass index (BMI) of 30.0 to 30.9 in adult  Body mass index is 31.52 kg/m².  May increase insulin resistance.         Type 2 diabetes mellitus with hypoglycemia without coma, with long-term current use of insulin  BG goal 140-180    Increase Levemir to 30 units q HS (20% dose increase)   Increase Novolog to 12 units TID with meals (0.8 u/kg dosing) Steroids contributing to significant BG excursions.  Moderate Dose Correction Scale  BG monitoring ac/hs    ** Please call Endocrine for any BG related issues **        Other  Adverse effect of corticosteroids  Glucocorticoids markedly increase glucoses. Expect steroid taper to help with glucose control          Plan discussed with patient, family, and RN at bedside.     Isatu Guardado NP  Endocrinology  [facility] - Trans[facility]  "

## 2024-04-15 NOTE — NURSING
Nurses Note -- 4 Eyes      4/15/2024   6:11 AM      Skin assessed during: Q Shift Change      [x] No Altered Skin Integrity Present    []Prevention Measures Documented      [] Yes- Altered Skin Integrity Present or Discovered   [] LDA Added if Not in Epic (Describe Wound)   [] New Altered Skin Integrity was Present on Admit and Documented in LDA   [] Wound Image Taken    Wound Care Consulted? No    Attending Nurse:  Tez Lemus RN    Second RN/Staff Member: Cyndi Gore RN

## 2024-04-15 NOTE — PROGRESS NOTES
TRANSPLANT NOTE:      ORGAN:   RIGHT KIDNEY    Disease Etiology: Diabetes Mellitus - Type II  Donor Type:   Donation after Circulatory Death     CDC High Risk:   No    Donor CMV Status:   positive   Donor HBcAB:   Negative    Donor HCV Status:   Negative    Peak cPRA % (within 1 year of transplant): 62      Murphy Ha is a 63 y.o. male s/p    Donation after Circulatory Death    kidney transplant on 4/14/2024 (Kidney) for Diabetes Mellitus - Type II.   This patient will receive 3 doses of Thymoglobulin for induction.  This patients maintenance immunosuppression will include a steroid taper per protocol to 5mg daily, Prograf, and Cellcept maintenance.  Opportunistic infection prophylaxis will include Valcyte for 3 months (CMV D + , R + ) and Bactrim for 6 months.  Patient is to begin self medications upon transfer to the [facility], and I plan to meet with this patient and his/her support person prior to discharge to review the medication section of the Kidney Transplant Education Manual.  I have reviewed the pre-op medications and have restarted those, as appropriate.

## 2024-04-15 NOTE — ASSESSMENT & PLAN NOTE
s/p DCD KTX d/t DM and PD cath removal on 4/14/24 (CIT 17h, KDPI 83%). 2 day johnston, no drains.     - Hyperkalemic post-op, HD POD#0.   - DGF d/t prolonged CIT, DCD status  - Daily renal panel  - Avoid nephrotoxic meds  - Strict I's/O's

## 2024-04-15 NOTE — PROGRESS NOTES
Increased sanguinous drainage noted on RLQ kidney incision dressing. NP Jaya at bedside to assess incision. Dressing changed. POC ongoing.

## 2024-04-15 NOTE — ASSESSMENT & PLAN NOTE
Lab Results   Component Value Date    CREATININE 6.9 (H) 04/15/2024     Avoid insulin stacking   Titrate insulin slowly

## 2024-04-15 NOTE — PROGRESS NOTES
[facility] - Transplant Stepdown  Kidney Transplant  Progress Note      Reason for Follow-up: Reassessment of Kidney Transplant - 4/14/2024  (#1) recipient and management of immunosuppression.    ORGAN:   RIGHT KIDNEY    Donor Type:   Donation after Circulatory Death     Donor CMV Status: Positive  Donor HBcAB:Negative  Donor HCV Status:Negative  Donor HBV JOSE M:   Donor HCV JOSE M: Negative      Subjective:   History of Present Illness:  Mr. Ha is a 63 y.o. Black or  male with ESRD secondary to diabetic nephropathy. He is presenting as a primary candidate for a kidney transplant with Dr. Burt 4/14/24 at 8am.  He has been on the wait list for a kidney transplant at [facility] since 2/24/2021. Patient is currently on peritoneal dialysis started on 2/24/2021. Patient reports that he is tolerating dialysis well. He has a PD catheter. Patient denies any recent hospitalizations or ED visits. Denies peritonitis or exit site infections. Pre op labs and imaging reveal K 5.4. PD cut short due to transplant offer. Patient shifted in preparation for surgery, anesthesiology aware.       Hospital Course:  s/p DCD KTX d/t DM, also PD cath removal on 4/14/24 (CIT 17h, KDPI 83%). 2 day johnston, no drains.     Interval note:   - Hyperkalemic post-op, K 7.1, HD done POD#0. K stable today.   - DGF noted, not yet clearing with oliguria. Kidney US today stable, reviewed with surgeon. Expect DGF from DCD status plus prolonged CIT.   - Mild serosang drainage noted from incision, H/H stable. Monitor  - Pt ambulating halls without issue. Pain adequately controlled. Passing flatus.   - Restart home nifedipine for better BP control      Past Medical, Surgical, Family, and Social History:   Unchanged from H&P.    Scheduled Meds:  Current Facility-Administered Medications   Medication Dose Route Frequency Provider Last Rate Last Admin    acetaminophen tablet 650 mg  650 mg Oral Q8H Rosemary Foss MD   650 mg at 04/15/24 6776     acetaminophen tablet 650 mg  650 mg Oral Q24H Rosemary Foss MD   650 mg at 04/15/24 1113    antithymocyte globulin (rabbit) 125 mg, hydrocortisone sodium succinate (SOLU-CORTEF) 20 mg in sodium chloride 0.9% 500 mL (FOR PERIPHERAL LINE ADMINISTRATION ONLY)  1.5 mg/kg (Adjusted) Intravenous Daily Rosemary Foss MD 83.3 mL/hr at 04/15/24 1149 125 mg at 04/15/24 1149    bisacodyL EC tablet 10 mg  10 mg Oral QHS Rosemary Foss MD   10 mg at 04/14/24 2018    carvediloL tablet 25 mg  25 mg Oral BID WM Rosemary Foss MD   25 mg at 04/15/24 0832    dextrose 10% bolus 125 mL 125 mL  12.5 g Intravenous PRN Isatu Guardado NP        dextrose 10% bolus 125 mL 125 mL  12.5 g Intravenous PRN Rosemary Foss MD        dextrose 10% bolus 250 mL 250 mL  25 g Intravenous PRN Isatu Guardado NP        dextrose 10% bolus 250 mL 250 mL  25 g Intravenous PRN Rosemary Foss MD        diphenhydrAMINE capsule 25 mg  25 mg Oral Q24H Rosemary Foss MD   25 mg at 04/15/24 1113    diphenhydrAMINE capsule 50 mg  50 mg Oral Once PRN Rosemary Foss MD        divalproex EC tablet 250 mg  250 mg Oral Q12H Juanito Zavala NP        docusate sodium capsule 100 mg  100 mg Oral TID Rosemary Foss MD   100 mg at 04/15/24 1411    EPINEPHrine (PF) injection 1 mg  1 mg Subcutaneous Once PRN Rosemary Foss MD        famotidine tablet 20 mg  20 mg Oral QHS Rosemary Foss MD   20 mg at 04/14/24 2018    glucagon (human recombinant) injection 1 mg  1 mg Intramuscular PRN Isatu Guardado NP        glucagon (human recombinant) injection 1 mg  1 mg Intramuscular Continuous PRN Rosemary Foss MD        glucose chewable tablet 16 g  16 g Oral PRN Isatu Guardado NP        glucose chewable tablet 16 g  16 g Oral PRN Rosemary Foss MD        glucose chewable tablet 16 g  16 g Oral PRN Rosemary Foss MD        glucose chewable tablet 24 g  24 g Oral PRN Isatu Guardado NP        glucose chewable tablet 24 g  24 g Oral PRN Rosemary Foss MD        heparin (porcine) injection 2,400 Units  2,400  Units Intravenous PRN Sher Hanna MD        heparin (porcine) injection 5,000 Units  5,000 Units Subcutaneous Q8H Rosemary Foss MD   5,000 Units at 04/15/24 1411    hydrocortisone sodium succinate injection 100 mg  100 mg Intravenous Once PRN Rosemary Foss MD        insulin aspart U-100 pen 0-5 Units  0-5 Units Subcutaneous QID (AC + HS) PRN Rosemary Foss MD   2 Units at 04/15/24 1312    insulin aspart U-100 pen 12 Units  12 Units Subcutaneous TIDWM Isatu Guardado NP   12 Units at 04/15/24 1309    insulin detemir U-100 (Levemir) pen 30 Units  30 Units Subcutaneous QHS Isatu Guardado NP        methylPREDNISolone sodium succinate (SOLU-MEDROL) 500 mg in dextrose 5 % (D5W) 100 mL IVPB  500 mg Intravenous Once Lorelei Cha PA-C        [START ON 4/16/2024] methylPREDNISolone sodium succinate injection 125 mg  125 mg Intravenous Once Rosemary Foss MD        multivitamin tablet  1 tablet Oral Daily Rosemary Foss MD   1 tablet at 04/15/24 0832    mupirocin 2 % ointment 1 g  1 g Nasal BID Rosemary Foss MD   1 g at 04/15/24 0906    mupirocin 2 % ointment   Nasal On Call Procedure Lorelei Cha PA-C        mycophenolate capsule 1,000 mg  1,000 mg Oral BID Rosemary Foss MD   1,000 mg at 04/15/24 0832    NIFEdipine 24 hr tablet 30 mg  30 mg Oral BID Juanito Zavala NP   30 mg at 04/15/24 1040    ondansetron injection 4 mg  4 mg Intravenous Q6H PRN Rosemary Foss MD   4 mg at 04/15/24 1126    oxyCODONE immediate release tablet 5 mg  5 mg Oral Q4H PRN Lorelei Cha PA-C   5 mg at 04/15/24 1413    [START ON 4/17/2024] predniSONE tablet 20 mg  20 mg Oral Daily Rosemary Foss MD        pregabalin capsule 75 mg  75 mg Oral On Call Procedure Lorelei Cha PA-C        sodium bicarbonate tablet 1,300 mg  1,300 mg Oral BID Lorelei Cha PA-C   1,300 mg at 04/15/24 0832    sodium chloride 0.9% flush 10 mL  10 mL Intravenous PRN Lorelei Cha PA-C        sodium  chloride 0.9% flush 10 mL  10 mL Intravenous PRN Rosemary Foss MD        [START ON 4/24/2024] sulfamethoxazole-trimethoprim 400-80mg per tablet 1 tablet  1 tablet Oral Daily AM Rosemary Foss MD        tacrolimus capsule 4 mg  4 mg Oral BID Angela Powell MD        tamsulosin 24 hr capsule 0.4 mg  1 capsule Oral Daily Lorelei Cha PA-C   0.4 mg at 04/15/24 0835    traMADoL tablet 50 mg  50 mg Oral Q4H PRN Lorelei Cha PA-C   50 mg at 04/15/24 0849    [START ON 4/24/2024] valGANciclovir tablet 450 mg  450 mg Oral Daily AM Rosemary Foss MD         Continuous Infusions:  Current Facility-Administered Medications   Medication Dose Route Frequency Provider Last Rate Last Admin    acetaminophen tablet 650 mg  650 mg Oral Q8H Rosemary Foss MD   650 mg at 04/15/24 1409    acetaminophen tablet 650 mg  650 mg Oral Q24H Rosemary Foss MD   650 mg at 04/15/24 1113    antithymocyte globulin (rabbit) 125 mg, hydrocortisone sodium succinate (SOLU-CORTEF) 20 mg in sodium chloride 0.9% 500 mL (FOR PERIPHERAL LINE ADMINISTRATION ONLY)  1.5 mg/kg (Adjusted) Intravenous Daily Rosemary Foss MD 83.3 mL/hr at 04/15/24 1149 125 mg at 04/15/24 1149    bisacodyL EC tablet 10 mg  10 mg Oral QHS Rosemary Foss MD   10 mg at 04/14/24 2018    carvediloL tablet 25 mg  25 mg Oral BID WM Rosemary Foss MD   25 mg at 04/15/24 0832    dextrose 10% bolus 125 mL 125 mL  12.5 g Intravenous PRN Isatu Guardado, NP        dextrose 10% bolus 125 mL 125 mL  12.5 g Intravenous PRN Rosemary Foss MD        dextrose 10% bolus 250 mL 250 mL  25 g Intravenous PRN Isatu Guardado, NP        dextrose 10% bolus 250 mL 250 mL  25 g Intravenous PRN Rosemary Foss MD        diphenhydrAMINE capsule 25 mg  25 mg Oral Q24H Rosemary Foss MD   25 mg at 04/15/24 1113    diphenhydrAMINE capsule 50 mg  50 mg Oral Once PRN Rosemary Foss MD        divalproex EC tablet 250 mg  250 mg Oral Q12H Juanito Zavala, NP        docusate sodium capsule 100 mg  100 mg Oral  TID Rosemary Foss MD   100 mg at 04/15/24 1411    EPINEPHrine (PF) injection 1 mg  1 mg Subcutaneous Once PRN Rosemary oFss MD        famotidine tablet 20 mg  20 mg Oral QHS Rosemary Foss MD   20 mg at 04/14/24 2018    glucagon (human recombinant) injection 1 mg  1 mg Intramuscular PRN Isatu Guardado NP        glucagon (human recombinant) injection 1 mg  1 mg Intramuscular Continuous PRN Rosemary Foss MD        glucose chewable tablet 16 g  16 g Oral PRN Isatu Guardado, NP        glucose chewable tablet 16 g  16 g Oral PRN Rosemary Foss MD        glucose chewable tablet 16 g  16 g Oral PRN Rosemary Foss MD        glucose chewable tablet 24 g  24 g Oral PRN Isatu Guardado, NP        glucose chewable tablet 24 g  24 g Oral PRN Rosemary Foss MD        heparin (porcine) injection 2,400 Units  2,400 Units Intravenous PRN Sher Hanna MD        heparin (porcine) injection 5,000 Units  5,000 Units Subcutaneous Q8H Rosemary Foss MD   5,000 Units at 04/15/24 1411    hydrocortisone sodium succinate injection 100 mg  100 mg Intravenous Once PRN Rosemary Foss MD        insulin aspart U-100 pen 0-5 Units  0-5 Units Subcutaneous QID (AC + HS) PRN Rosemary Foss MD   2 Units at 04/15/24 1312    insulin aspart U-100 pen 12 Units  12 Units Subcutaneous TIDWM Isatu Guardado NP   12 Units at 04/15/24 1309    insulin detemir U-100 (Levemir) pen 30 Units  30 Units Subcutaneous QHS Isatu Guardado NP        methylPREDNISolone sodium succinate (SOLU-MEDROL) 500 mg in dextrose 5 % (D5W) 100 mL IVPB  500 mg Intravenous Once Lorelei Cha PA-C        [START ON 4/16/2024] methylPREDNISolone sodium succinate injection 125 mg  125 mg Intravenous Once Rosemary Foss MD        multivitamin tablet  1 tablet Oral Daily Rosemary Foss MD   1 tablet at 04/15/24 0832    mupirocin 2 % ointment 1 g  1 g Nasal BID Rosemary Foss MD   1 g at 04/15/24 0906    mupirocin 2 % ointment   Nasal On Call Procedure Lorelei Cha PA-C         mycophenolate capsule 1,000 mg  1,000 mg Oral BID Rosemary Foss MD   1,000 mg at 04/15/24 0832    NIFEdipine 24 hr tablet 30 mg  30 mg Oral BID Juanito Zavala NP   30 mg at 04/15/24 1040    ondansetron injection 4 mg  4 mg Intravenous Q6H PRN Rosemary Foss MD   4 mg at 04/15/24 1126    oxyCODONE immediate release tablet 5 mg  5 mg Oral Q4H PRN Lorelei Cha PA-C   5 mg at 04/15/24 1413    [START ON 4/17/2024] predniSONE tablet 20 mg  20 mg Oral Daily Rosemary Foss MD        pregabalin capsule 75 mg  75 mg Oral On Call Procedure Lorelei Cha PA-C        sodium bicarbonate tablet 1,300 mg  1,300 mg Oral BID Lorelei Cha PA-C   1,300 mg at 04/15/24 0832    sodium chloride 0.9% flush 10 mL  10 mL Intravenous PRN Lorelei Cha PA-C        sodium chloride 0.9% flush 10 mL  10 mL Intravenous PRN Rosemary Foss MD        [START ON 4/24/2024] sulfamethoxazole-trimethoprim 400-80mg per tablet 1 tablet  1 tablet Oral Daily AM Rosemary Foss MD        tacrolimus capsule 4 mg  4 mg Oral BID Anegla Powell MD        tamsulosin 24 hr capsule 0.4 mg  1 capsule Oral Daily Lorelei Cha PA-C   0.4 mg at 04/15/24 0835    traMADoL tablet 50 mg  50 mg Oral Q4H PRN Lorelei Cha PA-C   50 mg at 04/15/24 0849    [START ON 4/24/2024] valGANciclovir tablet 450 mg  450 mg Oral Daily AM Rosemary Foss MD         PRN Meds:  Current Facility-Administered Medications   Medication Dose Route Frequency Provider Last Rate Last Admin    acetaminophen tablet 650 mg  650 mg Oral Q8H Rosemary Foss MD   650 mg at 04/15/24 1409    acetaminophen tablet 650 mg  650 mg Oral Q24H Rosemary Foss MD   650 mg at 04/15/24 1113    antithymocyte globulin (rabbit) 125 mg, hydrocortisone sodium succinate (SOLU-CORTEF) 20 mg in sodium chloride 0.9% 500 mL (FOR PERIPHERAL LINE ADMINISTRATION ONLY)  1.5 mg/kg (Adjusted) Intravenous Daily Rosemary Foss MD 83.3 mL/hr at 04/15/24 1149 125 mg at  04/15/24 1149    bisacodyL EC tablet 10 mg  10 mg Oral QHS Rosemary Foss MD   10 mg at 04/14/24 2018    carvediloL tablet 25 mg  25 mg Oral BID WM Rosemary Foss MD   25 mg at 04/15/24 0832    dextrose 10% bolus 125 mL 125 mL  12.5 g Intravenous PRN Isatu Guardado, NP        dextrose 10% bolus 125 mL 125 mL  12.5 g Intravenous PRN Rosemary Foss MD        dextrose 10% bolus 250 mL 250 mL  25 g Intravenous PRN Isatu Guardado, NP        dextrose 10% bolus 250 mL 250 mL  25 g Intravenous PRN Rosemary Foss MD        diphenhydrAMINE capsule 25 mg  25 mg Oral Q24H Rosemary Foss MD   25 mg at 04/15/24 1113    diphenhydrAMINE capsule 50 mg  50 mg Oral Once PRN Rosemary Foss MD        divalproex EC tablet 250 mg  250 mg Oral Q12H Juanito Zavala NP        docusate sodium capsule 100 mg  100 mg Oral TID Rosemary Foss MD   100 mg at 04/15/24 1411    EPINEPHrine (PF) injection 1 mg  1 mg Subcutaneous Once PRN Rosemary Foss MD        famotidine tablet 20 mg  20 mg Oral QHS Rosemary Foss MD   20 mg at 04/14/24 2018    glucagon (human recombinant) injection 1 mg  1 mg Intramuscular PRN Isatu Guardado NP        glucagon (human recombinant) injection 1 mg  1 mg Intramuscular Continuous PRN Rosemary Foss MD        glucose chewable tablet 16 g  16 g Oral PRN Isatu Guardado, NP        glucose chewable tablet 16 g  16 g Oral PRN Rosemary Foss MD        glucose chewable tablet 16 g  16 g Oral PRN Rosemary Foss MD        glucose chewable tablet 24 g  24 g Oral PRN Isatu Guardado, NP        glucose chewable tablet 24 g  24 g Oral PRN Rosemary Foss MD        heparin (porcine) injection 2,400 Units  2,400 Units Intravenous PRN Sher Hanna MD        heparin (porcine) injection 5,000 Units  5,000 Units Subcutaneous Q8H Rosemary Foss MD   5,000 Units at 04/15/24 1411    hydrocortisone sodium succinate injection 100 mg  100 mg Intravenous Once PRN Rosemary Foss MD        insulin aspart U-100 pen 0-5 Units  0-5 Units Subcutaneous QID (AC + HS) PRN Foss,  MD Rosemary   2 Units at 04/15/24 1312    insulin aspart U-100 pen 12 Units  12 Units Subcutaneous TIDWM Isatu Guardado NP   12 Units at 04/15/24 1309    insulin detemir U-100 (Levemir) pen 30 Units  30 Units Subcutaneous QHS Isatu Guardado NP        methylPREDNISolone sodium succinate (SOLU-MEDROL) 500 mg in dextrose 5 % (D5W) 100 mL IVPB  500 mg Intravenous Once Lorelei Cha PA-C        [START ON 4/16/2024] methylPREDNISolone sodium succinate injection 125 mg  125 mg Intravenous Once Rosemary Foss MD        multivitamin tablet  1 tablet Oral Daily Rosemary Foss MD   1 tablet at 04/15/24 0832    mupirocin 2 % ointment 1 g  1 g Nasal BID Rosemary Foss MD   1 g at 04/15/24 0906    mupirocin 2 % ointment   Nasal On Call Procedure Lorelei Cha PA-C        mycophenolate capsule 1,000 mg  1,000 mg Oral BID Rosemary Foss MD   1,000 mg at 04/15/24 0832    NIFEdipine 24 hr tablet 30 mg  30 mg Oral BID Juanito Zavala NP   30 mg at 04/15/24 1040    ondansetron injection 4 mg  4 mg Intravenous Q6H PRN Rosemary Foss MD   4 mg at 04/15/24 1126    oxyCODONE immediate release tablet 5 mg  5 mg Oral Q4H PRN Lorelei Cha PA-C   5 mg at 04/15/24 1413    [START ON 4/17/2024] predniSONE tablet 20 mg  20 mg Oral Daily Rosemary Foss MD        pregabalin capsule 75 mg  75 mg Oral On Call Procedure Lorelei Cha PA-C        sodium bicarbonate tablet 1,300 mg  1,300 mg Oral BID Lorelei Cha PA-C   1,300 mg at 04/15/24 0832    sodium chloride 0.9% flush 10 mL  10 mL Intravenous PRN Lorelei Cha PA-C        sodium chloride 0.9% flush 10 mL  10 mL Intravenous PRN Rosemary Foss MD        [START ON 4/24/2024] sulfamethoxazole-trimethoprim 400-80mg per tablet 1 tablet  1 tablet Oral Daily AM Rosemary Foss MD        tacrolimus capsule 4 mg  4 mg Oral BID Angela Powell MD        tamsulosin 24 hr capsule 0.4 mg  1 capsule Oral Daily Lorelei Cha PA-C    "0.4 mg at 04/15/24 0835    traMADoL tablet 50 mg  50 mg Oral Q4H NAGAN Lorelei Cha PA-C   50 mg at 04/15/24 0849    [START ON 4/24/2024] valGANciclovir tablet 450 mg  450 mg Oral Daily AM Rosemary Foss MD           Intake/Output - Last 3 Shifts         04/13 0700 04/14 0659 04/14 0700  04/15 0659 04/15 0700 04/16 0659    P.O.  540     I.V. (mL/kg)  2157.7 (23.6)     Other  500     IV Piggyback  2055.7     Total Intake(mL/kg)  5253.4 (57.5)     Urine (mL/kg/hr) 220 1155 (0.5) 160 (0.2)    Emesis/NG output  0     Other  1000     Stool  0     Blood  50     Total Output 220 2205 160    Net -220 +3048.4 -160           Urine Occurrence  0 x     Stool Occurrence  0 x     Emesis Occurrence  0 x              Review of Systems   Constitutional:  Negative for appetite change, fatigue and fever.   Eyes:  Positive for visual disturbance (chronic).   Respiratory:  Negative for cough and shortness of breath.    Cardiovascular:  Negative for chest pain and leg swelling.   Gastrointestinal:  Positive for abdominal distention and abdominal pain (incisional). Negative for diarrhea, nausea and vomiting.   Genitourinary:  Positive for decreased urine volume. Negative for difficulty urinating, dysuria and frequency.   Skin:  Positive for wound.   Allergic/Immunologic: Positive for immunocompromised state.   Neurological:  Negative for dizziness and weakness.   Psychiatric/Behavioral:  Negative for confusion.       Objective:     Vital Signs (Most Recent):  Temp: 98.2 °F (36.8 °C) (04/15/24 1500)  Pulse: 80 (04/15/24 1500)  Resp: 16 (04/15/24 1500)  BP: (!) 145/75 (04/15/24 1500)  SpO2: 96 % (04/15/24 1500) Vital Signs (24h Range):  Temp:  [98.1 °F (36.7 °C)-99.3 °F (37.4 °C)] 98.2 °F (36.8 °C)  Pulse:  [] 80  Resp:  [10-19] 16  SpO2:  [95 %-100 %] 96 %  BP: (101-196)/() 145/75     Weight: 91.3 kg (201 lb 4.5 oz)  Height: 5' 7" (170.2 cm)  Body mass index is 31.52 kg/m².     Physical Exam  Vitals and nursing note " reviewed.   Constitutional:       General: He is not in acute distress.     Appearance: He is not ill-appearing.   HENT:      Head: Normocephalic.      Mouth/Throat:      Mouth: Mucous membranes are moist.   Eyes:      Extraocular Movements: Extraocular movements intact.      Conjunctiva/sclera: Conjunctivae normal.   Cardiovascular:      Rate and Rhythm: Normal rate and regular rhythm.      Pulses: Normal pulses.      Heart sounds: Normal heart sounds.   Pulmonary:      Effort: Pulmonary effort is normal. No respiratory distress.      Breath sounds: No wheezing or rales.   Abdominal:      General: Bowel sounds are normal. There is no distension.      Palpations: Abdomen is soft.      Tenderness: There is abdominal tenderness (appropriate post-op). There is no guarding or rebound.      Comments: RLQ kidney txp incision, KENNA with staples. Scant serosang drainage noted    Musculoskeletal:         General: No swelling. Normal range of motion.      Cervical back: Normal range of motion.      Right lower le+ Pitting Edema present.      Left lower le+ Pitting Edema present.   Skin:     General: Skin is warm and dry.   Neurological:      Mental Status: He is alert and oriented to person, place, and time.      Motor: No weakness.   Psychiatric:         Mood and Affect: Mood normal.         Behavior: Behavior normal.         Thought Content: Thought content normal.         Judgment: Judgment normal.          Laboratory:  CBC:   Recent Labs   Lab 04/14/24  2009 04/15/24  0511 04/15/24  1421   WBC 15.01* 9.83 7.84   RBC 3.22* 2.97* 2.83*   HGB 9.9* 9.1* 8.7*   HCT 29.2* 26.7* 25.6*    141* 121*   MCV 91 90 91   MCH 30.7 30.6 30.7   MCHC 33.9 34.1 34.0     CMP:   Recent Labs   Lab 24  0348 24  1355 04/14/24  2009 04/15/24  0230 04/15/24  0511 04/15/24  1421   *   < > 253*  --  270* 237*   CALCIUM 8.3*   < > 8.0*  --  7.6* 7.6*   ALBUMIN 2.8*   < > 2.6*  --  2.2* 2.3*   PROT 6.4  --   --   --    --   --       < > 133*  --  133* 131*   K 5.4*   < > 7.1* 4.6 4.7 5.0   CO2 19*   < > 17*  --  22* 23      < > 106  --  101 100   BUN 67*   < > 74*  --  46* 60*   CREATININE 9.2*   < > 9.4*  --  6.9* 8.2*   ALKPHOS 78  --   --   --   --   --    ALT 22  --   --   --   --   --    AST 12  --   --   --   --   --     < > = values in this interval not displayed.       Diagnostic Results:  US - Kidney: Results for orders placed during the hospital encounter of 04/14/24    US Transplant Kidney With Doppler    Narrative  EXAMINATION:  US TRANSPLANT KIDNEY WITH DOPPLER    CLINICAL HISTORY:  POD#1 kidney transplant;    TECHNIQUE:  Real time gray scale and doppler ultrasound was performed over the patient's renal allograft.    COMPARISON:  None    FINDINGS:  Renal allograft in the right lower quadrant.  The allograft measures 11.3 cm. Normal perfusion. No hydronephrosis.  Stent is present.    Fluid collection adjacent to the superior pole measuring 3.9 x 4.7 x 2.3    Vasculature:    Resistive indices ranged from 0.77 to 0.83.    Main renal artery peak systolic velocity: 182cm/sec with normal waveform.    Renal artery/iliac ratio: 1.3.    The main renal vein is patent.    Impression  Minimally elevated intraparenchymal resistive indices likely due to edema in recent state.  Otherwise satisfactory gray scale and doppler evaluation of renal allograft.      Electronically signed by: Ti Ha MD  Date:    04/15/2024  Time:    08:11  Assessment/Plan:     * Kidney replaced by transplant  s/p DCD KTX d/t DM and PD cath removal on 4/14/24 (CIT 17h, KDPI 83%). 2 day johnston, no drains.     - Hyperkalemic post-op, HD POD#0.   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 Monitor for toxic side effects       Hypertension  - continue home coreg and nifedipine      Type 2 diabetes mellitus with hypoglycemia without coma, with long-term current use of insulin  - endocrine consulted, appreciate assistance           Discharge Planning:  Not suitable for discharge at this time    Medical decision making for this encounter includes review of pertinent labs and diagnostic studies, assessment and planning, discussions with consulting providers, discussion with patient/family, and participation in multidisciplinary rounds. Time spent caring for patient: 60 minutes    Juanito Zavala, NAI  Kidney Transplant  [facility] - Transplant Stepdown

## 2024-04-16 PROBLEM — E87.20 METABOLIC ACIDOSIS: Status: ACTIVE | Noted: 2024-04-16

## 2024-04-16 PROBLEM — D62 ACUTE BLOOD LOSS ANEMIA: Status: ACTIVE | Noted: 2024-04-16

## 2024-04-16 LAB
ALBUMIN SERPL BCP-MCNC: 2.3 G/DL (ref 3.5–5.2)
ANION GAP SERPL CALC-SCNC: 11 MMOL/L (ref 8–16)
BACTERIA UR CULT: NO GROWTH
BASOPHILS # BLD AUTO: 0 K/UL (ref 0–0.2)
BASOPHILS NFR BLD: 0 % (ref 0–1.9)
BUN SERPL-MCNC: 68 MG/DL (ref 8–23)
CALCIUM SERPL-MCNC: 7.7 MG/DL (ref 8.7–10.5)
CHLORIDE SERPL-SCNC: 101 MMOL/L (ref 95–110)
CO2 SERPL-SCNC: 21 MMOL/L (ref 23–29)
CREAT SERPL-MCNC: 9.4 MG/DL (ref 0.5–1.4)
DIFFERENTIAL METHOD BLD: ABNORMAL
EOSINOPHIL # BLD AUTO: 0 K/UL (ref 0–0.5)
EOSINOPHIL NFR BLD: 0 % (ref 0–8)
ERYTHROCYTE [DISTWIDTH] IN BLOOD BY AUTOMATED COUNT: 13 % (ref 11.5–14.5)
EST. GFR  (NO RACE VARIABLE): 5.8 ML/MIN/1.73 M^2
GLUCOSE SERPL-MCNC: 148 MG/DL (ref 70–110)
HCT VFR BLD AUTO: 24.2 % (ref 40–54)
HGB BLD-MCNC: 8.4 G/DL (ref 14–18)
IMM GRANULOCYTES # BLD AUTO: 0.03 K/UL (ref 0–0.04)
IMM GRANULOCYTES NFR BLD AUTO: 0.4 % (ref 0–0.5)
LYMPHOCYTES # BLD AUTO: 0.4 K/UL (ref 1–4.8)
LYMPHOCYTES NFR BLD: 6.5 % (ref 18–48)
MAGNESIUM SERPL-MCNC: 2.1 MG/DL (ref 1.6–2.6)
MCH RBC QN AUTO: 31.5 PG (ref 27–31)
MCHC RBC AUTO-ENTMCNC: 34.7 G/DL (ref 32–36)
MCV RBC AUTO: 91 FL (ref 82–98)
MONOCYTES # BLD AUTO: 0.8 K/UL (ref 0.3–1)
MONOCYTES NFR BLD: 12.4 % (ref 4–15)
NEUTROPHILS # BLD AUTO: 5.4 K/UL (ref 1.8–7.7)
NEUTROPHILS NFR BLD: 80.7 % (ref 38–73)
NRBC BLD-RTO: 0 /100 WBC
PHOSPHATE SERPL-MCNC: 6.1 MG/DL (ref 2.7–4.5)
PLATELET # BLD AUTO: 90 K/UL (ref 150–450)
PMV BLD AUTO: 11.9 FL (ref 9.2–12.9)
POCT GLUCOSE: 152 MG/DL (ref 70–110)
POCT GLUCOSE: 176 MG/DL (ref 70–110)
POCT GLUCOSE: 177 MG/DL (ref 70–110)
POCT GLUCOSE: 85 MG/DL (ref 70–110)
POCT GLUCOSE: 85 MG/DL (ref 70–110)
POCT GLUCOSE: 86 MG/DL (ref 70–110)
POTASSIUM SERPL-SCNC: 4.8 MMOL/L (ref 3.5–5.1)
POTASSIUM SERPL-SCNC: 5.1 MMOL/L (ref 3.5–5.1)
RBC # BLD AUTO: 2.67 M/UL (ref 4.6–6.2)
SODIUM SERPL-SCNC: 133 MMOL/L (ref 136–145)
TACROLIMUS BLD-MCNC: 3.6 NG/ML (ref 5–15)
WBC # BLD AUTO: 6.72 K/UL (ref 3.9–12.7)

## 2024-04-16 PROCEDURE — 25000003 PHARM REV CODE 250: Performed by: PHYSICIAN ASSISTANT

## 2024-04-16 PROCEDURE — 25000003 PHARM REV CODE 250: Performed by: STUDENT IN AN ORGANIZED HEALTH CARE EDUCATION/TRAINING PROGRAM

## 2024-04-16 PROCEDURE — 63600175 PHARM REV CODE 636 W HCPCS: Performed by: PHYSICIAN ASSISTANT

## 2024-04-16 PROCEDURE — 63600175 PHARM REV CODE 636 W HCPCS: Performed by: INTERNAL MEDICINE

## 2024-04-16 PROCEDURE — 63600175 PHARM REV CODE 636 W HCPCS: Performed by: STUDENT IN AN ORGANIZED HEALTH CARE EDUCATION/TRAINING PROGRAM

## 2024-04-16 PROCEDURE — 27000207 HC ISOLATION

## 2024-04-16 PROCEDURE — 83735 ASSAY OF MAGNESIUM: CPT | Performed by: STUDENT IN AN ORGANIZED HEALTH CARE EDUCATION/TRAINING PROGRAM

## 2024-04-16 PROCEDURE — 80197 ASSAY OF TACROLIMUS: CPT | Performed by: STUDENT IN AN ORGANIZED HEALTH CARE EDUCATION/TRAINING PROGRAM

## 2024-04-16 PROCEDURE — 99232 SBSQ HOSP IP/OBS MODERATE 35: CPT | Mod: ,,, | Performed by: NURSE PRACTITIONER

## 2024-04-16 PROCEDURE — 25000003 PHARM REV CODE 250: Performed by: CLINICAL NURSE SPECIALIST

## 2024-04-16 PROCEDURE — 25000003 PHARM REV CODE 250: Performed by: NURSE PRACTITIONER

## 2024-04-16 PROCEDURE — 99233 SBSQ HOSP IP/OBS HIGH 50: CPT | Mod: 24,,, | Performed by: PHYSICIAN ASSISTANT

## 2024-04-16 PROCEDURE — 25000003 PHARM REV CODE 250

## 2024-04-16 PROCEDURE — 80069 RENAL FUNCTION PANEL: CPT | Performed by: STUDENT IN AN ORGANIZED HEALTH CARE EDUCATION/TRAINING PROGRAM

## 2024-04-16 PROCEDURE — 85025 COMPLETE CBC W/AUTO DIFF WBC: CPT | Performed by: STUDENT IN AN ORGANIZED HEALTH CARE EDUCATION/TRAINING PROGRAM

## 2024-04-16 PROCEDURE — 20600001 HC STEP DOWN PRIVATE ROOM

## 2024-04-16 RX ORDER — TACROLIMUS 1 MG/1
5 CAPSULE ORAL 2 TIMES DAILY
Status: DISCONTINUED | OUTPATIENT
Start: 2024-04-16 | End: 2024-04-18

## 2024-04-16 RX ORDER — SODIUM CHLORIDE 9 MG/ML
INJECTION, SOLUTION INTRAVENOUS CONTINUOUS
Status: CANCELLED | OUTPATIENT
Start: 2024-04-16 | End: 2024-04-16

## 2024-04-16 RX ORDER — SEVELAMER CARBONATE 800 MG/1
800 TABLET, FILM COATED ORAL
Status: DISCONTINUED | OUTPATIENT
Start: 2024-04-16 | End: 2024-04-18 | Stop reason: HOSPADM

## 2024-04-16 RX ORDER — SODIUM CHLORIDE 0.9 % (FLUSH) 0.9 %
10 SYRINGE (ML) INJECTION
Status: DISCONTINUED | OUTPATIENT
Start: 2024-04-16 | End: 2024-04-18 | Stop reason: HOSPADM

## 2024-04-16 RX ORDER — TACROLIMUS 1 MG/1
1 CAPSULE ORAL ONCE
Status: COMPLETED | OUTPATIENT
Start: 2024-04-16 | End: 2024-04-16

## 2024-04-16 RX ORDER — BISACODYL 10 MG/1
10 SUPPOSITORY RECTAL DAILY PRN
Status: DISCONTINUED | OUTPATIENT
Start: 2024-04-16 | End: 2024-04-18 | Stop reason: HOSPADM

## 2024-04-16 RX ORDER — FLUCONAZOLE 200 MG/1
200 TABLET ORAL DAILY
Status: DISCONTINUED | OUTPATIENT
Start: 2024-04-16 | End: 2024-04-17

## 2024-04-16 RX ADMIN — ACETAMINOPHEN 650 MG: 325 TABLET ORAL at 11:04

## 2024-04-16 RX ADMIN — INSULIN ASPART 12 UNITS: 100 INJECTION, SOLUTION INTRAVENOUS; SUBCUTANEOUS at 05:04

## 2024-04-16 RX ADMIN — MUPIROCIN 1 G: 20 OINTMENT TOPICAL at 08:04

## 2024-04-16 RX ADMIN — MUPIROCIN 1 G: 20 OINTMENT TOPICAL at 10:04

## 2024-04-16 RX ADMIN — OXYCODONE 5 MG: 5 TABLET ORAL at 10:04

## 2024-04-16 RX ADMIN — NIFEDIPINE 30 MG: 30 TABLET, FILM COATED, EXTENDED RELEASE ORAL at 08:04

## 2024-04-16 RX ADMIN — MYCOPHENOLATE MOFETIL 1000 MG: 250 CAPSULE ORAL at 09:04

## 2024-04-16 RX ADMIN — THERA TABS 1 TABLET: TAB at 08:04

## 2024-04-16 RX ADMIN — DOCUSATE SODIUM 100 MG: 100 CAPSULE, LIQUID FILLED ORAL at 09:04

## 2024-04-16 RX ADMIN — INSULIN ASPART 12 UNITS: 100 INJECTION, SOLUTION INTRAVENOUS; SUBCUTANEOUS at 01:04

## 2024-04-16 RX ADMIN — SODIUM BICARBONATE 1300 MG: 650 TABLET ORAL at 08:04

## 2024-04-16 RX ADMIN — ACETAMINOPHEN 650 MG: 325 TABLET ORAL at 05:04

## 2024-04-16 RX ADMIN — Medication 16 G: at 09:04

## 2024-04-16 RX ADMIN — DOCUSATE SODIUM 100 MG: 100 CAPSULE, LIQUID FILLED ORAL at 08:04

## 2024-04-16 RX ADMIN — TACROLIMUS 1 MG: 1 CAPSULE ORAL at 11:04

## 2024-04-16 RX ADMIN — OXYCODONE 5 MG: 5 TABLET ORAL at 07:04

## 2024-04-16 RX ADMIN — NIFEDIPINE 30 MG: 30 TABLET, FILM COATED, EXTENDED RELEASE ORAL at 09:04

## 2024-04-16 RX ADMIN — TACROLIMUS 4 MG: 1 CAPSULE ORAL at 08:04

## 2024-04-16 RX ADMIN — DIPHENHYDRAMINE HYDROCHLORIDE 25 MG: 25 CAPSULE ORAL at 11:04

## 2024-04-16 RX ADMIN — CEFTRIAXONE 1 G: 1 INJECTION, POWDER, FOR SOLUTION INTRAMUSCULAR; INTRAVENOUS at 01:04

## 2024-04-16 RX ADMIN — FLUCONAZOLE 200 MG: 200 TABLET ORAL at 01:04

## 2024-04-16 RX ADMIN — HEPARIN SODIUM 5000 UNITS: 5000 INJECTION INTRAVENOUS; SUBCUTANEOUS at 09:04

## 2024-04-16 RX ADMIN — METHYLPREDNISOLONE SODIUM SUCCINATE 125 MG: 125 INJECTION, POWDER, FOR SOLUTION INTRAMUSCULAR; INTRAVENOUS at 10:04

## 2024-04-16 RX ADMIN — INSULIN ASPART 12 UNITS: 100 INJECTION, SOLUTION INTRAVENOUS; SUBCUTANEOUS at 09:04

## 2024-04-16 RX ADMIN — DORZOLAMIDE HCL 1 DROP: 20 SOLUTION/ DROPS OPHTHALMIC at 08:04

## 2024-04-16 RX ADMIN — CARVEDILOL 25 MG: 25 TABLET, FILM COATED ORAL at 05:04

## 2024-04-16 RX ADMIN — DIVALPROEX SODIUM 250 MG: 250 TABLET, DELAYED RELEASE ORAL at 09:04

## 2024-04-16 RX ADMIN — HEPARIN SODIUM 5000 UNITS: 5000 INJECTION INTRAVENOUS; SUBCUTANEOUS at 01:04

## 2024-04-16 RX ADMIN — CARVEDILOL 25 MG: 25 TABLET, FILM COATED ORAL at 08:04

## 2024-04-16 RX ADMIN — BISACODYL 10 MG: 5 TABLET, COATED ORAL at 09:04

## 2024-04-16 RX ADMIN — DORZOLAMIDE HCL 1 DROP: 20 SOLUTION/ DROPS OPHTHALMIC at 10:04

## 2024-04-16 RX ADMIN — TAMSULOSIN HYDROCHLORIDE 0.4 MG: 0.4 CAPSULE ORAL at 08:04

## 2024-04-16 RX ADMIN — TACROLIMUS 5 MG: 5 CAPSULE ORAL at 06:04

## 2024-04-16 RX ADMIN — FAMOTIDINE 20 MG: 20 TABLET ORAL at 09:04

## 2024-04-16 RX ADMIN — DOCUSATE SODIUM 100 MG: 100 CAPSULE, LIQUID FILLED ORAL at 03:04

## 2024-04-16 RX ADMIN — HEPARIN SODIUM 5000 UNITS: 5000 INJECTION INTRAVENOUS; SUBCUTANEOUS at 05:04

## 2024-04-16 RX ADMIN — SODIUM BICARBONATE 1300 MG: 650 TABLET ORAL at 09:04

## 2024-04-16 RX ADMIN — OXYCODONE 5 MG: 5 TABLET ORAL at 08:04

## 2024-04-16 RX ADMIN — SEVELAMER CARBONATE 800 MG: 800 TABLET, FILM COATED ORAL at 05:04

## 2024-04-16 RX ADMIN — DIVALPROEX SODIUM 250 MG: 250 TABLET, DELAYED RELEASE ORAL at 08:04

## 2024-04-16 RX ADMIN — TIMOLOL MALEATE 1 DROP: 5 SOLUTION OPHTHALMIC at 10:04

## 2024-04-16 RX ADMIN — TIMOLOL MALEATE 1 DROP: 5 SOLUTION OPHTHALMIC at 08:04

## 2024-04-16 RX ADMIN — ANTI-THYMOCYTE GLOBULIN (RABBIT) 125 MG: 5 INJECTION, POWDER, LYOPHILIZED, FOR SOLUTION INTRAVENOUS at 11:04

## 2024-04-16 NOTE — PLAN OF CARE
AAOx3 but off tonight. NP NIDIA Haas aware. Afebrile, c/o incisional pain. Pt did not want scheduled tylenol. PRN oxy given on day shift. Pt c/o of nausea. IV zofran and compazine given with little relief. BG monitored achs and tx per orders. Endocrine is following. Telemetry monitor in place (NSR). K was 5.2 @2045. Repeat from 2354 was 5.1. Plan for thymo #3 today. Marinelli in place with minimal pink/red urine. Marinelli care done. DGF expected. RLQ incision with staples. Painted with betadine. Pt's wife not available to teach incision care. Incision leaking ss drainage. ABD pad applied. PD cath site with surgical dressing. Dried dressing noted to island dressing. Dermabond site under surgical dressing leaking. Gauze applied. SCD/BRETT on. Self meds 100%. Pt stated he is passing gas. Faint bowel sounds noted. Pt is distended. Bed alarm on. Wife at the bedside. Pt in lowest position, side rails up x2, non-skid foot wear in place, call light within reach, pt verbalized understanding to call RN when needed. Hand hygiene practiced per protocol. Will continue to monitor.

## 2024-04-16 NOTE — PROGRESS NOTES
[facility] - Transplant Stepdown  Adult Nutrition  Progress Note    SUMMARY       Recommendations    1. Continue Renal diet      2. If PO intake <50%, add ONS [employer] renal       3. RD to monitor and follow    Goals: Meet % EEN, EPN  Nutrition Goal Status: progressing towards goal  Communication of RD Recs:  (POC)    Assessment and Plan    Nutrition Problem  Increased nutrient needs      Related to (etiology):   Increased demand for nutrient due to sx     Signs and Symptoms (as evidenced by):   S/p DCD Kidney transplant (4/14)     Interventions/Recommendations (treatment strategy):  Collaboration with other providers  Nutrition education     Nutrition Diagnosis Status:   Continue     Malnutrition Assessment    Orbital Region (Subcutaneous Fat Loss): well nourished  Upper Arm Region (Subcutaneous Fat Loss): well nourished   [place] Region (Muscle Loss): well nourished  Clavicle Bone Region (Muscle Loss): well nourished  Clavicle and Acromion Bone Region (Muscle Loss): well nourished  Dorsal Hand (Muscle Loss): well nourished  Patellar Region (Muscle Loss): well nourished  Anterior Thigh Region (Muscle Loss): well nourished  Posterior Calf Region (Muscle Loss): well nourished     Reason for Assessment    Reason For Assessment: RD follow-up  Diagnosis:  (ESRD (end stage renal disease) on dialysis)  Relevant Medical History: T2DM, HTN  Interdisciplinary Rounds: did not attend    General Information Comments:   S/p DCD Kidney transplant (4/14). Required HD post op. Pt reports good appetite, tolerating 50% of meals. Denies N/V, chewing/swallowing difficulties. - BM, + flatus. Stated  lb, wt stable. NFPE completed today, no wasting noted. No indicator of malnutrition.     Nutrition Discharge Planning: Post transplant nutrition education provided on 4/15. Food safety/drug interactions emphasized. General healthy/low salt diet recommended. Education material left at bedside. No other needs  "identified.    Nutrition Risk Screen    Nutrition Risk Screen: no indicators present    Nutrition/Diet History    Patient Reported Diet/Restrictions/Preferences: renal  Spiritual, Cultural Beliefs, Zoroastrianism Practices, Values that Affect Care: no    Anthropometrics    Temp: 98 °F (36.7 °C)  Height Method: Stated  Height: 5' 7" (170.2 cm)  Height (inches): 67 in  Weight Method: Standard Scale  Weight: 93.1 kg (205 lb 4 oz)  Weight (lb): 205.25 lb  Ideal Body Weight (IBW), Male: 148 lb  % Ideal Body Weight, Male (lb): 137.42 %  BMI (Calculated): 32.1       Lab/Procedures/Meds    Pertinent Labs Reviewed: reviewed  Pertinent Labs Comments: glucose 148, Na 133, BUN 68, Cr 9.4, albumin 2.3, Phos 6.1, eGFR 5.8  Pertinent Medications Reviewed: reviewed  Pertinent Medications Comments:   Current Facility-Administered Medications   Medication Dose Route Frequency Provider Last Rate Last Admin    bisacodyL EC tablet 10 mg  10 mg Oral QHS Rosemary Foss MD   10 mg at 04/15/24 2014    bisacodyL suppository 10 mg  10 mg Rectal Daily PRN Bethany Delcid PA-C        carvediloL tablet 25 mg  25 mg Oral BID WM Rosemary Foss MD   25 mg at 04/16/24 1720    cefTRIAXone (Rocephin) 1 g in dextrose 5 % in water (D5W) 100 mL IVPB (MB+)  1 g Intravenous Q24H Bethany Delcid PA-C   Stopped at 04/16/24 1409    dextrose 10% bolus 125 mL 125 mL  12.5 g Intravenous PRN Isatu Guardado NP        dextrose 10% bolus 125 mL 125 mL  12.5 g Intravenous PRN Rosemary Foss MD        dextrose 10% bolus 250 mL 250 mL  25 g Intravenous PRN Isatu Guardado NP        dextrose 10% bolus 250 mL 250 mL  25 g Intravenous PRN Rosemary Foss MD        diphenhydrAMINE capsule 50 mg  50 mg Oral Once PRN Rosemary Foss MD        divalproex EC tablet 250 mg  250 mg Oral Q12H Juanito Zavala NP   250 mg at 04/16/24 0840    docusate sodium capsule 100 mg  100 mg Oral TID Rosemary Foss MD   100 mg at 04/16/24 1500    dorzolamide 2 % ophthalmic solution 1 drop  1 drop Left " Eye BID Trent Burt MD   1 drop at 04/16/24 0843    And    timolol maleate 0.5% ophthalmic solution 1 drop  1 drop Left Eye BID Trent Burt MD   1 drop at 04/16/24 0843    EPINEPHrine (PF) injection 1 mg  1 mg Subcutaneous Once PRN Rosemary Foss MD        famotidine tablet 20 mg  20 mg Oral QHS Rosemary Foss MD   20 mg at 04/15/24 2015    fluconazole tablet 200 mg  200 mg Oral Daily Bethany Delcid PA-C   200 mg at 04/16/24 1335    gabapentin capsule 300 mg  300 mg Oral Nightly PRN Juanito Zavala NP        glucagon (human recombinant) injection 1 mg  1 mg Intramuscular PRN Isatu Guardado NP        glucagon (human recombinant) injection 1 mg  1 mg Intramuscular Continuous PRN Rosemary Foss MD        glucose chewable tablet 16 g  16 g Oral PRN Isatu Guardado NP        glucose chewable tablet 16 g  16 g Oral PRN Rosemary Foss MD        glucose chewable tablet 16 g  16 g Oral PRN Rosemary Foss MD        glucose chewable tablet 24 g  24 g Oral PRN Isatu Guardado NP        glucose chewable tablet 24 g  24 g Oral PRN Rosemary Foss MD        heparin (porcine) injection 2,400 Units  2,400 Units Intravenous PRN Sher Hanna MD        heparin (porcine) injection 5,000 Units  5,000 Units Subcutaneous Q8H Rosemary Foss MD   5,000 Units at 04/16/24 1335    hydrocortisone sodium succinate injection 100 mg  100 mg Intravenous Once PRN Rosemary Foss MD        insulin aspart U-100 pen 0-5 Units  0-5 Units Subcutaneous QID (AC + HS) PRN Rosemary Foss MD   2 Units at 04/15/24 1759    insulin aspart U-100 pen 12 Units  12 Units Subcutaneous TIDWM Isatu Guardado NP   12 Units at 04/16/24 1721    insulin detemir U-100 (Levemir) pen 30 Units  30 Units Subcutaneous QHS Isatu Guardado NP   30 Units at 04/15/24 2146    methylPREDNISolone sodium succinate (SOLU-MEDROL) 500 mg in dextrose 5 % (D5W) 100 mL IVPB  500 mg Intravenous Once Lorelei Cha PA-C        multivitamin tablet  1 tablet Oral  Daily Rosemary Foss MD   1 tablet at 04/16/24 0838    mupirocin 2 % ointment 1 g  1 g Nasal BID Rosemary Foss MD   1 g at 04/16/24 0843    mupirocin 2 % ointment   Nasal On Call Procedure Lorelei Cha PA-C        mycophenolate capsule 1,000 mg  1,000 mg Oral BID Rosemary Foss MD   1,000 mg at 04/16/24 0904    NIFEdipine 24 hr tablet 30 mg  30 mg Oral BID Juanito Zavala NP   30 mg at 04/16/24 0840    ondansetron injection 4 mg  4 mg Intravenous Q6H PRN Rosemary Foss MD   4 mg at 04/15/24 1930    oxyCODONE immediate release tablet 5 mg  5 mg Oral Q4H PRN Juanito Zavala NP   5 mg at 04/16/24 0814    oxyCODONE immediate release tablet Tab 10 mg  10 mg Oral Q4H PRN Juanito Zavala NP   10 mg at 04/15/24 1706    [START ON 4/17/2024] predniSONE tablet 20 mg  20 mg Oral Daily Rosemary Foss MD        prochlorperazine injection Soln 5 mg  5 mg Intravenous Q6H PRN Jessica Haas DNP   5 mg at 04/15/24 2049    sevelamer carbonate tablet 800 mg  800 mg Oral TID  Bethany Delcid PA-C   800 mg at 04/16/24 1720    sodium bicarbonate tablet 1,300 mg  1,300 mg Oral BID Lorelei Cha PA-C   1,300 mg at 04/16/24 0838    sodium chloride 0.9% flush 10 mL  10 mL Intravenous PRN Lorelei Cha PA-C        sodium chloride 0.9% flush 10 mL  10 mL Intravenous PRN Rosemary Foss MD        sodium chloride 0.9% flush 10 mL  10 mL Intravenous PRN Sobeida Morales MD        [START ON 4/24/2024] sulfamethoxazole-trimethoprim 400-80mg per tablet 1 tablet  1 tablet Oral Daily AM Rosemary Foss MD        tacrolimus capsule 5 mg  5 mg Oral BID Bethany Delcid PA-C   5 mg at 04/16/24 1812    tamsulosin 24 hr capsule 0.4 mg  1 capsule Oral Daily Lorelei Cha PA-C   0.4 mg at 04/16/24 0838    traMADoL tablet 50 mg  50 mg Oral Q4H PRN Lorelei Cha PA-C   50 mg at 04/15/24 0849    [START ON 4/24/2024] valGANciclovir tablet 450 mg  450 mg Oral Daily AM Rosemary Foss MD             Estimated/Assessed Needs    Weight Used For Calorie Calculations: 91.3 kg (201 lb 4.5 oz)  Energy Calorie Requirements (kcal): 1838 kcal  Energy Need Method: [place]-St Jeor (PAL 1.1)  Protein Requirements: 81-101g (1.2-1.5g/kg IBW)  Weight Used For Protein Calculations: 67.1 kg (148 lb) (IBW)  Fluid Requirements (mL): 1ml/kcal or per MD  Estimated Fluid Requirement Method: RDA Method  RDA Method (mL): 1838  CHO Requirement: 228g/day      Nutrition Prescription Ordered    Current Diet Order: Diabetic, renal    Evaluation of Received Nutrient/Fluid Intake    I/O: + 2.9 L since admit  Energy Calories Required: not meeting needs  Protein Required: not meeting needs  Comments: LBM 4/13  Tolerance: tolerating  % Intake of Estimated Energy Needs: 50 - 75 %  % Meal Intake: 50 - 75 %    Nutrition Risk    Level of Risk/Frequency of Follow-up:  (1x/week)     Monitor and Evaluation    Food and Nutrient Intake: energy intake, food and beverage intake  Food and Nutrient Adminstration: diet order  Knowledge/Beliefs/Attitudes: food and nutrition knowledge/skill  Physical Activity and Function: nutrition-related ADLs and IADLs  Anthropometric Measurements: height/length, weight, weight change, body mass index  Biochemical Data, Medical Tests and Procedures: electrolyte and renal panel, gastrointestinal profile, glucose/endocrine profile, inflammatory profile, lipid profile  Nutrition-Focused Physical Findings: overall appearance, extremities, muscles and bones, head and eyes, skin     Nutrition Follow-Up    RD Follow-up?: Yes

## 2024-04-16 NOTE — ASSESSMENT & PLAN NOTE
- s/p DCD KTX d/t DM and PD cath removal on 4/14/24 (CIT 17h, KDPI 83%). 2 day [place], no drains.   - Hyperkalemic post-op, HD POD#0.   - DGF d/t prolonged CIT, DCD status  - Interventional neph consulted for tunnel cath placement   - Daily renal panel  - Avoid nephrotoxic meds  - Strict I's/O's

## 2024-04-16 NOTE — CONSULTS
CC: ESRD post transplant on 4/14/2024 with delayed graft function for insertion of cuffed tunneled HD cath.     HPI:  I have seen Mr. Ha today for a consult to insert a cuffed tunneled HD catheter. He is a known case of ESRD on PD. He got a cadaveric kidney transplant on 4/14/2024. He had delayed graft function and is requiring hemodialysis. HE is known to have HTN, DM II and ANDREY. I have explained the procedure with all the complications including pain, bleeding and infection. I have answered all the questions. HE agreed and signed the consent. He is scheduled for Thursday morning. Please send PT and PTT tomorrow. Hold heparin dose in Wednesday evening, Keep him NPO on Wed. after midnight.        Past Medical History:   Diagnosis Date    Anemia     Aqueous misdirection of right eye     Arthritis     Blind painful eye     Cataract     CKD (chronic kidney disease)     Diabetes mellitus, type 2     Disorder of kidney and ureter     Fever blister     GERD (gastroesophageal reflux disease)     Hyperlipidemia     Hypertension     Joint pain     Neuropathy        Past Surgical History:   Procedure Laterality Date    AIR FLUID EXCHANGE OF EYE Left 6/24/2022    Procedure: AIR FLUID EXCHANGE, EYE;  Surgeon: Arun Veronica MD;  Location: [facility];  Service: Ophthalmology;  Laterality: Left;    CATARACT EXTRACTION Left 8/27/2014    COLONOSCOPY N/A 9/8/2016    Procedure: COLONOSCOPY;  Surgeon: Luis Bogran-Reyes, MD;  Location: [facility];  Service: Endoscopy;  Laterality: N/A;    COLONOSCOPY N/A 11/10/2023    Procedure: COLONOSCOPY;  Surgeon: Nomi Del Real MD;  Location: [facility];  Service: Endoscopy;  Laterality: N/A;    CONJUNCTIVOPLASTY Right 6/17/2020    Procedure: CONJUNCTIVOPLASTY;  Surgeon: Myrna Alba MD;  Location: [facility];  Service: Ophthalmology;  Laterality: Right;    ENDOLASER PHOTOCOAGULATION Left 6/24/2022    Procedure: PHOTOCOAGULATION, ENDOLASER;  Surgeon: Arun Veronica MD;   Location: [facility] OR;  Service: Ophthalmology;  Laterality: Left;    ENUCLEATION Right 6/17/2020    Procedure: ENUCLEATION, EYE;  Surgeon: Myrna Alba MD;  Location: [facility] OR;  Service: Ophthalmology;  Laterality: Right;    EXPLORATION OF ORBIT Right 6/17/2020    Procedure: EXPLORATION, ORBIT;  Surgeon: Myrna Alba MD;  Location: [facility] OR;  Service: Ophthalmology;  Laterality: Right;    EYE SURGERY      KIDNEY TRANSPLANT N/A 4/14/2024    Procedure: TRANSPLANT, KIDNEY;  Surgeon: Trent Burt MD;  Location: [facility] OR 2ND FLR;  Service: Transplant;  Laterality: N/A;    TARSORRHAPHY Right 6/17/2020    Procedure: BLEPHARORRHAPHY;  Surgeon: Myrna Alba MD;  Location: [facility] OR;  Service: Ophthalmology;  Laterality: Right;    VITRECTOMY Right     VITRECTOMY BY PARS PLANA APPROACH Left 6/24/2022    Procedure: VITRECTOMY, PARS PLANA APPROACH;  Surgeon: Arun Veronica MD;  Location: [facility];  Service: Ophthalmology;  Laterality: Left;       Family History   Problem Relation Name Age of Onset    Diabetes Mother      Hypertension Mother      Cataracts Mother      Glaucoma Mother      Diabetes Father      Kidney failure Father      Cataracts Father      Hypertension Sister      Hypertension Brother         Social History     Socioeconomic History    Marital status:      Spouse name: Marjorie    Number of children: 4    Years of education: 12   Occupational History    Occupation: disabled     Employer: [employer]    Tobacco Use    Smoking status: Never     Passive exposure: Never    Smokeless tobacco: Never   Substance and Sexual Activity    Alcohol use: Not Currently     Alcohol/week: 0.0 standard drinks of alcohol     Comment: rarely    Drug use: No    Sexual activity: Yes   Social History Narrative    Caregiver Wife Marjorie     Social Determinants of Health     Stress: No Stress Concern Present (9/3/2019)    [employer] - Occupational Stress Questionnaire      Feeling of Stress : Not at all       Current Facility-Administered Medications   Medication Dose Route Frequency Provider Last Rate Last Admin    antithymocyte globulin (rabbit) 125 mg, hydrocortisone sodium succinate (SOLU-CORTEF) 20 mg in sodium chloride 0.9% 500 mL (FOR PERIPHERAL LINE ADMINISTRATION ONLY)  1.5 mg/kg (Adjusted) Intravenous Daily Rosemary Foss MD 83.3 mL/hr at 04/16/24 1146 125 mg at 04/16/24 1146    bisacodyL EC tablet 10 mg  10 mg Oral QHS Rosemary Foss MD   10 mg at 04/15/24 2014    bisacodyL suppository 10 mg  10 mg Rectal Daily PRN Bethany Delcid PA-C        carvediloL tablet 25 mg  25 mg Oral BID WM Rosemary Foss MD   25 mg at 04/16/24 0838    cefTRIAXone (Rocephin) 1 g in dextrose 5 % in water (D5W) 100 mL IVPB (MB+)  1 g Intravenous Q24H Bethany Delcid PA-C   Stopped at 04/16/24 1409    dextrose 10% bolus 125 mL 125 mL  12.5 g Intravenous PRN Isatu Guardado, NP        dextrose 10% bolus 125 mL 125 mL  12.5 g Intravenous PRN Rosemary Foss MD        dextrose 10% bolus 250 mL 250 mL  25 g Intravenous PRN Isatu Guardado, NP        dextrose 10% bolus 250 mL 250 mL  25 g Intravenous PRN Rosemary Foss MD        diphenhydrAMINE capsule 50 mg  50 mg Oral Once PRN Rosemary Foss MD        divalproex EC tablet 250 mg  250 mg Oral Q12H Juanito Zavala NP   250 mg at 04/16/24 0840    docusate sodium capsule 100 mg  100 mg Oral TID Rosemary Foss MD   100 mg at 04/16/24 1500    dorzolamide 2 % ophthalmic solution 1 drop  1 drop Left Eye BID Trent Burt MD   1 drop at 04/16/24 0843    And    timolol maleate 0.5% ophthalmic solution 1 drop  1 drop Left Eye BID Trent Burt MD   1 drop at 04/16/24 0843    EPINEPHrine (PF) injection 1 mg  1 mg Subcutaneous Once PRN Rosemary Foss MD        famotidine tablet 20 mg  20 mg Oral QHS Rosemary Foss MD   20 mg at 04/15/24 2015    fluconazole tablet 200 mg  200 mg Oral Daily Bethany Delcid PA-C   200 mg at 04/16/24 1335    gabapentin capsule 300  mg  300 mg Oral Nightly PRN Juanito Zavala NP        glucagon (human recombinant) injection 1 mg  1 mg Intramuscular PRN Isatu Guardado NP        glucagon (human recombinant) injection 1 mg  1 mg Intramuscular Continuous PRN Rosemary Foss MD        glucose chewable tablet 16 g  16 g Oral PRN Isatu Guardado NP        glucose chewable tablet 16 g  16 g Oral PRN Rosemary Foss MD        glucose chewable tablet 16 g  16 g Oral PRN Rosemary Foss MD        glucose chewable tablet 24 g  24 g Oral PRN Isatu Guardado NP        glucose chewable tablet 24 g  24 g Oral PRN Rosemary Foss MD        heparin (porcine) injection 2,400 Units  2,400 Units Intravenous PRN Sher Hanna MD        heparin (porcine) injection 5,000 Units  5,000 Units Subcutaneous Q8H Rosemary Foss MD   5,000 Units at 04/16/24 1335    hydrocortisone sodium succinate injection 100 mg  100 mg Intravenous Once PRN Rosemary Foss MD        insulin aspart U-100 pen 0-5 Units  0-5 Units Subcutaneous QID (AC + HS) PRN Rosemary Foss MD   2 Units at 04/15/24 1759    insulin aspart U-100 pen 12 Units  12 Units Subcutaneous TIDWM Isatu Guardado NP   12 Units at 04/16/24 1329    insulin detemir U-100 (Levemir) pen 30 Units  30 Units Subcutaneous QHS Isatu Guardado NP   30 Units at 04/15/24 2146    methylPREDNISolone sodium succinate (SOLU-MEDROL) 500 mg in dextrose 5 % (D5W) 100 mL IVPB  500 mg Intravenous Once Lorelei Cha PA-C        multivitamin tablet  1 tablet Oral Daily Rosemary Foss MD   1 tablet at 04/16/24 0838    mupirocin 2 % ointment 1 g  1 g Nasal BID Rosemary Foss MD   1 g at 04/16/24 0843    mupirocin 2 % ointment   Nasal On Call Procedure Lorelei Cha PA-C        mycophenolate capsule 1,000 mg  1,000 mg Oral BID Rosemary Foss MD   1,000 mg at 04/16/24 0904    NIFEdipine 24 hr tablet 30 mg  30 mg Oral BID Juanito Zavala NP   30 mg at 04/16/24 0840    ondansetron injection 4 mg  4 mg Intravenous Q6H PRN Rosemary Foss,  MD   4 mg at 04/15/24 1930    oxyCODONE immediate release tablet 5 mg  5 mg Oral Q4H PRN Juanito Zavala NP   5 mg at 04/16/24 0814    oxyCODONE immediate release tablet Tab 10 mg  10 mg Oral Q4H PRN Juanito Zavala, NP   10 mg at 04/15/24 1706    [START ON 4/17/2024] predniSONE tablet 20 mg  20 mg Oral Daily Rosemary Foss MD        prochlorperazine injection Soln 5 mg  5 mg Intravenous Q6H PRN Jessica Haas DNP   5 mg at 04/15/24 2049    sevelamer carbonate tablet 800 mg  800 mg Oral TID  Bethany Delcid PA-C        sodium bicarbonate tablet 1,300 mg  1,300 mg Oral BID Lorelei Cha PA-C   1,300 mg at 04/16/24 0838    sodium chloride 0.9% flush 10 mL  10 mL Intravenous PRN Lorelei Cha PA-C        sodium chloride 0.9% flush 10 mL  10 mL Intravenous PRN Rosemary Foss MD        sodium chloride 0.9% flush 10 mL  10 mL Intravenous PRN Sobeida Morales MD        [START ON 4/24/2024] sulfamethoxazole-trimethoprim 400-80mg per tablet 1 tablet  1 tablet Oral Daily AM Rosemary Foss MD        tacrolimus capsule 5 mg  5 mg Oral BID Bethany Delcid PA-C        tamsulosin 24 hr capsule 0.4 mg  1 capsule Oral Daily Lorelei Cha PA-C   0.4 mg at 04/16/24 0838    traMADoL tablet 50 mg  50 mg Oral Q4H PRN Lorelei Cha PA-C   50 mg at 04/15/24 0849    [START ON 4/24/2024] valGANciclovir tablet 450 mg  450 mg Oral Daily AM Rosemary Foss MD           [unfilled]    Review of patient's allergies indicates:  No Known Allergies     Review of Systems   Constitutional: Negative.    HENT: Negative.     Eyes: Negative.    Respiratory: Negative.     Cardiovascular: Negative.    Gastrointestinal: Negative.    Skin: Negative.           Physical Exam  Constitutional:       Appearance: Normal appearance.   HENT:      Head: Normocephalic.   Eyes:      Pupils: Pupils are equal, round, and reactive to light.   Cardiovascular:      Rate and Rhythm: Normal rate.      Pulses: Normal pulses.    Pulmonary:      Effort: Pulmonary effort is normal.   Musculoskeletal:         General: Normal range of motion.      Cervical back: Normal range of motion.   Neurological:      Mental Status: He is alert.          Problem List Items Addressed This Visit          Unprioritized    Type 2 diabetes mellitus with hypoglycemia without coma, with long-term current use of insulin    Hypertension - Primary (Chronic)    Class 1 obesity due to excess calories with serious comorbidity and body mass index (BMI) of 31.0 to 31.9 in adult    * (Principal) Kidney replaced by transplant    Prophylactic immunotherapy    Adverse effect of corticosteroids    At risk for opportunistic infections    History of seizure    Anemia of chronic disease    Acute blood loss anemia    Metabolic acidosis    RESOLVED: ESRD (end stage renal disease) on dialysis    RESOLVED: Pre-op evaluation        Labs:  PT   PTT      Impression and plan:  ESRD post renal transplant with delayed graft function for insertion of cuffed tunneled HD catheter scheduled on Thursday. Please send PT and PTT tomorrow. Hold heparin dose in Wednesday evening, Keep him NPO on Wed. after midnight.     HTN with well controlled BP.    DM II with elevated blood sugar.    ANDREY. With normal oxygenation.            VELASQUZE WILEY.Raymond. MD. KYLE. FACP.  , [employer] / [employer]  Nephrology Consultant. [facility].   [address]. [address]th [place] [address].    email: [email or web address].  Tel: Office: [phone]

## 2024-04-16 NOTE — ASSESSMENT & PLAN NOTE
BG goal 140-180    Continue Levemir to 30 units q HS   Continue Novolog to 12 units TID with meals (0.8 u/kg dosing) Steroids contributing to significant BG excursions.  Moderate Dose Correction Scale  BG monitoring ac/hs    ** Please call Endocrine for any BG related issues **

## 2024-04-16 NOTE — PROGRESS NOTES
[facility] - Transplant Stepdown  Kidney Transplant  Progress Note      Reason for Follow-up: Reassessment of Kidney Transplant - 4/14/2024  (#1) recipient and management of immunosuppression.     ORGAN:   RIGHT KIDNEY    Donor Type:   Donation after Circulatory Death       Subjective:   History of Present Illness:  Mr. Ha is a 63 y.o. Black or  male with ESRD secondary to diabetic nephropathy. He is presenting as a primary candidate for a kidney transplant with Dr. Burt 4/14/24 at 8am.  He has been on the wait list for a kidney transplant at [facility] since 2/24/2021. Patient is currently on peritoneal dialysis started on 2/24/2021. Patient reports that he is tolerating dialysis well. He has a PD catheter. Patient denies any recent hospitalizations or ED visits. Denies peritonitis or exit site infections. Pre op labs and imaging reveal K 5.4. PD cut short due to transplant offer. Patient shifted in preparation for surgery, anesthesiology aware.       Mr. Ha is a 63 y.o. year old male who is status post Kidney Transplant - 4/14/2024  (#1).    His maintenance immunosuppression consists of:   Immunosuppressants (From admission, onward)      Start     Stop Route Frequency Ordered    04/16/24 1800  tacrolimus capsule 5 mg  (IP TXP KIDNEY POST-OP THYMO WITH PERIPHERAL PRECHECKED)         -- Oral 2 times daily 04/16/24 1031    04/15/24 0900  antithymocyte globulin (rabbit) 125 mg, hydrocortisone sodium succinate (SOLU-CORTEF) 20 mg in sodium chloride 0.9% 500 mL (FOR PERIPHERAL LINE ADMINISTRATION ONLY)  (IP TXP KIDNEY POST-OP THYMO WITH PERIPHERAL PRECHECKED)         04/17/24 0859 IV Daily 04/14/24 1354    04/14/24 1400  mycophenolate capsule 1,000 mg  (IP TXP KIDNEY POST-OP THYMO WITH PERIPHERAL PRECHECKED)         -- Oral 2 times daily 04/14/24 1354            Hospital Course:  s/p DCD KTX d/t DM, also PD cath removal on 4/14/24 (CIT 17h, KDPI 83%). 2 day johnston, no drains. Hyperkalemic post-op,  K 7.1, HD done POD#0. Kidney US POD 1#, minimally elevated intraparenchymal resistive indices likely due to edema in recent state, otherwise satisfactory.      Interval note: No acute events overnight. DGF noted, not yet clearing with oliguria. Removing johnston today. K stable. Interventional neph consulted for tunnel cath placement. NPO at MN. Mild serosang drainage noted from incision, H/H stable. Pt ambulating halls without issue. Pain adequately controlled. + flatus, - BM. Cont bowel regimen. VSS. Will continue to monitor.        Past Medical, Surgical, Family, and Social History:   Unchanged from H&P.    Scheduled Meds:  Current Facility-Administered Medications   Medication Dose Route Frequency Provider Last Rate Last Admin    antithymocyte globulin (rabbit) 125 mg, hydrocortisone sodium succinate (SOLU-CORTEF) 20 mg in sodium chloride 0.9% 500 mL (FOR PERIPHERAL LINE ADMINISTRATION ONLY)  1.5 mg/kg (Adjusted) Intravenous Daily Rosemary Foss MD 83.3 mL/hr at 04/16/24 1146 125 mg at 04/16/24 1146    bisacodyL EC tablet 10 mg  10 mg Oral QHS Rosemary Foss MD   10 mg at 04/15/24 2014    bisacodyL suppository 10 mg  10 mg Rectal Daily PRN Bethany Delcid PA-C        carvediloL tablet 25 mg  25 mg Oral BID WM Rosemary Foss MD   25 mg at 04/16/24 0838    cefTRIAXone (Rocephin) 1 g in dextrose 5 % in water (D5W) 100 mL IVPB (MB+)  1 g Intravenous Q24H Bethany Delcid PA-C   Stopped at 04/16/24 1409    dextrose 10% bolus 125 mL 125 mL  12.5 g Intravenous PRN Isatu Guardado, NP        dextrose 10% bolus 125 mL 125 mL  12.5 g Intravenous PRN Rosemary Foss MD        dextrose 10% bolus 250 mL 250 mL  25 g Intravenous PRN Isatu Guardado, NP        dextrose 10% bolus 250 mL 250 mL  25 g Intravenous PRN Rosemary Foss MD        diphenhydrAMINE capsule 50 mg  50 mg Oral Once PRN Rosemary Foss MD        divalproex EC tablet 250 mg  250 mg Oral Q12H Juanito Zavala NP   250 mg at 04/16/24 0840    docusate sodium capsule 100 mg   100 mg Oral TID Rosemary Foss MD   100 mg at 04/16/24 0838    dorzolamide 2 % ophthalmic solution 1 drop  1 drop Left Eye BID Trent Burt MD   1 drop at 04/16/24 0843    And    timolol maleate 0.5% ophthalmic solution 1 drop  1 drop Left Eye BID Trent Burt MD   1 drop at 04/16/24 0843    EPINEPHrine (PF) injection 1 mg  1 mg Subcutaneous Once PRN Rosemary Foss MD        famotidine tablet 20 mg  20 mg Oral QHS Rosemary Foss MD   20 mg at 04/15/24 2015    fluconazole tablet 200 mg  200 mg Oral Daily Bethany Delcid PA-C   200 mg at 04/16/24 1335    gabapentin capsule 300 mg  300 mg Oral Nightly PRN Juanito Zavala NP        glucagon (human recombinant) injection 1 mg  1 mg Intramuscular PRN Isatu Guardado NP        glucagon (human recombinant) injection 1 mg  1 mg Intramuscular Continuous PRN Rosemary Foss MD        glucose chewable tablet 16 g  16 g Oral PRN Isatu Guardado NP        glucose chewable tablet 16 g  16 g Oral PRN Rosemary Foss MD        glucose chewable tablet 16 g  16 g Oral PRN Rosemary Foss MD        glucose chewable tablet 24 g  24 g Oral PRN Isatu Guardado NP        glucose chewable tablet 24 g  24 g Oral PRN Rosemary Foss MD        heparin (porcine) injection 2,400 Units  2,400 Units Intravenous PRN Sher Hanna MD        heparin (porcine) injection 5,000 Units  5,000 Units Subcutaneous Q8H Rosemary Foss MD   5,000 Units at 04/16/24 1335    hydrocortisone sodium succinate injection 100 mg  100 mg Intravenous Once PRN Rosemary Foss MD        insulin aspart U-100 pen 0-5 Units  0-5 Units Subcutaneous QID (AC + HS) PRN Rosemary Foss MD   2 Units at 04/15/24 1759    insulin aspart U-100 pen 12 Units  12 Units Subcutaneous TIDWM Isatu Guardado NP   12 Units at 04/16/24 1329    insulin detemir U-100 (Levemir) pen 30 Units  30 Units Subcutaneous QHS Isatu Guardado NP   30 Units at 04/15/24 4158    methylPREDNISolone sodium succinate (SOLU-MEDROL) 500 mg in dextrose 5 %  (D5W) 100 mL IVPB  500 mg Intravenous Once Lorelei Cha PA-C        multivitamin tablet  1 tablet Oral Daily Rosemary Foss MD   1 tablet at 04/16/24 0838    mupirocin 2 % ointment 1 g  1 g Nasal BID Rosemary Foss MD   1 g at 04/16/24 0843    mupirocin 2 % ointment   Nasal On Call Procedure Lorelei Cha PA-C        mycophenolate capsule 1,000 mg  1,000 mg Oral BID Rosemary Foss MD   1,000 mg at 04/16/24 0904    NIFEdipine 24 hr tablet 30 mg  30 mg Oral BID Juanito Zavala, NP   30 mg at 04/16/24 0840    ondansetron injection 4 mg  4 mg Intravenous Q6H PRN Rosemary Foss MD   4 mg at 04/15/24 1930    oxyCODONE immediate release tablet 5 mg  5 mg Oral Q4H PRN Juanito Zavala, NP   5 mg at 04/16/24 0814    oxyCODONE immediate release tablet Tab 10 mg  10 mg Oral Q4H PRN Juanito Zavala, NP   10 mg at 04/15/24 1706    [START ON 4/17/2024] predniSONE tablet 20 mg  20 mg Oral Daily Rosemary Foss MD        prochlorperazine injection Soln 5 mg  5 mg Intravenous Q6H PRN Jessica Haas, MERISSA   5 mg at 04/15/24 2049    sevelamer carbonate tablet 800 mg  800 mg Oral TID  Bethany Delcid PA-C        sodium bicarbonate tablet 1,300 mg  1,300 mg Oral BID Lorelei Cha PA-C   1,300 mg at 04/16/24 0838    sodium chloride 0.9% flush 10 mL  10 mL Intravenous PRN Lorelei Cha PA-C        sodium chloride 0.9% flush 10 mL  10 mL Intravenous PRN Rosemary Foss MD        [START ON 4/24/2024] sulfamethoxazole-trimethoprim 400-80mg per tablet 1 tablet  1 tablet Oral Daily AM Rosemary Foss MD        tacrolimus capsule 5 mg  5 mg Oral BID Bhavin, Bethany M., PA-C        tamsulosin 24 hr capsule 0.4 mg  1 capsule Oral Daily Lorelei Cha PA-C   0.4 mg at 04/16/24 0838    traMADoL tablet 50 mg  50 mg Oral Q4H PRN Lorelei Cha PA-C   50 mg at 04/15/24 0849    [START ON 4/24/2024] valGANciclovir tablet 450 mg  450 mg Oral Daily AM Rosemary Foss MD         Continuous  Infusions:  Current Facility-Administered Medications   Medication Dose Route Frequency Provider Last Rate Last Admin    antithymocyte globulin (rabbit) 125 mg, hydrocortisone sodium succinate (SOLU-CORTEF) 20 mg in sodium chloride 0.9% 500 mL (FOR PERIPHERAL LINE ADMINISTRATION ONLY)  1.5 mg/kg (Adjusted) Intravenous Daily Rosemary Foss MD 83.3 mL/hr at 04/16/24 1146 125 mg at 04/16/24 1146    bisacodyL EC tablet 10 mg  10 mg Oral QHS Rosemary Foss MD   10 mg at 04/15/24 2014    bisacodyL suppository 10 mg  10 mg Rectal Daily PRN Bethany Delcid PA-C        carvediloL tablet 25 mg  25 mg Oral BID WM Rosemary Foss MD   25 mg at 04/16/24 0838    cefTRIAXone (Rocephin) 1 g in dextrose 5 % in water (D5W) 100 mL IVPB (MB+)  1 g Intravenous Q24H Bethany Delcid PA-C   Stopped at 04/16/24 1409    dextrose 10% bolus 125 mL 125 mL  12.5 g Intravenous PRN Isatu Guardado NP        dextrose 10% bolus 125 mL 125 mL  12.5 g Intravenous PRN Rosemary Foss MD        dextrose 10% bolus 250 mL 250 mL  25 g Intravenous PRN Isatu Guardado, NP        dextrose 10% bolus 250 mL 250 mL  25 g Intravenous PRN Rosemary Foss MD        diphenhydrAMINE capsule 50 mg  50 mg Oral Once PRN Rosemary Foss MD        divalproex EC tablet 250 mg  250 mg Oral Q12H Juanito Zavala NP   250 mg at 04/16/24 0840    docusate sodium capsule 100 mg  100 mg Oral TID Rosemary Foss MD   100 mg at 04/16/24 0838    dorzolamide 2 % ophthalmic solution 1 drop  1 drop Left Eye BID Trent Burt MD   1 drop at 04/16/24 0843    And    timolol maleate 0.5% ophthalmic solution 1 drop  1 drop Left Eye BID Trent Burt MD   1 drop at 04/16/24 0843    EPINEPHrine (PF) injection 1 mg  1 mg Subcutaneous Once PRN Rosemary Foss MD        famotidine tablet 20 mg  20 mg Oral QHS Rosemary Foss MD   20 mg at 04/15/24 2015    fluconazole tablet 200 mg  200 mg Oral Daily Bethany Delcid PA-C   200 mg at 04/16/24 1335    gabapentin capsule 300 mg  300 mg Oral Nightly  PRN Juanito Zavala NP        glucagon (human recombinant) injection 1 mg  1 mg Intramuscular PRN Isatu Guardado NP        glucagon (human recombinant) injection 1 mg  1 mg Intramuscular Continuous PRN Rosemary Foss MD        glucose chewable tablet 16 g  16 g Oral PRN Isatu Guardado NP        glucose chewable tablet 16 g  16 g Oral PRN Rosemary Foss MD        glucose chewable tablet 16 g  16 g Oral PRN Rosemary Foss MD        glucose chewable tablet 24 g  24 g Oral PRN sIatu Guardado NP        glucose chewable tablet 24 g  24 g Oral PRN Rosemary Foss MD        heparin (porcine) injection 2,400 Units  2,400 Units Intravenous PRN Sher Hanna MD        heparin (porcine) injection 5,000 Units  5,000 Units Subcutaneous Q8H Rosemary Foss MD   5,000 Units at 04/16/24 1335    hydrocortisone sodium succinate injection 100 mg  100 mg Intravenous Once PRN Rosemary Foss MD        insulin aspart U-100 pen 0-5 Units  0-5 Units Subcutaneous QID (AC + HS) PRN Rosemary Foss MD   2 Units at 04/15/24 1759    insulin aspart U-100 pen 12 Units  12 Units Subcutaneous TIDWM Isatu Guardado NP   12 Units at 04/16/24 1329    insulin detemir U-100 (Levemir) pen 30 Units  30 Units Subcutaneous QHS Isatu Guardado NP   30 Units at 04/15/24 2146    methylPREDNISolone sodium succinate (SOLU-MEDROL) 500 mg in dextrose 5 % (D5W) 100 mL IVPB  500 mg Intravenous Once Lorelei Cha PA-C        multivitamin tablet  1 tablet Oral Daily Rosemary Foss MD   1 tablet at 04/16/24 0838    mupirocin 2 % ointment 1 g  1 g Nasal BID Rosemary Foss MD   1 g at 04/16/24 0843    mupirocin 2 % ointment   Nasal On Call Procedure Lorelei Cha PA-C        mycophenolate capsule 1,000 mg  1,000 mg Oral BID Rosemary Foss MD   1,000 mg at 04/16/24 0904    NIFEdipine 24 hr tablet 30 mg  30 mg Oral BID Juanito Zavala NP   30 mg at 04/16/24 0840    ondansetron injection 4 mg  4 mg Intravenous Q6H PRN Rosemary Foss MD   4 mg at 04/15/24  1930    oxyCODONE immediate release tablet 5 mg  5 mg Oral Q4H PRN Juanito Zavala NP   5 mg at 04/16/24 0814    oxyCODONE immediate release tablet Tab 10 mg  10 mg Oral Q4H PRN Juanito Zavala, NP   10 mg at 04/15/24 1706    [START ON 4/17/2024] predniSONE tablet 20 mg  20 mg Oral Daily Rosemary Foss MD        prochlorperazine injection Soln 5 mg  5 mg Intravenous Q6H PRN Jessica Haas DNP   5 mg at 04/15/24 2049    sevelamer carbonate tablet 800 mg  800 mg Oral TID  Bethany Delcid PA-C        sodium bicarbonate tablet 1,300 mg  1,300 mg Oral BID Lorelei Cha PA-C   1,300 mg at 04/16/24 0838    sodium chloride 0.9% flush 10 mL  10 mL Intravenous PRN Lorelei Cha PA-C        sodium chloride 0.9% flush 10 mL  10 mL Intravenous PRN Rosemary Foss MD        [START ON 4/24/2024] sulfamethoxazole-trimethoprim 400-80mg per tablet 1 tablet  1 tablet Oral Daily AM Rosemary Foss MD        tacrolimus capsule 5 mg  5 mg Oral BID Bethany Delcid PA-C        tamsulosin 24 hr capsule 0.4 mg  1 capsule Oral Daily Lorelei Cha PA-C   0.4 mg at 04/16/24 0838    traMADoL tablet 50 mg  50 mg Oral Q4H PRN Lorelei Cha PA-C   50 mg at 04/15/24 0849    [START ON 4/24/2024] valGANciclovir tablet 450 mg  450 mg Oral Daily AM Rosemary Foss MD         PRN Meds:  Current Facility-Administered Medications   Medication Dose Route Frequency Provider Last Rate Last Admin    antithymocyte globulin (rabbit) 125 mg, hydrocortisone sodium succinate (SOLU-CORTEF) 20 mg in sodium chloride 0.9% 500 mL (FOR PERIPHERAL LINE ADMINISTRATION ONLY)  1.5 mg/kg (Adjusted) Intravenous Daily Rosemary Foss MD 83.3 mL/hr at 04/16/24 1146 125 mg at 04/16/24 1146    bisacodyL EC tablet 10 mg  10 mg Oral QHS Rosemary Foss MD   10 mg at 04/15/24 2014    bisacodyL suppository 10 mg  10 mg Rectal Daily PRN Bethany Delcid, PA-C        carvediloL tablet 25 mg  25 mg Oral BID  Rosemary Foss MD   25 mg at 04/16/24  0838    cefTRIAXone (Rocephin) 1 g in dextrose 5 % in water (D5W) 100 mL IVPB (MB+)  1 g Intravenous Q24H Bethany Delcid PA-C   Stopped at 04/16/24 1409    dextrose 10% bolus 125 mL 125 mL  12.5 g Intravenous PRN Isatu Guardado NP        dextrose 10% bolus 125 mL 125 mL  12.5 g Intravenous PRN Rosemary Foss MD        dextrose 10% bolus 250 mL 250 mL  25 g Intravenous PRN Isatu Guardado NP        dextrose 10% bolus 250 mL 250 mL  25 g Intravenous PRN Rosemary Foss MD        diphenhydrAMINE capsule 50 mg  50 mg Oral Once PRN Rosemary Foss MD        divalproex EC tablet 250 mg  250 mg Oral Q12H Juanito Zavala NP   250 mg at 04/16/24 0840    docusate sodium capsule 100 mg  100 mg Oral TID Rosemary Foss MD   100 mg at 04/16/24 0838    dorzolamide 2 % ophthalmic solution 1 drop  1 drop Left Eye BID Trent Burt MD   1 drop at 04/16/24 0843    And    timolol maleate 0.5% ophthalmic solution 1 drop  1 drop Left Eye BID Trent Burt MD   1 drop at 04/16/24 0843    EPINEPHrine (PF) injection 1 mg  1 mg Subcutaneous Once PRN Rosemary Foss MD        famotidine tablet 20 mg  20 mg Oral QHS Rosemary Foss MD   20 mg at 04/15/24 2015    fluconazole tablet 200 mg  200 mg Oral Daily Bethany Delcid PA-C   200 mg at 04/16/24 1335    gabapentin capsule 300 mg  300 mg Oral Nightly PRN Juanito Zavala NP        glucagon (human recombinant) injection 1 mg  1 mg Intramuscular PRN Isatu Guardado NP        glucagon (human recombinant) injection 1 mg  1 mg Intramuscular Continuous PRN Rosemary Foss MD        glucose chewable tablet 16 g  16 g Oral PRN Isatu Guardado NP        glucose chewable tablet 16 g  16 g Oral PRN Rosemary Foss MD        glucose chewable tablet 16 g  16 g Oral PRN Rosemary Foss MD        glucose chewable tablet 24 g  24 g Oral PRN Isatu Guardado NP        glucose chewable tablet 24 g  24 g Oral PRN Rosemary Foss MD        heparin (porcine) injection 2,400 Units  2,400 Units Intravenous  PRN Sher Hanna MD        heparin (porcine) injection 5,000 Units  5,000 Units Subcutaneous Q8H Rosemary Foss MD   5,000 Units at 04/16/24 1335    hydrocortisone sodium succinate injection 100 mg  100 mg Intravenous Once PRN Rosemary Foss MD        insulin aspart U-100 pen 0-5 Units  0-5 Units Subcutaneous QID (AC + HS) PRN Rosemary Foss MD   2 Units at 04/15/24 1759    insulin aspart U-100 pen 12 Units  12 Units Subcutaneous TIDWM Isatu Guardado NP   12 Units at 04/16/24 1329    insulin detemir U-100 (Levemir) pen 30 Units  30 Units Subcutaneous QHS Isatu Guardado NP   30 Units at 04/15/24 2146    methylPREDNISolone sodium succinate (SOLU-MEDROL) 500 mg in dextrose 5 % (D5W) 100 mL IVPB  500 mg Intravenous Once Lorelei Cha PA-C        multivitamin tablet  1 tablet Oral Daily Rosemary Foss MD   1 tablet at 04/16/24 0838    mupirocin 2 % ointment 1 g  1 g Nasal BID Rosemary Foss MD   1 g at 04/16/24 0843    mupirocin 2 % ointment   Nasal On Call Procedure Lorelei Cha PA-C        mycophenolate capsule 1,000 mg  1,000 mg Oral BID Rosemary Foss MD   1,000 mg at 04/16/24 0904    NIFEdipine 24 hr tablet 30 mg  30 mg Oral BID Juanito Zavala NP   30 mg at 04/16/24 0840    ondansetron injection 4 mg  4 mg Intravenous Q6H PRN Rosemary Foss MD   4 mg at 04/15/24 1930    oxyCODONE immediate release tablet 5 mg  5 mg Oral Q4H PRN Juanito Zavala NP   5 mg at 04/16/24 0814    oxyCODONE immediate release tablet Tab 10 mg  10 mg Oral Q4H PRN Juanito Zavala NP   10 mg at 04/15/24 1706    [START ON 4/17/2024] predniSONE tablet 20 mg  20 mg Oral Daily Rosemary Foss MD        prochlorperazine injection Soln 5 mg  5 mg Intravenous Q6H PRN Jessica Haas, MERISSA   5 mg at 04/15/24 2049    sevelamer carbonate tablet 800 mg  800 mg Oral TID WM Bethany Delcid PA-C        sodium bicarbonate tablet 1,300 mg  1,300 mg Oral BID Lorelei Cha PA-C   1,300 mg at 04/16/24 0838    sodium  chloride 0.9% flush 10 mL  10 mL Intravenous PRN Lorelei Cha PA-C        sodium chloride 0.9% flush 10 mL  10 mL Intravenous PRN Rosemary Foss MD        [START ON 4/24/2024] sulfamethoxazole-trimethoprim 400-80mg per tablet 1 tablet  1 tablet Oral Daily AM Rosemary Foss MD        tacrolimus capsule 5 mg  5 mg Oral BID Bethany Delcid PA-C        tamsulosin 24 hr capsule 0.4 mg  1 capsule Oral Daily Lorelei Cha PA-C   0.4 mg at 04/16/24 0838    traMADoL tablet 50 mg  50 mg Oral Q4H PRN Lorelei Cha PA-C   50 mg at 04/15/24 0849    [START ON 4/24/2024] valGANciclovir tablet 450 mg  450 mg Oral Daily AM Rosemary Foss MD           Intake/Output - Last 3 Shifts         04/14 0700  04/15 0659 04/15 0700 04/16 0659 04/16 0700  04/17 0659    P.O. 540 360 360    I.V. (mL/kg) 2157.7 (23.6) 0 (0)     Other 500 0     IV Piggyback 2055.7      Total Intake(mL/kg) 5253.4 (57.5) 360 (3.9) 360 (3.9)    Urine (mL/kg/hr) 1155 (0.5) 335 (0.1) 125 (0.2)    Emesis/NG output 0 0 0    Other 1000 0 0    Stool 0 0 0    Blood 50 0 0    Total Output 2205 335 125    Net +3048.4 +25 +235           Urine Occurrence 0 x 0 x 0 x    Stool Occurrence 0 x 0 x 0 x    Emesis Occurrence 0 x 0 x 0 x             Review of Systems   Constitutional:  Negative for appetite change, fatigue and fever.   Eyes:  Positive for visual disturbance (chronic).   Respiratory:  Negative for cough and shortness of breath.    Cardiovascular:  Negative for chest pain and leg swelling.   Gastrointestinal:  Positive for abdominal distention and abdominal pain (incisional). Negative for diarrhea, nausea and vomiting.   Genitourinary:  Positive for decreased urine volume. Negative for difficulty urinating, dysuria and frequency.   Skin:  Positive for wound.   Allergic/Immunologic: Positive for immunocompromised state.   Neurological:  Negative for dizziness and weakness.   Psychiatric/Behavioral:  Negative for confusion and decreased  "concentration. The patient is not nervous/anxious.       Objective:     Vital Signs (Most Recent):  Temp: 98.2 °F (36.8 °C) (24 1227)  Pulse: 82 (24 1332)  Resp: 14 (24 1121)  BP: 128/71 (24 1332)  SpO2: 97 % (24 1332) Vital Signs (24h Range):  Temp:  [98 °F (36.7 °C)-99 °F (37.2 °C)] 98.2 °F (36.8 °C)  Pulse:  [76-84] 82  Resp:  [14-18] 14  SpO2:  [95 %-97 %] 97 %  BP: (128-161)/() 128/71     Weight: 93.1 kg (205 lb 4 oz)  Height: 5' 7" (170.2 cm)  Body mass index is 32.15 kg/m².     Physical Exam  Vitals and nursing note reviewed.   Constitutional:       General: He is not in acute distress.     Appearance: He is not ill-appearing.   HENT:      Head: Normocephalic.      Mouth/Throat:      Mouth: Mucous membranes are moist.   Eyes:      Extraocular Movements: Extraocular movements intact.      Conjunctiva/sclera: Conjunctivae normal.   Cardiovascular:      Rate and Rhythm: Normal rate and regular rhythm.      Pulses: Normal pulses.      Heart sounds: Normal heart sounds.   Pulmonary:      Effort: Pulmonary effort is normal. No respiratory distress.      Breath sounds: No wheezing or rales.   Abdominal:      General: Bowel sounds are normal. There is no distension.      Palpations: Abdomen is soft.      Tenderness: There is abdominal tenderness (appropriate post-op). There is no guarding or rebound.      Comments: RLQ kidney txp incision, KENNA with staples. Scant serosang drainage noted    Musculoskeletal:         General: No swelling. Normal range of motion.      Cervical back: Normal range of motion.      Right lower le+ Pitting Edema present.      Left lower le+ Pitting Edema present.   Skin:     General: Skin is warm and dry.   Neurological:      Mental Status: He is alert and oriented to person, place, and time. Mental status is at baseline.      Motor: No weakness.   Psychiatric:         Mood and Affect: Mood normal.         Behavior: Behavior normal.         Thought " Content: Thought content normal.         Judgment: Judgment normal.          Laboratory:  CBC:   Recent Labs   Lab 04/15/24  0511 04/15/24  1421 04/16/24  0450   WBC 9.83 7.84 6.72   RBC 2.97* 2.83* 2.67*   HGB 9.1* 8.7* 8.4*   HCT 26.7* 25.6* 24.2*   * 121* 90*   MCV 90 91 91   MCH 30.6 30.7 31.5*   MCHC 34.1 34.0 34.7     BMP:   Recent Labs   Lab 04/15/24  0511 04/15/24  1421 04/15/24  2046 04/15/24  2354 04/16/24  0450   * 237*  --   --  148*   * 131*  --   --  133*   K 4.7 5.0 5.2* 5.1 4.8    100  --   --  101   CO2 22* 23  --   --  21*   BUN 46* 60*  --   --  68*   CREATININE 6.9* 8.2*  --   --  9.4*   CALCIUM 7.6* 7.6*  --   --  7.7*     Labs within the past 24 hours have been reviewed.    Diagnostic Results:  US - Kidney: Results for orders placed during the hospital encounter of 04/14/24    US Transplant Kidney With Doppler    Narrative  EXAMINATION:  US TRANSPLANT KIDNEY WITH DOPPLER    CLINICAL HISTORY:  POD#1 kidney transplant;    TECHNIQUE:  Real time gray scale and doppler ultrasound was performed over the patient's renal allograft.    COMPARISON:  None    FINDINGS:  Renal allograft in the right lower quadrant.  The allograft measures 11.3 cm. Normal perfusion. No hydronephrosis.  Stent is present.    Fluid collection adjacent to the superior pole measuring 3.9 x 4.7 x 2.3    Vasculature:    Resistive indices ranged from 0.77 to 0.83.    Main renal artery peak systolic velocity: 182cm/sec with normal waveform.    Renal artery/iliac ratio: 1.3.    The main renal vein is patent.    Impression  Minimally elevated intraparenchymal resistive indices likely due to edema in recent state.  Otherwise satisfactory gray scale and doppler evaluation of renal allograft.      Electronically signed by: Ti Ha MD  Date:    04/15/2024  Time:    08:11  Assessment/Plan:     * Kidney replaced by transplant  - s/p DCD KTX d/t DM and PD cath removal on 4/14/24 (CIT 17h, KDPI 83%). 2 day  johnston, no drains.   - Hyperkalemic post-op, HD POD#0.   - DGF d/t prolonged CIT, DCD status  - Interventional neph consulted for tunnel cath placement   - Daily renal panel  - Avoid nephrotoxic meds  - Strict I's/O's      Metabolic acidosis  - Cont PO bicarb       Acute blood loss anemia  - Expected post op  - Monitor with daily cbc       Anemia of chronic disease  - H/H stable. Will continue to monitor with daily cbc.       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 Monitor for toxic side effects       Class 1 obesity due to excess calories with serious comorbidity and body mass index (BMI) of 31.0 to 31.9 in adult  - Dietary following   - Cont daily weights       Hypertension  - continue home coreg and nifedipine      Type 2 diabetes mellitus with hypoglycemia without coma, with long-term current use of insulin  - endocrine consulted, appreciate assistance       Discharge Planning: Not a candidate for d/c at this time.     Medical decision making for this encounter includes review of pertinent labs and diagnostic studies, assessment and planning, discussions with consulting providers, discussion with patient/family, and participation in multidisciplinary rounds. Time spent caring for patient: 60 minutes    Bethany Delcid PA-C  Kidney Transplant  [facility] - Transplant Stepdown

## 2024-04-16 NOTE — H&P (VIEW-ONLY)
CC: ESRD post transplant on 4/14/2024 with delayed graft function for insertion of cuffed tunneled HD cath.     HPI:  I have seen Mr. Ha today for a consult to insert a cuffed tunneled HD catheter. He is a known case of ESRD on PD. He got a cadaveric kidney transplant on 4/14/2024. He had delayed graft function and is requiring hemodialysis. HE is known to have HTN, DM II and ANDREY. I have explained the procedure with all the complications including pain, bleeding and infection. I have answered all the questions. HE agreed and signed the consent. He is scheduled for Thursday morning. Please send PT and PTT tomorrow. Hold heparin dose in Wednesday evening, Keep him NPO on Wed. after midnight.        Past Medical History:   Diagnosis Date    Anemia     Aqueous misdirection of right eye     Arthritis     Blind painful eye     Cataract     CKD (chronic kidney disease)     Diabetes mellitus, type 2     Disorder of kidney and ureter     Fever blister     GERD (gastroesophageal reflux disease)     Hyperlipidemia     Hypertension     Joint pain     Neuropathy        Past Surgical History:   Procedure Laterality Date    AIR FLUID EXCHANGE OF EYE Left 6/24/2022    Procedure: AIR FLUID EXCHANGE, EYE;  Surgeon: Arun Veronica MD;  Location: [facility];  Service: Ophthalmology;  Laterality: Left;    CATARACT EXTRACTION Left 8/27/2014    COLONOSCOPY N/A 9/8/2016    Procedure: COLONOSCOPY;  Surgeon: Luis Bogran-Reyes, MD;  Location: [facility];  Service: Endoscopy;  Laterality: N/A;    COLONOSCOPY N/A 11/10/2023    Procedure: COLONOSCOPY;  Surgeon: Nomi Del Real MD;  Location: [facility];  Service: Endoscopy;  Laterality: N/A;    CONJUNCTIVOPLASTY Right 6/17/2020    Procedure: CONJUNCTIVOPLASTY;  Surgeon: Myrna Alba MD;  Location: [facility];  Service: Ophthalmology;  Laterality: Right;    ENDOLASER PHOTOCOAGULATION Left 6/24/2022    Procedure: PHOTOCOAGULATION, ENDOLASER;  Surgeon: Arun Veronica MD;   Location: [facility] OR;  Service: Ophthalmology;  Laterality: Left;    ENUCLEATION Right 6/17/2020    Procedure: ENUCLEATION, EYE;  Surgeon: Myrna Alba MD;  Location: [facility] OR;  Service: Ophthalmology;  Laterality: Right;    EXPLORATION OF ORBIT Right 6/17/2020    Procedure: EXPLORATION, ORBIT;  Surgeon: Myrna Alba MD;  Location: [facility] OR;  Service: Ophthalmology;  Laterality: Right;    EYE SURGERY      KIDNEY TRANSPLANT N/A 4/14/2024    Procedure: TRANSPLANT, KIDNEY;  Surgeon: Trent Burt MD;  Location: [facility] OR 2ND FLR;  Service: Transplant;  Laterality: N/A;    TARSORRHAPHY Right 6/17/2020    Procedure: BLEPHARORRHAPHY;  Surgeon: Myrna Alba MD;  Location: [facility] OR;  Service: Ophthalmology;  Laterality: Right;    VITRECTOMY Right     VITRECTOMY BY PARS PLANA APPROACH Left 6/24/2022    Procedure: VITRECTOMY, PARS PLANA APPROACH;  Surgeon: Arun Veronica MD;  Location: [facility];  Service: Ophthalmology;  Laterality: Left;       Family History   Problem Relation Name Age of Onset    Diabetes Mother      Hypertension Mother      Cataracts Mother      Glaucoma Mother      Diabetes Father      Kidney failure Father      Cataracts Father      Hypertension Sister      Hypertension Brother         Social History     Socioeconomic History    Marital status:      Spouse name: Marjorie    Number of children: 4    Years of education: 12   Occupational History    Occupation: disabled     Employer: [employer]    Tobacco Use    Smoking status: Never     Passive exposure: Never    Smokeless tobacco: Never   Substance and Sexual Activity    Alcohol use: Not Currently     Alcohol/week: 0.0 standard drinks of alcohol     Comment: rarely    Drug use: No    Sexual activity: Yes   Social History Narrative    Caregiver Wife Marjorie     Social Determinants of Health     Stress: No Stress Concern Present (9/3/2019)    [employer] [place] [employer] - Occupational Stress Questionnaire      Feeling of Stress : Not at all       Current Facility-Administered Medications   Medication Dose Route Frequency Provider Last Rate Last Admin    antithymocyte globulin (rabbit) 125 mg, hydrocortisone sodium succinate (SOLU-CORTEF) 20 mg in sodium chloride 0.9% 500 mL (FOR PERIPHERAL LINE ADMINISTRATION ONLY)  1.5 mg/kg (Adjusted) Intravenous Daily Rosemary Foss MD 83.3 mL/hr at 04/16/24 1146 125 mg at 04/16/24 1146    bisacodyL EC tablet 10 mg  10 mg Oral QHS Rosemary Foss MD   10 mg at 04/15/24 2014    bisacodyL suppository 10 mg  10 mg Rectal Daily PRN Bethany Delcid PA-C        carvediloL tablet 25 mg  25 mg Oral BID WM Rosemary Foss MD   25 mg at 04/16/24 0838    cefTRIAXone (Rocephin) 1 g in dextrose 5 % in water (D5W) 100 mL IVPB (MB+)  1 g Intravenous Q24H Bethany Delcid PA-C   Stopped at 04/16/24 1409    dextrose 10% bolus 125 mL 125 mL  12.5 g Intravenous PRN Isatu Guardado, NP        dextrose 10% bolus 125 mL 125 mL  12.5 g Intravenous PRN Rosemary Foss MD        dextrose 10% bolus 250 mL 250 mL  25 g Intravenous PRN Isatu Guardado, NP        dextrose 10% bolus 250 mL 250 mL  25 g Intravenous PRN Rosemary Foss MD        diphenhydrAMINE capsule 50 mg  50 mg Oral Once PRN Rosemary Foss MD        divalproex EC tablet 250 mg  250 mg Oral Q12H Juanito Zavala NP   250 mg at 04/16/24 0840    docusate sodium capsule 100 mg  100 mg Oral TID Rosemary Foss MD   100 mg at 04/16/24 1500    dorzolamide 2 % ophthalmic solution 1 drop  1 drop Left Eye BID Trent Burt MD   1 drop at 04/16/24 0843    And    timolol maleate 0.5% ophthalmic solution 1 drop  1 drop Left Eye BID Trent Burt MD   1 drop at 04/16/24 0843    EPINEPHrine (PF) injection 1 mg  1 mg Subcutaneous Once PRN Rosemary Foss MD        famotidine tablet 20 mg  20 mg Oral QHS Rosemary Foss MD   20 mg at 04/15/24 2015    fluconazole tablet 200 mg  200 mg Oral Daily Bethany Delcid PA-C   200 mg at 04/16/24 1335    gabapentin capsule 300  mg  300 mg Oral Nightly PRN Juanito Zavala NP        glucagon (human recombinant) injection 1 mg  1 mg Intramuscular PRN Isatu Guardado NP        glucagon (human recombinant) injection 1 mg  1 mg Intramuscular Continuous PRN Rosemary Foss MD        glucose chewable tablet 16 g  16 g Oral PRN Isatu Guardado NP        glucose chewable tablet 16 g  16 g Oral PRN Rosemary Foss MD        glucose chewable tablet 16 g  16 g Oral PRN Rosemary Foss MD        glucose chewable tablet 24 g  24 g Oral PRN Isatu Guardado NP        glucose chewable tablet 24 g  24 g Oral PRN Rosemary Foss MD        heparin (porcine) injection 2,400 Units  2,400 Units Intravenous PRN Sher Hanna MD        heparin (porcine) injection 5,000 Units  5,000 Units Subcutaneous Q8H Rosemary Foss MD   5,000 Units at 04/16/24 1335    hydrocortisone sodium succinate injection 100 mg  100 mg Intravenous Once PRN Rosemary Foss MD        insulin aspart U-100 pen 0-5 Units  0-5 Units Subcutaneous QID (AC + HS) PRN Rosemary Foss MD   2 Units at 04/15/24 1759    insulin aspart U-100 pen 12 Units  12 Units Subcutaneous TIDWM Isatu Guardado NP   12 Units at 04/16/24 1329    insulin detemir U-100 (Levemir) pen 30 Units  30 Units Subcutaneous QHS Isatu Guardado NP   30 Units at 04/15/24 2146    methylPREDNISolone sodium succinate (SOLU-MEDROL) 500 mg in dextrose 5 % (D5W) 100 mL IVPB  500 mg Intravenous Once Lorelei Cha PA-C        multivitamin tablet  1 tablet Oral Daily Rosemary Foss MD   1 tablet at 04/16/24 0838    mupirocin 2 % ointment 1 g  1 g Nasal BID Rosemary Foss MD   1 g at 04/16/24 0843    mupirocin 2 % ointment   Nasal On Call Procedure Lorelei Cha PA-C        mycophenolate capsule 1,000 mg  1,000 mg Oral BID Rosemary Foss MD   1,000 mg at 04/16/24 0904    NIFEdipine 24 hr tablet 30 mg  30 mg Oral BID Juanito Zavala NP   30 mg at 04/16/24 0840    ondansetron injection 4 mg  4 mg Intravenous Q6H PRN Rosemary Foss,  MD   4 mg at 04/15/24 1930    oxyCODONE immediate release tablet 5 mg  5 mg Oral Q4H PRN Juanito Zavala NP   5 mg at 04/16/24 0814    oxyCODONE immediate release tablet Tab 10 mg  10 mg Oral Q4H PRN Juanito Zavala, NP   10 mg at 04/15/24 1706    [START ON 4/17/2024] predniSONE tablet 20 mg  20 mg Oral Daily Rosemary Foss MD        prochlorperazine injection Soln 5 mg  5 mg Intravenous Q6H PRN Jessica Haas DNP   5 mg at 04/15/24 2049    sevelamer carbonate tablet 800 mg  800 mg Oral TID  Bethany Delcid PA-C        sodium bicarbonate tablet 1,300 mg  1,300 mg Oral BID Lorelei Cha PA-C   1,300 mg at 04/16/24 0838    sodium chloride 0.9% flush 10 mL  10 mL Intravenous PRN Lorelei Cha PA-C        sodium chloride 0.9% flush 10 mL  10 mL Intravenous PRN Rosemary Foss MD        sodium chloride 0.9% flush 10 mL  10 mL Intravenous PRN Sobeida Morales MD        [START ON 4/24/2024] sulfamethoxazole-trimethoprim 400-80mg per tablet 1 tablet  1 tablet Oral Daily AM Rosemary Foss MD        tacrolimus capsule 5 mg  5 mg Oral BID Bethany Delcid PA-C        tamsulosin 24 hr capsule 0.4 mg  1 capsule Oral Daily Lorelei Cha PA-C   0.4 mg at 04/16/24 0838    traMADoL tablet 50 mg  50 mg Oral Q4H PRN Lorelei Cha PA-C   50 mg at 04/15/24 0849    [START ON 4/24/2024] valGANciclovir tablet 450 mg  450 mg Oral Daily AM Rosemary Foss MD           [unfilled]    Review of patient's allergies indicates:  No Known Allergies     Review of Systems   Constitutional: Negative.    HENT: Negative.     Eyes: Negative.    Respiratory: Negative.     Cardiovascular: Negative.    Gastrointestinal: Negative.    Skin: Negative.           Physical Exam  Constitutional:       Appearance: Normal appearance.   HENT:      Head: Normocephalic.   Eyes:      Pupils: Pupils are equal, round, and reactive to light.   Cardiovascular:      Rate and Rhythm: Normal rate.      Pulses: Normal pulses.    Pulmonary:      Effort: Pulmonary effort is normal.   Musculoskeletal:         General: Normal range of motion.      Cervical back: Normal range of motion.   Neurological:      Mental Status: He is alert.          Problem List Items Addressed This Visit          Unprioritized    Type 2 diabetes mellitus with hypoglycemia without coma, with long-term current use of insulin    Hypertension - Primary (Chronic)    Class 1 obesity due to excess calories with serious comorbidity and body mass index (BMI) of 31.0 to 31.9 in adult    * (Principal) Kidney replaced by transplant    Prophylactic immunotherapy    Adverse effect of corticosteroids    At risk for opportunistic infections    History of seizure    Anemia of chronic disease    Acute blood loss anemia    Metabolic acidosis    RESOLVED: ESRD (end stage renal disease) on dialysis    RESOLVED: Pre-op evaluation        Labs:  PT   PTT      Impression and plan:  ESRD post renal transplant with delayed graft function for insertion of cuffed tunneled HD catheter scheduled on Thursday. Please send PT and PTT tomorrow. Hold heparin dose in Wednesday evening, Keep him NPO on Wed. after midnight.     HTN with well controlled BP.    DM II with elevated blood sugar.    ANDREY. With normal oxygenation.            VELASQUEZ WILEY.Raymond. MD. KYLE. FACP.  , [employer] / [employer] ([place]  Nephrology Consultant. [facility].   [address]. [address]th [place] [address].    email: [email or web address].  Tel: Office: [phone]

## 2024-04-16 NOTE — SUBJECTIVE & OBJECTIVE
Subjective:   History of Present Illness:  Mr. Ha is a 63 y.o. Black or  male with ESRD secondary to diabetic nephropathy. He is presenting as a primary candidate for a kidney transplant with Dr. Burt 4/14/24 at 8am.  He has been on the wait list for a kidney transplant at [facility] since 2/24/2021. Patient is currently on peritoneal dialysis started on 2/24/2021. Patient reports that he is tolerating dialysis well. He has a PD catheter. Patient denies any recent hospitalizations or ED visits. Denies peritonitis or exit site infections. Pre op labs and imaging reveal K 5.4. PD cut short due to transplant offer. Patient shifted in preparation for surgery, anesthesiology aware.       Mr. Ha is a 63 y.o. year old male who is status post Kidney Transplant - 4/14/2024  (#1).    His maintenance immunosuppression consists of:   Immunosuppressants (From admission, onward)      Start     Stop Route Frequency Ordered    04/16/24 1800  tacrolimus capsule 5 mg  (IP TXP KIDNEY POST-OP THYMO WITH PERIPHERAL PRECHECKED)         -- Oral 2 times daily 04/16/24 1031    04/15/24 0900  antithymocyte globulin (rabbit) 125 mg, hydrocortisone sodium succinate (SOLU-CORTEF) 20 mg in sodium chloride 0.9% 500 mL (FOR PERIPHERAL LINE ADMINISTRATION ONLY)  (IP TXP KIDNEY POST-OP THYMO WITH PERIPHERAL PRECHECKED)         04/17/24 0859 IV Daily 04/14/24 1354    04/14/24 1400  mycophenolate capsule 1,000 mg  (IP TXP KIDNEY POST-OP THYMO WITH PERIPHERAL PRECHECKED)         -- Oral 2 times daily 04/14/24 1354            Hospital Course:  s/p DCD KTX d/t DM, also PD cath removal on 4/14/24 (CIT 17h, KDPI 83%). 2 day johnston, no drains. Hyperkalemic post-op, K 7.1, HD done POD#0. Kidney US POD 1#, minimally elevated intraparenchymal resistive indices likely due to edema in recent state, otherwise satisfactory.      Interval note: No acute events overnight. DGF noted, not yet clearing with oliguria. Removing johnston today. K  stable. Interventional neph consulted for tunnel cath placement. NPO at MN. Mild serosang drainage noted from incision, H/H stable. Pt ambulating halls without issue. Pain adequately controlled. + flatus, - BM. Cont bowel regimen. VSS. Will continue to monitor.        Past Medical, Surgical, Family, and Social History:   Unchanged from H&P.    Scheduled Meds:  Current Facility-Administered Medications   Medication Dose Route Frequency Provider Last Rate Last Admin    antithymocyte globulin (rabbit) 125 mg, hydrocortisone sodium succinate (SOLU-CORTEF) 20 mg in sodium chloride 0.9% 500 mL (FOR PERIPHERAL LINE ADMINISTRATION ONLY)  1.5 mg/kg (Adjusted) Intravenous Daily Rosemary Foss MD 83.3 mL/hr at 04/16/24 1146 125 mg at 04/16/24 1146    bisacodyL EC tablet 10 mg  10 mg Oral QHS Rosemary Foss MD   10 mg at 04/15/24 2014    bisacodyL suppository 10 mg  10 mg Rectal Daily PRN Bethany Delcid PA-C        carvediloL tablet 25 mg  25 mg Oral BID WM Rosemary Foss MD   25 mg at 04/16/24 0838    cefTRIAXone (Rocephin) 1 g in dextrose 5 % in water (D5W) 100 mL IVPB (MB+)  1 g Intravenous Q24H Bethany Delcid PA-C   Stopped at 04/16/24 1409    dextrose 10% bolus 125 mL 125 mL  12.5 g Intravenous PRN Isatu Guardado NP        dextrose 10% bolus 125 mL 125 mL  12.5 g Intravenous PRN Rosemary Foss MD        dextrose 10% bolus 250 mL 250 mL  25 g Intravenous PRN Isatu Guardado, NP        dextrose 10% bolus 250 mL 250 mL  25 g Intravenous PRN Rosemary Foss MD        diphenhydrAMINE capsule 50 mg  50 mg Oral Once PRN Rosemary Foss MD        divalproex EC tablet 250 mg  250 mg Oral Q12H Juanito Zavala NP   250 mg at 04/16/24 0840    docusate sodium capsule 100 mg  100 mg Oral TID Rosemary Foss MD   100 mg at 04/16/24 0838    dorzolamide 2 % ophthalmic solution 1 drop  1 drop Left Eye BID Trent Burt MD   1 drop at 04/16/24 0843    And    timolol maleate 0.5% ophthalmic solution 1 drop  1 drop Left Eye BID Javed,  Trent RAZO MD   1 drop at 04/16/24 0843    EPINEPHrine (PF) injection 1 mg  1 mg Subcutaneous Once PRN Rosemary Foss MD        famotidine tablet 20 mg  20 mg Oral QHS Rosemary Foss MD   20 mg at 04/15/24 2015    fluconazole tablet 200 mg  200 mg Oral Daily Bethany Delcid PA-C   200 mg at 04/16/24 1335    gabapentin capsule 300 mg  300 mg Oral Nightly PRN Juanito Zavala NP        glucagon (human recombinant) injection 1 mg  1 mg Intramuscular PRN Isatu Guardado NP        glucagon (human recombinant) injection 1 mg  1 mg Intramuscular Continuous PRN Rosemary Foss MD        glucose chewable tablet 16 g  16 g Oral PRN Isatu Guardado NP        glucose chewable tablet 16 g  16 g Oral PRN Rosemary Foss MD        glucose chewable tablet 16 g  16 g Oral PRN Rosemary Foss MD        glucose chewable tablet 24 g  24 g Oral PRN Isatu Guardado NP        glucose chewable tablet 24 g  24 g Oral PRN Rosemary Foss MD        heparin (porcine) injection 2,400 Units  2,400 Units Intravenous PRN Sher Hanna MD        heparin (porcine) injection 5,000 Units  5,000 Units Subcutaneous Q8H Rosemary Foss MD   5,000 Units at 04/16/24 1335    hydrocortisone sodium succinate injection 100 mg  100 mg Intravenous Once PRN Rosemary Foss MD        insulin aspart U-100 pen 0-5 Units  0-5 Units Subcutaneous QID (AC + HS) PRN Rosemary Foss MD   2 Units at 04/15/24 1759    insulin aspart U-100 pen 12 Units  12 Units Subcutaneous TIDWM Isatu Guardado NP   12 Units at 04/16/24 1329    insulin detemir U-100 (Levemir) pen 30 Units  30 Units Subcutaneous QHS Isatu Guardado NP   30 Units at 04/15/24 2146    methylPREDNISolone sodium succinate (SOLU-MEDROL) 500 mg in dextrose 5 % (D5W) 100 mL IVPB  500 mg Intravenous Once Lorelei Cha PA-C        multivitamin tablet  1 tablet Oral Daily Rosmeary Foss MD   1 tablet at 04/16/24 0838    mupirocin 2 % ointment 1 g  1 g Nasal BID Rosemary Foss MD   1 g at 04/16/24 0843    mupirocin 2 %  ointment   Nasal On Call Procedure Lorelei Cha PA-C        mycophenolate capsule 1,000 mg  1,000 mg Oral BID Rosemary Foss MD   1,000 mg at 04/16/24 0904    NIFEdipine 24 hr tablet 30 mg  30 mg Oral BID Juanito Zavala NP   30 mg at 04/16/24 0840    ondansetron injection 4 mg  4 mg Intravenous Q6H PRN Rosemary Foss MD   4 mg at 04/15/24 1930    oxyCODONE immediate release tablet 5 mg  5 mg Oral Q4H PRN Juanito Zavala NP   5 mg at 04/16/24 0814    oxyCODONE immediate release tablet Tab 10 mg  10 mg Oral Q4H PRN Juanito Zavala NP   10 mg at 04/15/24 1706    [START ON 4/17/2024] predniSONE tablet 20 mg  20 mg Oral Daily Rosemary Foss MD        prochlorperazine injection Soln 5 mg  5 mg Intravenous Q6H PRN Jessica Haas DNP   5 mg at 04/15/24 2049    sevelamer carbonate tablet 800 mg  800 mg Oral TID  Bethany Delcid PA-C        sodium bicarbonate tablet 1,300 mg  1,300 mg Oral BID Lorelei Cha PA-C   1,300 mg at 04/16/24 0838    sodium chloride 0.9% flush 10 mL  10 mL Intravenous PRN Lorelei Cha PA-C        sodium chloride 0.9% flush 10 mL  10 mL Intravenous PRN Rosemary Foss MD        [START ON 4/24/2024] sulfamethoxazole-trimethoprim 400-80mg per tablet 1 tablet  1 tablet Oral Daily AM Rosemary Foss MD        tacrolimus capsule 5 mg  5 mg Oral BID Bethany Delcid PA-C        tamsulosin 24 hr capsule 0.4 mg  1 capsule Oral Daily Lorelei Cha PA-C   0.4 mg at 04/16/24 0838    traMADoL tablet 50 mg  50 mg Oral Q4H PRN Lorelei Cha PA-C   50 mg at 04/15/24 0849    [START ON 4/24/2024] valGANciclovir tablet 450 mg  450 mg Oral Daily AM Rosemary Foss MD         Continuous Infusions:  Current Facility-Administered Medications   Medication Dose Route Frequency Provider Last Rate Last Admin    antithymocyte globulin (rabbit) 125 mg, hydrocortisone sodium succinate (SOLU-CORTEF) 20 mg in sodium chloride 0.9% 500 mL (FOR PERIPHERAL LINE  ADMINISTRATION ONLY)  1.5 mg/kg (Adjusted) Intravenous Daily Rosemary Foss MD 83.3 mL/hr at 04/16/24 1146 125 mg at 04/16/24 1146    bisacodyL EC tablet 10 mg  10 mg Oral QHS Rosemary Foss MD   10 mg at 04/15/24 2014    bisacodyL suppository 10 mg  10 mg Rectal Daily PRN Bethany Delcid PA-C        carvediloL tablet 25 mg  25 mg Oral BID WM Rosemary Foss MD   25 mg at 04/16/24 0838    cefTRIAXone (Rocephin) 1 g in dextrose 5 % in water (D5W) 100 mL IVPB (MB+)  1 g Intravenous Q24H Bethany Delcid PA-C   Stopped at 04/16/24 1409    dextrose 10% bolus 125 mL 125 mL  12.5 g Intravenous PRN Isatu Guardado., NP        dextrose 10% bolus 125 mL 125 mL  12.5 g Intravenous PRN Rosemary Foss MD        dextrose 10% bolus 250 mL 250 mL  25 g Intravenous PRN Isatu Guardado, NP        dextrose 10% bolus 250 mL 250 mL  25 g Intravenous PRN Rosemary Foss MD        diphenhydrAMINE capsule 50 mg  50 mg Oral Once PRN Rosemary Foss MD        divalproex EC tablet 250 mg  250 mg Oral Q12H Juanito Zavala, NP   250 mg at 04/16/24 0840    docusate sodium capsule 100 mg  100 mg Oral TID Rosemary Foss MD   100 mg at 04/16/24 0838    dorzolamide 2 % ophthalmic solution 1 drop  1 drop Left Eye BID Trent Burt MD   1 drop at 04/16/24 0843    And    timolol maleate 0.5% ophthalmic solution 1 drop  1 drop Left Eye BID Trent Burt MD   1 drop at 04/16/24 0843    EPINEPHrine (PF) injection 1 mg  1 mg Subcutaneous Once PRN Rosemary Foss MD        famotidine tablet 20 mg  20 mg Oral QHS Rosemary Foss MD   20 mg at 04/15/24 2015    fluconazole tablet 200 mg  200 mg Oral Daily Bethany Delcid PA-C   200 mg at 04/16/24 1335    gabapentin capsule 300 mg  300 mg Oral Nightly PRN Juanito Zavala, NP        glucagon (human recombinant) injection 1 mg  1 mg Intramuscular PRN Isatu Guardado NP        glucagon (human recombinant) injection 1 mg  1 mg Intramuscular Continuous PRN Rosemary Foss MD        glucose chewable tablet 16 g  16  g Oral PRN Isatu Guardado NP        glucose chewable tablet 16 g  16 g Oral PRN Rosemary Foss MD        glucose chewable tablet 16 g  16 g Oral PRN Rosemary Foss MD        glucose chewable tablet 24 g  24 g Oral PRN Isatu Guardado NP        glucose chewable tablet 24 g  24 g Oral PRN Rosemary Foss MD        heparin (porcine) injection 2,400 Units  2,400 Units Intravenous PRN Sher Hanna MD        heparin (porcine) injection 5,000 Units  5,000 Units Subcutaneous Q8H Rosemary Foss MD   5,000 Units at 04/16/24 1335    hydrocortisone sodium succinate injection 100 mg  100 mg Intravenous Once PRN Rosemary Foss MD        insulin aspart U-100 pen 0-5 Units  0-5 Units Subcutaneous QID (AC + HS) PRN Rosemary Foss MD   2 Units at 04/15/24 1759    insulin aspart U-100 pen 12 Units  12 Units Subcutaneous TIDWM Isatu Guardado NP   12 Units at 04/16/24 1329    insulin detemir U-100 (Levemir) pen 30 Units  30 Units Subcutaneous QHS Isatu Guardado NP   30 Units at 04/15/24 2146    methylPREDNISolone sodium succinate (SOLU-MEDROL) 500 mg in dextrose 5 % (D5W) 100 mL IVPB  500 mg Intravenous Once Lorelei Cha PA-C        multivitamin tablet  1 tablet Oral Daily Rosemary Foss MD   1 tablet at 04/16/24 0838    mupirocin 2 % ointment 1 g  1 g Nasal BID Rosemary Foss MD   1 g at 04/16/24 0843    mupirocin 2 % ointment   Nasal On Call Procedure Lorelei Cha PA-C        mycophenolate capsule 1,000 mg  1,000 mg Oral BID Rosemary Foss MD   1,000 mg at 04/16/24 0904    NIFEdipine 24 hr tablet 30 mg  30 mg Oral BID Juanito Zavala NP   30 mg at 04/16/24 0840    ondansetron injection 4 mg  4 mg Intravenous Q6H PRN Rosemary Foss MD   4 mg at 04/15/24 1930    oxyCODONE immediate release tablet 5 mg  5 mg Oral Q4H PRN Juanito Zavala NP   5 mg at 04/16/24 0814    oxyCODONE immediate release tablet Tab 10 mg  10 mg Oral Q4H PRN Juanito Zavala, NP   10 mg at 04/15/24 1706    [START ON 4/17/2024]  predniSONE tablet 20 mg  20 mg Oral Daily Rosemary Foss MD        prochlorperazine injection Soln 5 mg  5 mg Intravenous Q6H PRN Jessica Haas, MERISSA   5 mg at 04/15/24 2049    sevelamer carbonate tablet 800 mg  800 mg Oral TID  Bethany Delcid PA-C        sodium bicarbonate tablet 1,300 mg  1,300 mg Oral BID Lorelei Cha PA-C   1,300 mg at 04/16/24 0838    sodium chloride 0.9% flush 10 mL  10 mL Intravenous PRN Lorelei Cha PA-C        sodium chloride 0.9% flush 10 mL  10 mL Intravenous PRN Rosemary Foss MD        [START ON 4/24/2024] sulfamethoxazole-trimethoprim 400-80mg per tablet 1 tablet  1 tablet Oral Daily AM Rosemary Foss MD        tacrolimus capsule 5 mg  5 mg Oral BID Bethany Delcid PA-C        tamsulosin 24 hr capsule 0.4 mg  1 capsule Oral Daily Lorelei Cha PA-C   0.4 mg at 04/16/24 0838    traMADoL tablet 50 mg  50 mg Oral Q4H PRN Lorelei Cha PA-C   50 mg at 04/15/24 0849    [START ON 4/24/2024] valGANciclovir tablet 450 mg  450 mg Oral Daily AM Rosemary Foss MD         PRN Meds:  Current Facility-Administered Medications   Medication Dose Route Frequency Provider Last Rate Last Admin    antithymocyte globulin (rabbit) 125 mg, hydrocortisone sodium succinate (SOLU-CORTEF) 20 mg in sodium chloride 0.9% 500 mL (FOR PERIPHERAL LINE ADMINISTRATION ONLY)  1.5 mg/kg (Adjusted) Intravenous Daily Rosemary Foss MD 83.3 mL/hr at 04/16/24 1146 125 mg at 04/16/24 1146    bisacodyL EC tablet 10 mg  10 mg Oral QHS Rosemary Foss MD   10 mg at 04/15/24 2014    bisacodyL suppository 10 mg  10 mg Rectal Daily PRN Bethany Delcid PA-C        carvediloL tablet 25 mg  25 mg Oral BID  Rosemary Foss MD   25 mg at 04/16/24 0838    cefTRIAXone (Rocephin) 1 g in dextrose 5 % in water (D5W) 100 mL IVPB (MB+)  1 g Intravenous Q24H Bethany Delcid, CARLOS   Stopped at 04/16/24 1409    dextrose 10% bolus 125 mL 125 mL  12.5 g Intravenous PRN Isatu Guardado, NP        dextrose 10% bolus  125 mL 125 mL  12.5 g Intravenous PRN Rosemary Foss MD        dextrose 10% bolus 250 mL 250 mL  25 g Intravenous PRN Isatu Guardado NP        dextrose 10% bolus 250 mL 250 mL  25 g Intravenous PRN Rosemary Foss MD        diphenhydrAMINE capsule 50 mg  50 mg Oral Once PRN Rosemary Foss MD        divalproex EC tablet 250 mg  250 mg Oral Q12H Juanito Zavala, NP   250 mg at 04/16/24 0840    docusate sodium capsule 100 mg  100 mg Oral TID Rosemary Foss MD   100 mg at 04/16/24 0838    dorzolamide 2 % ophthalmic solution 1 drop  1 drop Left Eye BID Trent Burt MD   1 drop at 04/16/24 0843    And    timolol maleate 0.5% ophthalmic solution 1 drop  1 drop Left Eye BID Trent Burt MD   1 drop at 04/16/24 0843    EPINEPHrine (PF) injection 1 mg  1 mg Subcutaneous Once PRN Rosemary Foss MD        famotidine tablet 20 mg  20 mg Oral QHS Rosemary Foss MD   20 mg at 04/15/24 2015    fluconazole tablet 200 mg  200 mg Oral Daily Bethany Delcid PA-C   200 mg at 04/16/24 1335    gabapentin capsule 300 mg  300 mg Oral Nightly PRN Juanito Zavala NP        glucagon (human recombinant) injection 1 mg  1 mg Intramuscular PRN Isatu Guardado NP        glucagon (human recombinant) injection 1 mg  1 mg Intramuscular Continuous PRN Rosemary Foss MD        glucose chewable tablet 16 g  16 g Oral PRN Isatu Guardado, NP        glucose chewable tablet 16 g  16 g Oral PRN Rosemary Foss MD        glucose chewable tablet 16 g  16 g Oral PRN Rosemary Foss MD        glucose chewable tablet 24 g  24 g Oral PRN Isatu Guardado, NP        glucose chewable tablet 24 g  24 g Oral PRN Rosemary Foss MD        heparin (porcine) injection 2,400 Units  2,400 Units Intravenous PRN Sher Hanna MD        heparin (porcine) injection 5,000 Units  5,000 Units Subcutaneous Q8H Rosemary Foss MD   5,000 Units at 04/16/24 1335    hydrocortisone sodium succinate injection 100 mg  100 mg Intravenous Once PRN Rosemary Foss MD        insulin  aspart U-100 pen 0-5 Units  0-5 Units Subcutaneous QID (AC + HS) PRN Rosemary Foss MD   2 Units at 04/15/24 1759    insulin aspart U-100 pen 12 Units  12 Units Subcutaneous TIDWM Isatu Guardado NP   12 Units at 04/16/24 1329    insulin detemir U-100 (Levemir) pen 30 Units  30 Units Subcutaneous QHS Isatu Guardado NP   30 Units at 04/15/24 2146    methylPREDNISolone sodium succinate (SOLU-MEDROL) 500 mg in dextrose 5 % (D5W) 100 mL IVPB  500 mg Intravenous Once Lorelei Cha PA-C        multivitamin tablet  1 tablet Oral Daily Rosemary Foss MD   1 tablet at 04/16/24 0838    mupirocin 2 % ointment 1 g  1 g Nasal BID Rosemary Foss MD   1 g at 04/16/24 0843    mupirocin 2 % ointment   Nasal On Call Procedure Lorelei Cha PA-C        mycophenolate capsule 1,000 mg  1,000 mg Oral BID Rosemary Foss MD   1,000 mg at 04/16/24 0904    NIFEdipine 24 hr tablet 30 mg  30 mg Oral BID Juanito Zavala NP   30 mg at 04/16/24 0840    ondansetron injection 4 mg  4 mg Intravenous Q6H PRN Rosemary Foss MD   4 mg at 04/15/24 1930    oxyCODONE immediate release tablet 5 mg  5 mg Oral Q4H PRN Juanito Zavala NP   5 mg at 04/16/24 0814    oxyCODONE immediate release tablet Tab 10 mg  10 mg Oral Q4H PRN Juanito Zavala NP   10 mg at 04/15/24 1706    [START ON 4/17/2024] predniSONE tablet 20 mg  20 mg Oral Daily Rosemary Foss MD        prochlorperazine injection Soln 5 mg  5 mg Intravenous Q6H PRN Jessica Haas DNP   5 mg at 04/15/24 2049    sevelamer carbonate tablet 800 mg  800 mg Oral TID  Bethany Delcid PA-C        sodium bicarbonate tablet 1,300 mg  1,300 mg Oral BID Lorelei Cha PA-C   1,300 mg at 04/16/24 0838    sodium chloride 0.9% flush 10 mL  10 mL Intravenous PRN Lorelei Cha PA-C        sodium chloride 0.9% flush 10 mL  10 mL Intravenous PRN Rosemary Foss MD        [START ON 4/24/2024] sulfamethoxazole-trimethoprim 400-80mg per tablet 1 tablet  1 tablet Oral  Daily AM Rosemary Foss MD        tacrolimus capsule 5 mg  5 mg Oral BID Bethany Delcid PA-C        tamsulosin 24 hr capsule 0.4 mg  1 capsule Oral Daily Lorelei Cha PA-C   0.4 mg at 04/16/24 0838    traMADoL tablet 50 mg  50 mg Oral Q4H PRN Lorelei Cha PA-C   50 mg at 04/15/24 0849    [START ON 4/24/2024] valGANciclovir tablet 450 mg  450 mg Oral Daily AM Rosemary Foss MD           Intake/Output - Last 3 Shifts         04/14 0700  04/15 0659 04/15 0700 04/16 0659 04/16 0700  04/17 0659    P.O. 540 360 360    I.V. (mL/kg) 2157.7 (23.6) 0 (0)     Other 500 0     IV Piggyback 2055.7      Total Intake(mL/kg) 5253.4 (57.5) 360 (3.9) 360 (3.9)    Urine (mL/kg/hr) 1155 (0.5) 335 (0.1) 125 (0.2)    Emesis/NG output 0 0 0    Other 1000 0 0    Stool 0 0 0    Blood 50 0 0    Total Output 2205 335 125    Net +3048.4 +25 +235           Urine Occurrence 0 x 0 x 0 x    Stool Occurrence 0 x 0 x 0 x    Emesis Occurrence 0 x 0 x 0 x             Review of Systems   Constitutional:  Negative for appetite change, fatigue and fever.   Eyes:  Positive for visual disturbance (chronic).   Respiratory:  Negative for cough and shortness of breath.    Cardiovascular:  Negative for chest pain and leg swelling.   Gastrointestinal:  Positive for abdominal distention and abdominal pain (incisional). Negative for diarrhea, nausea and vomiting.   Genitourinary:  Positive for decreased urine volume. Negative for difficulty urinating, dysuria and frequency.   Skin:  Positive for wound.   Allergic/Immunologic: Positive for immunocompromised state.   Neurological:  Negative for dizziness and weakness.   Psychiatric/Behavioral:  Negative for confusion and decreased concentration. The patient is not nervous/anxious.       Objective:     Vital Signs (Most Recent):  Temp: 98.2 °F (36.8 °C) (04/16/24 1227)  Pulse: 82 (04/16/24 1332)  Resp: 14 (04/16/24 1121)  BP: 128/71 (04/16/24 1332)  SpO2: 97 % (04/16/24 1332) Vital Signs (24h  "Range):  Temp:  [98 °F (36.7 °C)-99 °F (37.2 °C)] 98.2 °F (36.8 °C)  Pulse:  [76-84] 82  Resp:  [14-18] 14  SpO2:  [95 %-97 %] 97 %  BP: (128-161)/() 128/71     Weight: 93.1 kg (205 lb 4 oz)  Height: 5' 7" (170.2 cm)  Body mass index is 32.15 kg/m².     Physical Exam  Vitals and nursing note reviewed.   Constitutional:       General: He is not in acute distress.     Appearance: He is not ill-appearing.   HENT:      Head: Normocephalic.      Mouth/Throat:      Mouth: Mucous membranes are moist.   Eyes:      Extraocular Movements: Extraocular movements intact.      Conjunctiva/sclera: Conjunctivae normal.   Cardiovascular:      Rate and Rhythm: Normal rate and regular rhythm.      Pulses: Normal pulses.      Heart sounds: Normal heart sounds.   Pulmonary:      Effort: Pulmonary effort is normal. No respiratory distress.      Breath sounds: No wheezing or rales.   Abdominal:      General: Bowel sounds are normal. There is no distension.      Palpations: Abdomen is soft.      Tenderness: There is abdominal tenderness (appropriate post-op). There is no guarding or rebound.      Comments: RLQ kidney txp incision, KENNA with staples. Scant serosang drainage noted    Musculoskeletal:         General: No swelling. Normal range of motion.      Cervical back: Normal range of motion.      Right lower le+ Pitting Edema present.      Left lower le+ Pitting Edema present.   Skin:     General: Skin is warm and dry.   Neurological:      Mental Status: He is alert and oriented to person, place, and time. Mental status is at baseline.      Motor: No weakness.   Psychiatric:         Mood and Affect: Mood normal.         Behavior: Behavior normal.         Thought Content: Thought content normal.         Judgment: Judgment normal.          Laboratory:  CBC:   Recent Labs   Lab 04/15/24  0511 04/15/24  1421 24  0450   WBC 9.83 7.84 6.72   RBC 2.97* 2.83* 2.67*   HGB 9.1* 8.7* 8.4*   HCT 26.7* 25.6* 24.2*   * 121* " 90*   MCV 90 91 91   MCH 30.6 30.7 31.5*   MCHC 34.1 34.0 34.7     BMP:   Recent Labs   Lab 04/15/24  0511 04/15/24  1421 04/15/24  2046 04/15/24  2354 04/16/24  0450   * 237*  --   --  148*   * 131*  --   --  133*   K 4.7 5.0 5.2* 5.1 4.8    100  --   --  101   CO2 22* 23  --   --  21*   BUN 46* 60*  --   --  68*   CREATININE 6.9* 8.2*  --   --  9.4*   CALCIUM 7.6* 7.6*  --   --  7.7*     Labs within the past 24 hours have been reviewed.    Diagnostic Results:  US - Kidney: Results for orders placed during the hospital encounter of 04/14/24    US Transplant Kidney With Doppler    Narrative  EXAMINATION:  US TRANSPLANT KIDNEY WITH DOPPLER    CLINICAL HISTORY:  POD#1 kidney transplant;    TECHNIQUE:  Real time gray scale and doppler ultrasound was performed over the patient's renal allograft.    COMPARISON:  None    FINDINGS:  Renal allograft in the right lower quadrant.  The allograft measures 11.3 cm. Normal perfusion. No hydronephrosis.  Stent is present.    Fluid collection adjacent to the superior pole measuring 3.9 x 4.7 x 2.3    Vasculature:    Resistive indices ranged from 0.77 to 0.83.    Main renal artery peak systolic velocity: 182cm/sec with normal waveform.    Renal artery/iliac ratio: 1.3.    The main renal vein is patent.    Impression  Minimally elevated intraparenchymal resistive indices likely due to edema in recent state.  Otherwise satisfactory gray scale and doppler evaluation of renal allograft.      Electronically signed by: Ti Ha MD  Date:    04/15/2024  Time:    08:11

## 2024-04-16 NOTE — PROGRESS NOTES
"[facility]  Endocrinology  Progress Note    Admit Date: 2024     Reason for Consult: Management of T2DM, Hyperglycemia     Surgical Procedure and Date: Kidney Transplant on 24    Diabetes diagnosis year:     Home Diabetes Medications:  Novolog 4-6 units TID with mels, Lantus 28-35 untis q HS, Tradjenta 5 mg daily     How often checking glucose at home? Dexcom G7    BG readings on regimen: mostly 140-150s  Hypoglycemia on the regimen?  No  Missed doses on regimen?  No    Diabetes Complications include:     Hyperglycemia, Diabetic nephropathy  , Diabetic chronic kidney disease     , Diabetic cataract , and Diabetic peripheral neuropathy     Complicating diabetes co morbidities:   ESRD and Glucocorticoid use       HPI:   Patient is a 63 y.o. 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  Now presents for the above procedure. Endocrinology consulted for management of T2DM.      Lab Results   Component Value Date    HGBA1C 8.6 (H) 2024         Interval HPI:   Overnight events: Remains in TSU. POD 2 s/p kidney transplant. BG reasonably controlled on current SQ insulin regimen. Remains on Solu Medrol 125 mg, standard steroid taper. Creatinine 9.4. Diet diabetic Consistent Carbohydrate, Renal;  Calorie    Eatin%  Nausea: No  Hypoglycemia and intervention: No  Fever: No  TPN and/or TF: No  If yes, type of TF/TPN and rate: n/a    BP (!) 150/75 (BP Location: Right arm, Patient Position: Sitting)   Pulse 83   Temp 99 °F (37.2 °C) (Oral)   Resp 18   Ht 5' 7" (1.702 m)   Wt 93.1 kg (205 lb 4 oz)   SpO2 96%   BMI 32.15 kg/m²     Labs Reviewed and Include    Recent Labs   Lab 24  0450   *   CALCIUM 7.7*   ALBUMIN 2.3*   *   K 4.8   CO2 21*      BUN 68*   CREATININE 9.4*     Lab Results   Component Value Date    WBC 6.72 2024    HGB 8.4 (L) 2024    HCT 24.2 (L) 2024    MCV 91 2024    " "PLT 90 (L) 04/16/2024     No results for input(s): "TSH", "FREET4" in the last 168 hours.  Lab Results   Component Value Date    HGBA1C 8.6 (H) 04/14/2024       Nutritional status:   Body mass index is 32.15 kg/m².  Lab Results   Component Value Date    ALBUMIN 2.3 (L) 04/16/2024    ALBUMIN 2.3 (L) 04/15/2024    ALBUMIN 2.2 (L) 04/15/2024     No results found for: "PREALBUMIN"    Estimated Creatinine Clearance: 8.9 mL/min (A) (based on SCr of 9.4 mg/dL (H)).    Accu-Checks  Recent Labs     04/14/24  0557 04/14/24  0658 04/14/24  1353 04/14/24  1755 04/14/24  2205 04/15/24  0839 04/15/24  1307 04/15/24  1755 04/15/24  2145 04/16/24  0816   POCTGLUCOSE 226* 279* 147* 220* 247* 346* 244* 212* 155* 176*       Current Medications and/or Treatments Impacting Glycemic Control  Immunotherapy:    Immunosuppressants           Stop Route Frequency     tacrolimus capsule 4 mg         -- Oral 2 times daily     antithymocyte globulin (rabbit) 125 mg, hydrocortisone sodium succinate (SOLU-CORTEF) 20 mg in sodium chloride 0.9% 500 mL (FOR PERIPHERAL LINE ADMINISTRATION ONLY)         04/17/24 0859 IV Daily     mycophenolate capsule 1,000 mg         -- Oral 2 times daily          Steroids:   Hormones (From admission, onward)      Start     Stop Route Frequency Ordered    04/17/24 0900  predniSONE tablet 20 mg  (IP TXP KIDNEY POST-OP THYMO WITH PERIPHERAL PRECHECKED)         -- Oral Daily 04/14/24 1354    04/16/24 0800  methylPREDNISolone sodium succinate injection 125 mg  (IP TXP KIDNEY POST-OP THYMO WITH PERIPHERAL PRECHECKED)         -- IV Once 04/14/24 1354    04/15/24 0900  antithymocyte globulin (rabbit) 125 mg, hydrocortisone sodium succinate (SOLU-CORTEF) 20 mg in sodium chloride 0.9% 500 mL (FOR PERIPHERAL LINE ADMINISTRATION ONLY)  (IP TXP KIDNEY POST-OP THYMO WITH PERIPHERAL PRECHECKED)         04/17/24 0859 IV Daily 04/14/24 1354    04/14/24 1431  hydrocortisone sodium succinate injection 100 mg  (IP TXP KIDNEY POST-OP " THYMO WITH PERIPHERAL PRECHECKED)         09/11/35 0531 IV Once as needed 04/14/24 1354    04/14/24 0300  methylPREDNISolone sodium succinate (SOLU-MEDROL) 500 mg in dextrose 5 % (D5W) 100 mL IVPB  (IP TXP INTRA-OP THYMO - PERIPHERAL THYMO PRECHECKED)         -- IV Once 04/14/24 0254          Pressors:    Autonomic Drugs (From admission, onward)      Start     Stop Route Frequency Ordered    04/14/24 1431  EPINEPHrine (PF) injection 1 mg  (IP TXP KIDNEY POST-OP THYMO WITH PERIPHERAL PRECHECKED)         09/11/35 0531 SubQ Once as needed 04/14/24 1354          Hyperglycemia/Diabetes Medications:   Antihyperglycemics (From admission, onward)      Start     Stop Route Frequency Ordered    04/15/24 2100  insulin detemir U-100 (Levemir) pen 30 Units         -- SubQ Nightly 04/15/24 1004    04/15/24 1130  insulin aspart U-100 pen 12 Units         -- SubQ 3 times daily with meals 04/15/24 1104    04/14/24 1430  insulin aspart U-100 pen 0-5 Units  (INSULIN - LOW CORRECTION DOSE (Recommended for BMI <25, GFR<15 or on dialysis, Age > 70, type 1 diabetes, ESLD, severe CHF or patients on <70 units of insulin daily))         -- SubQ Before meals & nightly PRN 04/14/24 1354            ASSESSMENT and PLAN    Renal/  * Kidney replaced by transplant  Managed per primary team  Avoid hypoglycemia        Immunology/Multi System  Prophylactic immunotherapy  May increase insulin resistance.         Endocrine  Class 1 obesity due to excess calories with serious comorbidity and body mass index (BMI) of 31.0 to 31.9 in adult  Body mass index is 32.15 kg/m².  May increase insulin resistance.         Type 2 diabetes mellitus with hypoglycemia without coma, with long-term current use of insulin  BG goal 140-180    Continue Levemir to 30 units q HS   Continue Novolog to 12 units TID with meals (0.8 u/kg dosing) Steroids contributing to significant BG excursions.  Moderate Dose Correction Scale  BG monitoring ac/hs    ** Please call Endocrine  for any BG related issues **        Other  Adverse effect of corticosteroids  Glucocorticoids markedly increase glucoses. Expect steroid taper to help with glucose control          Isatu Guardado NP  Endocrinology  [facility]

## 2024-04-16 NOTE — PLAN OF CARE
Patient is AAOx4.  Afebrile  RA  Telemetry monitoring remains in place. NSR 70-80s  AC&HS glucose monitoring  Thymo #3 of 3 currently infusing per order. Patient is tolerating well.   Marinelli removed today (4/16/24) at 1600.  Proper betadine application to incision  taught to patient and patient's wife.  No complaint of nausea this shift.   Patient is tolerating diet well.   PRN oxy 5mg administered for complaint of pain.   Patient is passing gas, but no bowel movement noted thus far this shift.   RLQ kidney incision with staples. KENNA  Old PD cath sites with staples, KENNA  Diflucan, renvela and ceftriaxone added to medication regimen.   H&H 8.4/24.2  K 4.8  Cr 9.4  Wife pulls self medications at 100%.   Blue card updated and medications restocked.   Patient ambulated in hallway with wife a few times this shift with stand by assist.   Patient up in recliner for several hours.   Patient is to be NPO at midnight for permcath placement tomorrow (4/17/24).  Wife is at bedside and participating in care.   Bed is in lowest position with wheels locked.   Call light remains within reach.   Instructed to call for assistance.   Plan of care is ongoing.

## 2024-04-16 NOTE — SUBJECTIVE & OBJECTIVE
"Interval HPI:   Overnight events: Remains in TSU. POD 2 s/p kidney transplant. BG reasonably controlled on current SQ insulin regimen. Remains on Solu Medrol 125 mg, standard steroid taper. Creatinine 9.4. Diet diabetic Consistent Carbohydrate, Renal;  Calorie    Eatin%  Nausea: No  Hypoglycemia and intervention: No  Fever: No  TPN and/or TF: No  If yes, type of TF/TPN and rate: n/a    BP (!) 150/75 (BP Location: Right arm, Patient Position: Sitting)   Pulse 83   Temp 99 °F (37.2 °C) (Oral)   Resp 18   Ht 5' 7" (1.702 m)   Wt 93.1 kg (205 lb 4 oz)   SpO2 96%   BMI 32.15 kg/m²     Labs Reviewed and Include    Recent Labs   Lab 24  0450   *   CALCIUM 7.7*   ALBUMIN 2.3*   *   K 4.8   CO2 21*      BUN 68*   CREATININE 9.4*     Lab Results   Component Value Date    WBC 6.72 2024    HGB 8.4 (L) 2024    HCT 24.2 (L) 2024    MCV 91 2024    PLT 90 (L) 2024     No results for input(s): "TSH", "FREET4" in the last 168 hours.  Lab Results   Component Value Date    HGBA1C 8.6 (H) 2024       Nutritional status:   Body mass index is 32.15 kg/m².  Lab Results   Component Value Date    ALBUMIN 2.3 (L) 2024    ALBUMIN 2.3 (L) 04/15/2024    ALBUMIN 2.2 (L) 04/15/2024     No results found for: "PREALBUMIN"    Estimated Creatinine Clearance: 8.9 mL/min (A) (based on SCr of 9.4 mg/dL (H)).    Accu-Checks  Recent Labs     24  0557 24  0658 24  1353 24  1755 24  2205 04/15/24  0839 04/15/24  1307 04/15/24  1755 04/15/24  2145 24  0816   POCTGLUCOSE 226* 279* 147* 220* 247* 346* 244* 212* 155* 176*       Current Medications and/or Treatments Impacting Glycemic Control  Immunotherapy:    Immunosuppressants           Stop Route Frequency     tacrolimus capsule 4 mg         -- Oral 2 times daily     antithymocyte globulin (rabbit) 125 mg, hydrocortisone sodium succinate (SOLU-CORTEF) 20 mg in sodium chloride 0.9% 500 mL " (FOR PERIPHERAL LINE ADMINISTRATION ONLY)         04/17/24 0859 IV Daily     mycophenolate capsule 1,000 mg         -- Oral 2 times daily          Steroids:   Hormones (From admission, onward)      Start     Stop Route Frequency Ordered    04/17/24 0900  predniSONE tablet 20 mg  (IP TXP KIDNEY POST-OP THYMO WITH PERIPHERAL PRECHECKED)         -- Oral Daily 04/14/24 1354    04/16/24 0800  methylPREDNISolone sodium succinate injection 125 mg  (IP TXP KIDNEY POST-OP THYMO WITH PERIPHERAL PRECHECKED)         -- IV Once 04/14/24 1354    04/15/24 0900  antithymocyte globulin (rabbit) 125 mg, hydrocortisone sodium succinate (SOLU-CORTEF) 20 mg in sodium chloride 0.9% 500 mL (FOR PERIPHERAL LINE ADMINISTRATION ONLY)  (IP TXP KIDNEY POST-OP THYMO WITH PERIPHERAL PRECHECKED)         04/17/24 0859 IV Daily 04/14/24 1354    04/14/24 1431  hydrocortisone sodium succinate injection 100 mg  (IP TXP KIDNEY POST-OP THYMO WITH PERIPHERAL PRECHECKED)         09/11/35 0531 IV Once as needed 04/14/24 1354    04/14/24 0300  methylPREDNISolone sodium succinate (SOLU-MEDROL) 500 mg in dextrose 5 % (D5W) 100 mL IVPB  (IP TXP INTRA-OP THYMO - PERIPHERAL THYMO PRECHECKED)         -- IV Once 04/14/24 0254          Pressors:    Autonomic Drugs (From admission, onward)      Start     Stop Route Frequency Ordered    04/14/24 1431  EPINEPHrine (PF) injection 1 mg  (IP TXP KIDNEY POST-OP THYMO WITH PERIPHERAL PRECHECKED)         09/11/35 0531 SubQ Once as needed 04/14/24 1354          Hyperglycemia/Diabetes Medications:   Antihyperglycemics (From admission, onward)      Start     Stop Route Frequency Ordered    04/15/24 2100  insulin detemir U-100 (Levemir) pen 30 Units         -- SubQ Nightly 04/15/24 1004    04/15/24 1130  insulin aspart U-100 pen 12 Units         -- SubQ 3 times daily with meals 04/15/24 1104    04/14/24 1430  insulin aspart U-100 pen 0-5 Units  (INSULIN - LOW CORRECTION DOSE (Recommended for BMI <25, GFR<15 or on dialysis, Age  > 70, type 1 diabetes, ESLD, severe CHF or patients on <70 units of insulin daily))         -- SubQ Before meals & nightly PRN 04/14/24 3207

## 2024-04-17 PROBLEM — T86.19 DELAYED GRAFT FUNCTION OF KIDNEY: Status: ACTIVE | Noted: 2024-04-17

## 2024-04-17 LAB
ALBUMIN SERPL BCP-MCNC: 2.2 G/DL (ref 3.5–5.2)
ANION GAP SERPL CALC-SCNC: 11 MMOL/L (ref 8–16)
BASOPHILS # BLD AUTO: 0 K/UL (ref 0–0.2)
BASOPHILS NFR BLD: 0 % (ref 0–1.9)
BLD PROD TYP BPU: NORMAL
BLOOD UNIT EXPIRATION DATE: NORMAL
BLOOD UNIT TYPE CODE: 6200
BLOOD UNIT TYPE: NORMAL
BUN SERPL-MCNC: 87 MG/DL (ref 8–23)
CALCIUM SERPL-MCNC: 7.6 MG/DL (ref 8.7–10.5)
CHLORIDE SERPL-SCNC: 100 MMOL/L (ref 95–110)
CO2 SERPL-SCNC: 22 MMOL/L (ref 23–29)
CODING SYSTEM: NORMAL
CREAT SERPL-MCNC: 9.9 MG/DL (ref 0.5–1.4)
CROSSMATCH INTERPRETATION: NORMAL
DIFFERENTIAL METHOD BLD: ABNORMAL
DISPENSE STATUS: NORMAL
EOSINOPHIL # BLD AUTO: 0 K/UL (ref 0–0.5)
EOSINOPHIL NFR BLD: 0 % (ref 0–8)
ERYTHROCYTE [DISTWIDTH] IN BLOOD BY AUTOMATED COUNT: 12.8 % (ref 11.5–14.5)
EST. GFR  (NO RACE VARIABLE): 5.4 ML/MIN/1.73 M^2
GLUCOSE SERPL-MCNC: 95 MG/DL (ref 70–110)
HBV SURFACE AB SER QL IA: POSITIVE
HBV SURFACE AB SERPL IA-ACNC: 13 MIU/ML
HCT VFR BLD AUTO: 20.9 % (ref 40–54)
HGB BLD-MCNC: 7.2 G/DL (ref 14–18)
IMM GRANULOCYTES # BLD AUTO: 0.02 K/UL (ref 0–0.04)
IMM GRANULOCYTES NFR BLD AUTO: 0.4 % (ref 0–0.5)
LYMPHOCYTES # BLD AUTO: 0.4 K/UL (ref 1–4.8)
LYMPHOCYTES NFR BLD: 8.8 % (ref 18–48)
MAGNESIUM SERPL-MCNC: 2.2 MG/DL (ref 1.6–2.6)
MCH RBC QN AUTO: 30.5 PG (ref 27–31)
MCHC RBC AUTO-ENTMCNC: 34.4 G/DL (ref 32–36)
MCV RBC AUTO: 89 FL (ref 82–98)
MONOCYTES # BLD AUTO: 0.7 K/UL (ref 0.3–1)
MONOCYTES NFR BLD: 14.8 % (ref 4–15)
NEUTROPHILS # BLD AUTO: 3.7 K/UL (ref 1.8–7.7)
NEUTROPHILS NFR BLD: 76 % (ref 38–73)
NRBC BLD-RTO: 0 /100 WBC
NUM UNITS TRANS PACKED RBC: NORMAL
PHOSPHATE SERPL-MCNC: 6.4 MG/DL (ref 2.7–4.5)
PLATELET # BLD AUTO: 77 K/UL (ref 150–450)
PMV BLD AUTO: 12.2 FL (ref 9.2–12.9)
POCT GLUCOSE: 105 MG/DL (ref 70–110)
POCT GLUCOSE: 110 MG/DL (ref 70–110)
POCT GLUCOSE: 122 MG/DL (ref 70–110)
POCT GLUCOSE: 136 MG/DL (ref 70–110)
POCT GLUCOSE: 176 MG/DL (ref 70–110)
POTASSIUM SERPL-SCNC: 4.7 MMOL/L (ref 3.5–5.1)
RBC # BLD AUTO: 2.36 M/UL (ref 4.6–6.2)
SODIUM SERPL-SCNC: 133 MMOL/L (ref 136–145)
TACROLIMUS BLD-MCNC: 8 NG/ML (ref 5–15)
WBC # BLD AUTO: 4.8 K/UL (ref 3.9–12.7)

## 2024-04-17 PROCEDURE — 25000003 PHARM REV CODE 250

## 2024-04-17 PROCEDURE — 25000003 PHARM REV CODE 250: Performed by: PHYSICIAN ASSISTANT

## 2024-04-17 PROCEDURE — P9016 RBC LEUKOCYTES REDUCED: HCPCS | Performed by: PHYSICIAN ASSISTANT

## 2024-04-17 PROCEDURE — 86922 COMPATIBILITY TEST ANTIGLOB: CPT | Performed by: PHYSICIAN ASSISTANT

## 2024-04-17 PROCEDURE — 63600175 PHARM REV CODE 636 W HCPCS: Performed by: PHYSICIAN ASSISTANT

## 2024-04-17 PROCEDURE — 99232 SBSQ HOSP IP/OBS MODERATE 35: CPT | Mod: ,,, | Performed by: NURSE PRACTITIONER

## 2024-04-17 PROCEDURE — 85025 COMPLETE CBC W/AUTO DIFF WBC: CPT | Performed by: STUDENT IN AN ORGANIZED HEALTH CARE EDUCATION/TRAINING PROGRAM

## 2024-04-17 PROCEDURE — 90935 HEMODIALYSIS ONE EVALUATION: CPT

## 2024-04-17 PROCEDURE — 80069 RENAL FUNCTION PANEL: CPT | Performed by: STUDENT IN AN ORGANIZED HEALTH CARE EDUCATION/TRAINING PROGRAM

## 2024-04-17 PROCEDURE — 25000003 PHARM REV CODE 250: Performed by: STUDENT IN AN ORGANIZED HEALTH CARE EDUCATION/TRAINING PROGRAM

## 2024-04-17 PROCEDURE — 63600175 PHARM REV CODE 636 W HCPCS: Performed by: STUDENT IN AN ORGANIZED HEALTH CARE EDUCATION/TRAINING PROGRAM

## 2024-04-17 PROCEDURE — 25000003 PHARM REV CODE 250: Performed by: CLINICAL NURSE SPECIALIST

## 2024-04-17 PROCEDURE — 83735 ASSAY OF MAGNESIUM: CPT | Performed by: STUDENT IN AN ORGANIZED HEALTH CARE EDUCATION/TRAINING PROGRAM

## 2024-04-17 PROCEDURE — 99233 SBSQ HOSP IP/OBS HIGH 50: CPT | Mod: 24,,, | Performed by: PHYSICIAN ASSISTANT

## 2024-04-17 PROCEDURE — 20600001 HC STEP DOWN PRIVATE ROOM

## 2024-04-17 PROCEDURE — 80197 ASSAY OF TACROLIMUS: CPT | Performed by: STUDENT IN AN ORGANIZED HEALTH CARE EDUCATION/TRAINING PROGRAM

## 2024-04-17 PROCEDURE — 27000207 HC ISOLATION

## 2024-04-17 PROCEDURE — 30233N1 TRANSFUSION OF NONAUTOLOGOUS RED BLOOD CELLS INTO PERIPHERAL VEIN, PERCUTANEOUS APPROACH: ICD-10-PCS | Performed by: SURGERY

## 2024-04-17 RX ORDER — HYDROCODONE BITARTRATE AND ACETAMINOPHEN 500; 5 MG/1; MG/1
TABLET ORAL
Status: DISCONTINUED | OUTPATIENT
Start: 2024-04-17 | End: 2024-04-18

## 2024-04-17 RX ORDER — INSULIN ASPART 100 [IU]/ML
8 INJECTION, SOLUTION INTRAVENOUS; SUBCUTANEOUS
Status: DISCONTINUED | OUTPATIENT
Start: 2024-04-17 | End: 2024-04-18

## 2024-04-17 RX ORDER — SODIUM CHLORIDE 9 MG/ML
INJECTION, SOLUTION INTRAVENOUS
Status: DISCONTINUED | OUTPATIENT
Start: 2024-04-17 | End: 2024-04-18 | Stop reason: HOSPADM

## 2024-04-17 RX ADMIN — MYCOPHENOLATE MOFETIL 1000 MG: 250 CAPSULE ORAL at 08:04

## 2024-04-17 RX ADMIN — CARVEDILOL 25 MG: 25 TABLET, FILM COATED ORAL at 06:04

## 2024-04-17 RX ADMIN — OXYCODONE 5 MG: 5 TABLET ORAL at 07:04

## 2024-04-17 RX ADMIN — SODIUM BICARBONATE 1300 MG: 650 TABLET ORAL at 08:04

## 2024-04-17 RX ADMIN — TRAMADOL HYDROCHLORIDE 50 MG: 50 TABLET, COATED ORAL at 09:04

## 2024-04-17 RX ADMIN — DOCUSATE SODIUM 100 MG: 100 CAPSULE, LIQUID FILLED ORAL at 08:04

## 2024-04-17 RX ADMIN — TAMSULOSIN HYDROCHLORIDE 0.4 MG: 0.4 CAPSULE ORAL at 08:04

## 2024-04-17 RX ADMIN — INSULIN ASPART 8 UNITS: 100 INJECTION, SOLUTION INTRAVENOUS; SUBCUTANEOUS at 06:04

## 2024-04-17 RX ADMIN — FLUCONAZOLE 200 MG: 200 TABLET ORAL at 08:04

## 2024-04-17 RX ADMIN — SEVELAMER CARBONATE 800 MG: 800 TABLET, FILM COATED ORAL at 06:04

## 2024-04-17 RX ADMIN — MUPIROCIN 1 G: 20 OINTMENT TOPICAL at 08:04

## 2024-04-17 RX ADMIN — DORZOLAMIDE HCL 1 DROP: 20 SOLUTION/ DROPS OPHTHALMIC at 09:04

## 2024-04-17 RX ADMIN — PREDNISONE 20 MG: 20 TABLET ORAL at 08:04

## 2024-04-17 RX ADMIN — SEVELAMER CARBONATE 800 MG: 800 TABLET, FILM COATED ORAL at 08:04

## 2024-04-17 RX ADMIN — MYCOPHENOLATE MOFETIL 1000 MG: 250 CAPSULE ORAL at 09:04

## 2024-04-17 RX ADMIN — THERA TABS 1 TABLET: TAB at 08:04

## 2024-04-17 RX ADMIN — TACROLIMUS 5 MG: 5 CAPSULE ORAL at 06:04

## 2024-04-17 RX ADMIN — DIVALPROEX SODIUM 250 MG: 250 TABLET, DELAYED RELEASE ORAL at 08:04

## 2024-04-17 RX ADMIN — SEVELAMER CARBONATE 800 MG: 800 TABLET, FILM COATED ORAL at 01:04

## 2024-04-17 RX ADMIN — HEPARIN SODIUM 5000 UNITS: 5000 INJECTION INTRAVENOUS; SUBCUTANEOUS at 05:04

## 2024-04-17 RX ADMIN — TACROLIMUS 5 MG: 5 CAPSULE ORAL at 08:04

## 2024-04-17 RX ADMIN — MUPIROCIN 1 G: 20 OINTMENT TOPICAL at 09:04

## 2024-04-17 RX ADMIN — OXYCODONE 5 MG: 5 TABLET ORAL at 05:04

## 2024-04-17 RX ADMIN — DOCUSATE SODIUM 100 MG: 100 CAPSULE, LIQUID FILLED ORAL at 01:04

## 2024-04-17 RX ADMIN — SODIUM BICARBONATE 1300 MG: 650 TABLET ORAL at 09:04

## 2024-04-17 RX ADMIN — INSULIN ASPART 8 UNITS: 100 INJECTION, SOLUTION INTRAVENOUS; SUBCUTANEOUS at 01:04

## 2024-04-17 RX ADMIN — DIVALPROEX SODIUM 250 MG: 250 TABLET, DELAYED RELEASE ORAL at 09:04

## 2024-04-17 RX ADMIN — TIMOLOL MALEATE 1 DROP: 5 SOLUTION OPHTHALMIC at 08:04

## 2024-04-17 RX ADMIN — NIFEDIPINE 30 MG: 30 TABLET, FILM COATED, EXTENDED RELEASE ORAL at 09:04

## 2024-04-17 RX ADMIN — DORZOLAMIDE HCL 1 DROP: 20 SOLUTION/ DROPS OPHTHALMIC at 08:04

## 2024-04-17 RX ADMIN — ONDANSETRON 4 MG: 2 INJECTION INTRAMUSCULAR; INTRAVENOUS at 01:04

## 2024-04-17 RX ADMIN — NIFEDIPINE 30 MG: 30 TABLET, FILM COATED, EXTENDED RELEASE ORAL at 08:04

## 2024-04-17 RX ADMIN — BISACODYL 10 MG: 10 SUPPOSITORY RECTAL at 09:04

## 2024-04-17 RX ADMIN — INSULIN ASPART 12 UNITS: 100 INJECTION, SOLUTION INTRAVENOUS; SUBCUTANEOUS at 09:04

## 2024-04-17 RX ADMIN — CARVEDILOL 25 MG: 25 TABLET, FILM COATED ORAL at 08:04

## 2024-04-17 RX ADMIN — TIMOLOL MALEATE 1 DROP: 5 SOLUTION OPHTHALMIC at 09:04

## 2024-04-17 RX ADMIN — HEPARIN SODIUM 5000 UNITS: 5000 INJECTION INTRAVENOUS; SUBCUTANEOUS at 01:04

## 2024-04-17 RX ADMIN — FAMOTIDINE 20 MG: 20 TABLET ORAL at 09:04

## 2024-04-17 NOTE — PROGRESS NOTES
Murphy was seen while receiving his dialysis today. Reviewed case with dialysis nurse at bedside.  HD being tolerated so far- see orders and flowsheet for precise details.  Will transfuse one u PRBCs d/t acute blood loss anemia post op (expected). No active s/s bleeding.

## 2024-04-17 NOTE — PROGRESS NOTES
[facility] - Transplant Stepdown  Kidney Transplant  Progress Note      Reason for Follow-up: Reassessment of Kidney Transplant - 4/14/2024  (#1) recipient and management of immunosuppression.     ORGAN:   RIGHT KIDNEY    Donor Type:   Donation after Circulatory Death     PHS Increased Risk: no   Cold Ischemia: 17 hrs  Induction Medications: Thymoglobulin      Subjective:   History of Present Illness:  Mr. Ha is a 63 y.o. Black or  male with ESRD secondary to diabetic nephropathy. He is presenting as a primary candidate for a kidney transplant with Dr. Burt 4/14/24 at 8am.  He has been on the wait list for a kidney transplant at [facility] since 2/24/2021. Patient is currently on peritoneal dialysis started on 2/24/2021. Patient reports that he is tolerating dialysis well. He has a PD catheter. Patient denies any recent hospitalizations or ED visits. Denies peritonitis or exit site infections. Pre op labs and imaging reveal K 5.4. PD cut short due to transplant offer. Patient shifted in preparation for surgery, anesthesiology aware.       Mr. Ha is a 63 y.o. year old male who is status post Kidney Transplant - 4/14/2024  (#1).    His maintenance immunosuppression consists of:   Immunosuppressants (From admission, onward)      Start     Stop Route Frequency Ordered    04/16/24 1800  tacrolimus capsule 5 mg  (IP TXP KIDNEY POST-OP THYMO WITH PERIPHERAL PRECHECKED)         -- Oral 2 times daily 04/16/24 1031    04/14/24 1400  mycophenolate capsule 1,000 mg  (IP TXP KIDNEY POST-OP THYMO WITH PERIPHERAL PRECHECKED)         -- Oral 2 times daily 04/14/24 1354            Hospital Course:  s/p DCD KTX d/t DM, also PD cath removal on 4/14/24 (CIT 17h, KDPI 83%). 2 day johnston, no drains. Hyperkalemic post-op, K 7.1, HD done POD#0. Kidney US POD 1#, minimally elevated intraparenchymal resistive indices likely due to edema in recent state, otherwise satisfactory.      Interval History: Pt had a  hypoglycemic episode overnight (BG= 85). Held insulin, given glucose. DGF noted. Marinelli removed yesterday. Pt is voiding well (UOP= 695cc). K stable. Interventional neph placing tunnel cath tomorrow AM. NPO at midnight. Mild SS drainage from incision site on abdomen. Slight uptrend in Cr levels (9.4 to 9.9). Drop in H/H. Pt will receive a unit of RBC during dialysis today. Pt ambulating twice a day. Pain is adequately controlled. (+) flatus, (-) BM. Continue current bowel regimen. VSS. Will continue to monitor.         Past Medical, Surgical, Family, and Social History:   Unchanged from H&P.    Scheduled Meds:  Current Facility-Administered Medications   Medication Dose Route Frequency Provider Last Rate Last Admin    0.9%  NaCl infusion (for blood administration)   Intravenous Q24H PRN Bethany Delcid PA-C        bisacodyL EC tablet 10 mg  10 mg Oral QHS Rosemary Foss MD   10 mg at 04/16/24 2142    bisacodyL suppository 10 mg  10 mg Rectal Daily PRN Bethany Delcid PA-C   10 mg at 04/17/24 0959    carvediloL tablet 25 mg  25 mg Oral BID WM Rosemary Foss MD   25 mg at 04/17/24 0828    dextrose 10% bolus 125 mL 125 mL  12.5 g Intravenous PRN Isatu Guardado NP        dextrose 10% bolus 125 mL 125 mL  12.5 g Intravenous PRN Rosemary Foss MD        dextrose 10% bolus 250 mL 250 mL  25 g Intravenous PRN Isatu Guardado, NP        dextrose 10% bolus 250 mL 250 mL  25 g Intravenous PRN Rosemary Foss MD        diphenhydrAMINE capsule 50 mg  50 mg Oral Once PRN Rosemary Foss MD        divalproex EC tablet 250 mg  250 mg Oral Q12H Juanito Zavala NP   250 mg at 04/17/24 0828    docusate sodium capsule 100 mg  100 mg Oral TID Rosemary Foss MD   100 mg at 04/17/24 0828    dorzolamide 2 % ophthalmic solution 1 drop  1 drop Left Eye BID Trent Burt MD   1 drop at 04/17/24 0832    And    timolol maleate 0.5% ophthalmic solution 1 drop  1 drop Left Eye BID Trent Burt MD   1 drop at 04/17/24 0832     EPINEPHrine (PF) injection 1 mg  1 mg Subcutaneous Once PRN Rosemary Foss MD        famotidine tablet 20 mg  20 mg Oral QHS Rosemary Foss MD   20 mg at 04/16/24 2143    gabapentin capsule 300 mg  300 mg Oral Nightly PRN Juanito Zavala NP        glucagon (human recombinant) injection 1 mg  1 mg Intramuscular PRN Isatu Guardado NP        glucagon (human recombinant) injection 1 mg  1 mg Intramuscular Continuous PRN Rosemary Foss MD        glucose chewable tablet 16 g  16 g Oral PRN Iastu Guardado NP   16 g at 04/16/24 2148    glucose chewable tablet 16 g  16 g Oral PRN Rosemary Foss MD        glucose chewable tablet 16 g  16 g Oral PRN Rosemary Foss MD        glucose chewable tablet 24 g  24 g Oral PRN Isatu Guardado NP        glucose chewable tablet 24 g  24 g Oral PRN Rosemary Foss MD        heparin (porcine) injection 2,400 Units  2,400 Units Intravenous PRN Sher Hanna MD        heparin (porcine) injection 5,000 Units  5,000 Units Subcutaneous Q8H Rosemary Foss MD   5,000 Units at 04/17/24 0546    hydrocortisone sodium succinate injection 100 mg  100 mg Intravenous Once PRN Rosemary Foss MD        insulin aspart U-100 pen 0-5 Units  0-5 Units Subcutaneous QID (AC + HS) PRN Rosemary Foss MD   2 Units at 04/15/24 1759    insulin aspart U-100 pen 12 Units  12 Units Subcutaneous TIDWM Isatu Guardado NP   12 Units at 04/17/24 0954    insulin detemir U-100 (Levemir) pen 30 Units  30 Units Subcutaneous QHS Isatu Guardado NP   30 Units at 04/15/24 2146    methylPREDNISolone sodium succinate (SOLU-MEDROL) 500 mg in dextrose 5 % (D5W) 100 mL IVPB  500 mg Intravenous Once Lorelei Cha PA-C        multivitamin tablet  1 tablet Oral Daily Rosemary Foss MD   1 tablet at 04/17/24 0828    mupirocin 2 % ointment 1 g  1 g Nasal BID Rosemary Fsos MD   1 g at 04/17/24 0831    mupirocin 2 % ointment   Nasal On Call Procedure Lorelei Cha PA-C        mycophenolate capsule 1,000 mg  1,000 mg Oral BID  Rosemary Foss MD   1,000 mg at 04/17/24 0828    NIFEdipine 24 hr tablet 30 mg  30 mg Oral BID Juanito Zavala, NP   30 mg at 04/17/24 0828    ondansetron injection 4 mg  4 mg Intravenous Q6H PRN Rosemary Foss MD   4 mg at 04/15/24 1930    oxyCODONE immediate release tablet 5 mg  5 mg Oral Q4H PRN Junaito Zavala, NP   5 mg at 04/17/24 0546    oxyCODONE immediate release tablet Tab 10 mg  10 mg Oral Q4H PRN Juanito Zavala, NP   10 mg at 04/15/24 1706    predniSONE tablet 20 mg  20 mg Oral Daily Rosemary Foss MD   20 mg at 04/17/24 0835    prochlorperazine injection Soln 5 mg  5 mg Intravenous Q6H PRN Jessica Haas, MERISSA   5 mg at 04/15/24 2049    sevelamer carbonate tablet 800 mg  800 mg Oral TID  Bethany Delcid PA-C   800 mg at 04/17/24 0828    sodium bicarbonate tablet 1,300 mg  1,300 mg Oral BID Lorelei Cha PA-C   1,300 mg at 04/17/24 0828    sodium chloride 0.9% flush 10 mL  10 mL Intravenous PRN Lorelei Cha PA-C        sodium chloride 0.9% flush 10 mL  10 mL Intravenous PRN Rosemary Foss MD        sodium chloride 0.9% flush 10 mL  10 mL Intravenous PRN Sobeida Morales MD        [START ON 4/24/2024] sulfamethoxazole-trimethoprim 400-80mg per tablet 1 tablet  1 tablet Oral Daily AM Rosemary Foss MD        tacrolimus capsule 5 mg  5 mg Oral BID Bethany Delcid PA-C   5 mg at 04/17/24 0828    tamsulosin 24 hr capsule 0.4 mg  1 capsule Oral Daily Lorelei Cha PA-C   0.4 mg at 04/17/24 0828    traMADoL tablet 50 mg  50 mg Oral Q4H PRN Lorelei Cha PA-C   50 mg at 04/17/24 0959    [START ON 4/24/2024] valGANciclovir tablet 450 mg  450 mg Oral Daily AM Rosemary Foss MD         Continuous Infusions:  Current Facility-Administered Medications   Medication Dose Route Frequency Provider Last Rate Last Admin    0.9%  NaCl infusion (for blood administration)   Intravenous Q24H PRN Bethany Delcid PA-C        bisacodyL EC tablet 10 mg  10 mg Oral QHS Rosemary Foss,  MD   10 mg at 04/16/24 2142    bisacodyL suppository 10 mg  10 mg Rectal Daily PRN Bethany Delcid PA-C   10 mg at 04/17/24 0959    carvediloL tablet 25 mg  25 mg Oral BID WM Rosemary Foss MD   25 mg at 04/17/24 0828    dextrose 10% bolus 125 mL 125 mL  12.5 g Intravenous PRN Isatu Guardado, NP        dextrose 10% bolus 125 mL 125 mL  12.5 g Intravenous PRN Rosemary Foss MD        dextrose 10% bolus 250 mL 250 mL  25 g Intravenous PRN Isatu Guardado, NP        dextrose 10% bolus 250 mL 250 mL  25 g Intravenous PRN Rosemary Foss MD        diphenhydrAMINE capsule 50 mg  50 mg Oral Once PRN Rosemary Foss MD        divalproex EC tablet 250 mg  250 mg Oral Q12H Juanito Zavala NP   250 mg at 04/17/24 0828    docusate sodium capsule 100 mg  100 mg Oral TID Rosemary Foss MD   100 mg at 04/17/24 0828    dorzolamide 2 % ophthalmic solution 1 drop  1 drop Left Eye BID Trent Burt MD   1 drop at 04/17/24 0832    And    timolol maleate 0.5% ophthalmic solution 1 drop  1 drop Left Eye BID Trent Burt MD   1 drop at 04/17/24 0832    EPINEPHrine (PF) injection 1 mg  1 mg Subcutaneous Once PRN Rosemary Foss MD        famotidine tablet 20 mg  20 mg Oral QHS Rosemary Foss MD   20 mg at 04/16/24 2143    gabapentin capsule 300 mg  300 mg Oral Nightly PRN Juanito Zavala NP        glucagon (human recombinant) injection 1 mg  1 mg Intramuscular PRN Isatu Guardado NP        glucagon (human recombinant) injection 1 mg  1 mg Intramuscular Continuous PRN Rosemary Foss MD        glucose chewable tablet 16 g  16 g Oral PRN Isatu Guardado NP   16 g at 04/16/24 2148    glucose chewable tablet 16 g  16 g Oral PRN Rosemary Foss MD        glucose chewable tablet 16 g  16 g Oral PRN Rosemary Foss MD        glucose chewable tablet 24 g  24 g Oral PRN Isatu Guardado NP        glucose chewable tablet 24 g  24 g Oral PRN Rosemary Foss MD        heparin (porcine) injection 2,400 Units  2,400 Units Intravenous PRN Cyndi,  Sher AUSTIN MD        heparin (porcine) injection 5,000 Units  5,000 Units Subcutaneous Q8H Rosemary Foss MD   5,000 Units at 04/17/24 0546    hydrocortisone sodium succinate injection 100 mg  100 mg Intravenous Once PRN Rosemary Foss MD        insulin aspart U-100 pen 0-5 Units  0-5 Units Subcutaneous QID (AC + HS) PRN Rosemary Foss MD   2 Units at 04/15/24 1759    insulin aspart U-100 pen 12 Units  12 Units Subcutaneous TIDWM Isatu Guardado NP   12 Units at 04/17/24 0954    insulin detemir U-100 (Levemir) pen 30 Units  30 Units Subcutaneous QHS Isatu Guardado NP   30 Units at 04/15/24 2146    methylPREDNISolone sodium succinate (SOLU-MEDROL) 500 mg in dextrose 5 % (D5W) 100 mL IVPB  500 mg Intravenous Once Lorelei Cha PA-C        multivitamin tablet  1 tablet Oral Daily Rosemary Foss MD   1 tablet at 04/17/24 0828    mupirocin 2 % ointment 1 g  1 g Nasal BID Rosemary Foss MD   1 g at 04/17/24 0831    mupirocin 2 % ointment   Nasal On Call Procedure Lorelei Cha PA-C        mycophenolate capsule 1,000 mg  1,000 mg Oral BID Rosemary Foss MD   1,000 mg at 04/17/24 0828    NIFEdipine 24 hr tablet 30 mg  30 mg Oral BID Juanito Zavala NP   30 mg at 04/17/24 0828    ondansetron injection 4 mg  4 mg Intravenous Q6H PRN Rosemary Foss MD   4 mg at 04/15/24 1930    oxyCODONE immediate release tablet 5 mg  5 mg Oral Q4H PRN Juanito Zavala NP   5 mg at 04/17/24 0546    oxyCODONE immediate release tablet Tab 10 mg  10 mg Oral Q4H PRN Juanito Zavala NP   10 mg at 04/15/24 1706    predniSONE tablet 20 mg  20 mg Oral Daily Rosemary Foss MD   20 mg at 04/17/24 0835    prochlorperazine injection Soln 5 mg  5 mg Intravenous Q6H PRN Jessica Haas, MERISSA   5 mg at 04/15/24 2049    sevelamer carbonate tablet 800 mg  800 mg Oral TID WM Bethany Delcid PA-C   800 mg at 04/17/24 0828    sodium bicarbonate tablet 1,300 mg  1,300 mg Oral BID Lorelei Cha PA-C   1,300 mg at 04/17/24 0828     sodium chloride 0.9% flush 10 mL  10 mL Intravenous PRN Lorelei Cha PA-C        sodium chloride 0.9% flush 10 mL  10 mL Intravenous PRN Rosemary Foss MD        sodium chloride 0.9% flush 10 mL  10 mL Intravenous PRN Sobeida Morales MD        [START ON 4/24/2024] sulfamethoxazole-trimethoprim 400-80mg per tablet 1 tablet  1 tablet Oral Daily AM Rosemary Foss MD        tacrolimus capsule 5 mg  5 mg Oral BID Bethany Delcid PA-C   5 mg at 04/17/24 0828    tamsulosin 24 hr capsule 0.4 mg  1 capsule Oral Daily Lorelei Cha PA-C   0.4 mg at 04/17/24 0828    traMADoL tablet 50 mg  50 mg Oral Q4H PRN Lorelei Cha PA-C   50 mg at 04/17/24 0959    [START ON 4/24/2024] valGANciclovir tablet 450 mg  450 mg Oral Daily AM Rosemary Foss MD         PRN Meds:  Current Facility-Administered Medications   Medication Dose Route Frequency Provider Last Rate Last Admin    0.9%  NaCl infusion (for blood administration)   Intravenous Q24H PRN Bethany Delcid PA-C        bisacodyL EC tablet 10 mg  10 mg Oral QHS Rosemary Foss MD   10 mg at 04/16/24 2142    bisacodyL suppository 10 mg  10 mg Rectal Daily PRN Bethany Delcid PA-C   10 mg at 04/17/24 0959    carvediloL tablet 25 mg  25 mg Oral BID WM Rosemary Foss MD   25 mg at 04/17/24 0828    dextrose 10% bolus 125 mL 125 mL  12.5 g Intravenous PRN Isatu Guardado, NP        dextrose 10% bolus 125 mL 125 mL  12.5 g Intravenous PRN Rosemary Foss MD        dextrose 10% bolus 250 mL 250 mL  25 g Intravenous PRN Isatu Guardado, NP        dextrose 10% bolus 250 mL 250 mL  25 g Intravenous PRN Rosemary Foss MD        diphenhydrAMINE capsule 50 mg  50 mg Oral Once PRN Rosemary Foss MD        divalproex EC tablet 250 mg  250 mg Oral Q12H Juanito Zavala NP   250 mg at 04/17/24 0828    docusate sodium capsule 100 mg  100 mg Oral TID Rosemary Foss MD   100 mg at 04/17/24 0828    dorzolamide 2 % ophthalmic solution 1 drop  1 drop Left Eye BID Trent Burt MD    1 drop at 04/17/24 0832    And    timolol maleate 0.5% ophthalmic solution 1 drop  1 drop Left Eye BID Trent Burt MD   1 drop at 04/17/24 0832    EPINEPHrine (PF) injection 1 mg  1 mg Subcutaneous Once PRN Rosemary Foss MD        famotidine tablet 20 mg  20 mg Oral QHS Rosemary Foss MD   20 mg at 04/16/24 2143    gabapentin capsule 300 mg  300 mg Oral Nightly PRN Juanito Zavala NP        glucagon (human recombinant) injection 1 mg  1 mg Intramuscular PRN Isatu Guardado NP        glucagon (human recombinant) injection 1 mg  1 mg Intramuscular Continuous PRN Rosemary Foss MD        glucose chewable tablet 16 g  16 g Oral PRN Isatu Guardado NP   16 g at 04/16/24 2148    glucose chewable tablet 16 g  16 g Oral PRN Rosemary Foss MD        glucose chewable tablet 16 g  16 g Oral PRN Rosemary Foss MD        glucose chewable tablet 24 g  24 g Oral PRN Isatu Guardado NP        glucose chewable tablet 24 g  24 g Oral PRN Rosemary Foss MD        heparin (porcine) injection 2,400 Units  2,400 Units Intravenous PRN Sher Hanna MD        heparin (porcine) injection 5,000 Units  5,000 Units Subcutaneous Q8H Rosemary Foss MD   5,000 Units at 04/17/24 0546    hydrocortisone sodium succinate injection 100 mg  100 mg Intravenous Once PRN Rosemary Foss MD        insulin aspart U-100 pen 0-5 Units  0-5 Units Subcutaneous QID (AC + HS) PRN Rosemary Foss MD   2 Units at 04/15/24 1759    insulin aspart U-100 pen 12 Units  12 Units Subcutaneous TIDWM Isatu Guardado NP   12 Units at 04/17/24 0954    insulin detemir U-100 (Levemir) pen 30 Units  30 Units Subcutaneous QHS Isatu Guardado NP   30 Units at 04/15/24 2146    methylPREDNISolone sodium succinate (SOLU-MEDROL) 500 mg in dextrose 5 % (D5W) 100 mL IVPB  500 mg Intravenous Once Lorelei Cha PA-C        multivitamin tablet  1 tablet Oral Daily Rosemary Foss MD   1 tablet at 04/17/24 0828    mupirocin 2 % ointment 1 g  1 g Nasal BID Rosemary Foss MD   1  g at 04/17/24 0831    mupirocin 2 % ointment   Nasal On Call Procedure Lorelei Cha PA-C        mycophenolate capsule 1,000 mg  1,000 mg Oral BID Rosemary Foss MD   1,000 mg at 04/17/24 0828    NIFEdipine 24 hr tablet 30 mg  30 mg Oral BID Juanito Zavala NP   30 mg at 04/17/24 0828    ondansetron injection 4 mg  4 mg Intravenous Q6H PRN Rosemary Foss MD   4 mg at 04/15/24 1930    oxyCODONE immediate release tablet 5 mg  5 mg Oral Q4H PRN Juanito Zavala NP   5 mg at 04/17/24 0546    oxyCODONE immediate release tablet Tab 10 mg  10 mg Oral Q4H PRN Juanito Zavala NP   10 mg at 04/15/24 1706    predniSONE tablet 20 mg  20 mg Oral Daily Rosemary Foss MD   20 mg at 04/17/24 0835    prochlorperazine injection Soln 5 mg  5 mg Intravenous Q6H PRN Jessica Haas DNP   5 mg at 04/15/24 2049    sevelamer carbonate tablet 800 mg  800 mg Oral TID WM Bethany Delcid PA-C   800 mg at 04/17/24 0828    sodium bicarbonate tablet 1,300 mg  1,300 mg Oral BID Lorelei Cha PA-C   1,300 mg at 04/17/24 0828    sodium chloride 0.9% flush 10 mL  10 mL Intravenous PRN Lorelei Cha PA-C        sodium chloride 0.9% flush 10 mL  10 mL Intravenous PRN Rosemary Foss MD        sodium chloride 0.9% flush 10 mL  10 mL Intravenous PRN Sobeida Morales MD        [START ON 4/24/2024] sulfamethoxazole-trimethoprim 400-80mg per tablet 1 tablet  1 tablet Oral Daily AM Rosemary Foss MD        tacrolimus capsule 5 mg  5 mg Oral BID Bethany Delcid PA-C   5 mg at 04/17/24 0828    tamsulosin 24 hr capsule 0.4 mg  1 capsule Oral Daily Lorelei Cha PA-C   0.4 mg at 04/17/24 0828    traMADoL tablet 50 mg  50 mg Oral Q4H NAGAN Lorelei Cha PA-C   50 mg at 04/17/24 0959    [START ON 4/24/2024] valGANciclovir tablet 450 mg  450 mg Oral Daily AM Rosemary Foss MD           Intake/Output - Last 3 Shifts         04/15 0700 04/16 0659 04/16 0700 04/17 0659 04/17 0700 04/18 0659    P.O. 360 1080  "360    I.V. (mL/kg) 0 (0)      Other 0      IV Piggyback       Total Intake(mL/kg) 360 (3.9) 1080 (11.4) 360 (3.8)    Urine (mL/kg/hr) 335 (0.1) 695 (0.3) 375 (0.7)    Emesis/NG output 0 0     Other 0 0     Stool 0 0 0    Blood 0 0     Total Output 335 695 375    Net +25 +385 -15           Urine Occurrence 0 x 1 x     Stool Occurrence 0 x 0 x 1 x    Emesis Occurrence 0 x 0 x              Review of Systems   Constitutional:  Negative for appetite change, fatigue and fever.   Eyes:  Positive for visual disturbance (chronic).   Respiratory:  Negative for cough and shortness of breath.    Cardiovascular:  Negative for chest pain and leg swelling.   Gastrointestinal:  Positive for abdominal distention and abdominal pain (incisional). Negative for diarrhea, nausea and vomiting.   Genitourinary:  Positive for decreased urine volume. Negative for difficulty urinating, dysuria and frequency.   Skin:  Positive for wound (abdomen).   Allergic/Immunologic: Positive for immunocompromised state.   Neurological:  Negative for dizziness and weakness.   Psychiatric/Behavioral:  Negative for confusion, decreased concentration and suicidal ideas. The patient is not nervous/anxious.       Objective:     Vital Signs (Most Recent):  Temp: 98.2 °F (36.8 °C) (04/17/24 1103)  Pulse: 83 (04/17/24 1103)  Resp: 18 (04/17/24 1103)  BP: 134/65 (04/17/24 1103)  SpO2: 97 % (04/17/24 1103) Vital Signs (24h Range):  Temp:  [98 °F (36.7 °C)-98.7 °F (37.1 °C)] 98.2 °F (36.8 °C)  Pulse:  [78-86] 83  Resp:  [14-18] 18  SpO2:  [93 %-97 %] 97 %  BP: (113-151)/(62-77) 134/65     Weight: 95.1 kg (209 lb 8.8 oz)  Height: 5' 7" (170.2 cm)  Body mass index is 32.82 kg/m².     Physical Exam  Vitals and nursing note reviewed.   Constitutional:       General: He is not in acute distress.     Appearance: Normal appearance. He is not ill-appearing.   HENT:      Head: Normocephalic and atraumatic.      Mouth/Throat:      Mouth: Mucous membranes are moist.   Eyes:    "   Extraocular Movements: Extraocular movements intact.      Conjunctiva/sclera: Conjunctivae normal.   Cardiovascular:      Rate and Rhythm: Normal rate and regular rhythm.      Pulses: Normal pulses.      Heart sounds: Normal heart sounds.   Pulmonary:      Effort: Pulmonary effort is normal. No respiratory distress.      Breath sounds: Normal breath sounds. No wheezing or rales.   Abdominal:      General: Bowel sounds are normal. There is distension.      Palpations: Abdomen is soft.      Tenderness: There is abdominal tenderness (appropiate for post-op). There is no guarding or rebound.      Comments: RLQ KTx incision, KENNA with staples. Mild SS drainage noted.      Musculoskeletal:         General: No swelling. Normal range of motion.      Cervical back: Normal range of motion.      Right lower leg: Edema present.      Left lower leg: Edema present.   Skin:     General: Skin is warm and dry.      Capillary Refill: Capillary refill takes less than 2 seconds.   Neurological:      General: No focal deficit present.      Mental Status: He is alert and oriented to person, place, and time. Mental status is at baseline.      Motor: No weakness.   Psychiatric:         Mood and Affect: Mood normal.         Behavior: Behavior normal.         Thought Content: Thought content normal.         Judgment: Judgment normal.          Laboratory:  CBC:   Recent Labs   Lab 04/15/24  1421 04/16/24  0450 04/17/24  0533   WBC 7.84 6.72 4.80   RBC 2.83* 2.67* 2.36*   HGB 8.7* 8.4* 7.2*   HCT 25.6* 24.2* 20.9*   * 90* 77*   MCV 91 91 89   MCH 30.7 31.5* 30.5   MCHC 34.0 34.7 34.4     BMP:   Recent Labs   Lab 04/15/24  1421 04/15/24  2046 04/15/24  2354 04/16/24  0450 04/17/24  0533   *  --   --  148* 95   *  --   --  133* 133*   K 5.0   < > 5.1 4.8 4.7     --   --  101 100   CO2 23  --   --  21* 22*   BUN 60*  --   --  68* 87*   CREATININE 8.2*  --   --  9.4* 9.9*   CALCIUM 7.6*  --   --  7.7* 7.6*    < > =  values in this interval not displayed.     Labs within the past 24 hours have been reviewed.    Diagnostic Results:  US - Kidney: Results for orders placed during the hospital encounter of 04/14/24    US Transplant Kidney With Doppler    Narrative  EXAMINATION:  US TRANSPLANT KIDNEY WITH DOPPLER    CLINICAL HISTORY:  POD#1 kidney transplant;    TECHNIQUE:  Real time gray scale and doppler ultrasound was performed over the patient's renal allograft.    COMPARISON:  None    FINDINGS:  Renal allograft in the right lower quadrant.  The allograft measures 11.3 cm. Normal perfusion. No hydronephrosis.  Stent is present.    Fluid collection adjacent to the superior pole measuring 3.9 x 4.7 x 2.3    Vasculature:    Resistive indices ranged from 0.77 to 0.83.    Main renal artery peak systolic velocity: 182cm/sec with normal waveform.    Renal artery/iliac ratio: 1.3.    The main renal vein is patent.    Impression  Minimally elevated intraparenchymal resistive indices likely due to edema in recent state.  Otherwise satisfactory gray scale and doppler evaluation of renal allograft.      Electronically signed by: Ti Ha MD  Date:    04/15/2024  Time:    08:11  Assessment/Plan:     * Kidney replaced by transplant  - s/p DCD KTX d/t DM and PD cath removal on 4/14/24 (CIT 17h, KDPI 83%). 2 day johnston, no drains.   - Hyperkalemic post-op, HD POD#0.   - DGF d/t prolonged CIT, DCD status  - Interventional neph consulted for tunnel cath placement. Planned for tomorrow AM.    - Daily renal panel  - Avoid nephrotoxic meds  - Strict I's/O's      Delayed graft function of kidney  - Expected d/t prolonged CIT and DCD kidney  - HD POD #0 and today 4/17  - Outpatient HD chair obtained MWF       Metabolic acidosis  - Cont PO bicarb       Acute blood loss anemia  - Expected post op  - Monitor with daily cbc       Anemia of chronic disease  - Drop in H/H. Pt will receive one unit of RBCs during dialysis today.  - HDS, no s/s of  bleeding  - Will cont to monitor with daily cbc.       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 Monitor for toxic side effects       Class 1 obesity due to excess calories with serious comorbidity and body mass index (BMI) of 31.0 to 31.9 in adult  - Dietary following   - Cont daily weights       Hypertension  - continue home coreg and nifedipine      Type 2 diabetes mellitus with hypoglycemia without coma, with long-term current use of insulin  - endocrine consulted, appreciate assistance       Discharge Planning: Not a candidate for d/c at this time.     Medical decision making for this encounter includes review of pertinent labs and diagnostic studies, assessment and planning, discussions with consulting providers, discussion with patient/family, and participation in multidisciplinary rounds. Time spent caring for patient: 60 minutes    Bethany Delcid PA-C  Kidney Transplant  [facility]

## 2024-04-17 NOTE — ASSESSMENT & PLAN NOTE
BG goal 140-180    Steroids tapered- expect decrease insulin requirements    Decrease Levemir to 20 units q HS (30% dose reduction)   Decrease Novolog to 8 units TID with meals (30% dose reduction)   Moderate Dose Correction Scale  BG monitoring ac/hs    ** Please call Endocrine for any BG related issues **

## 2024-04-17 NOTE — PROGRESS NOTES
"[facility]  Endocrinology  Progress Note    Admit Date: 2024     Reason for Consult: Management of T2DM, Hyperglycemia     Surgical Procedure and Date: Kidney Transplant on 24    Diabetes diagnosis year:     Home Diabetes Medications:  Novolog 4-6 units TID with mels, Lantus 28-35 untis q HS, Tradjenta 5 mg daily     How often checking glucose at home? Dexcom G7    BG readings on regimen: mostly 140-150s  Hypoglycemia on the regimen?  No  Missed doses on regimen?  No    Diabetes Complications include:     Hyperglycemia, Diabetic nephropathy  , Diabetic chronic kidney disease     , Diabetic cataract , and Diabetic peripheral neuropathy     Complicating diabetes co morbidities:   ESRD and Glucocorticoid use       HPI:   Patient is a 63 y.o. 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  Now presents for the above procedure. Endocrinology consulted for management of T2DM.      Lab Results   Component Value Date    HGBA1C 8.6 (H) 2024         Interval HPI:   Overnight events: Remains in TSU. POD 3 s/p kidney transplant. BG at and below goal ranges on current SQ insulin regimen. Levemir held yesterday evening for BG 85.  Prednisone 20 mg daily. Creatinine 9.9. Diet Renal Standard Tray    Eatin%   Nausea: No  Hypoglycemia and intervention: No  Fever: No  TPN and/or TF: No  If yes, type of TF/TPN and rate: n/a    /65 (BP Location: Right arm, Patient Position: Sitting)   Pulse 83   Temp 98.2 °F (36.8 °C) (Oral)   Resp 18   Ht 5' 7" (1.702 m)   Wt 95.1 kg (209 lb 8.8 oz)   SpO2 97%   BMI 32.82 kg/m²     Labs Reviewed and Include    Recent Labs   Lab 24  0533   GLU 95   CALCIUM 7.6*   ALBUMIN 2.2*   *   K 4.7   CO2 22*      BUN 87*   CREATININE 9.9*     Lab Results   Component Value Date    WBC 4.80 2024    HGB 7.2 (L) 2024    HCT 20.9 (L) 2024    MCV 89 2024    PLT 77 (L) 2024 " "    No results for input(s): "TSH", "FREET4" in the last 168 hours.  Lab Results   Component Value Date    HGBA1C 8.6 (H) 04/14/2024       Nutritional status:   Body mass index is 32.82 kg/m².  Lab Results   Component Value Date    ALBUMIN 2.2 (L) 04/17/2024    ALBUMIN 2.3 (L) 04/16/2024    ALBUMIN 2.3 (L) 04/15/2024     No results found for: "PREALBUMIN"    Estimated Creatinine Clearance: 8.5 mL/min (A) (based on SCr of 9.9 mg/dL (H)).    Accu-Checks  Recent Labs     04/15/24  1755 04/15/24  2145 04/16/24  0816 04/16/24  1225 04/16/24  1720 04/16/24  2139 04/16/24  2141 04/16/24  2226 04/17/24  0000 04/17/24  1341   POCTGLUCOSE 212* 155* 176* 177* 152* 85 85 86 105 176*       Current Medications and/or Treatments Impacting Glycemic Control  Immunotherapy:    Immunosuppressants           Stop Route Frequency     tacrolimus capsule 5 mg         -- Oral 2 times daily     mycophenolate capsule 1,000 mg         -- Oral 2 times daily          Steroids:   Hormones (From admission, onward)      Start     Stop Route Frequency Ordered    04/17/24 0900  predniSONE tablet 20 mg  (IP TXP KIDNEY POST-OP THYMO WITH PERIPHERAL PRECHECKED)         -- Oral Daily 04/14/24 1354    04/14/24 1431  hydrocortisone sodium succinate injection 100 mg  (IP TXP KIDNEY POST-OP THYMO WITH PERIPHERAL PRECHECKED)         09/11/35 0531 IV Once as needed 04/14/24 1354    04/14/24 0300  methylPREDNISolone sodium succinate (SOLU-MEDROL) 500 mg in dextrose 5 % (D5W) 100 mL IVPB  (IP TXP INTRA-OP THYMO - PERIPHERAL THYMO PRECHECKED)         -- IV Once 04/14/24 0254          Pressors:    Autonomic Drugs (From admission, onward)      Start     Stop Route Frequency Ordered    04/14/24 1431  EPINEPHrine (PF) injection 1 mg  (IP TXP KIDNEY POST-OP THYMO WITH PERIPHERAL PRECHECKED)         09/11/35 0531 SubQ Once as needed 04/14/24 1354          Hyperglycemia/Diabetes Medications:   Antihyperglycemics (From admission, onward)      Start     Stop Route " Frequency Ordered    04/17/24 2100  insulin detemir U-100 (Levemir) pen 20 Units         -- SubQ Nightly 04/17/24 1353    04/17/24 1645  insulin aspart U-100 pen 8 Units         -- SubQ 3 times daily with meals 04/17/24 1351    04/14/24 1430  insulin aspart U-100 pen 0-5 Units  (INSULIN - LOW CORRECTION DOSE (Recommended for BMI <25, GFR<15 or on dialysis, Age > 70, type 1 diabetes, ESLD, severe CHF or patients on <70 units of insulin daily))         -- SubQ Before meals & nightly PRN 04/14/24 1354            ASSESSMENT and PLAN    Renal/  * Kidney replaced by transplant  Managed per primary team  Avoid hypoglycemia        Immunology/Multi System  Prophylactic immunotherapy  May increase insulin resistance.         Endocrine  Class 1 obesity due to excess calories with serious comorbidity and body mass index (BMI) of 31.0 to 31.9 in adult  Body mass index is 32.82 kg/m².  May increase insulin resistance.         Type 2 diabetes mellitus with hypoglycemia without coma, with long-term current use of insulin  BG goal 140-180    Steroids tapered- expect decrease insulin requirements    Decrease Levemir to 20 units q HS (30% dose reduction)   Decrease Novolog to 8 units TID with meals (30% dose reduction)   Moderate Dose Correction Scale  BG monitoring ac/hs    ** Please call Endocrine for any BG related issues **        Other  Adverse effect of corticosteroids  Glucocorticoids markedly increase glucoses. Expect steroid taper to help with glucose control          Isatu Guardado, NP  Endocrinology  [facility]

## 2024-04-17 NOTE — SUBJECTIVE & OBJECTIVE
Subjective:   History of Present Illness:  Mr. Ha is a 63 y.o. Black or  male with ESRD secondary to diabetic nephropathy. He is presenting as a primary candidate for a kidney transplant with Dr. Burt 4/14/24 at 8am.  He has been on the wait list for a kidney transplant at [facility] since 2/24/2021. Patient is currently on peritoneal dialysis started on 2/24/2021. Patient reports that he is tolerating dialysis well. He has a PD catheter. Patient denies any recent hospitalizations or ED visits. Denies peritonitis or exit site infections. Pre op labs and imaging reveal K 5.4. PD cut short due to transplant offer. Patient shifted in preparation for surgery, anesthesiology aware.       Mr. Ha is a 63 y.o. year old male who is status post Kidney Transplant - 4/14/2024  (#1).    His maintenance immunosuppression consists of:   Immunosuppressants (From admission, onward)      Start     Stop Route Frequency Ordered    04/16/24 1800  tacrolimus capsule 5 mg  (IP TXP KIDNEY POST-OP THYMO WITH PERIPHERAL PRECHECKED)         -- Oral 2 times daily 04/16/24 1031    04/14/24 1400  mycophenolate capsule 1,000 mg  (IP TXP KIDNEY POST-OP THYMO WITH PERIPHERAL PRECHECKED)         -- Oral 2 times daily 04/14/24 1354            Hospital Course:  s/p DCD KTX d/t DM, also PD cath removal on 4/14/24 (CIT 17h, KDPI 83%). 2 day johnston, no drains. Hyperkalemic post-op, K 7.1, HD done POD#0. Kidney US POD 1#, minimally elevated intraparenchymal resistive indices likely due to edema in recent state, otherwise satisfactory.      Interval History: Pt had a hypoglycemic episode overnight (BG= 85). Held insulin, given glucose. DGF noted. Johnston removed yesterday. Pt is voiding well (UOP= 695cc). K stable. Interventional neph placing tunnel cath tomorrow AM. NPO at midnight. Mild SS drainage from incision site on abdomen. Slight uptrend in Cr levels (9.4 to 9.9). Drop in H/H. Pt will receive a unit of RBC during dialysis  today. Pt ambulating twice a day. Pain is adequately controlled. (+) flatus, (-) BM. Continue current bowel regimen. VSS. Will continue to monitor.         Past Medical, Surgical, Family, and Social History:   Unchanged from H&P.    Scheduled Meds:  Current Facility-Administered Medications   Medication Dose Route Frequency Provider Last Rate Last Admin    0.9%  NaCl infusion (for blood administration)   Intravenous Q24H PRN Bethany Delcid PA-C        bisacodyL EC tablet 10 mg  10 mg Oral QHS Rosemary Foss MD   10 mg at 04/16/24 2142    bisacodyL suppository 10 mg  10 mg Rectal Daily PRN Bethany Delcid PA-C   10 mg at 04/17/24 0959    carvediloL tablet 25 mg  25 mg Oral BID WM Rosemary Foss MD   25 mg at 04/17/24 0828    dextrose 10% bolus 125 mL 125 mL  12.5 g Intravenous PRN Isatu Guardado, NP        dextrose 10% bolus 125 mL 125 mL  12.5 g Intravenous PRN Rosemary Foss MD        dextrose 10% bolus 250 mL 250 mL  25 g Intravenous PRN Isatu Guardado, NP        dextrose 10% bolus 250 mL 250 mL  25 g Intravenous PRN Rosemary Foss MD        diphenhydrAMINE capsule 50 mg  50 mg Oral Once PRN Rosemary Foss MD        divalproex EC tablet 250 mg  250 mg Oral Q12H Juanito Zavala NP   250 mg at 04/17/24 0828    docusate sodium capsule 100 mg  100 mg Oral TID Rosemary Foss MD   100 mg at 04/17/24 0828    dorzolamide 2 % ophthalmic solution 1 drop  1 drop Left Eye BID Trent Burt MD   1 drop at 04/17/24 0832    And    timolol maleate 0.5% ophthalmic solution 1 drop  1 drop Left Eye BID Trent Burt MD   1 drop at 04/17/24 0832    EPINEPHrine (PF) injection 1 mg  1 mg Subcutaneous Once PRN Rosemary Foss MD        famotidine tablet 20 mg  20 mg Oral QHS Rosemary Foss MD   20 mg at 04/16/24 2143    gabapentin capsule 300 mg  300 mg Oral Nightly PRN Juanito Zavala, NP        glucagon (human recombinant) injection 1 mg  1 mg Intramuscular PRN Isatu Guardado, NP        glucagon (human recombinant)  injection 1 mg  1 mg Intramuscular Continuous PRN Rosemary Foss MD        glucose chewable tablet 16 g  16 g Oral PRN Isatu Guardado NP   16 g at 04/16/24 2148    glucose chewable tablet 16 g  16 g Oral PRN Rosemary Foss MD        glucose chewable tablet 16 g  16 g Oral PRN Rosemary Foss MD        glucose chewable tablet 24 g  24 g Oral PRN Isatu Guardado NP        glucose chewable tablet 24 g  24 g Oral PRN Rosemary Foss MD        heparin (porcine) injection 2,400 Units  2,400 Units Intravenous PRN Sher Hanna MD        heparin (porcine) injection 5,000 Units  5,000 Units Subcutaneous Q8H Rosemary Foss MD   5,000 Units at 04/17/24 0546    hydrocortisone sodium succinate injection 100 mg  100 mg Intravenous Once PRN Rosemary Foss MD        insulin aspart U-100 pen 0-5 Units  0-5 Units Subcutaneous QID (AC + HS) PRN Rosemary Foss MD   2 Units at 04/15/24 1759    insulin aspart U-100 pen 12 Units  12 Units Subcutaneous TIDWM Isatu Guardado NP   12 Units at 04/17/24 0954    insulin detemir U-100 (Levemir) pen 30 Units  30 Units Subcutaneous QHS Isatu Guardado NP   30 Units at 04/15/24 2146    methylPREDNISolone sodium succinate (SOLU-MEDROL) 500 mg in dextrose 5 % (D5W) 100 mL IVPB  500 mg Intravenous Once Lorelei Cha PA-C        multivitamin tablet  1 tablet Oral Daily Rosemary Foss MD   1 tablet at 04/17/24 0828    mupirocin 2 % ointment 1 g  1 g Nasal BID Rosemary Foss MD   1 g at 04/17/24 0831    mupirocin 2 % ointment   Nasal On Call Procedure Lorelei Cha PA-C        mycophenolate capsule 1,000 mg  1,000 mg Oral BID Rosemary Foss MD   1,000 mg at 04/17/24 0828    NIFEdipine 24 hr tablet 30 mg  30 mg Oral BID Juanito Zavala NP   30 mg at 04/17/24 0828    ondansetron injection 4 mg  4 mg Intravenous Q6H PRN Rosemary Foss MD   4 mg at 04/15/24 1930    oxyCODONE immediate release tablet 5 mg  5 mg Oral Q4H PRN Juanito Zavala, NP   5 mg at 04/17/24 0566    oxyCODONE immediate  release tablet Tab 10 mg  10 mg Oral Q4H PRN Juanito Zavala NP   10 mg at 04/15/24 1706    predniSONE tablet 20 mg  20 mg Oral Daily Rosemary Foss MD   20 mg at 04/17/24 0835    prochlorperazine injection Soln 5 mg  5 mg Intravenous Q6H PRN Jessica Haas DNP   5 mg at 04/15/24 2049    sevelamer carbonate tablet 800 mg  800 mg Oral TID  Bethany Delcid PA-C   800 mg at 04/17/24 0828    sodium bicarbonate tablet 1,300 mg  1,300 mg Oral BID Lorelei Cha PA-C   1,300 mg at 04/17/24 0828    sodium chloride 0.9% flush 10 mL  10 mL Intravenous PRN Lorelei Cha PA-C        sodium chloride 0.9% flush 10 mL  10 mL Intravenous PRN Rosemary Foss MD        sodium chloride 0.9% flush 10 mL  10 mL Intravenous PRN Sobeida Morales MD        [START ON 4/24/2024] sulfamethoxazole-trimethoprim 400-80mg per tablet 1 tablet  1 tablet Oral Daily AM Rosemary Foss MD        tacrolimus capsule 5 mg  5 mg Oral BID Bethany Delcid PA-C   5 mg at 04/17/24 0828    tamsulosin 24 hr capsule 0.4 mg  1 capsule Oral Daily Lorelei Cha PA-C   0.4 mg at 04/17/24 0828    traMADoL tablet 50 mg  50 mg Oral Q4H PRN Lorelei Cha PA-C   50 mg at 04/17/24 0959    [START ON 4/24/2024] valGANciclovir tablet 450 mg  450 mg Oral Daily AM Rosemary Foss MD         Continuous Infusions:  Current Facility-Administered Medications   Medication Dose Route Frequency Provider Last Rate Last Admin    0.9%  NaCl infusion (for blood administration)   Intravenous Q24H PRN Bethany Delcid PA-C        bisacodyL EC tablet 10 mg  10 mg Oral QHS Rosemary Foss MD   10 mg at 04/16/24 2142    bisacodyL suppository 10 mg  10 mg Rectal Daily PRN Bethany Delcid PA-C   10 mg at 04/17/24 0959    carvediloL tablet 25 mg  25 mg Oral BID WM Rosemary Foss MD   25 mg at 04/17/24 0828    dextrose 10% bolus 125 mL 125 mL  12.5 g Intravenous PRN Isatu Guardado NP        dextrose 10% bolus 125 mL 125 mL  12.5 g Intravenous PRN Rosemary Foss,  MD        dextrose 10% bolus 250 mL 250 mL  25 g Intravenous PRN Isatu Guardado NP        dextrose 10% bolus 250 mL 250 mL  25 g Intravenous PRN Rosemary Foss MD        diphenhydrAMINE capsule 50 mg  50 mg Oral Once PRN Rosemary Foss MD        divalproex EC tablet 250 mg  250 mg Oral Q12H Juanito Zavala NP   250 mg at 04/17/24 0828    docusate sodium capsule 100 mg  100 mg Oral TID Rosemary Foss MD   100 mg at 04/17/24 0828    dorzolamide 2 % ophthalmic solution 1 drop  1 drop Left Eye BID Trent Burt MD   1 drop at 04/17/24 0832    And    timolol maleate 0.5% ophthalmic solution 1 drop  1 drop Left Eye BID Trent Burt MD   1 drop at 04/17/24 0832    EPINEPHrine (PF) injection 1 mg  1 mg Subcutaneous Once PRN Rosemary Foss MD        famotidine tablet 20 mg  20 mg Oral QHS Rosemary Foss MD   20 mg at 04/16/24 2143    gabapentin capsule 300 mg  300 mg Oral Nightly PRN Juanito Zavala NP        glucagon (human recombinant) injection 1 mg  1 mg Intramuscular PRN Isatu Guardado NP        glucagon (human recombinant) injection 1 mg  1 mg Intramuscular Continuous PRN Rosemary Foss MD        glucose chewable tablet 16 g  16 g Oral PRN sIatu Guardado NP   16 g at 04/16/24 2148    glucose chewable tablet 16 g  16 g Oral PRN Rosemary Foss MD        glucose chewable tablet 16 g  16 g Oral PRN Rosemary Foss MD        glucose chewable tablet 24 g  24 g Oral PRN Isatu Guardado NP        glucose chewable tablet 24 g  24 g Oral PRN Rosemary Foss MD        heparin (porcine) injection 2,400 Units  2,400 Units Intravenous PRN Sher Hanna MD        heparin (porcine) injection 5,000 Units  5,000 Units Subcutaneous Q8H Rosemary Foss MD   5,000 Units at 04/17/24 0546    hydrocortisone sodium succinate injection 100 mg  100 mg Intravenous Once PRN Rosemary Foss MD        insulin aspart U-100 pen 0-5 Units  0-5 Units Subcutaneous QID (AC + HS) PRN Rosemary Foss MD   2 Units at 04/15/24 3233    insulin aspart  U-100 pen 12 Units  12 Units Subcutaneous TIDWM Isatu Guardado NP   12 Units at 04/17/24 0954    insulin detemir U-100 (Levemir) pen 30 Units  30 Units Subcutaneous QHS Isatu Guardado NP   30 Units at 04/15/24 2146    methylPREDNISolone sodium succinate (SOLU-MEDROL) 500 mg in dextrose 5 % (D5W) 100 mL IVPB  500 mg Intravenous Once Lorelei Cha PA-C        multivitamin tablet  1 tablet Oral Daily Rosemary Foss MD   1 tablet at 04/17/24 0828    mupirocin 2 % ointment 1 g  1 g Nasal BID Rosemary Foss MD   1 g at 04/17/24 0831    mupirocin 2 % ointment   Nasal On Call Procedure Lorelei Cha PA-C        mycophenolate capsule 1,000 mg  1,000 mg Oral BID Rosemary Foss MD   1,000 mg at 04/17/24 0828    NIFEdipine 24 hr tablet 30 mg  30 mg Oral BID Juanito Zavala NP   30 mg at 04/17/24 0828    ondansetron injection 4 mg  4 mg Intravenous Q6H PRN Rosemary Foss MD   4 mg at 04/15/24 1930    oxyCODONE immediate release tablet 5 mg  5 mg Oral Q4H PRN Juanito Zavala NP   5 mg at 04/17/24 0546    oxyCODONE immediate release tablet Tab 10 mg  10 mg Oral Q4H PRN Juanito Zavala NP   10 mg at 04/15/24 1706    predniSONE tablet 20 mg  20 mg Oral Daily Rosemary Foss MD   20 mg at 04/17/24 0835    prochlorperazine injection Soln 5 mg  5 mg Intravenous Q6H PRN Jessica Haas DNP   5 mg at 04/15/24 2049    sevelamer carbonate tablet 800 mg  800 mg Oral TID Bethany Guerrero PA-C   800 mg at 04/17/24 0828    sodium bicarbonate tablet 1,300 mg  1,300 mg Oral BID Lorelei Cha PA-C   1,300 mg at 04/17/24 0828    sodium chloride 0.9% flush 10 mL  10 mL Intravenous PRN Lorelei Cha PA-C        sodium chloride 0.9% flush 10 mL  10 mL Intravenous PRN Rosemary Foss MD        sodium chloride 0.9% flush 10 mL  10 mL Intravenous PRN Sobeida Morales MD        [START ON 4/24/2024] sulfamethoxazole-trimethoprim 400-80mg per tablet 1 tablet  1 tablet Oral Daily AM Rosemary Foss MD         tacrolimus capsule 5 mg  5 mg Oral BID Bethany Delcid PA-C   5 mg at 04/17/24 0828    tamsulosin 24 hr capsule 0.4 mg  1 capsule Oral Daily Lorelei Cha PA-C   0.4 mg at 04/17/24 0828    traMADoL tablet 50 mg  50 mg Oral Q4H PRN Lorelei Cha PA-C   50 mg at 04/17/24 0959    [START ON 4/24/2024] valGANciclovir tablet 450 mg  450 mg Oral Daily AM Rosemary Foss MD         PRN Meds:  Current Facility-Administered Medications   Medication Dose Route Frequency Provider Last Rate Last Admin    0.9%  NaCl infusion (for blood administration)   Intravenous Q24H PRN Bethany Delcid PA-C        bisacodyL EC tablet 10 mg  10 mg Oral QHS Rosemary Foss MD   10 mg at 04/16/24 2142    bisacodyL suppository 10 mg  10 mg Rectal Daily PRN Bethany Delcid PA-C   10 mg at 04/17/24 0959    carvediloL tablet 25 mg  25 mg Oral BID WM Rosemary Foss MD   25 mg at 04/17/24 0828    dextrose 10% bolus 125 mL 125 mL  12.5 g Intravenous PRN Isatu Guardado NP        dextrose 10% bolus 125 mL 125 mL  12.5 g Intravenous PRN Rosemary Foss MD        dextrose 10% bolus 250 mL 250 mL  25 g Intravenous PRN Isatu Guardado NP        dextrose 10% bolus 250 mL 250 mL  25 g Intravenous PRN Rosemary Foss MD        diphenhydrAMINE capsule 50 mg  50 mg Oral Once PRN Rosemary Foss MD        divalproex EC tablet 250 mg  250 mg Oral Q12H Juanito Zavala NP   250 mg at 04/17/24 0828    docusate sodium capsule 100 mg  100 mg Oral TID Rosemary Foss MD   100 mg at 04/17/24 0828    dorzolamide 2 % ophthalmic solution 1 drop  1 drop Left Eye BID Trent Burt MD   1 drop at 04/17/24 0832    And    timolol maleate 0.5% ophthalmic solution 1 drop  1 drop Left Eye BID Trent Burt MD   1 drop at 04/17/24 0832    EPINEPHrine (PF) injection 1 mg  1 mg Subcutaneous Once PRN Rosemary Foss MD        famotidine tablet 20 mg  20 mg Oral QHS Rosemary Foss MD   20 mg at 04/16/24 2143    gabapentin capsule 300 mg  300 mg Oral Nightly  PRN Juanito Zavala NP        glucagon (human recombinant) injection 1 mg  1 mg Intramuscular PRN Isatu Guardado NP        glucagon (human recombinant) injection 1 mg  1 mg Intramuscular Continuous PRN Rosemary Foss MD        glucose chewable tablet 16 g  16 g Oral PRN Isatu Guardado NP   16 g at 04/16/24 2148    glucose chewable tablet 16 g  16 g Oral PRN Rosemary Foss MD        glucose chewable tablet 16 g  16 g Oral PRN Rosemary Foss MD        glucose chewable tablet 24 g  24 g Oral PRN Isatu Guardado NP        glucose chewable tablet 24 g  24 g Oral PRN Rosemary Foss MD        heparin (porcine) injection 2,400 Units  2,400 Units Intravenous PRN Sher Hanna MD        heparin (porcine) injection 5,000 Units  5,000 Units Subcutaneous Q8H Rosemary Foss MD   5,000 Units at 04/17/24 0546    hydrocortisone sodium succinate injection 100 mg  100 mg Intravenous Once PRN Rosemary Foss MD        insulin aspart U-100 pen 0-5 Units  0-5 Units Subcutaneous QID (AC + HS) PRN Rosemary Foss MD   2 Units at 04/15/24 1759    insulin aspart U-100 pen 12 Units  12 Units Subcutaneous TIDWM Isatu Guardado NP   12 Units at 04/17/24 0954    insulin detemir U-100 (Levemir) pen 30 Units  30 Units Subcutaneous QHS Isatu Guardado NP   30 Units at 04/15/24 2146    methylPREDNISolone sodium succinate (SOLU-MEDROL) 500 mg in dextrose 5 % (D5W) 100 mL IVPB  500 mg Intravenous Once Lorelei Cha PA-C        multivitamin tablet  1 tablet Oral Daily Rosemary Foss MD   1 tablet at 04/17/24 0828    mupirocin 2 % ointment 1 g  1 g Nasal BID Rosemary Foss MD   1 g at 04/17/24 0831    mupirocin 2 % ointment   Nasal On Call Procedure Lorelei Cha PA-C        mycophenolate capsule 1,000 mg  1,000 mg Oral BID Rosemary Foss MD   1,000 mg at 04/17/24 0828    NIFEdipine 24 hr tablet 30 mg  30 mg Oral BID Juanito Zavala NP   30 mg at 04/17/24 0828    ondansetron injection 4 mg  4 mg Intravenous Q6H PRN Rosemary Foss MD    4 mg at 04/15/24 1930    oxyCODONE immediate release tablet 5 mg  5 mg Oral Q4H PRN Juanito Zavala NP   5 mg at 04/17/24 0546    oxyCODONE immediate release tablet Tab 10 mg  10 mg Oral Q4H PRN Juanito Zavala NP   10 mg at 04/15/24 1706    predniSONE tablet 20 mg  20 mg Oral Daily Rosemary Foss MD   20 mg at 04/17/24 0835    prochlorperazine injection Soln 5 mg  5 mg Intravenous Q6H PRN Jessica Haas DNP   5 mg at 04/15/24 2049    sevelamer carbonate tablet 800 mg  800 mg Oral TID  Bethany Delcid PA-C   800 mg at 04/17/24 0828    sodium bicarbonate tablet 1,300 mg  1,300 mg Oral BID Lorelei Cha PA-C   1,300 mg at 04/17/24 0828    sodium chloride 0.9% flush 10 mL  10 mL Intravenous PRN Lorelei Cha PA-C        sodium chloride 0.9% flush 10 mL  10 mL Intravenous PRN Rosemary Foss MD        sodium chloride 0.9% flush 10 mL  10 mL Intravenous PRN Sobeida Moraels MD        [START ON 4/24/2024] sulfamethoxazole-trimethoprim 400-80mg per tablet 1 tablet  1 tablet Oral Daily AM Rosemary Foss MD        tacrolimus capsule 5 mg  5 mg Oral BID Bethany Delcid PA-C   5 mg at 04/17/24 0828    tamsulosin 24 hr capsule 0.4 mg  1 capsule Oral Daily Lorelei Cha PA-C   0.4 mg at 04/17/24 0828    traMADoL tablet 50 mg  50 mg Oral Q4H PRN Lorelei Cha PA-C   50 mg at 04/17/24 0959    [START ON 4/24/2024] valGANciclovir tablet 450 mg  450 mg Oral Daily AM Rosemary Foss MD           Intake/Output - Last 3 Shifts         04/15 0700  04/16 0659 04/16 0700 04/17 0659 04/17 0700 04/18 0659    P.O. 360 1080 360    I.V. (mL/kg) 0 (0)      Other 0      IV Piggyback       Total Intake(mL/kg) 360 (3.9) 1080 (11.4) 360 (3.8)    Urine (mL/kg/hr) 335 (0.1) 695 (0.3) 375 (0.7)    Emesis/NG output 0 0     Other 0 0     Stool 0 0 0    Blood 0 0     Total Output 335 695 375    Net +25 +385 -15           Urine Occurrence 0 x 1 x     Stool Occurrence 0 x 0 x 1 x    Emesis Occurrence 0 x  "0 x              Review of Systems   Constitutional:  Negative for appetite change, fatigue and fever.   Eyes:  Positive for visual disturbance (chronic).   Respiratory:  Negative for cough and shortness of breath.    Cardiovascular:  Negative for chest pain and leg swelling.   Gastrointestinal:  Positive for abdominal distention and abdominal pain (incisional). Negative for diarrhea, nausea and vomiting.   Genitourinary:  Positive for decreased urine volume. Negative for difficulty urinating, dysuria and frequency.   Skin:  Positive for wound (abdomen).   Allergic/Immunologic: Positive for immunocompromised state.   Neurological:  Negative for dizziness and weakness.   Psychiatric/Behavioral:  Negative for confusion, decreased concentration and suicidal ideas. The patient is not nervous/anxious.       Objective:     Vital Signs (Most Recent):  Temp: 98.2 °F (36.8 °C) (04/17/24 1103)  Pulse: 83 (04/17/24 1103)  Resp: 18 (04/17/24 1103)  BP: 134/65 (04/17/24 1103)  SpO2: 97 % (04/17/24 1103) Vital Signs (24h Range):  Temp:  [98 °F (36.7 °C)-98.7 °F (37.1 °C)] 98.2 °F (36.8 °C)  Pulse:  [78-86] 83  Resp:  [14-18] 18  SpO2:  [93 %-97 %] 97 %  BP: (113-151)/(62-77) 134/65     Weight: 95.1 kg (209 lb 8.8 oz)  Height: 5' 7" (170.2 cm)  Body mass index is 32.82 kg/m².     Physical Exam  Vitals and nursing note reviewed.   Constitutional:       General: He is not in acute distress.     Appearance: Normal appearance. He is not ill-appearing.   HENT:      Head: Normocephalic and atraumatic.      Mouth/Throat:      Mouth: Mucous membranes are moist.   Eyes:      Extraocular Movements: Extraocular movements intact.      Conjunctiva/sclera: Conjunctivae normal.   Cardiovascular:      Rate and Rhythm: Normal rate and regular rhythm.      Pulses: Normal pulses.      Heart sounds: Normal heart sounds.   Pulmonary:      Effort: Pulmonary effort is normal. No respiratory distress.      Breath sounds: Normal breath sounds. No wheezing " or rales.   Abdominal:      General: Bowel sounds are normal. There is distension.      Palpations: Abdomen is soft.      Tenderness: There is abdominal tenderness (appropiate for post-op). There is no guarding or rebound.      Comments: RLQ KTx incision, KENNA with staples. Mild SS drainage noted.      Musculoskeletal:         General: No swelling. Normal range of motion.      Cervical back: Normal range of motion.      Right lower leg: Edema present.      Left lower leg: Edema present.   Skin:     General: Skin is warm and dry.      Capillary Refill: Capillary refill takes less than 2 seconds.   Neurological:      General: No focal deficit present.      Mental Status: He is alert and oriented to person, place, and time. Mental status is at baseline.      Motor: No weakness.   Psychiatric:         Mood and Affect: Mood normal.         Behavior: Behavior normal.         Thought Content: Thought content normal.         Judgment: Judgment normal.          Laboratory:  CBC:   Recent Labs   Lab 04/15/24  1421 04/16/24  0450 04/17/24  0533   WBC 7.84 6.72 4.80   RBC 2.83* 2.67* 2.36*   HGB 8.7* 8.4* 7.2*   HCT 25.6* 24.2* 20.9*   * 90* 77*   MCV 91 91 89   MCH 30.7 31.5* 30.5   MCHC 34.0 34.7 34.4     BMP:   Recent Labs   Lab 04/15/24  1421 04/15/24  2046 04/15/24  2354 04/16/24  0450 04/17/24  0533   *  --   --  148* 95   *  --   --  133* 133*   K 5.0   < > 5.1 4.8 4.7     --   --  101 100   CO2 23  --   --  21* 22*   BUN 60*  --   --  68* 87*   CREATININE 8.2*  --   --  9.4* 9.9*   CALCIUM 7.6*  --   --  7.7* 7.6*    < > = values in this interval not displayed.     Labs within the past 24 hours have been reviewed.    Diagnostic Results:  US - Kidney: Results for orders placed during the hospital encounter of 04/14/24    US Transplant Kidney With Doppler    Narrative  EXAMINATION:  US TRANSPLANT KIDNEY WITH DOPPLER    CLINICAL HISTORY:  POD#1 kidney transplant;    TECHNIQUE:  Real time gray scale  and doppler ultrasound was performed over the patient's renal allograft.    COMPARISON:  None    FINDINGS:  Renal allograft in the right lower quadrant.  The allograft measures 11.3 cm. Normal perfusion. No hydronephrosis.  Stent is present.    Fluid collection adjacent to the superior pole measuring 3.9 x 4.7 x 2.3    Vasculature:    Resistive indices ranged from 0.77 to 0.83.    Main renal artery peak systolic velocity: 182cm/sec with normal waveform.    Renal artery/iliac ratio: 1.3.    The main renal vein is patent.    Impression  Minimally elevated intraparenchymal resistive indices likely due to edema in recent state.  Otherwise satisfactory gray scale and doppler evaluation of renal allograft.      Electronically signed by: Ti Ha MD  Date:    04/15/2024  Time:    08:11

## 2024-04-17 NOTE — ASSESSMENT & PLAN NOTE
- Expected d/t prolonged CIT and DCD kidney  - HD POD #0 and today 4/17  - Outpatient HD chair obtained MWF

## 2024-04-17 NOTE — PLAN OF CARE
Pt AAOx4, VSS. NSR on tele. Pt RLQ incision KENNA with staples, painted with betadine. Incision with mild ss drainage, ABD pad placed. LLQ prior PD site covered with gauze, CDI. Dermabond site CDI. Pt NPO since midnight for tunnel cath placement 4/17. BG monitored AC/HS. Night BG of 85, insulin Levemir held and 16g glucose administered. Endocrine notified, rpt BG of 105. Pt denies N/V. Oxy 5 administered once PRN for pain. Pt voiding following johnston removal, see flowsheet for I/O-- no BM. Bed in lowest locked position, call light within reach, wife at bedside, POC ongoing.

## 2024-04-17 NOTE — ASSESSMENT & PLAN NOTE
- s/p DCD KTX d/t DM and PD cath removal on 4/14/24 (CIT 17h, KDPI 83%). 2 day johnston, no drains.   - Hyperkalemic post-op, HD POD#0.   - DGF d/t prolonged CIT, DCD status  - Interventional neph consulted for tunnel cath placement. Planned for tomorrow AM.    - Daily renal panel  - Avoid nephrotoxic meds  - Strict I's/O's

## 2024-04-17 NOTE — PROGRESS NOTES
Patient arrived in a wheelchair  to dialysis unit.   Report received from primary nurse  VS's per dialysis Flowsheet.     Hemodialysis initiated using the following:     Dialysis Access: RIJ     Will Maintain telemetry and blood pressure monitoring throughout treatment.  Refer to dialysis flowsheet and MAR for details.

## 2024-04-17 NOTE — SUBJECTIVE & OBJECTIVE
"Interval HPI:   Overnight events: Remains in TSU. POD 3 s/p kidney transplant. BG at and below goal ranges on current SQ insulin regimen. Levemir held yesterday evening for BG 85.  Prednisone 20 mg daily. Creatinine 9.9. Diet Renal Standard Tray    Eatin%   Nausea: No  Hypoglycemia and intervention: No  Fever: No  TPN and/or TF: No  If yes, type of TF/TPN and rate: n/a    /65 (BP Location: Right arm, Patient Position: Sitting)   Pulse 83   Temp 98.2 °F (36.8 °C) (Oral)   Resp 18   Ht 5' 7" (1.702 m)   Wt 95.1 kg (209 lb 8.8 oz)   SpO2 97%   BMI 32.82 kg/m²     Labs Reviewed and Include    Recent Labs   Lab 24  0533   GLU 95   CALCIUM 7.6*   ALBUMIN 2.2*   *   K 4.7   CO2 22*      BUN 87*   CREATININE 9.9*     Lab Results   Component Value Date    WBC 4.80 2024    HGB 7.2 (L) 2024    HCT 20.9 (L) 2024    MCV 89 2024    PLT 77 (L) 2024     No results for input(s): "TSH", "FREET4" in the last 168 hours.  Lab Results   Component Value Date    HGBA1C 8.6 (H) 2024       Nutritional status:   Body mass index is 32.82 kg/m².  Lab Results   Component Value Date    ALBUMIN 2.2 (L) 2024    ALBUMIN 2.3 (L) 2024    ALBUMIN 2.3 (L) 04/15/2024     No results found for: "PREALBUMIN"    Estimated Creatinine Clearance: 8.5 mL/min (A) (based on SCr of 9.9 mg/dL (H)).    Accu-Checks  Recent Labs     04/15/24  1755 04/15/24  2145 24  0816 24  1225 24  1720 24  2139 24  2141 24  2226 24  0000 24  1341   POCTGLUCOSE 212* 155* 176* 177* 152* 85 85 86 105 176*       Current Medications and/or Treatments Impacting Glycemic Control  Immunotherapy:    Immunosuppressants           Stop Route Frequency     tacrolimus capsule 5 mg         -- Oral 2 times daily     mycophenolate capsule 1,000 mg         -- Oral 2 times daily          Steroids:   Hormones (From admission, onward)      Start     Stop Route Frequency " Ordered    04/17/24 0900  predniSONE tablet 20 mg  (IP TXP KIDNEY POST-OP THYMO WITH PERIPHERAL PRECHECKED)         -- Oral Daily 04/14/24 1354    04/14/24 1431  hydrocortisone sodium succinate injection 100 mg  (IP TXP KIDNEY POST-OP THYMO WITH PERIPHERAL PRECHECKED)         09/11/35 0531 IV Once as needed 04/14/24 1354    04/14/24 0300  methylPREDNISolone sodium succinate (SOLU-MEDROL) 500 mg in dextrose 5 % (D5W) 100 mL IVPB  (IP TXP INTRA-OP THYMO - PERIPHERAL THYMO PRECHECKED)         -- IV Once 04/14/24 0254          Pressors:    Autonomic Drugs (From admission, onward)      Start     Stop Route Frequency Ordered    04/14/24 1431  EPINEPHrine (PF) injection 1 mg  (IP TXP KIDNEY POST-OP THYMO WITH PERIPHERAL PRECHECKED)         09/11/35 0531 SubQ Once as needed 04/14/24 1354          Hyperglycemia/Diabetes Medications:   Antihyperglycemics (From admission, onward)      Start     Stop Route Frequency Ordered    04/17/24 2100  insulin detemir U-100 (Levemir) pen 20 Units         -- SubQ Nightly 04/17/24 1353    04/17/24 1645  insulin aspart U-100 pen 8 Units         -- SubQ 3 times daily with meals 04/17/24 1351    04/14/24 1430  insulin aspart U-100 pen 0-5 Units  (INSULIN - LOW CORRECTION DOSE (Recommended for BMI <25, GFR<15 or on dialysis, Age > 70, type 1 diabetes, ESLD, severe CHF or patients on <70 units of insulin daily))         -- SubQ Before meals & nightly PRN 04/14/24 1354

## 2024-04-17 NOTE — PROGRESS NOTES
Transplant Teaching Book given to patient, Murphy Ha, on 4/15/2024.  During the course of the hospital stay the patient received information regarding kidney transplant. Teaching and instruction were completed.  Areas that were discussed included: how to contact the Transplant Team, the importance of measuring intake of fluids and urine output, and monitoring vital signs such as blood pressure, temperature, and daily weights.  Parameters for which to report abnormal findings were given.  Appointment were provided along with the rational for the importance of lab work and clinic visits.  A written medication list was provided.  The importance of immunosuppressive medications, their common side effects, and treatment to prevent or minimize side effects has been reviewed.  Signs and symptoms of rejection and infection along with various treatments were reviewed.  The need to avoid infection was discussed.  Wound care and special consideration regarding activities of daily living were explained.  Written and verbal teaching of the above information was given.     Discussed with the patient and caregiver the importance of maintaining COVID-19 precautions; wearing a mask, good handwashing, and social distancing.  Also, to report any signs or symptoms (fever, difficulty breathing, loss of taste/smell, etc.), suspected exposure, or COVID testing, immediately to the transplant program.

## 2024-04-17 NOTE — PROGRESS NOTES
04/17/24 1753   Post-Hemodialysis Assessment   Rinseback Volume (mL) 250 mL   Blood Volume Processed (Liters) 55.4 L   Dialyzer Clearance Lightly streaked   Duration of Treatment 210 minutes   Additional Fluid Intake (mL) 350 mL   Total UF (mL) 3361 mL   Net Fluid Removal 2811   Patient Response to Treatment tolerated   Post-Treatment Weight 92.3 kg (203 lb 7.8 oz)   Treatment Weight Change -2.6   Post-Hemodialysis Comments see notes     HD TX complete. Net removal 2811 ml. Pt tolerated well. Pt received 1 Unit PRBCs with HD treatment as ordered, tolerated with no complaints. Pt VSS, NAD. Report given to primary nurse. Pt returned to unit via wheelchair, escorted by staff nurse.

## 2024-04-17 NOTE — PLAN OF CARE
Pt aaox4.  Bed in low and locked position.  Nonskid socks in use.  Call bell, phone, food, drink, urinal within reach in bed or on bedside table.  Wife at bedside and active in care.  She is pulling self meds 100%.  Both aware to call if needing assistance.  Up to chair since this morning.  Left piv and RIJ TLC cdi.  RLQ incision miquel w staples as well as pd removal site.  Tele in use.  Accuchecks ac/hs with mealtime insulin.  NPO at midnight tonight for permcath placement tomorrow.  Currently in HD and getting one unit prbc.  Suppository given this am since no BM since surgery - 2bms since.  Pain controlled with prn po ultram and oxy.  See flowsheet for full assessment and details.

## 2024-04-18 ENCOUNTER — PATIENT MESSAGE (OUTPATIENT)
Dept: ENDOCRINOLOGY | Facility: HOSPITAL | Age: 64
End: 2024-04-18
Payer: MEDICARE

## 2024-04-18 VITALS
SYSTOLIC BLOOD PRESSURE: 152 MMHG | DIASTOLIC BLOOD PRESSURE: 75 MMHG | HEIGHT: 67 IN | TEMPERATURE: 98 F | HEART RATE: 85 BPM | WEIGHT: 204.81 LBS | RESPIRATION RATE: 17 BRPM | OXYGEN SATURATION: 99 % | BODY MASS INDEX: 32.15 KG/M2

## 2024-04-18 DIAGNOSIS — Z57.8 OCCUPATIONAL EXPOSURE TO OTHER RISK FACTORS: ICD-10-CM

## 2024-04-18 DIAGNOSIS — Z94.0 KIDNEY REPLACED BY TRANSPLANT: Primary | ICD-10-CM

## 2024-04-18 LAB
ABO + RH BLD: NORMAL
ALBUMIN SERPL BCP-MCNC: 2.4 G/DL (ref 3.5–5.2)
ANION GAP SERPL CALC-SCNC: 10 MMOL/L (ref 8–16)
BASOPHILS # BLD AUTO: 0.02 K/UL (ref 0–0.2)
BASOPHILS NFR BLD: 0.4 % (ref 0–1.9)
BLD GP AB SCN CELLS X3 SERPL QL: NORMAL
BLD PROD TYP BPU: NORMAL
BLOOD UNIT EXPIRATION DATE: NORMAL
BLOOD UNIT TYPE CODE: 6200
BLOOD UNIT TYPE: NORMAL
BUN SERPL-MCNC: 59 MG/DL (ref 8–23)
CALCIUM SERPL-MCNC: 7.8 MG/DL (ref 8.7–10.5)
CHLORIDE SERPL-SCNC: 101 MMOL/L (ref 95–110)
CO2 SERPL-SCNC: 25 MMOL/L (ref 23–29)
CODING SYSTEM: NORMAL
CREAT SERPL-MCNC: 7.7 MG/DL (ref 0.5–1.4)
CROSSMATCH INTERPRETATION: NORMAL
DIFFERENTIAL METHOD BLD: ABNORMAL
DISPENSE STATUS: NORMAL
EOSINOPHIL # BLD AUTO: 0 K/UL (ref 0–0.5)
EOSINOPHIL NFR BLD: 0.4 % (ref 0–8)
ERYTHROCYTE [DISTWIDTH] IN BLOOD BY AUTOMATED COUNT: 13.2 % (ref 11.5–14.5)
EST. GFR  (NO RACE VARIABLE): 7.3 ML/MIN/1.73 M^2
GLUCOSE SERPL-MCNC: 65 MG/DL (ref 70–110)
HCT VFR BLD AUTO: 24.3 % (ref 40–54)
HGB BLD-MCNC: 8.5 G/DL (ref 14–18)
IMM GRANULOCYTES # BLD AUTO: 0.02 K/UL (ref 0–0.04)
IMM GRANULOCYTES NFR BLD AUTO: 0.4 % (ref 0–0.5)
LYMPHOCYTES # BLD AUTO: 0.4 K/UL (ref 1–4.8)
LYMPHOCYTES NFR BLD: 7.9 % (ref 18–48)
MAGNESIUM SERPL-MCNC: 2.2 MG/DL (ref 1.6–2.6)
MCH RBC QN AUTO: 30.4 PG (ref 27–31)
MCHC RBC AUTO-ENTMCNC: 35 G/DL (ref 32–36)
MCV RBC AUTO: 87 FL (ref 82–98)
MONOCYTES # BLD AUTO: 0.9 K/UL (ref 0.3–1)
MONOCYTES NFR BLD: 16.8 % (ref 4–15)
NEUTROPHILS # BLD AUTO: 4.1 K/UL (ref 1.8–7.7)
NEUTROPHILS NFR BLD: 74.1 % (ref 38–73)
NRBC BLD-RTO: 0 /100 WBC
NUM UNITS TRANS PACKED RBC: NORMAL
PHOSPHATE SERPL-MCNC: 4.7 MG/DL (ref 2.7–4.5)
PLATELET # BLD AUTO: 84 K/UL (ref 150–450)
PMV BLD AUTO: 12 FL (ref 9.2–12.9)
POCT GLUCOSE: 137 MG/DL (ref 70–110)
POCT GLUCOSE: 237 MG/DL (ref 70–110)
POCT GLUCOSE: 67 MG/DL (ref 70–110)
POTASSIUM SERPL-SCNC: 4 MMOL/L (ref 3.5–5.1)
RBC # BLD AUTO: 2.8 M/UL (ref 4.6–6.2)
SODIUM SERPL-SCNC: 136 MMOL/L (ref 136–145)
SPECIMEN OUTDATE: NORMAL
TACROLIMUS BLD-MCNC: 11.6 NG/ML (ref 5–15)
WBC # BLD AUTO: 5.46 K/UL (ref 3.9–12.7)

## 2024-04-18 PROCEDURE — 97161 PT EVAL LOW COMPLEX 20 MIN: CPT

## 2024-04-18 PROCEDURE — 36558 INSERT TUNNELED CV CATH: CPT | Mod: RT,,, | Performed by: INTERNAL MEDICINE

## 2024-04-18 PROCEDURE — 99152 MOD SED SAME PHYS/QHP 5/>YRS: CPT | Performed by: INTERNAL MEDICINE

## 2024-04-18 PROCEDURE — 1111F DSCHRG MED/CURRENT MED MERGE: CPT | Mod: CPTII,,, | Performed by: PHYSICIAN ASSISTANT

## 2024-04-18 PROCEDURE — C1750 CATH, HEMODIALYSIS,LONG-TERM: HCPCS | Performed by: INTERNAL MEDICINE

## 2024-04-18 PROCEDURE — 63600175 PHARM REV CODE 636 W HCPCS: Performed by: STUDENT IN AN ORGANIZED HEALTH CARE EDUCATION/TRAINING PROGRAM

## 2024-04-18 PROCEDURE — 99153 MOD SED SAME PHYS/QHP EA: CPT | Performed by: INTERNAL MEDICINE

## 2024-04-18 PROCEDURE — 77001 FLUOROGUIDE FOR VEIN DEVICE: CPT | Performed by: INTERNAL MEDICINE

## 2024-04-18 PROCEDURE — 02H633Z INSERTION OF INFUSION DEVICE INTO RIGHT ATRIUM, PERCUTANEOUS APPROACH: ICD-10-PCS | Performed by: INTERNAL MEDICINE

## 2024-04-18 PROCEDURE — 99232 SBSQ HOSP IP/OBS MODERATE 35: CPT | Mod: ,,, | Performed by: PHYSICIAN ASSISTANT

## 2024-04-18 PROCEDURE — 85025 COMPLETE CBC W/AUTO DIFF WBC: CPT | Performed by: STUDENT IN AN ORGANIZED HEALTH CARE EDUCATION/TRAINING PROGRAM

## 2024-04-18 PROCEDURE — 76937 US GUIDE VASCULAR ACCESS: CPT | Performed by: INTERNAL MEDICINE

## 2024-04-18 PROCEDURE — 25000003 PHARM REV CODE 250

## 2024-04-18 PROCEDURE — 25000003 PHARM REV CODE 250: Performed by: STUDENT IN AN ORGANIZED HEALTH CARE EDUCATION/TRAINING PROGRAM

## 2024-04-18 PROCEDURE — 86901 BLOOD TYPING SEROLOGIC RH(D): CPT | Performed by: PHYSICIAN ASSISTANT

## 2024-04-18 PROCEDURE — 77001 FLUOROGUIDE FOR VEIN DEVICE: CPT | Mod: 26,,, | Performed by: INTERNAL MEDICINE

## 2024-04-18 PROCEDURE — 25000003 PHARM REV CODE 250: Performed by: CLINICAL NURSE SPECIALIST

## 2024-04-18 PROCEDURE — 25000003 PHARM REV CODE 250: Performed by: PHYSICIAN ASSISTANT

## 2024-04-18 PROCEDURE — 99024 POSTOP FOLLOW-UP VISIT: CPT | Mod: ,,, | Performed by: PHYSICIAN ASSISTANT

## 2024-04-18 PROCEDURE — 63600175 PHARM REV CODE 636 W HCPCS: Performed by: INTERNAL MEDICINE

## 2024-04-18 PROCEDURE — 80197 ASSAY OF TACROLIMUS: CPT | Performed by: STUDENT IN AN ORGANIZED HEALTH CARE EDUCATION/TRAINING PROGRAM

## 2024-04-18 PROCEDURE — 63600175 PHARM REV CODE 636 W HCPCS: Performed by: PHYSICIAN ASSISTANT

## 2024-04-18 PROCEDURE — 0JH63XZ INSERTION OF TUNNELED VASCULAR ACCESS DEVICE INTO CHEST SUBCUTANEOUS TISSUE AND FASCIA, PERCUTANEOUS APPROACH: ICD-10-PCS | Performed by: INTERNAL MEDICINE

## 2024-04-18 PROCEDURE — 97116 GAIT TRAINING THERAPY: CPT

## 2024-04-18 PROCEDURE — C1769 GUIDE WIRE: HCPCS | Performed by: INTERNAL MEDICINE

## 2024-04-18 PROCEDURE — 36558 INSERT TUNNELED CV CATH: CPT | Mod: RT | Performed by: INTERNAL MEDICINE

## 2024-04-18 PROCEDURE — 76937 US GUIDE VASCULAR ACCESS: CPT | Mod: 26,,, | Performed by: INTERNAL MEDICINE

## 2024-04-18 PROCEDURE — 83735 ASSAY OF MAGNESIUM: CPT | Performed by: STUDENT IN AN ORGANIZED HEALTH CARE EDUCATION/TRAINING PROGRAM

## 2024-04-18 PROCEDURE — 80069 RENAL FUNCTION PANEL: CPT | Performed by: STUDENT IN AN ORGANIZED HEALTH CARE EDUCATION/TRAINING PROGRAM

## 2024-04-18 DEVICE — 14F X 28CM SPLIT14F X 28CM SPLIT CATH®III CATHETER SET (CUFF 23CM FROM TIP)(CUFF 23CM F
Type: IMPLANTABLE DEVICE | Site: CHEST | Status: FUNCTIONAL
Brand: SPLIT-CATH®III

## 2024-04-18 RX ORDER — HEPARIN SODIUM 1000 [USP'U]/ML
INJECTION, SOLUTION INTRAVENOUS; SUBCUTANEOUS
Status: DISCONTINUED | OUTPATIENT
Start: 2024-04-18 | End: 2024-04-18 | Stop reason: HOSPADM

## 2024-04-18 RX ORDER — INSULIN ASPART 100 [IU]/ML
6 INJECTION, SOLUTION INTRAVENOUS; SUBCUTANEOUS
Status: DISCONTINUED | OUTPATIENT
Start: 2024-04-18 | End: 2024-04-18 | Stop reason: HOSPADM

## 2024-04-18 RX ORDER — TACROLIMUS 1 MG/1
4 CAPSULE ORAL EVERY 12 HOURS
Qty: 240 CAPSULE | Refills: 11 | Status: SHIPPED | OUTPATIENT
Start: 2024-04-18 | End: 2024-04-24

## 2024-04-18 RX ORDER — PEN NEEDLE, DIABETIC 30 GX3/16"
1 NEEDLE, DISPOSABLE MISCELLANEOUS 4 TIMES DAILY
Qty: 100 EACH | Refills: 11 | Status: SHIPPED | OUTPATIENT
Start: 2024-04-18 | End: 2024-04-18

## 2024-04-18 RX ORDER — INSULIN ASPART 100 [IU]/ML
6 INJECTION, SOLUTION INTRAVENOUS; SUBCUTANEOUS
Qty: 15 ML | Refills: 5 | Status: SHIPPED | OUTPATIENT
Start: 2024-04-18 | End: 2024-04-18 | Stop reason: HOSPADM

## 2024-04-18 RX ORDER — INSULIN GLARGINE 100 [IU]/ML
11 INJECTION, SOLUTION SUBCUTANEOUS NIGHTLY
Qty: 10 ML | Refills: 5 | Status: SHIPPED | OUTPATIENT
Start: 2024-04-18

## 2024-04-18 RX ORDER — ACETAMINOPHEN 10 MG/ML
INJECTION, SOLUTION INTRAVENOUS
Status: DISCONTINUED | OUTPATIENT
Start: 2024-04-18 | End: 2024-04-18 | Stop reason: HOSPADM

## 2024-04-18 RX ORDER — INSULIN ASPART 100 [IU]/ML
6 INJECTION, SOLUTION INTRAVENOUS; SUBCUTANEOUS
Qty: 10 ML | Refills: 5 | Status: SHIPPED | OUTPATIENT
Start: 2024-04-18

## 2024-04-18 RX ORDER — TACROLIMUS 1 MG/1
4 CAPSULE ORAL 2 TIMES DAILY
Status: DISCONTINUED | OUTPATIENT
Start: 2024-04-18 | End: 2024-04-18 | Stop reason: HOSPADM

## 2024-04-18 RX ORDER — SEVELAMER CARBONATE 800 MG/1
800 TABLET, FILM COATED ORAL
COMMUNITY
Start: 2024-04-18 | End: 2024-04-29 | Stop reason: ALTCHOICE

## 2024-04-18 RX ORDER — CARVEDILOL 25 MG/1
25 TABLET ORAL 2 TIMES DAILY
Qty: 60 TABLET | Refills: 11 | Status: SHIPPED | OUTPATIENT
Start: 2024-04-18

## 2024-04-18 RX ORDER — SULFAMETHOXAZOLE AND TRIMETHOPRIM 400; 80 MG/1; MG/1
1 TABLET ORAL
Qty: 12 TABLET | Refills: 5 | Status: SHIPPED | OUTPATIENT
Start: 2024-04-19 | End: 2024-06-03 | Stop reason: ALTCHOICE

## 2024-04-18 RX ORDER — OXYCODONE HYDROCHLORIDE 5 MG/1
5 TABLET ORAL EVERY 6 HOURS PRN
Qty: 24 TABLET | Refills: 0 | Status: SHIPPED | OUTPATIENT
Start: 2024-04-18 | End: 2024-04-19 | Stop reason: ALTCHOICE

## 2024-04-18 RX ORDER — SYRINGE-NEEDLE,INSULIN,0.5 ML 27GX1/2"
1 SYRINGE, EMPTY DISPOSABLE MISCELLANEOUS
Qty: 100 EACH | Refills: 1 | Status: SHIPPED | OUTPATIENT
Start: 2024-04-18

## 2024-04-18 RX ORDER — INSULIN GLARGINE 100 [IU]/ML
11 INJECTION, SOLUTION SUBCUTANEOUS NIGHTLY
Qty: 3 ML | Refills: 5 | Status: SHIPPED | OUTPATIENT
Start: 2024-04-18 | End: 2024-04-18 | Stop reason: HOSPADM

## 2024-04-18 RX ORDER — VALGANCICLOVIR 450 MG/1
450 TABLET, FILM COATED ORAL
Qty: 12 TABLET | Refills: 2 | Status: SHIPPED | OUTPATIENT
Start: 2024-04-19 | End: 2024-07-13

## 2024-04-18 RX ORDER — MIDAZOLAM HYDROCHLORIDE 1 MG/ML
INJECTION, SOLUTION INTRAMUSCULAR; INTRAVENOUS
Status: DISCONTINUED | OUTPATIENT
Start: 2024-04-18 | End: 2024-04-18 | Stop reason: HOSPADM

## 2024-04-18 RX ORDER — ATORVASTATIN CALCIUM 80 MG/1
80 TABLET, FILM COATED ORAL NIGHTLY
Qty: 30 TABLET | Refills: 1 | Status: SHIPPED | OUTPATIENT
Start: 2024-04-18

## 2024-04-18 RX ORDER — NIFEDIPINE 30 MG/1
30 TABLET, EXTENDED RELEASE ORAL 2 TIMES DAILY
Qty: 60 TABLET | Refills: 11 | Status: SHIPPED | OUTPATIENT
Start: 2024-04-18 | End: 2024-04-22

## 2024-04-18 RX ADMIN — TRAMADOL HYDROCHLORIDE 50 MG: 50 TABLET, COATED ORAL at 08:04

## 2024-04-18 RX ADMIN — MUPIROCIN 1 G: 20 OINTMENT TOPICAL at 08:04

## 2024-04-18 RX ADMIN — DEXTROSE MONOHYDRATE 125 ML: 100 INJECTION, SOLUTION INTRAVENOUS at 09:04

## 2024-04-18 RX ADMIN — THERA TABS 1 TABLET: TAB at 08:04

## 2024-04-18 RX ADMIN — SODIUM BICARBONATE 1300 MG: 650 TABLET ORAL at 08:04

## 2024-04-18 RX ADMIN — DIVALPROEX SODIUM 250 MG: 250 TABLET, DELAYED RELEASE ORAL at 08:04

## 2024-04-18 RX ADMIN — CARVEDILOL 25 MG: 25 TABLET, FILM COATED ORAL at 05:04

## 2024-04-18 RX ADMIN — TACROLIMUS 5 MG: 5 CAPSULE ORAL at 08:04

## 2024-04-18 RX ADMIN — DORZOLAMIDE HCL 1 DROP: 20 SOLUTION/ DROPS OPHTHALMIC at 08:04

## 2024-04-18 RX ADMIN — TIMOLOL MALEATE 1 DROP: 5 SOLUTION OPHTHALMIC at 08:04

## 2024-04-18 RX ADMIN — INSULIN ASPART 6 UNITS: 100 INJECTION, SOLUTION INTRAVENOUS; SUBCUTANEOUS at 01:04

## 2024-04-18 RX ADMIN — SEVELAMER CARBONATE 800 MG: 800 TABLET, FILM COATED ORAL at 01:04

## 2024-04-18 RX ADMIN — PREDNISONE 20 MG: 20 TABLET ORAL at 08:04

## 2024-04-18 RX ADMIN — DOCUSATE SODIUM 100 MG: 100 CAPSULE, LIQUID FILLED ORAL at 01:04

## 2024-04-18 RX ADMIN — OXYCODONE 5 MG: 5 TABLET ORAL at 05:04

## 2024-04-18 RX ADMIN — MYCOPHENOLATE MOFETIL 1000 MG: 250 CAPSULE ORAL at 08:04

## 2024-04-18 RX ADMIN — DOCUSATE SODIUM 100 MG: 100 CAPSULE, LIQUID FILLED ORAL at 08:04

## 2024-04-18 RX ADMIN — CARVEDILOL 25 MG: 25 TABLET, FILM COATED ORAL at 08:04

## 2024-04-18 RX ADMIN — NIFEDIPINE 30 MG: 30 TABLET, FILM COATED, EXTENDED RELEASE ORAL at 08:04

## 2024-04-18 RX ADMIN — TAMSULOSIN HYDROCHLORIDE 0.4 MG: 0.4 CAPSULE ORAL at 08:04

## 2024-04-18 RX ADMIN — ONDANSETRON 4 MG: 2 INJECTION INTRAMUSCULAR; INTRAVENOUS at 08:04

## 2024-04-18 SDOH — SOCIAL DETERMINANTS OF HEALTH (SDOH): OCCUPATIONAL EXPOSURE TO OTHER RISK FACTORS: Z57.8

## 2024-04-18 NOTE — PROGRESS NOTES
EDUCATION NOTE:    Met with Murphy Ha and his caregivers to provide teaching re: immunosuppressant medications.  Reviewed medication section of the Kidney Transplant Education book that was provided.  Emphasized the importance of compliance, role of the blue medication card, concerns for drug interactions, and process of obtaining refills.  Counseled regarding Prograf, Cellcept , prednisone, including directions for use, monitoring, how to handle missed doses, and side effects.  Mr Ha verbalized understanding and had the opportunity to ask questions.

## 2024-04-18 NOTE — OP NOTE
[facility] - Transplant Stepdown  Operative Note    Date of Procedure: 4/18/2024     Procedure: Procedure(s) (LRB):  Insertion, Catheter, Central Venous, Hemodialysis (Right)     Murphy Ha  1960  [id number]    Pre-op Diagnosis: ESRD (end stage renal disease) on dialysis [N18.6, Z99.2]   Post-op Diagnosis: ESRD (end stage renal disease) on dialysis [N18.6, Z99.2]       Rt. IJ tunneled catheter under local anesthesia with sedation under anesthesia. With real time ultrasound and fluoroscopy.     The patient was placed supine.    The Rt. IJ was localized with US.    The skin was sterilized with Chlorhexidine.    1% Xylocaine with Epinephrine was given to anesthetize the skin and subcutaneous tissues.    The Rt. IJ was punctured under real time ultrasound.  A guide wire was inserted and left.     The tunneled tract was anesthetized with 2% xylocaine with Epinephrine.    The catheter was tunneled under the skin.   The sheath was introduced in the SVC under fluoroscopy after  multiple sequential dilatations performed over the guide wire.   The catheter is inserted inside the sheath after removing the guidewire and the dilater.   The catheter position is confirmed with the fluoroscopy with the tip in the Rt. Atrium. The curve is checked and no angulation in the tract was noticed.    Flushing and blood suction were checked from both catheter ports.    1000IU/ml Heparin was inserted according to the catheter dead space.   The wound was sutured.    No bleeding was noticed after observation.   Biopatch was placed over the wound and covered with Tegaderm.    Pressure dressing with rolled up 4 X4 gauze placed over the tunnel.    The procedure was smooth with no complications.        Complications: No  Condition: Good  FOLLOWUP: In patient      The procedure is done under real time fluoroscopy.     You can use the catheter for dialysis after the observation period.      VELASQUEZ WILEY.Raymond. MD. KYLE. FACP.  Assistant  professor, [employer]  Nephrology Consultant. [employer].   [address],  [facility]. 5th [place] [address].    email: [email or web address].  Tel: Office: [phone]

## 2024-04-18 NOTE — INTERVAL H&P NOTE
The patient has been examined and the H&P has been reviewed:    I concur with the findings and no changes have occurred since H&P was written.    Procedure risks, benefits and alternative options discussed and understood by patient/family.          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]  No    Acute blood loss anemia [D62]  No    Metabolic acidosis [E87.20]  Yes    Prophylactic immunotherapy [Z29.89]  Not Applicable    Adverse effect of corticosteroids [T38.0X5A]  No    At risk for opportunistic infections [Z91.89]  No    History of seizure [Z87.898]  Yes    Anemia of chronic disease [D63.8]  Yes    Class 1 obesity due to excess calories with serious comorbidity and body mass index (BMI) of 31.0 to 31.9 in adult [E66.09, Z68.31]  Not Applicable    Type 2 diabetes mellitus with hypoglycemia without coma, with long-term current use of insulin [E11.649, Z79.4]  Not Applicable    Hypertension [I10]  Yes     Chronic      Resolved Hospital Problems    Diagnosis Date Resolved POA    Pre-op evaluation [Z01.818] 04/15/2024 Not Applicable    ESRD (end stage renal disease) on dialysis [N18.6, Z99.2] 04/15/2024 Not Applicable

## 2024-04-18 NOTE — ASSESSMENT & PLAN NOTE
BG goal 140-180  T2DM. Kidney Txp 4/14/24. On steroids.  BG below goal.  Will further decrease regimen.    Decrease Levemir to 15 units q HS   Decrease Novolog to 6 units TID with meals   Moderate Dose Correction Scale  BG monitoring ac/hs    ** Please call Endocrine for any BG related issues **     Discharge Planning:   Notified of D/c today.  Overall needs decreasing with lows here.  Steroid doses decreasing.  Will send home on decreased regimen and advise to send logs in the coming days if adjustments needed.    Lantus 11 units nightly  Novolog 6 units with meals  Add correction scale if needed.  Blood sugar 180 to 230 add 2 units  Blood sugar 231 to 280 add 4 units  Blood sugar 281 to 330 add 6 units  Blood sugar 331 to 380 add 8 units  Blood sugar greater than 380 add 10 units  - BD pen needles

## 2024-04-18 NOTE — PROGRESS NOTES
Bed rest ended at this time. Pt is discharging. Awaiting transport. Wife at beside. Peripheral IV removed. Pressure dressing to RONALD simmons. Pt aware to take prograf at 8pm and aware of am labs and clinic appt. Pt states will eat dinner at apt, no dinner insulin was given. AVS given to patient.

## 2024-04-18 NOTE — DISCHARGE SUMMARY
[facility] - Transplant Stepdown  Kidney Transplant  Discharge Summary    Patient Name: Muprhy Ha  MRN: [id number]  Admission Date: 4/14/2024  Hospital Length of Stay: 4 days  Discharge Date and Time:  04/18/2024 1:15 PM  Attending Physician: Jayden Lala MD   Discharging Provider: Bethany Delcid PA-C  Primary Care Provider: Linwood Trevino MD    HPI:   Mr. Ha is a 63 y.o. Black or  male with ESRD secondary to diabetic nephropathy. He is presenting as a primary candidate for a kidney transplant with Dr. Burt 4/14/24 at 8am.  He has been on the wait list for a kidney transplant at [facility] since 2/24/2021. Patient is currently on peritoneal dialysis started on 2/24/2021. Patient reports that he is tolerating dialysis well. He has a PD catheter. Patient denies any recent hospitalizations or ED visits. Denies peritonitis or exit site infections. Pre op labs and imaging reveal K 5.4. PD cut short due to transplant offer. Patient shifted in preparation for surgery, anesthesiology aware.       Procedure(s) (LRB):  Insertion, Catheter, Central Venous, Hemodialysis (Right)     Hospital Course:    s/p DCD KTX d/t DM, also PD cath removal on 4/14/24 (CIT 17h, KDPI 83%). 2 day johnston, no drains. Hyperkalemic post-op, K 7.1, HD done POD#0. Kidney US POD 1#, minimally elevated intraparenchymal resistive indices likely due to edema in recent state, otherwise satisfactory. Pt with DGF d/t prolonged CIT and DCD kidney. Required HD again POD#3 for clearance.. He had a tunneled cath placed 4/18 and has an outpt HD chair MWF. UOP improving. Johnston removed without issue. He did received 1u PRBC with appropriate response, no s/s of bleeding. Pt ambulating with moderate assistance. PT evaluated pt and recommends HH. Pain is adequately controlled. (+) flatus, (+) BM. He is tolerating a diet.    Pt is stable and ready for discharge. He has no complaints. He has met with PharmD and received education. He will  follow up with labs and clinic appointment 4/19/24. Pt expressed understanding of discharge instructions and importance of follow up.      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] 04/17/2024 No    Acute blood loss anemia [D62] 04/16/2024 No    Metabolic acidosis [E87.20] 04/16/2024 Yes    Prophylactic immunotherapy [Z29.89] 04/15/2024 Not Applicable    Adverse effect of corticosteroids [T38.0X5A] 04/15/2024 No    At risk for opportunistic infections [Z91.89] 04/15/2024 No    History of seizure [Z87.898] 04/15/2024 Yes    Anemia of chronic disease [D63.8] 04/15/2024 Yes    Class 1 obesity due to excess calories with serious comorbidity and body mass index (BMI) of 31.0 to 31.9 in adult [E66.09, Z68.31] 05/21/2019 Not Applicable    Type 2 diabetes mellitus with hypoglycemia without coma, with long-term current use of insulin [E11.649, Z79.4] 07/10/2014 Not Applicable    Hypertension [I10] 07/10/2014 Yes     Chronic      Problems Resolved During this Admission:    Diagnosis Date Noted Date Resolved POA    Pre-op evaluation [Z01.818] 04/14/2024 04/15/2024 Not Applicable    ESRD (end stage renal disease) on dialysis [N18.6, Z99.2] 04/27/2021 04/15/2024 Not Applicable       Consults (From admission, onward)          Status Ordering Provider     IP consult to Interventional Nephrology  Once        Provider:  (Not yet assigned)    Completed SUKHDEEP NOVAK     Inpatient consult to Registered Dietitian/Nutritionist  Once        Provider:  (Not yet assigned)    Completed JONAH FRYE     Inpatient consult to Transplant Nephrology (KTM)  Once        Provider:  (Not yet assigned)    Acknowledged JONAH FRYE     Inpatient consult to Endocrinology  Once        Provider:  (Not yet assigned)    Completed SUMIT RUSSELL  "           Pending Diagnostic Studies:       Procedure Component Value Units Date/Time    APTT [[id number]] Collected: 04/14/24 0343    Order Status: Sent Lab Status: No result     Specimen: Blood     Protime-INR [[id number]] Collected: 04/14/24 0343    Order Status: Sent Lab Status: No result     Specimen: Blood           Significant Diagnostic Studies: Labs: BMP:   Recent Labs   Lab 04/17/24  0533 04/18/24 0529   GLU 95 65*   * 136   K 4.7 4.0    101   CO2 22* 25   BUN 87* 59*   CREATININE 9.9* 7.7*   CALCIUM 7.6* 7.8*   MG 2.2 2.2   , CBC   Recent Labs   Lab 04/17/24  0533 04/18/24 0529   WBC 4.80 5.46   HGB 7.2* 8.5*   HCT 20.9* 24.3*   PLT 77* 84*   , and All labs within the past 24 hours have been reviewed    Discharged Condition: stable    Disposition: Home or Self Care    Patient Instructions:      WALKER FOR HOME USE     Order Specific Question Answer Comments   Type of Walker: Adult (5'4"-6'6")    With wheels? Yes    Height: 5' 7" (1.702 m)    Weight: 92.9 kg (204 lb 12.9 oz)    Length of need (1-99 months): 99    Does patient have medical equipment at home? grab bar    Please check all that apply: Walker will be used for gait training.    Please check all that apply: Patient is unable to safely ambulate without equipment.    Please check all that apply: Patient needs help to get in and out of chair.      Ambulatory referral/consult to Physical/Occupational Therapy   Standing Status: Future   Referral Priority: Routine Referral Type: Physical Medicine   Referral Reason: Specialty Services Required   Number of Visits Requested: 1     Diet renal     No dressing needed     Notify your health care provider if you experience any of the following:  increased confusion or weakness     Notify your health care provider if you experience any of the following:  persistent dizziness, light-headedness, or visual disturbances     Notify your health care provider if you experience any of the following:  " worsening rash     Notify your health care provider if you experience any of the following:  severe persistent headache     Notify your health care provider if you experience any of the following:  difficulty breathing or increased cough     Notify your health care provider if you experience any of the following:  redness, tenderness, or signs of infection (pain, swelling, redness, odor or green/yellow discharge around incision site)     Notify your health care provider if you experience any of the following:  severe uncontrolled pain     Notify your health care provider if you experience any of the following:  persistent nausea and vomiting or diarrhea     Notify your health care provider if you experience any of the following:  temperature >100.4     Activity as tolerated     Medications:  Reconciled Home Medications:      Medication List        START taking these medications      famotidine 20 MG tablet  Commonly known as: PEPCID  Take 1 tablet (20 mg total) by mouth every evening.     insulin aspart U-100 100 unit/mL (3 mL) Inpn pen  Commonly known as: NovoLOG  Inject 6 Units into the skin 3 (three) times daily with meals. + sliding scale. MDD 36 units/day  Replaces: insulin aspart U-100 100 unit/mL injection     LANTUS SOLOSTAR U-100 INSULIN glargine 100 units/mL SubQ pen  Generic drug: insulin  Inject 11 Units into the skin every evening.  Replaces: LANTUS U-100 INSULIN 100 unit/mL injection     mycophenolate 250 mg Cap  Commonly known as: CELLCEPT  Take 4 capsules (1,000 mg total) by mouth 2 (two) times daily.     NIFEdipine 30 MG (OSM) 24 hr tablet  Commonly known as: PROCARDIA-XL  Take 1 tablet (30 mg total) by mouth 2 (two) times a day.     oxyCODONE 5 MG immediate release tablet  Commonly known as: ROXICODONE  Take 1 tablet (5 mg total) by mouth every 6 (six) hours as needed for Pain.     predniSONE 5 MG tablet  Commonly known as: DELTASONE  Take by mouth daily; 4/17/2024-4/23/2024: 20 mg,  "4/24/2024-4/30/2024: 15 mg; 5/1/2024-5/7/2024: 10 mg; 5/8/2024- forever: 5 mg; do not stop     sulfamethoxazole-trimethoprim 400-80mg 400-80 mg per tablet  Commonly known as: BACTRIM,SEPTRA  Take 1 tablet by mouth every Mon, Wed, Fri. Stop 10/11/24  Start taking on: April 19, 2024     tacrolimus 1 MG Cap  Commonly known as: PROGRAF  Take 4 capsules (4 mg total) by mouth every 12 (twelve) hours.     valGANciclovir 450 mg Tab  Commonly known as: VALCYTE  Take 1 tablet (450 mg total) by mouth every Mon, Wed, Fri. Stop 7/13/24  Start taking on: April 19, 2024            CHANGE how you take these medications      carvediloL 25 MG tablet  Commonly known as: COREG  Take 1 tablet (25 mg total) by mouth 2 (two) times daily.  What changed: when to take this     pen needle, diabetic 32 gauge x 5/32" Ndle  Use 1 pen needle to inject insulin 4 (four) times daily.  What changed: when to take this     sevelamer carbonate 800 mg Tab  Commonly known as: RENVELA  Take 1 tablet (800 mg total) by mouth 3 (three) times daily with meals.  What changed:   how much to take  when to take this            CONTINUE taking these medications      atorvastatin 80 MG tablet  Commonly known as: LIPITOR  Take 1 tablet (80 mg total) by mouth every evening.     DEXCOM G7  Misc  Generic drug: blood-glucose meter,continuous  Use to check glucose with sensors     DEXCOM G7 SENSOR Lois  Generic drug: blood-glucose sensor  1 Device by Misc.(Non-Drug; Combo Route) route every 10 days.     divalproex 250 MG EC tablet  Commonly known as: DEPAKOTE  Take 250 mg by mouth 2 (two) times daily.     dorzolamide-timolol 2-0.5% 22.3-6.8 mg/mL ophthalmic solution  Commonly known as: COSOPT  Place 1 drop into the left eye 2 (two) times daily.     gabapentin 300 MG capsule  Commonly known as: NEURONTIN  Take 1 capsule (300 mg total) by mouth every evening.     glucagon 3 mg/actuation Spry  1 each by Nasal route as needed.     lancets 32 gauge Misc  1 lancet by " Misc.(Non-Drug; Combo Route) route 2 (two) times a day.     tamsulosin 0.4 mg Cap  Commonly known as: FLOMAX  Take 1 capsule (0.4 mg total) by mouth once daily.            STOP taking these medications      aspirin 81 MG EC tablet  Commonly known as: ECOTRIN     AURYXIA 210 mg iron Tab  Generic drug: ferric citrate     calcitRIOL 0.25 MCG Cap  Commonly known as: ROCALTROL     cholecalciferol (vitamin D3) 1,250 mcg (50,000 unit) capsule     furosemide 40 MG tablet  Commonly known as: LASIX     HYDROcodone-acetaminophen 5-325 mg per tablet  Commonly known as: NORCO     insulin aspart U-100 100 unit/mL injection  Commonly known as: NovoLOG U-100 Insulin aspart  Replaced by: insulin aspart U-100 100 unit/mL (3 mL) Inpn pen     LANTUS U-100 INSULIN 100 unit/mL injection  Generic drug: insulin glargine U-100 (Lantus)  Replaced by: LANTUS SOLOSTAR U-100 INSULIN glargine 100 units/mL SubQ pen     LORazepam 1 MG tablet  Commonly known as: ATIVAN     pantoprazole 40 MG tablet  Commonly known as: PROTONIX     TRADJENTA 5 mg Tab tablet  Generic drug: linaGLIPtin     valproic acid 250 mg capsule  Commonly known as: DEPAKENE            Time spent caring for patient (Greater than 1/2 spent in direct face-to-face contact): > 30 minutes    Bethany Delcid PA-C  Kidney Transplant  [facility] - Transplant Stepdown

## 2024-04-18 NOTE — PROGRESS NOTES
Pt returned to floor via stretcher. Wife awaiting at bedside. Pt denies distress. Pressure dressing in place to left SC permcath for 24 hours. Vitals signs Q15 mins at this time.

## 2024-04-18 NOTE — PROGRESS NOTES
"DISCHARGE NOTE:    Murphy Ha is a 63 y.o. 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      Past Medical History:   Diagnosis Date    Anemia     Aqueous misdirection of right eye     Arthritis     Blind painful eye     Cataract     CKD (chronic kidney disease)     Diabetes mellitus, type 2     Disorder of kidney and ureter     Fever blister     GERD (gastroesophageal reflux disease)     Hyperlipidemia     Hypertension     Joint pain     Neuropathy        Hospital Course: Mr Ha is s/p kidney transplant from 4/14/24.  Post operative course complicated by DGF - will discharge with HD chair MWF.  Tunneled cath placed day of discharge.  Pt followed by endo - will use home glucose meter and supplies.  Prefers to use home insulin vials - will need to  more insulin syringes in the AM.  Home aspirin held - will need to reassess outpt.  CTX and fluconazole started for potential donor UTI (dirty UA), however cx neg so treatment stopped.     Allergies: Review of patient's allergies indicates:  No Known Allergies    [facility]: [facility]    Discharge Medications:     Medication List        START taking these medications      famotidine 20 MG tablet  Commonly known as: PEPCID  Take 1 tablet (20 mg total) by mouth every evening.     insulin syringe-needle U-100 0.5 mL 31 gauge x 5/16" Syrg  1 each by Misc.(Non-Drug; Combo Route) route 4 (four) times daily with meals and nightly.     mycophenolate 250 mg Cap  Commonly known as: CELLCEPT  Take 4 capsules (1,000 mg total) by mouth 2 (two) times daily.     NIFEdipine 30 MG (OSM) 24 hr tablet  Commonly known as: PROCARDIA-XL  Take 1 tablet (30 mg total) by mouth 2 (two) times a day.     oxyCODONE 5 MG immediate release tablet  Commonly known as: ROXICODONE  Take 1 tablet (5 mg total) by mouth every 6 (six) hours as needed for Pain.     predniSONE 5 MG " tablet  Commonly known as: DELTASONE  Take by mouth daily; 4/17/2024-4/23/2024: 20 mg, 4/24/2024-4/30/2024: 15 mg; 5/1/2024-5/7/2024: 10 mg; 5/8/2024- forever: 5 mg; do not stop     sulfamethoxazole-trimethoprim 400-80mg 400-80 mg per tablet  Commonly known as: BACTRIM,SEPTRA  Take 1 tablet by mouth every Mon, Wed, Fri. Stop 10/11/24  Start taking on: April 19, 2024     tacrolimus 1 MG Cap  Commonly known as: PROGRAF  Take 4 capsules (4 mg total) by mouth every 12 (twelve) hours.     valGANciclovir 450 mg Tab  Commonly known as: VALCYTE  Take 1 tablet (450 mg total) by mouth every Mon, Wed, Fri. Stop 7/13/24  Start taking on: April 19, 2024            CHANGE how you take these medications      carvediloL 25 MG tablet  Commonly known as: COREG  Take 1 tablet (25 mg total) by mouth 2 (two) times daily.  What changed: when to take this     insulin aspart U-100 100 unit/mL injection  Commonly known as: NovoLOG U-100 Insulin aspart  Inject 6 Units into the skin 3 (three) times daily before meals. + sliding scale.  MDD 36 units/day  What changed:   how much to take  additional instructions     insulin glargine U-100 (Lantus) 100 unit/mL injection  Commonly known as: Lantus  Inject 11 Units into the skin every evening.  What changed: how much to take     sevelamer carbonate 800 mg Tab  Commonly known as: RENVELA  What changed:   how much to take  when to take this            CONTINUE taking these medications      atorvastatin 80 MG tablet  Commonly known as: LIPITOR  Take 1 tablet (80 mg total) by mouth every evening.     DEXCOM G7  Misc  Generic drug: blood-glucose meter,continuous  Use to check glucose with sensors     DEXCOM G7 SENSOR Lois  Generic drug: blood-glucose sensor  1 Device by Misc.(Non-Drug; Combo Route) route every 10 days.     divalproex 250 MG EC tablet  Commonly known as: DEPAKOTE     dorzolamide-timolol 2-0.5% 22.3-6.8 mg/mL ophthalmic solution  Commonly known as: COSOPT  Place 1 drop into the  "left eye 2 (two) times daily.     gabapentin 300 MG capsule  Commonly known as: NEURONTIN  Take 1 capsule (300 mg total) by mouth every evening.     glucagon 3 mg/actuation Spry  1 each by Nasal route as needed.     lancets 32 gauge Misc  1 lancet by Misc.(Non-Drug; Combo Route) route 2 (two) times a day.     tamsulosin 0.4 mg Cap  Commonly known as: FLOMAX  Take 1 capsule (0.4 mg total) by mouth once daily.            STOP taking these medications      aspirin 81 MG EC tablet  Commonly known as: ECOTRIN     AURYXIA 210 mg iron Tab  Generic drug: ferric citrate     calcitRIOL 0.25 MCG Cap  Commonly known as: ROCALTROL     cholecalciferol (vitamin D3) 1,250 mcg (50,000 unit) capsule     furosemide 40 MG tablet  Commonly known as: LASIX     HYDROcodone-acetaminophen 5-325 mg per tablet  Commonly known as: NORCO     LORazepam 1 MG tablet  Commonly known as: ATIVAN     pantoprazole 40 MG tablet  Commonly known as: PROTONIX     pen needle, diabetic 32 gauge x 5/32" Ndle     TRADJENTA 5 mg Tab tablet  Generic drug: linaGLIPtin     valproic acid 250 mg capsule  Commonly known as: DEPAKENE               Where to Get Your Medications        These medications were sent to [facility] [address] [address]      Hours: Mon-Fri 7a-7p, Sat-Sun 10a-4p Phone: [phone]   atorvastatin 80 MG tablet  carvediloL 25 MG tablet  famotidine 20 MG tablet  insulin aspart U-100 100 unit/mL injection  insulin glargine U-100 (Lantus) 100 unit/mL injection  insulin syringe-needle U-100 0.5 mL 31 gauge x 5/16" Syrg  mycophenolate 250 mg Cap  NIFEdipine 30 MG (OSM) 24 hr tablet  oxyCODONE 5 MG immediate release tablet  predniSONE 5 MG tablet  sulfamethoxazole-trimethoprim 400-80mg 400-80 mg per tablet  tacrolimus 1 MG Cap  valGANciclovir 450 mg Tab          Pharmacy Interventions/Recommendations:  1) Transplant Immunosuppression: Induction thymo, and maintenance FK, mmf, and prednisone     2) Opportunistic " Infection prophylaxis: bactrim x6 mo, valcyte x3 mo    3) Osteoporosis Prevention measures (liver txp): none    4) Patient Counseling/Education: Demonstrated the use of the BP cuff, thermometer.    5) Follow-Up/Discharge Needs:  Endo follow up, need to  insulin syringes; restart home aspirin when ok by surgeon    6) Patient Assistance Information: none    7) The following medications have been placed on HOLD and should be restarted in the outpatient setting (when appropriate): aspirin     Murphy and his caregiver verbalized their understanding and had the opportunity to ask questions.

## 2024-04-18 NOTE — PT/OT/SLP EVAL
"Physical Therapy Evaluation    Patient Name:  Murphy Ha   MRN:  [id number]    Recommendations:     Discharge Recommendations: Low Intensity Therapy   Discharge Equipment Recommendations: walker, rolling   Barriers to discharge: None    Assessment:     Murphy Ha is a 63 y.o. male admitted with a medical diagnosis of Kidney replaced by transplant.  He presents with the following impairments/functional limitations: impaired endurance, weakness, gait instability, impaired balance.    Pleasant and motivated. Pt amb 160' x2 bouts RW with stair training in between bouts. Pt reported 8/10 modified SAAD at end of session. Pt will benefit from skilled PT services while in house in order to address the aforementioned deficits.    The mobility limitation cannot be sufficiently resolved by the use of a cane.   Patient's functional mobility deficit can be sufficiently resolved with the use of a (Rolling Walker or Walker).  Patient's mobility limitation significantly impairs their ability to participate in one of more activities of daily living.  The use of a (RW or Walker) will significantly improve the patients ability to participate in MRADLS and the patient will use it on regular basis in the home.          Rehab Prognosis: Good; patient would benefit from acute skilled PT services to address these deficits and reach maximum level of function.    Recent Surgery: Procedure(s) (LRB):  TRANSPLANT, KIDNEY (N/A) 4 Days Post-Op    Plan:     During this hospitalization, patient to be seen 2 x/week to address the identified rehab impairments via gait training, therapeutic exercises, therapeutic activities, neuromuscular re-education and progress toward the following goals:    Plan of Care Expires:  05/18/24    Subjective     "I sat up for about an hour so far"    Pain/Comfort:  Pain Rating 1: 7/10  Pain Addressed 1: Reposition, Distraction    Patients cultural, spiritual, Tenriism conflicts given the current " situation: no    Living Environment:  Per pt, pt and spouse reside in split level home with many stairs throughout home, no BLARI. Pt has 2 steps up to bedroom with walls close enough for support. 2 steps up into kitchen, then 1 step down to tub/shower combo, walls close enough for support.   Prior to admission, patients level of function was I.  Equipment used at home: grab bar.  DME owned (not currently used): none.  Upon discharge, patient will have assistance from spouse.    Objective:     Communicated with RN prior to session.  Patient found up in chair with telemetry  upon PT entry to room.    General Precautions: Standard, fall  Orthopedic Precautions:N/A   Braces: N/A  Respiratory Status: Room air    Exams:  RLE ROM: WFL  RLE Strength: WFL  LLE ROM: WFL  LLE Strength: WFL    Functional Mobility:  Transfers:     Sit to Stand:  stand by assistance with no AD  Gait: pt amb 160' x2 bouts RW SBA with stair training in between bouts. Pt presented with decreased cindy, intermittent head down, reciprocal gait  Balance:   Good sitting balance  Good standing balance  Stairs:  Pt ascended/descended  2 stair(s) with No Assistive Device with bilateral handrails with Stand-by Assistance.       AM-PAC 6 CLICK MOBILITY  Total Score:18       Treatment & Education:  Discussed sitting upright in chair >1hr, pt agreeable  Discussed amb with RW and RN/staff outside of therapy services  Discussed sitting up EOB and/or UIC with RW and RN/staff outside of therapy services  Discussed RW management, fall prevention, and safety  Discussed energy conservation, breathing techniques, and pacing activities    Educated pt on PT role/POC  Educated pt on importance of OOB activity and daily ambulation   Pt educated on proper body mechanics, safety techniques, and energy conservation with PT facilitation and cueing throughout session   Pt verbalized understanding        Patient left up in chair with call button in reach, RN notified, and  spouse present.    GOALS:   Multidisciplinary Problems       Physical Therapy Goals          Problem: Physical Therapy    Goal Priority Disciplines Outcome Goal Variances Interventions   Physical Therapy Goal     PT, PT/OT Ongoing, Progressing     Description: Goals to be met by: 2024     Patient will increase functional independence with mobility by performin. Gait  x 300 feet with Supervision using LRAD.   2. Ascend/descend 2 stair with bilateral Handrails Supervision using LRAD.                          History:     Past Medical History:   Diagnosis Date    Anemia     Aqueous misdirection of right eye     Arthritis     Blind painful eye     Cataract     CKD (chronic kidney disease)     Diabetes mellitus, type 2     Disorder of kidney and ureter     Fever blister     GERD (gastroesophageal reflux disease)     Hyperlipidemia     Hypertension     Joint pain     Neuropathy        Past Surgical History:   Procedure Laterality Date    AIR FLUID EXCHANGE OF EYE Left 2022    Procedure: AIR FLUID EXCHANGE, EYE;  Surgeon: Arun Veronica MD;  Location: [facility] of [facility];  Service: Ophthalmology;  Laterality: Left;    CATARACT EXTRACTION Left 2014    COLONOSCOPY N/A 2016    Procedure: COLONOSCOPY;  Surgeon: Luis Bogran-Reyes, MD;  Location: [facility];  Service: Endoscopy;  Laterality: N/A;    COLONOSCOPY N/A 11/10/2023    Procedure: COLONOSCOPY;  Surgeon: Nomi Del Real MD;  Location: [facility];  Service: Endoscopy;  Laterality: N/A;    CONJUNCTIVOPLASTY Right 2020    Procedure: CONJUNCTIVOPLASTY;  Surgeon: Myrna Alba MD;  Location: On license of [facility];  Service: Ophthalmology;  Laterality: Right;    ENDOLASER PHOTOCOAGULATION Left 2022    Procedure: PHOTOCOAGULATION, ENDOLASER;  Surgeon: Arun Veronica MD;  Location: On license of [facility];  Service: Ophthalmology;  Laterality: Left;    ENUCLEATION Right 2020    Procedure: ENUCLEATION, EYE;  Surgeon: Myrna Alba MD;  Location: On license of [facility];   Service: Ophthalmology;  Laterality: Right;    EXPLORATION OF ORBIT Right 6/17/2020    Procedure: EXPLORATION, ORBIT;  Surgeon: Myrna Alba MD;  [facility]: [facility];  Service: Ophthalmology;  Laterality: Right;    EYE SURGERY      KIDNEY TRANSPLANT N/A 4/14/2024    Procedure: TRANSPLANT, KIDNEY;  Surgeon: Trent Burt MD;  Location: [address];  Service: Transplant;  Laterality: N/A;    TARSORRHAPHY Right 6/17/2020    Procedure: BLEPHARORRHAPHY;  Surgeon: Myrna Alba MD;  Location: [facility];  Service: Ophthalmology;  Laterality: Right;    VITRECTOMY Right     VITRECTOMY BY PARS PLANA APPROACH Left 6/24/2022    Procedure: VITRECTOMY, PARS PLANA APPROACH;  Surgeon: Arun Veronica MD;  Location: [facility];  Service: Ophthalmology;  Laterality: Left;       Time Tracking:     PT Received On: 04/18/24  PT Start Time: 1015     PT Stop Time: 1036  PT Total Time (min): 21 min     Billable Minutes: Evaluation 10 and Gait Training 11      04/18/2024

## 2024-04-18 NOTE — PROGRESS NOTES
"[facility] - Transplant Stepdown  Endocrinology  Progress Note    Admit Date: 2024     Reason for Consult: Management of T2DM, Hyperglycemia     Surgical Procedure and Date: Kidney Transplant on 24    Diabetes diagnosis year:     Home Diabetes Medications:  Novolog 4-6 units TID with mels, Lantus 28-35 untis q HS, Tradjenta 5 mg daily     How often checking glucose at home? Dexcom G7    BG readings on regimen: mostly 140-150s  Hypoglycemia on the regimen?  No  Missed doses on regimen?  No    Diabetes Complications include:     Hyperglycemia, Diabetic nephropathy  , Diabetic chronic kidney disease     , Diabetic cataract , and Diabetic peripheral neuropathy     Complicating diabetes co morbidities:   ESRD and Glucocorticoid use       HPI:   Patient is a 63 y.o. 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  Now presents for the above procedure. Endocrinology consulted for management of T2DM.      Lab Results   Component Value Date    HGBA1C 8.6 (H) 2024         Interval HPI:   No acute events overnight. Patient in room 68255/17345 A. Blood glucose stable. BG at and below goal on current insulin regimen (SSI, prandial, and basal insulin ). Steroid use- Prednisone 20 mg .   4 Days Post-Op  Renal function- Abnormal - Cr 7.7   Vasopressors-  None     Diet Renal Standard Tray     Eatin%  Nausea: No  Hypoglycemia and intervention: No  Fever: No  TPN and/or TF: No    BP (!) 143/77   Pulse 85   Temp 98.8 °F (37.1 °C) (Oral)   Resp 18   Ht 5' 7" (1.702 m)   Wt 92.9 kg (204 lb 12.9 oz)   SpO2 95%   BMI 32.08 kg/m²     Labs Reviewed and Include    Recent Labs   Lab 24  0529   GLU 65*   CALCIUM 7.8*   ALBUMIN 2.4*      K 4.0   CO2 25      BUN 59*   CREATININE 7.7*     Lab Results   Component Value Date    WBC 5.46 2024    HGB 8.5 (L) 2024    HCT 24.3 (L) 2024    MCV 87 2024    PLT 84 (L) 2024     No results " "for input(s): "TSH", "FREET4" in the last 168 hours.  Lab Results   Component Value Date    HGBA1C 8.6 (H) 04/14/2024       Nutritional status:   Body mass index is 32.08 kg/m².  Lab Results   Component Value Date    ALBUMIN 2.4 (L) 04/18/2024    ALBUMIN 2.2 (L) 04/17/2024    ALBUMIN 2.3 (L) 04/16/2024     No results found for: "PREALBUMIN"    Estimated Creatinine Clearance: 10.8 mL/min (A) (based on SCr of 7.7 mg/dL (H)).    Accu-Checks  Recent Labs     04/16/24  1720 04/16/24  2139 04/16/24  2141 04/16/24  2226 04/17/24  0000 04/17/24  1341 04/17/24  1755 04/17/24  1824 04/17/24  2239 04/18/24  0851   POCTGLUCOSE 152* 85 85 86 105 176* 110 136* 122* 67*       Current Medications and/or Treatments Impacting Glycemic Control  Immunotherapy:    Immunosuppressants           Stop Route Frequency     tacrolimus capsule 4 mg         -- Oral 2 times daily     mycophenolate capsule 1,000 mg         -- Oral 2 times daily          Steroids:   Hormones (From admission, onward)      Start     Stop Route Frequency Ordered    04/17/24 0900  predniSONE tablet 20 mg  (IP TXP KIDNEY POST-OP THYMO WITH PERIPHERAL PRECHECKED)         -- Oral Daily 04/14/24 1354          Pressors:    Autonomic Drugs (From admission, onward)      None          Hyperglycemia/Diabetes Medications:   Antihyperglycemics (From admission, onward)      Start     Stop Route Frequency Ordered    04/18/24 2100  insulin detemir U-100 (Levemir) pen 15 Units         -- SubQ Nightly 04/18/24 0952    04/18/24 1130  insulin aspart U-100 pen 6 Units         -- SubQ 3 times daily with meals 04/18/24 0952    04/14/24 1430  insulin aspart U-100 pen 0-5 Units  (INSULIN - LOW CORRECTION DOSE (Recommended for BMI <25, GFR<15 or on dialysis, Age > 70, type 1 diabetes, ESLD, severe CHF or patients on <70 units of insulin daily))         -- SubQ Before meals & nightly PRN 04/14/24 1354            ASSESSMENT and PLAN    Cardiac/Vascular  Hypertension        Renal/  * Kidney " replaced by transplant  Managed per primary team  Avoid hypoglycemia        Immunology/Multi System  Prophylactic immunotherapy  May increase insulin resistance.         Endocrine  Type 2 diabetes mellitus with hypoglycemia without coma, with long-term current use of insulin  BG goal 140-180  T2DM. Kidney Txp 4/14/24. On steroids.  BG below goal.  Will further decrease regimen.    Decrease Levemir to 15 units q HS   Decrease Novolog to 6 units TID with meals   Moderate Dose Correction Scale  BG monitoring ac/hs    ** Please call Endocrine for any BG related issues **     Discharge Planning:   Notified of D/c today.  Overall needs decreasing with lows here.  Steroid doses decreasing.  Will send home on decreased regimen and advise to send logs in the coming days if adjustments needed.    Lantus 11 units nightly  Novolog 6 units with meals  Add correction scale if needed.  Blood sugar 180 to 230 add 2 units  Blood sugar 231 to 280 add 4 units  Blood sugar 281 to 330 add 6 units  Blood sugar 331 to 380 add 8 units  Blood sugar greater than 380 add 10 units  - BD pen needles                   Jamir Culp PA-C  Endocrinology  [facility] - Transplant Stepdown

## 2024-04-18 NOTE — PROGRESS NOTES
Transplant Social Work Discharge Note:    [facility] of Marjorie Ha, patient's wife, phone number [phone].     Patient reports readiness to discharge at this time. [facility] and has a outpatient HD chair at [facility] (MWF 1:30pm). Patient will receive a walker through [facility] (to be delivered bedside per Guerrero).  sent fax to [facility] and is awaiting reply, REESE aware that home health may not be set up at time of discharge, and reports patient is still okay to discharge. Per rounds this morning patient does not require additional needs from this  at this time. Patient and caregiver denied needing or wanting additional resources or referrals at this time. Patient and caregiver agree to contact transplant team with needs, questions, or concerns as they arise.     Pt aware of, involved in, and coping well with this discharge plan. Pt did not have any concerns with the discharge plan at this time.  remains available at [phone].    4/19/24:     Spoke with Palmira at [facility] who reports patient has been approved for services and will start with them on Monday. Notified patient and wife via phone.       Heidy Vela LMSW

## 2024-04-18 NOTE — PATIENT CARE CONFERENCE
HEMODIALYSIS CATHETER CARE  Patients' instruction list      Hemodialysis catheter is one of the major vascular access to provide hemodialysis. Infection is one of its common complication. To maintain a safe and long-term use of your catheter without the requirement of replacement and exchange please follow the care instruction for your catheter:    Avoid getting water on the catheter. It is highly recommended to keep the catheter dry. During bathing use only sponging with towel around the catheter area. Lower part of the body can be washed but avoid splashing water over the catheter.     Avoid scratching or touching the skin close to the catheter.     It is recommended to change the dressing during dialysis sessions only. If you find your catheter dressing is wet or not clean, please call your dialysis unit to arrange exchange as soon as possible.     If you notice pain, redness or discharge from the exit site of the catheter please call your dialysis unit or your Nephrologist immediately to arrange examining the catheter and excluding any early signs of infection.     Your dialysis catheter should only be used for dialysis. Only your dialysis nurse can use the catheter. Do not accept any other health care personnel to use your hemodialysis catheter for any reason. This catheter is not intended to be used for medications, IV fluid or taking blood for labs.     The more strict you are in following the instructions the less will be the risk of infection and complications with your dialysis catheter.    We would like to wish you a great health and we will be glad to answer any question you have.      please communicate with [facility] or use [facility] to send us your questions.       VELASQUEZ WILEY.Raymond. MD. KYLE. SHIRLEY.  , [employer] [facility] [employer]  Nephrology Consultant. [facility].   [address],  [facility]. [address]th  [place] [address].    email: [email or web address].  Tel: Office: [phone]

## 2024-04-18 NOTE — SUBJECTIVE & OBJECTIVE
"Interval HPI:   No acute events overnight. Patient in room 90393/85312 A. Blood glucose stable. BG at and below goal on current insulin regimen (SSI, prandial, and basal insulin ). Steroid use- Prednisone 20 mg .   4 Days Post-Op  Renal function- Abnormal - Cr 7.7   Vasopressors-  None     Diet Renal Standard Tray     Eatin%  Nausea: No  Hypoglycemia and intervention: No  Fever: No  TPN and/or TF: No    BP (!) 143/77   Pulse 85   Temp 98.8 °F (37.1 °C) (Oral)   Resp 18   Ht 5' 7" (1.702 m)   Wt 92.9 kg (204 lb 12.9 oz)   SpO2 95%   BMI 32.08 kg/m²     Labs Reviewed and Include    Recent Labs   Lab 24  0529   GLU 65*   CALCIUM 7.8*   ALBUMIN 2.4*      K 4.0   CO2 25      BUN 59*   CREATININE 7.7*     Lab Results   Component Value Date    WBC 5.46 2024    HGB 8.5 (L) 2024    HCT 24.3 (L) 2024    MCV 87 2024    PLT 84 (L) 2024     No results for input(s): "TSH", "FREET4" in the last 168 hours.  Lab Results   Component Value Date    HGBA1C 8.6 (H) 2024       Nutritional status:   Body mass index is 32.08 kg/m².  Lab Results   Component Value Date    ALBUMIN 2.4 (L) 2024    ALBUMIN 2.2 (L) 2024    ALBUMIN 2.3 (L) 2024     No results found for: "PREALBUMIN"    Estimated Creatinine Clearance: 10.8 mL/min (A) (based on SCr of 7.7 mg/dL (H)).    Accu-Checks  Recent Labs     24  1720 24  2139 24  2141 24  2226 24  0000 24  1341 24  1755 24  1824 24  2239 24  0851   POCTGLUCOSE 152* 85 85 86 105 176* 110 136* 122* 67*       Current Medications and/or Treatments Impacting Glycemic Control  Immunotherapy:    Immunosuppressants           Stop Route Frequency     tacrolimus capsule 4 mg         -- Oral 2 times daily     mycophenolate capsule 1,000 mg         -- Oral 2 times daily          Steroids:   Hormones (From admission, onward)      Start     Stop Route Frequency Ordered    " 04/17/24 0900  predniSONE tablet 20 mg  (IP TXP KIDNEY POST-OP THYMO WITH PERIPHERAL PRECHECKED)         -- Oral Daily 04/14/24 1354          Pressors:    Autonomic Drugs (From admission, onward)      None          Hyperglycemia/Diabetes Medications:   Antihyperglycemics (From admission, onward)      Start     Stop Route Frequency Ordered    04/18/24 2100  insulin detemir U-100 (Levemir) pen 15 Units         -- SubQ Nightly 04/18/24 0952 04/18/24 1130  insulin aspart U-100 pen 6 Units         -- SubQ 3 times daily with meals 04/18/24 0952 04/14/24 1430  insulin aspart U-100 pen 0-5 Units  (INSULIN - LOW CORRECTION DOSE (Recommended for BMI <25, GFR<15 or on dialysis, Age > 70, type 1 diabetes, ESLD, severe CHF or patients on <70 units of insulin daily))         -- SubQ Before meals & nightly PRN 04/14/24 7268

## 2024-04-18 NOTE — PLAN OF CARE
PT Evaluation completed and PT POC established.    Problem: Physical Therapy  Goal: Physical Therapy Goal  Description: Goals to be met by: 2024     Patient will increase functional independence with mobility by performin. Gait  x 300 feet with Supervision using LRAD.   2. Ascend/descend 2 stair with bilateral Handrails Supervision using LRAD.     Outcome: Ongoing, Progressing

## 2024-04-18 NOTE — PLAN OF CARE
Pt AAOx4, VSS. NSR on tele. Pt RLQ incision covered with ABD pad, CDI. LLQ prior PD site covered with gauze, CDI. Dermabond site CDI. Pt NPO since midnight for tunnel cath placement 4/18. BG monitored AC/HS-- insulin administered per MAR. Pt denies N/V. Oxy 5 administered once PRN for pain. See flowsheet for I/O-- no BM. Pt up with standby assist, remains free from falls/injury. Self meds 100% per pt wife. Bed in lowest locked position, call light within reach, wife at bedside, POC ongoing.

## 2024-04-19 ENCOUNTER — CLINICAL SUPPORT (OUTPATIENT)
Dept: TRANSPLANT | Facility: CLINIC | Age: 64
End: 2024-04-19
Payer: MEDICARE

## 2024-04-19 ENCOUNTER — LAB VISIT (OUTPATIENT)
Dept: LAB | Facility: HOSPITAL | Age: 64
End: 2024-04-19
Payer: MEDICARE

## 2024-04-19 VITALS
HEIGHT: 67 IN | TEMPERATURE: 98 F | OXYGEN SATURATION: 99 % | HEART RATE: 82 BPM | WEIGHT: 204.13 LBS | DIASTOLIC BLOOD PRESSURE: 68 MMHG | BODY MASS INDEX: 32.04 KG/M2 | RESPIRATION RATE: 18 BRPM | SYSTOLIC BLOOD PRESSURE: 150 MMHG

## 2024-04-19 DIAGNOSIS — Z94.0 KIDNEY REPLACED BY TRANSPLANT: ICD-10-CM

## 2024-04-19 LAB
ALBUMIN SERPL BCP-MCNC: 2.5 G/DL (ref 3.5–5.2)
ANION GAP SERPL CALC-SCNC: 9 MMOL/L (ref 8–16)
BACTERIA FLD AEROBE CULT: NO GROWTH
BASOPHILS # BLD AUTO: 0.01 K/UL (ref 0–0.2)
BASOPHILS NFR BLD: 0.1 % (ref 0–1.9)
BUN SERPL-MCNC: 65 MG/DL (ref 8–23)
CALCIUM SERPL-MCNC: 7.9 MG/DL (ref 8.7–10.5)
CHLORIDE SERPL-SCNC: 100 MMOL/L (ref 95–110)
CO2 SERPL-SCNC: 27 MMOL/L (ref 23–29)
CREAT SERPL-MCNC: 8.1 MG/DL (ref 0.5–1.4)
DIFFERENTIAL METHOD BLD: ABNORMAL
EOSINOPHIL # BLD AUTO: 0.1 K/UL (ref 0–0.5)
EOSINOPHIL NFR BLD: 0.7 % (ref 0–8)
ERYTHROCYTE [DISTWIDTH] IN BLOOD BY AUTOMATED COUNT: 13.4 % (ref 11.5–14.5)
EST. GFR  (NO RACE VARIABLE): 6.9 ML/MIN/1.73 M^2
GLUCOSE SERPL-MCNC: 213 MG/DL (ref 70–110)
GRAM STN SPEC: NORMAL
GRAM STN SPEC: NORMAL
HCT VFR BLD AUTO: 25.9 % (ref 40–54)
HGB BLD-MCNC: 8.8 G/DL (ref 14–18)
IMM GRANULOCYTES # BLD AUTO: 0.03 K/UL (ref 0–0.04)
IMM GRANULOCYTES NFR BLD AUTO: 0.4 % (ref 0–0.5)
LYMPHOCYTES # BLD AUTO: 0.4 K/UL (ref 1–4.8)
LYMPHOCYTES NFR BLD: 5.6 % (ref 18–48)
MAGNESIUM SERPL-MCNC: 2.5 MG/DL (ref 1.6–2.6)
MCH RBC QN AUTO: 30.3 PG (ref 27–31)
MCHC RBC AUTO-ENTMCNC: 34 G/DL (ref 32–36)
MCV RBC AUTO: 89 FL (ref 82–98)
MONOCYTES # BLD AUTO: 1.1 K/UL (ref 0.3–1)
MONOCYTES NFR BLD: 15.6 % (ref 4–15)
NEUTROPHILS # BLD AUTO: 5.3 K/UL (ref 1.8–7.7)
NEUTROPHILS NFR BLD: 77.6 % (ref 38–73)
NRBC BLD-RTO: 0 /100 WBC
PHOSPHATE SERPL-MCNC: 4.4 MG/DL (ref 2.7–4.5)
PLATELET # BLD AUTO: 115 K/UL (ref 150–450)
PMV BLD AUTO: 12.2 FL (ref 9.2–12.9)
POTASSIUM SERPL-SCNC: 3.9 MMOL/L (ref 3.5–5.1)
RBC # BLD AUTO: 2.9 M/UL (ref 4.6–6.2)
SODIUM SERPL-SCNC: 136 MMOL/L (ref 136–145)
TACROLIMUS BLD-MCNC: 8.5 NG/ML (ref 5–15)
WBC # BLD AUTO: 6.8 K/UL (ref 3.9–12.7)

## 2024-04-19 PROCEDURE — 83735 ASSAY OF MAGNESIUM: CPT | Performed by: STUDENT IN AN ORGANIZED HEALTH CARE EDUCATION/TRAINING PROGRAM

## 2024-04-19 PROCEDURE — 36415 COLL VENOUS BLD VENIPUNCTURE: CPT | Performed by: STUDENT IN AN ORGANIZED HEALTH CARE EDUCATION/TRAINING PROGRAM

## 2024-04-19 PROCEDURE — 80069 RENAL FUNCTION PANEL: CPT | Performed by: STUDENT IN AN ORGANIZED HEALTH CARE EDUCATION/TRAINING PROGRAM

## 2024-04-19 PROCEDURE — 99999 PR PBB SHADOW E&M-EST. PATIENT-LVL III: CPT | Mod: PBBFAC,,,

## 2024-04-19 PROCEDURE — 80197 ASSAY OF TACROLIMUS: CPT | Performed by: STUDENT IN AN ORGANIZED HEALTH CARE EDUCATION/TRAINING PROGRAM

## 2024-04-19 PROCEDURE — 85025 COMPLETE CBC W/AUTO DIFF WBC: CPT | Performed by: STUDENT IN AN ORGANIZED HEALTH CARE EDUCATION/TRAINING PROGRAM

## 2024-04-19 NOTE — PROGRESS NOTES
"1ST NURSING VISIT POST DISCHARGE NOTE    1st RN appointment with Murphy Ha post discharge 4/18/24 s/p kidney transplant 4/14/24.  Patient's spouse accompanied him.  Patient reports incisional pain.  Patient says that he that he is sleeping well.  Incision has drainage.  Patient that he is able to explain daily incision care and showering instructions.  Reviewed I&O monitoring, measuring, and recording, and the need for hydration (i.e., at least 2 liters of water daily with minimal caffeine and no grapefruit products).  Medication list and rationale were reviewed.  Patient did bring blue medication card and medication bottles for review.  Patient reports that has not started Colace but will  the prescription today.  Patient has not had a bowel movement today.  Patient expressed understanding of daily care including BID VS, medications, and I&O documentation.  Patient made aware of today's creatinine level: 8.1.  Patient aware that coordinator will review today's labs with a transplant physician and call the patient with any dose changes indicated.  Next lab appointment scheduled for 4/22/24.  First post-operative transplant team appointment with labs scheduled for 4/22/24.    Using the Kidney Transplant Patient Reference Manual, the patient submitted his open book "Self-assessment of Kidney Transplant Patient Knowledge" test, which was completed in the transplant clinic this morning before 1st nursing visit.  This test includes questions regarding critical dose medications commonly used after kidney transplant, medication dosing and side effects, importance of timed lab draws, important signs and symptoms to report 24/7 immediately post-transplant as well as how to contact the transplant team 24/7.    Test Score: 23/25    After completing the test, the patient was given a copy of the Self-assessment Answer Key to reinforce accurate learning of test content.  Patient expressed his understanding of the " value of the information included in the self-assessment test.    DGF Assessment done in clinic.  Patient did not have any swelling or weight gain.  Urine output overnight was 800 ml. Dr. Pierre reviewed labs and instructed to hold HD today.  JESSICA Morel with [facility] notified.

## 2024-04-19 NOTE — PROGRESS NOTES
Clinic Note: First Return to Clinic Post-  Kidney Transplant    Murphy Ha  is a 63 y.o. male  S/p   RIGHT KIDNEY   transplant on 4/14/2024 (Kidney) for Diabetes Mellitus - Type II.      Discharge Course (Issues/Concerns): Patient presents today with no complaints regarding eating or sleeping. Reports pain that is manageable with current pain regimen.     Objective:   Vitals:    04/19/24 0934   BP: (!) 150/68   Pulse: 82   Resp: 18   Temp: 97.7 °F (36.5 °C)       Met with patient and his caregiver in the clinic to review current medication list.     Current Outpatient Medications   Medication Sig Dispense Refill    atorvastatin (LIPITOR) 80 MG tablet Take 1 tablet (80 mg total) by mouth every evening. 30 tablet 1    blood-glucose meter,continuous (DEXCOM G7 ) Misc Use to check glucose with sensors 1 each 0    blood-glucose sensor (DEXCOM G7 SENSOR) Lois 1 Device by Misc.(Non-Drug; Combo Route) route every 10 days. 9 each 3    carvediloL (COREG) 25 MG tablet Take 1 tablet (25 mg total) by mouth 2 (two) times daily. 60 tablet 11    divalproex (DEPAKOTE) 250 MG EC tablet Take 250 mg by mouth 2 (two) times daily.      dorzolamide-timolol 2-0.5% (COSOPT) 22.3-6.8 mg/mL ophthalmic solution Place 1 drop into the left eye 2 (two) times daily. 10 mL 5    famotidine (PEPCID) 20 MG tablet Take 1 tablet (20 mg total) by mouth every evening. 30 tablet 11    gabapentin (NEURONTIN) 300 MG capsule Take 1 capsule (300 mg total) by mouth every evening. 90 capsule 3    glucagon 3 mg/actuation Spry 1 each by Nasal route as needed. 1 each 11    insulin aspart U-100 (NOVOLOG U-100 INSULIN ASPART) 100 unit/mL injection Inject 6 Units into the skin 3 (three) times daily before meals. + sliding scale.  MDD 36 units/day 10 mL 5    insulin glargine U-100, Lantus, (LANTUS) 100 unit/mL injection Inject 11 Units into the skin every evening. 10 mL 5    insulin syringe-needle U-100 (BD INSULIN SYRINGE ULTRA-FINE) 1 mL 30 gauge x  "1/2" Syrg 1 each by Misc.(Non-Drug; Combo Route) route 4 (four) times daily with meals and nightly. 100 each 1    lancets 32 gauge Misc 1 lancet by Misc.(Non-Drug; Combo Route) route 2 (two) times a day. 100 each 3    mycophenolate (CELLCEPT) 250 mg Cap Take 4 capsules (1,000 mg total) by mouth 2 (two) times daily. 240 capsule 11    NIFEdipine (PROCARDIA-XL) 30 MG (OSM) 24 hr tablet Take 1 tablet (30 mg total) by mouth 2 (two) times a day. 60 tablet 11    oxyCODONE (ROXICODONE) 5 MG immediate release tablet Take 1 tablet (5 mg total) by mouth every 6 (six) hours as needed for Pain. 24 tablet 0    predniSONE (DELTASONE) 5 MG tablet Take by mouth daily; 4/17/2024-4/23/2024: 20 mg, 4/24/2024-4/30/2024: 15 mg; 5/1/2024-5/7/2024: 10 mg; 5/8/2024- forever: 5 mg; do not stop 70 tablet 11    sevelamer carbonate (RENVELA) 800 mg Tab Take 1 tablet (800 mg total) by mouth 3 (three) times daily with meals.      sulfamethoxazole-trimethoprim 400-80mg (BACTRIM,SEPTRA) 400-80 mg per tablet Take 1 tablet by mouth every Mon, Wed, Fri. Stop 10/11/24 12 tablet 5    tacrolimus (PROGRAF) 1 MG Cap Take 4 capsules (4 mg total) by mouth every 12 (twelve) hours. 240 capsule 11    tamsulosin (FLOMAX) 0.4 mg Cap Take 1 capsule (0.4 mg total) by mouth once daily. 90 capsule 3    valGANciclovir (VALCYTE) 450 mg Tab Take 1 tablet (450 mg total) by mouth every Mon, Wed, Fri. Stop 7/13/24 12 tablet 2     No current facility-administered medications for this visit.     Facility-Administered Medications Ordered in Other Visits   Medication Dose Route Frequency Provider Last Rate Last Admin    mupirocin 2 % ointment   Nasal On Call Procedure Lorelei Cha PA-C        sodium chloride 0.9% flush 10 mL  10 mL Intravenous PRN Lorelei Cha, PA-C        tamsulosin 24 hr capsule 0.4 mg  1 capsule Oral Daily Lorelei Cha PA-C   0.4 mg at 04/18/24 0840       Pharmacy Interventions/Recommendations:     1) Graft Function & " Immunosuppression Issues: Maintenance Prograf, MMF, and prednisone taper     2) Opportunistic Infection prophylaxis:   PCP ppx: Bactrim until 10/11/24  CMV ppx: Valcyte until 7/13/24      3) Donor Serologies & Monitoring:     Donor CMV Status:  Positive  Donor HCV Status:  Negative  Donor HBcAb:  Negative  Donor HBV JOSE M: No results  Donor HCV JOSE M: Negative      4) Pain Management & Bowel Regimen: Pain is manageable on oxycodone. No BM reported but doesn't have OTC docusate yet. Counseled on goal BM x1/day. Patient stated that they will go  bowel regimen today.     5) Blood Pressure Management: On carvedilol and nifedipine     6) Blood Sugar Management & Follow-up: On Novolog and Lantus.  on AM labs and did not have home BG log. Recommend close endo follow-up.     7) Electrolyte Management: Renvela 800 mg TID with meals     8) Osteoporosis Prevention Strategy (Liver Transplant): N/A    9) OTHER medication follow-up (patient assistance, held medications, etc):   Holding aspirin; restart when appropriate per surgery   Patient informed of insulin needle rx sent to pharmacy    10) Reinforced medication education conducted in the hospital, including medication indications, dosing, administration, side effects, monitoring-- including timing of immunosuppressant levels.     Patient received their FIRST fill of medications from [facility].  Discussed the process for obtaining refills of medications, including verifying the dose and mailing address to have medications delivered.     Murphy and his caregivers verbalized understanding and had the opportunity to ask questions.

## 2024-04-21 ENCOUNTER — HOSPITAL ENCOUNTER (EMERGENCY)
Facility: HOSPITAL | Age: 64
Discharge: HOME | End: 2024-04-22
Attending: EMERGENCY MEDICINE
Payer: MEDICARE

## 2024-04-21 DIAGNOSIS — E87.5 HYPERKALEMIA: ICD-10-CM

## 2024-04-21 DIAGNOSIS — I10 HYPERTENSION, UNSPECIFIED TYPE: Chronic | ICD-10-CM

## 2024-04-21 DIAGNOSIS — Z94.0 KIDNEY REPLACED BY TRANSPLANT: ICD-10-CM

## 2024-04-21 DIAGNOSIS — R58 BLEEDING: ICD-10-CM

## 2024-04-21 DIAGNOSIS — Z48.89 ENCOUNTER FOR POST SURGICAL WOUND CHECK: Primary | ICD-10-CM

## 2024-04-21 LAB
ALBUMIN SERPL BCP-MCNC: 2.9 G/DL (ref 3.5–5.2)
ALP SERPL-CCNC: 84 U/L (ref 55–135)
ALT SERPL W/O P-5'-P-CCNC: 17 U/L (ref 10–44)
ANION GAP SERPL CALC-SCNC: 11 MMOL/L (ref 8–16)
AST SERPL-CCNC: 25 U/L (ref 10–40)
BILIRUB SERPL-MCNC: 0.3 MG/DL (ref 0.1–1)
BUN SERPL-MCNC: 82 MG/DL (ref 8–23)
CALCIUM SERPL-MCNC: 8.3 MG/DL (ref 8.7–10.5)
CHLORIDE SERPL-SCNC: 101 MMOL/L (ref 95–110)
CO2 SERPL-SCNC: 22 MMOL/L (ref 23–29)
CREAT SERPL-MCNC: 8.2 MG/DL (ref 0.5–1.4)
EST. GFR  (NO RACE VARIABLE): 6.8 ML/MIN/1.73 M^2
GLUCOSE SERPL-MCNC: 226 MG/DL (ref 70–110)
POTASSIUM SERPL-SCNC: 5.3 MMOL/L (ref 3.5–5.1)
PROT SERPL-MCNC: 6.6 G/DL (ref 6–8.4)
SODIUM SERPL-SCNC: 134 MMOL/L (ref 136–145)

## 2024-04-21 PROCEDURE — 80053 COMPREHEN METABOLIC PANEL: CPT | Performed by: EMERGENCY MEDICINE

## 2024-04-21 PROCEDURE — 96374 THER/PROPH/DIAG INJ IV PUSH: CPT

## 2024-04-21 PROCEDURE — 85025 COMPLETE CBC W/AUTO DIFF WBC: CPT | Performed by: EMERGENCY MEDICINE

## 2024-04-21 PROCEDURE — 63600175 PHARM REV CODE 636 W HCPCS: Performed by: EMERGENCY MEDICINE

## 2024-04-21 PROCEDURE — 93005 ELECTROCARDIOGRAM TRACING: CPT

## 2024-04-21 PROCEDURE — 99284 EMERGENCY DEPT VISIT MOD MDM: CPT | Mod: 25

## 2024-04-21 PROCEDURE — 25000003 PHARM REV CODE 250: Performed by: EMERGENCY MEDICINE

## 2024-04-21 PROCEDURE — 93010 ELECTROCARDIOGRAM REPORT: CPT | Mod: ,,, | Performed by: INTERNAL MEDICINE

## 2024-04-21 PROCEDURE — 25000003 PHARM REV CODE 250: Performed by: PHYSICIAN ASSISTANT

## 2024-04-21 RX ORDER — NIFEDIPINE 30 MG/1
30 TABLET, EXTENDED RELEASE ORAL ONCE
Status: COMPLETED | OUTPATIENT
Start: 2024-04-21 | End: 2024-04-21

## 2024-04-21 RX ORDER — HYDRALAZINE HYDROCHLORIDE 20 MG/ML
2 INJECTION INTRAMUSCULAR; INTRAVENOUS
Status: COMPLETED | OUTPATIENT
Start: 2024-04-21 | End: 2024-04-21

## 2024-04-21 RX ADMIN — SODIUM ZIRCONIUM CYCLOSILICATE 5 G: 5 POWDER, FOR SUSPENSION ORAL at 11:04

## 2024-04-21 RX ADMIN — NIFEDIPINE 30 MG: 30 TABLET, FILM COATED, EXTENDED RELEASE ORAL at 11:04

## 2024-04-21 RX ADMIN — HYDRALAZINE HYDROCHLORIDE 2 MG: 20 INJECTION, SOLUTION INTRAMUSCULAR; INTRAVENOUS at 11:04

## 2024-04-22 ENCOUNTER — TELEPHONE (OUTPATIENT)
Dept: TRANSPLANT | Facility: CLINIC | Age: 64
End: 2024-04-22
Payer: MEDICARE

## 2024-04-22 VITALS
HEIGHT: 67 IN | BODY MASS INDEX: 32.04 KG/M2 | DIASTOLIC BLOOD PRESSURE: 82 MMHG | TEMPERATURE: 98 F | WEIGHT: 204.13 LBS | RESPIRATION RATE: 20 BRPM | HEART RATE: 80 BPM | OXYGEN SATURATION: 100 % | SYSTOLIC BLOOD PRESSURE: 176 MMHG

## 2024-04-22 DIAGNOSIS — Z94.0 KIDNEY REPLACED BY TRANSPLANT: ICD-10-CM

## 2024-04-22 LAB
BACTERIA SPEC ANAEROBE CULT: NORMAL
BASOPHILS # BLD AUTO: 0.02 K/UL (ref 0–0.2)
BASOPHILS NFR BLD: 0.2 % (ref 0–1.9)
DIFFERENTIAL METHOD BLD: ABNORMAL
EOSINOPHIL # BLD AUTO: 0 K/UL (ref 0–0.5)
EOSINOPHIL NFR BLD: 0.4 % (ref 0–8)
ERYTHROCYTE [DISTWIDTH] IN BLOOD BY AUTOMATED COUNT: 13.7 % (ref 11.5–14.5)
HCT VFR BLD AUTO: 26.7 % (ref 40–54)
HGB BLD-MCNC: 9 G/DL (ref 14–18)
IMM GRANULOCYTES # BLD AUTO: 0.1 K/UL (ref 0–0.04)
IMM GRANULOCYTES NFR BLD AUTO: 1.1 % (ref 0–0.5)
LYMPHOCYTES # BLD AUTO: 0.2 K/UL (ref 1–4.8)
LYMPHOCYTES NFR BLD: 2.6 % (ref 18–48)
MCH RBC QN AUTO: 30.7 PG (ref 27–31)
MCHC RBC AUTO-ENTMCNC: 33.7 G/DL (ref 32–36)
MCV RBC AUTO: 91 FL (ref 82–98)
MONOCYTES # BLD AUTO: 1.1 K/UL (ref 0.3–1)
MONOCYTES NFR BLD: 12.5 % (ref 4–15)
NEUTROPHILS # BLD AUTO: 7.4 K/UL (ref 1.8–7.7)
NEUTROPHILS NFR BLD: 83.2 % (ref 38–73)
NRBC BLD-RTO: 0 /100 WBC
OHS QRS DURATION: 84 MS
OHS QRS DURATION: 88 MS
OHS QTC CALCULATION: 419 MS
OHS QTC CALCULATION: 440 MS
PLATELET # BLD AUTO: 218 K/UL (ref 150–450)
PLATELET BLD QL SMEAR: ABNORMAL
PMV BLD AUTO: 11.1 FL (ref 9.2–12.9)
RBC # BLD AUTO: 2.93 M/UL (ref 4.6–6.2)
WBC # BLD AUTO: 8.95 K/UL (ref 3.9–12.7)

## 2024-04-22 PROCEDURE — 99214 OFFICE O/P EST MOD 30 MIN: CPT | Mod: ,,, | Performed by: PHYSICIAN ASSISTANT

## 2024-04-22 RX ORDER — NIFEDIPINE 30 MG/1
60 TABLET, EXTENDED RELEASE ORAL 2 TIMES DAILY
Qty: 60 TABLET | Refills: 11 | Status: SHIPPED | OUTPATIENT
Start: 2024-04-22

## 2024-04-22 RX ORDER — NIFEDIPINE 30 MG/1
60 TABLET, EXTENDED RELEASE ORAL 2 TIMES DAILY
Qty: 60 TABLET | Refills: 11 | Status: SHIPPED | OUTPATIENT
Start: 2024-04-22 | End: 2024-04-22

## 2024-04-22 RX ORDER — NIFEDIPINE 30 MG/1
60 TABLET, EXTENDED RELEASE ORAL 2 TIMES DAILY
Qty: 60 TABLET | Refills: 11 | OUTPATIENT
Start: 2024-04-22 | End: 2024-04-22 | Stop reason: SDUPTHER

## 2024-04-22 NOTE — ED TRIAGE NOTES
Murphy Ha, a 63 y.o. male presents to the ED w/ complaint of post-op problem. Kidney transplant last Sunday. Patient state his stitches started bleeding today. Patient think it may have been from his straining while having a bowel movement. Bleeding now stopped. Denies fever or chills. Denies SOB or chest pain    Triage note:  Chief Complaint   Patient presents with    Post-op Problem     Kidney transplant last Sunday; bleeding from stitches started today     Review of patient's allergies indicates:  No Known Allergies  Past Medical History:   Diagnosis Date    Anemia     Aqueous misdirection of right eye     Arthritis     Blind painful eye     Cataract     CKD (chronic kidney disease)     Diabetes mellitus, type 2     Disorder of kidney and ureter     Fever blister     GERD (gastroesophageal reflux disease)     Hyperlipidemia     Hypertension     Joint pain     Neuropathy

## 2024-04-22 NOTE — DISCHARGE INSTRUCTIONS
Please follow up for dialysis tomorrow as discussed.  Your Procardia prescription has been changed to 60 mg twice daily.    Tests today showed:   Labs Reviewed   CBC W/ AUTO DIFFERENTIAL - Abnormal; Notable for the following components:       Result Value    RBC 2.93 (*)     Hemoglobin 9.0 (*)     Hematocrit 26.7 (*)     All other components within normal limits   COMPREHENSIVE METABOLIC PANEL - Abnormal; Notable for the following components:    Sodium 134 (*)     Potassium 5.3 (*)     CO2 22 (*)     Glucose 226 (*)     BUN 82 (*)     Creatinine 8.2 (*)     Calcium 8.3 (*)     Albumin 2.9 (*)     eGFR 6.8 (*)     All other components within normal limits     No orders to display       Treatments you had today:   Medications   sodium zirconium cyclosilicate packet 5 g (5 g Oral Given 4/21/24 2337)   hydrALAZINE injection 2 mg (2 mg Intravenous Given 4/21/24 2335)   NIFEdipine 24 hr tablet 30 mg (30 mg Oral Given 4/21/24 8080)       Follow-Up Plan:  - Follow-up with primary care doctor within 3 - 5 days  - Additional testing and/or evaluation as directed by your primary doctor    Return to the Emergency Department for symptoms including but not limited to: worsening symptoms, shortness of breath or chest pain, vomiting with inability to hold down fluids, fevers greater than 100.4°F, passing out/fainting/unconsciousness, or other concerning symptoms.

## 2024-04-22 NOTE — ASSESSMENT & PLAN NOTE
- Given IV hydralazine in ED along with 30 mg of procardia  - Will increase procardia to 60 mg BID with holding parameters for 120 or less  - Patient and caregiver instructed to notify nurse coordinator with blood pressure readings

## 2024-04-22 NOTE — SUBJECTIVE & OBJECTIVE
Past Medical History:   Diagnosis Date    Anemia     Aqueous misdirection of right eye     Arthritis     Blind painful eye     Cataract     CKD (chronic kidney disease)     Diabetes mellitus, type 2     Disorder of kidney and ureter     Fever blister     GERD (gastroesophageal reflux disease)     Hyperlipidemia     Hypertension     Joint pain     Neuropathy      Past Surgical History:   Procedure Laterality Date    AIR FLUID EXCHANGE OF EYE Left 6/24/2022    Procedure: AIR FLUID EXCHANGE, EYE;  Surgeon: Arun Veronica MD;  Location: [facility];  Service: Ophthalmology;  Laterality: Left;    CATARACT EXTRACTION Left 8/27/2014    COLONOSCOPY N/A 9/8/2016    Procedure: COLONOSCOPY;  Surgeon: Luis Bogran-Reyes, MD;  Location: [facility];  Service: Endoscopy;  Laterality: N/A;    COLONOSCOPY N/A 11/10/2023    Procedure: COLONOSCOPY;  Surgeon: Nomi Del Real MD;  Location: [facility];  Service: Endoscopy;  Laterality: N/A;    CONJUNCTIVOPLASTY Right 6/17/2020    Procedure: CONJUNCTIVOPLASTY;  Surgeon: Myrna Alab MD;  Location: [facility];  Service: Ophthalmology;  Laterality: Right;    ENDOLASER PHOTOCOAGULATION Left 6/24/2022    Procedure: PHOTOCOAGULATION, ENDOLASER;  Surgeon: Arun Veronica MD;  Location: [facility];  Service: Ophthalmology;  Laterality: Left;    ENUCLEATION Right 6/17/2020    Procedure: ENUCLEATION, EYE;  Surgeon: Myrna Alba MD;  Location: [facility];  Service: Ophthalmology;  Laterality: Right;    EXPLORATION OF ORBIT Right 6/17/2020    Procedure: EXPLORATION, ORBIT;  Surgeon: Myrna Alba MD;  Location: [facility];  Service: Ophthalmology;  Laterality: Right;    EYE SURGERY      INSERTION OF TUNNELED CENTRAL VENOUS HEMODIALYSIS CATHETER Right 4/18/2024    Procedure: Insertion, Catheter, Central Venous, Hemodialysis;  Surgeon: Sobeida Morales MD;  Location: [facility] CATH LAB;  Service: Interventional Nephrology;  Laterality: Right;    KIDNEY TRANSPLANT N/A 4/14/2024    Procedure:  TRANSPLANT, KIDNEY;  Surgeon: Trent Burt MD;  Location: [address];  Service: Transplant;  Laterality: N/A;    TARSORRHAPHY Right 6/17/2020    Procedure: BLEPHARORRHAPHY;  Surgeon: Myrna Alba MD;  Location: [facility];  Service: Ophthalmology;  Laterality: Right;    VITRECTOMY Right     VITRECTOMY BY PARS PLANA APPROACH Left 6/24/2022    Procedure: VITRECTOMY, PARS PLANA APPROACH;  Surgeon: Arun Veronica MD;  Location: [facility];  Service: Ophthalmology;  Laterality: Left;       Review of patient's allergies indicates:  No Known Allergies    Family History       Problem Relation (Age of Onset)    Cataracts Mother, Father    Diabetes Mother, Father    Glaucoma Mother    Hypertension Mother, Sister, Brother    Kidney failure Father          Tobacco Use    Smoking status: Never     Passive exposure: Never    Smokeless tobacco: Never   Substance and Sexual Activity    Alcohol use: Not Currently     Alcohol/week: 0.0 standard drinks of alcohol     Comment: rarely    Drug use: No    Sexual activity: Yes       Scheduled Meds:  Continuous Infusions:  PRN Meds:    Review of Systems   Constitutional:  Negative for activity change, appetite change, chills and fever.   Respiratory:  Negative for shortness of breath.    Gastrointestinal:  Positive for abdominal distention. Negative for abdominal pain, constipation, nausea and vomiting.   Genitourinary:  Negative for decreased urine volume and difficulty urinating.   Skin:  Positive for wound.   Allergic/Immunologic: Positive for immunocompromised state.   Psychiatric/Behavioral:  Negative for confusion.       Objective:     Vital Signs (Most Recent):  Temp: 98 °F (36.7 °C) (04/21/24 2103)  Pulse: 80 (04/22/24 0000)  Resp: 20 (04/22/24 0000)  BP: (!) 193/92 (04/22/24 0000)  SpO2: 100 % (04/22/24 0000) Vital Signs (24h Range):  Temp:  [98 °F (36.7 °C)] 98 °F (36.7 °C)  Pulse:  [77-81] 80  Resp:  [17-20] 20  SpO2:  [99 %-100 %] 100 %  BP:  (159-209)/(92-98) 193/92     Weight: 92.6 kg (204 lb 2.3 oz)  Body mass index is 31.97 kg/m².    No intake or output data in the 24 hours ending 04/22/24 0031     Physical Exam  Vitals reviewed.   Cardiovascular:      Rate and Rhythm: Normal rate.   Pulmonary:      Effort: Pulmonary effort is normal.   Abdominal:      General: There is distension.      Tenderness: There is no abdominal tenderness. There is no guarding or rebound.      Comments: Kidney incision with staples in place, surrounding ecchymosis, no drainage noted  Previous PD cath sites with staples in place, surrounding ecchymosis    Neurological:      Mental Status: He is alert and oriented to person, place, and time.   Psychiatric:         Mood and Affect: Mood normal.         Behavior: Behavior normal.         Thought Content: Thought content normal.         Judgment: Judgment normal.          Laboratory:  CBC:   Recent Labs   Lab 04/21/24  2220   WBC 8.95   RBC 2.93*   HGB 9.0*   HCT 26.7*        CMP:   Recent Labs   Lab 04/21/24  2220   *   CALCIUM 8.3*   ALBUMIN 2.9*   PROT 6.6   *   K 5.3*   CO2 22*      BUN 82*   CREATININE 8.2*   ALKPHOS 84   ALT 17   AST 25   BILITOT 0.3       Diagnostic Results:  None

## 2024-04-22 NOTE — CONSULTS
[facility] - Emergency Dept  Kidney Transplant  Consult Note    Patient Name: Murphy Ha  MRN: [id number]  Admission Date: 4/21/2024  Hospital Length of Stay: 0 days  Code Status: Prior  Attending Provider: Thuy Jeong MD  Primary Care Provider: Linwood Trevino MD    Inpatient consult to Kidney Transplant Surgery  Consult performed by: Stacy Fonseca PA-C  Consult ordered by: Thuy Jeong MD        Subjective:     History of Present Illness:  Mr. 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 He progressed well post operatively but does have expected DGF d/t prolonged CIT and DCD kidney. He has a tunneled cath placed 4/18 and has an outpt HD chair MWF.  He now presents to the ED with complaints of bloody drainage from kidney incision this evening. Patient reports he was straining to have a BM and then noticed drainage that appeared bloody in nature. He otherwise reports feeling well. No increased abdominal pain. UOP has increased to ~2-2.5L daily since discharge. On exam, incision has surrounding ecchymosis that does not appear to be new. No drainage was able to be expressed from incision. H/H on lab work is stable. Lab work does show mild hyperkalemia of 5.3 (given lokelma in ED, due for HD tomorrow). He was noted to be hypertensive in the ED (SBP 200s). He has taken his evening blood pressure medications. Caregiver reports blood pressure has been in the 130-140s at home.     Past Medical History:   Diagnosis Date    Anemia     Aqueous misdirection of right eye     Arthritis     Blind painful eye     Cataract     CKD (chronic kidney disease)     Diabetes mellitus, type 2     Disorder of kidney and ureter     Fever blister     GERD (gastroesophageal reflux disease)     Hyperlipidemia     Hypertension     Joint pain     Neuropathy      Past Surgical History:   Procedure Laterality Date    AIR FLUID EXCHANGE OF  EYE Left 6/24/2022    Procedure: AIR FLUID EXCHANGE, EYE;  Surgeon: Arun Veronica MD;  Location: [facility];  Service: Ophthalmology;  Laterality: Left;    CATARACT EXTRACTION Left 8/27/2014    COLONOSCOPY N/A 9/8/2016    Procedure: COLONOSCOPY;  Surgeon: Luis Bogran-Reyes, MD;  Location: [facility] ENDO;  Service: Endoscopy;  Laterality: N/A;    COLONOSCOPY N/A 11/10/2023    Procedure: COLONOSCOPY;  Surgeon: Nomi Del Real MD;  Location: [facility] ENDO;  Service: Endoscopy;  Laterality: N/A;    CONJUNCTIVOPLASTY Right 6/17/2020    Procedure: CONJUNCTIVOPLASTY;  Surgeon: Myrna Alba MD;  Location: [facility];  Service: Ophthalmology;  Laterality: Right;    ENDOLASER PHOTOCOAGULATION Left 6/24/2022    Procedure: PHOTOCOAGULATION, ENDOLASER;  Surgeon: Arun Veronica MD;  Location: [facility];  Service: Ophthalmology;  Laterality: Left;    ENUCLEATION Right 6/17/2020    Procedure: ENUCLEATION, EYE;  Surgeon: Myrna Alba MD;  Location: [facility];  Service: Ophthalmology;  Laterality: Right;    EXPLORATION OF ORBIT Right 6/17/2020    Procedure: EXPLORATION, ORBIT;  Surgeon: Myrna Alba MD;  Location: [facility];  Service: Ophthalmology;  Laterality: Right;    EYE SURGERY      INSERTION OF TUNNELED CENTRAL VENOUS HEMODIALYSIS CATHETER Right 4/18/2024    Procedure: Insertion, Catheter, Central Venous, Hemodialysis;  Surgeon: Sobeida Morales MD;  Location: [facility] CATH LAB;  Service: Interventional Nephrology;  Laterality: Right;    KIDNEY TRANSPLANT N/A 4/14/2024    Procedure: TRANSPLANT, KIDNEY;  Surgeon: Trent Burt MD;  [facility];  Service: Transplant;  Laterality: N/A;    TARSORRHAPHY Right 6/17/2020    Procedure: BLEPHARORRHAPHY;  Surgeon: Myrna Alba MD;  Location: [facility];  Service: Ophthalmology;  Laterality: Right;    VITRECTOMY Right     VITRECTOMY BY PARS PLANA APPROACH Left 6/24/2022    Procedure: VITRECTOMY, PARS PLANA APPROACH;  Surgeon: Arun Veronica MD;   Location: [facility];  Service: Ophthalmology;  Laterality: Left;       Review of patient's allergies indicates:  No Known Allergies    Family History       Problem Relation (Age of Onset)    Cataracts Mother, Father    Diabetes Mother, Father    Glaucoma Mother    Hypertension Mother, Sister, Brother    Kidney failure Father          Tobacco Use    Smoking status: Never     Passive exposure: Never    Smokeless tobacco: Never   Substance and Sexual Activity    Alcohol use: Not Currently     Alcohol/week: 0.0 standard drinks of alcohol     Comment: rarely    Drug use: No    Sexual activity: Yes       Scheduled Meds:  Continuous Infusions:  PRN Meds:    Review of Systems   Constitutional:  Negative for activity change, appetite change, chills and fever.   Respiratory:  Negative for shortness of breath.    Gastrointestinal:  Positive for abdominal distention. Negative for abdominal pain, constipation, nausea and vomiting.   Genitourinary:  Negative for decreased urine volume and difficulty urinating.   Skin:  Positive for wound.   Allergic/Immunologic: Positive for immunocompromised state.   Psychiatric/Behavioral:  Negative for confusion.       Objective:     Vital Signs (Most Recent):  Temp: 98 °F (36.7 °C) (04/21/24 2103)  Pulse: 80 (04/22/24 0000)  Resp: 20 (04/22/24 0000)  BP: (!) 193/92 (04/22/24 0000)  SpO2: 100 % (04/22/24 0000) Vital Signs (24h Range):  Temp:  [98 °F (36.7 °C)] 98 °F (36.7 °C)  Pulse:  [77-81] 80  Resp:  [17-20] 20  SpO2:  [99 %-100 %] 100 %  BP: (159-209)/(92-98) 193/92     Weight: 92.6 kg (204 lb 2.3 oz)  Body mass index is 31.97 kg/m².    No intake or output data in the 24 hours ending 04/22/24 0031     Physical Exam  Vitals reviewed.   Cardiovascular:      Rate and Rhythm: Normal rate.   Pulmonary:      Effort: Pulmonary effort is normal.   Abdominal:      General: There is distension.      Tenderness: There is no abdominal tenderness. There is no guarding or rebound.      Comments: Kidney  incision with staples in place, surrounding ecchymosis, no drainage noted  Previous PD cath sites with staples in place, surrounding ecchymosis    Neurological:      Mental Status: He is alert and oriented to person, place, and time.   Psychiatric:         Mood and Affect: Mood normal.         Behavior: Behavior normal.         Thought Content: Thought content normal.         Judgment: Judgment normal.          Laboratory:  CBC:   Recent Labs   Lab 04/21/24  2220   WBC 8.95   RBC 2.93*   HGB 9.0*   HCT 26.7*        CMP:   Recent Labs   Lab 04/21/24  2220   *   CALCIUM 8.3*   ALBUMIN 2.9*   PROT 6.6   *   K 5.3*   CO2 22*      BUN 82*   CREATININE 8.2*   ALKPHOS 84   ALT 17   AST 25   BILITOT 0.3       Diagnostic Results:  None  Assessment/Plan:     Cardiac/Vascular  Hypertension  - Given IV hydralazine in ED along with 30 mg of procardia  - Will increase procardia to 60 mg BID with holding parameters for 120 or less  - Patient and caregiver instructed to notify nurse coordinator with blood pressure readings      Renal/  * Kidney replaced by transplant  - No drainage noted on exam  - H/H stable  - Discussed with surgery fellow  - Ok to discharge from kidney transplant perspective  - Will follow up with labs tomorrow along with transplant clinic visit this week          Stacy Fonseca PA-C  Liver Transplant  [facility] - Emergency Dept

## 2024-04-22 NOTE — ED PROVIDER NOTES
Source of History:  Patient  Chart    Chief complaint:  Post-op Problem (Kidney transplant last Sunday; bleeding from stitches started today)      HPI:  Murphy Ha is a 63 y.o. male with history of ESRD secondary to diabetic nephropathy, status post kidney transplant 04/14/2024, presenting to emergency department with complaint of bleeding from his surgical site.    Patient states he had bleeding from the surgical site a few hours ago, which stopped spontaneously.  There is a dressing in place over the site now.  The bleeding looked like ximena blood, did not appear serosanguineous.  Patient has no new or worsening abdominal pain.  He is making urine.  He denies any shortness of breath.  Of note, patient remains on dialysis through a catheter in the right side of the chest, in his due for dialysis tomorrow.  He states that he took all of his home antihypertensive medications this evening.    Review of patient's allergies indicates:  No Known Allergies    Current Facility-Administered Medications on File Prior to Encounter   Medication Dose Route Frequency Provider Last Rate Last Admin    mupirocin 2 % ointment   Nasal On Call Procedure Lorelei Cha PA-C        sodium chloride 0.9% flush 10 mL  10 mL Intravenous PRN Lorelei Cha PA-C        tamsulosin 24 hr capsule 0.4 mg  1 capsule Oral Daily Lorelei Cha PA-C   0.4 mg at 04/18/24 0840     Current Outpatient Medications on File Prior to Encounter   Medication Sig Dispense Refill    atorvastatin (LIPITOR) 80 MG tablet Take 1 tablet (80 mg total) by mouth every evening. 30 tablet 1    blood-glucose meter,continuous (DEXCOM G7 ) Misc Use to check glucose with sensors 1 each 0    blood-glucose sensor (DEXCOM G7 SENSOR) Lois 1 Device by Misc.(Non-Drug; Combo Route) route every 10 days. 9 each 3    carvediloL (COREG) 25 MG tablet Take 1 tablet (25 mg total) by mouth 2 (two) times daily. 60 tablet 11    divalproex  "(DEPAKOTE) 250 MG EC tablet Take 250 mg by mouth 2 (two) times daily.      dorzolamide-timolol 2-0.5% (COSOPT) 22.3-6.8 mg/mL ophthalmic solution Place 1 drop into the left eye 2 (two) times daily. 10 mL 5    famotidine (PEPCID) 20 MG tablet Take 1 tablet (20 mg total) by mouth every evening. 30 tablet 11    gabapentin (NEURONTIN) 300 MG capsule Take 1 capsule (300 mg total) by mouth every evening. 90 capsule 3    glucagon 3 mg/actuation Spry 1 each by Nasal route as needed. 1 each 11    HYDROcodone-acetaminophen (NORCO) 5-325 mg per tablet Take 1 tablet by mouth every 12 (twelve) hours as needed for Pain. 60 tablet 0    insulin aspart U-100 (NOVOLOG U-100 INSULIN ASPART) 100 unit/mL injection Inject 6 Units into the skin 3 (three) times daily before meals. + sliding scale.  MDD 36 units/day 10 mL 5    insulin glargine U-100, Lantus, (LANTUS) 100 unit/mL injection Inject 11 Units into the skin every evening. 10 mL 5    insulin syringe-needle U-100 (BD INSULIN SYRINGE ULTRA-FINE) 1 mL 30 gauge x 1/2" Syrg 1 each by Misc.(Non-Drug; Combo Route) route 4 (four) times daily with meals and nightly. 100 each 1    lancets 32 gauge Misc 1 lancet by Misc.(Non-Drug; Combo Route) route 2 (two) times a day. 100 each 3    mycophenolate (CELLCEPT) 250 mg Cap Take 4 capsules (1,000 mg total) by mouth 2 (two) times daily. 240 capsule 11    NIFEdipine (PROCARDIA-XL) 30 MG (OSM) 24 hr tablet Take 1 tablet (30 mg total) by mouth 2 (two) times a day. 60 tablet 11    predniSONE (DELTASONE) 5 MG tablet Take by mouth daily; 4/17/2024-4/23/2024: 20 mg, 4/24/2024-4/30/2024: 15 mg; 5/1/2024-5/7/2024: 10 mg; 5/8/2024- forever: 5 mg; do not stop 70 tablet 11    sevelamer carbonate (RENVELA) 800 mg Tab Take 1 tablet (800 mg total) by mouth 3 (three) times daily with meals.      sulfamethoxazole-trimethoprim 400-80mg (BACTRIM,SEPTRA) 400-80 mg per tablet Take 1 tablet by mouth every Mon, Wed, Fri. Stop 10/11/24 12 tablet 5    tacrolimus (PROGRAF) " 1 MG Cap Take 4 capsules (4 mg total) by mouth every 12 (twelve) hours. 240 capsule 11    tamsulosin (FLOMAX) 0.4 mg Cap Take 1 capsule (0.4 mg total) by mouth once daily. 90 capsule 3    valGANciclovir (VALCYTE) 450 mg Tab Take 1 tablet (450 mg total) by mouth every Mon, Wed, Fri. Stop 7/13/24 12 tablet 2       PMH:  As per HPI and below:  Past Medical History:   Diagnosis Date    Anemia     Aqueous misdirection of right eye     Arthritis     Blind painful eye     Cataract     CKD (chronic kidney disease)     Diabetes mellitus, type 2     Disorder of kidney and ureter     Fever blister     GERD (gastroesophageal reflux disease)     Hyperlipidemia     Hypertension     Joint pain     Neuropathy      Past Surgical History:   Procedure Laterality Date    AIR FLUID EXCHANGE OF EYE Left 6/24/2022    Procedure: AIR FLUID EXCHANGE, EYE;  Surgeon: Arun Veronica MD;  Location: [facility];  Service: Ophthalmology;  Laterality: Left;    CATARACT EXTRACTION Left 8/27/2014    COLONOSCOPY N/A 9/8/2016    Procedure: COLONOSCOPY;  Surgeon: Luis Bogran-Reyes, MD;  Location: [facility];  Service: Endoscopy;  Laterality: N/A;    COLONOSCOPY N/A 11/10/2023    Procedure: COLONOSCOPY;  Surgeon: Nomi Del Real MD;  Location: [facility];  Service: Endoscopy;  Laterality: N/A;    CONJUNCTIVOPLASTY Right 6/17/2020    Procedure: CONJUNCTIVOPLASTY;  Surgeon: Myrna Alba MD;  Location: [facility];  Service: Ophthalmology;  Laterality: Right;    ENDOLASER PHOTOCOAGULATION Left 6/24/2022    Procedure: PHOTOCOAGULATION, ENDOLASER;  Surgeon: Arun Veronica MD;  Location: [facility];  Service: Ophthalmology;  Laterality: Left;    ENUCLEATION Right 6/17/2020    Procedure: ENUCLEATION, EYE;  Surgeon: Myrna Alba MD;  Location: [facility];  Service: Ophthalmology;  Laterality: Right;    EXPLORATION OF ORBIT Right 6/17/2020    Procedure: EXPLORATION, ORBIT;  Surgeon: Myrna Alba MD;  Location: [facility];  Service: Ophthalmology;   Laterality: Right;    EYE SURGERY      INSERTION OF TUNNELED CENTRAL VENOUS HEMODIALYSIS CATHETER Right 4/18/2024    Procedure: Insertion, Catheter, Central Venous, Hemodialysis;  Surgeon: Sobeida Morales MD;  Location: [facility] CATH LAB;  Service: Interventional Nephrology;  Laterality: Right;    KIDNEY TRANSPLANT N/A 4/14/2024    Procedure: TRANSPLANT, KIDNEY;  Surgeon: Trent Burt MD;  Location: [facility] OR 2ND FLR;  Service: Transplant;  Laterality: N/A;    TARSORRHAPHY Right 6/17/2020    Procedure: BLEPHARORRHAPHY;  Surgeon: Myrna Alba MD;  Location: [facility];  Service: Ophthalmology;  Laterality: Right;    VITRECTOMY Right     VITRECTOMY BY PARS PLANA APPROACH Left 6/24/2022    Procedure: VITRECTOMY, PARS PLANA APPROACH;  Surgeon: Arun Veronica MD;  Location: [facility];  Service: Ophthalmology;  Laterality: Left;       Social History     Socioeconomic History    Marital status:      Spouse name: Marjorie    Number of children: 4    Years of education: 12   Occupational History    Occupation: disabled     Employer: [employer]    Tobacco Use    Smoking status: Never     Passive exposure: Never    Smokeless tobacco: Never   Substance and Sexual Activity    Alcohol use: Not Currently     Alcohol/week: 0.0 standard drinks of alcohol     Comment: rarely    Drug use: No    Sexual activity: Yes   Social History Narrative    Caregiver Wife Marjorie     Social Determinants of Health     Stress: No Stress Concern Present (9/3/2019)    [employer] - Occupational Stress Questionnaire     Feeling of Stress : Not at all       Family History   Problem Relation Name Age of Onset    Diabetes Mother      Hypertension Mother      Cataracts Mother      Glaucoma Mother      Diabetes Father      Kidney failure Father      Cataracts Father      Hypertension Sister      Hypertension Brother         Physical Exam:      Vitals:    04/22/24 0000   BP: (!) 193/92   Pulse: 80   Resp: 20   Temp:       Gen: No acute distress.  Nontoxic.  Well appearing.  Mental Status:  Alert and oriented .  Appropriate, conversant.  Skin: Warm, dry. No rashes seen.  Eyes: No conjunctival injection.  Pulm: No increased work of breathing.  No significant tachypnea.  No audible stridor or wheezing.  No conversational dyspnea.    CV: Regular rate. Regular rhythm.   Abd: Soft.  Distended, appropriately tender.  Ecchymosis present.  No active drainage from the surgical site.  Please see photograph below.  Serosanguineous strike through on the bandage  MSK: Good range of motion all joints.  No deformities.    Neuro: Awake. Speech normal. No focal neuro deficit observed.      Laboratory Studies:  Labs Reviewed   CBC W/ AUTO DIFFERENTIAL - Abnormal; Notable for the following components:       Result Value    RBC 2.93 (*)     Hemoglobin 9.0 (*)     Hematocrit 26.7 (*)     All other components within normal limits   COMPREHENSIVE METABOLIC PANEL - Abnormal; Notable for the following components:    Sodium 134 (*)     Potassium 5.3 (*)     CO2 22 (*)     Glucose 226 (*)     BUN 82 (*)     Creatinine 8.2 (*)     Calcium 8.3 (*)     Albumin 2.9 (*)     eGFR 6.8 (*)     All other components within normal limits       EKG (independently interpreted by me):  Normal sinus rhythm, rate 80.  No STEMI.   milliseconds.  QTC 5 8 milliseconds.  No hyperkalemic changes noted      Chart reviewed.     Imaging Results    None         Medications Given:  Medications   sodium zirconium cyclosilicate packet 5 g (5 g Oral Given 4/21/24 2337)   hydrALAZINE injection 2 mg (2 mg Intravenous Given 4/21/24 2335)   NIFEdipine 24 hr tablet 30 mg (30 mg Oral Given 4/21/24 2351)       Discussed with: MARCELO    MDM:    63 y.o. male with bleeding from the surgical site of a kidney transplant 7 days ago.  He was afebrile, hemodynamically stable.  He was hypertensive.  Well-appearing, without hypoxia or increased work of breathing.    No active bleeding on my  evaluation, please see photograph.  Strike through and bandage appears to be serosanguineous.    Labs obtained.  Hemoglobin 9.0 from baseline 8.8.  No thrombocytopenia.    Mild hypokalemia.  Creatinine 8.2, BUN 82.    Case discussed with kidney transplant surgery, recommending dose of hydralazine, and dose of Lokelma here.    Patient seen by kidney transplant surgery.  Recommending discharge home with increase of Procardia, prescription to be addressed by KTS service.  Recommend following up for his dialysis in the morning as previously directed.    Diagnostic Impression:    1. Encounter for post surgical wound check    2. Bleeding    3. Hyperkalemia    4. Kidney replaced by transplant         ED Disposition Condition    Discharge Stable          ED Prescriptions    None       Follow-up Information    None         Patient and family understand the plan and are in agreement, verbalized understanding, questions answered    Thuy Jeong MD  Emergency Medicine         Thuy Jeong MD  04/22/24 0025

## 2024-04-22 NOTE — ASSESSMENT & PLAN NOTE
- No drainage noted on exam  - H/H stable  - Ok to discharge from kidney transplant perspective  - Will follow up with labs tomorrow along with transplant clinic visit this week

## 2024-04-22 NOTE — HPI
Mr. 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 He progressed well post operatively but does have expected DGF d/t prolonged CIT and DCD kidney. He has a tunneled cath placed 4/18 and has an outpt HD chair MWF.  He now presents to the ED with complaints of bloody drainage from kidney incision this evening. Patient reports he was straining to have a BM and then noticed drainage that appeared bloody in nature. He otherwise reports feeling well. No increased abdominal pain. UOP has increased to ~2-2.5L daily since discharge. On exam, incision has surrounding ecchymosis that does not appear to be new. No drainage was able to be expressed from incision. H/H on lab work is stable. Lab work does show mild hyperkalemia of 5.3 (given lokelma in ED, due for HD tomorrow). He was noted to be hypertensive in the ED (SBP 200s). He has taken his evening blood pressure medications. Caregiver reports blood pressure has been in the 130-140s at home.

## 2024-04-22 NOTE — TELEPHONE ENCOUNTER
Notified patient to have HD today for 3 hours and 0L UF per Dr. Pierre.  Maryjo Pimentel, RN with [facility] notified of today's HD appt.        ----- Message from Iain Pierre Jr., MD sent at 4/22/2024 10:18 AM CDT -----  Regarding: RE: DGF Assessment  HD today  ----- Message -----  From: Shanti Prieto RN  Sent: 4/22/2024  10:16 AM CDT  To: Iain Pierre Jr., MD  Subject: DGF Assessment                                   DGF Nursing Assessment:  I called pt and completed the following assessment prior to calling to review with MD to determine need for Dialysis:    Saturday 4/20/24 Sunday 4/21/24    BP:        164/85                      133/76  HR:          80                             86  Wt:         199.2 #                    200 #  T:              96.9                        96.6  Intake:  3000 ml                  3000 ml  Output:  2200 ml                  2000 ml    Pt went to the ED last night for incisional leakage.  He did not have a BM on Thursday or Friday but had a small one on Saturday.  He had a BM last night after he got back from the ED and said it was a lot.  His abdomen feels better this morning and is not distended.  Advised pt to continue colace daily while taking pain medication.  No complaints of swelling or SOB.

## 2024-04-24 ENCOUNTER — LAB VISIT (OUTPATIENT)
Dept: LAB | Facility: HOSPITAL | Age: 64
End: 2024-04-24
Payer: MEDICARE

## 2024-04-24 DIAGNOSIS — Z94.0 KIDNEY REPLACED BY TRANSPLANT: ICD-10-CM

## 2024-04-24 LAB
ALBUMIN SERPL BCP-MCNC: 2.7 G/DL (ref 3.5–5.2)
ANION GAP SERPL CALC-SCNC: 10 MMOL/L (ref 8–16)
BASOPHILS # BLD AUTO: 0.03 K/UL (ref 0–0.2)
BASOPHILS NFR BLD: 0.3 % (ref 0–1.9)
BUN SERPL-MCNC: 62 MG/DL (ref 8–23)
CALCIUM SERPL-MCNC: 8.3 MG/DL (ref 8.7–10.5)
CHLORIDE SERPL-SCNC: 106 MMOL/L (ref 95–110)
CO2 SERPL-SCNC: 23 MMOL/L (ref 23–29)
CREAT SERPL-MCNC: 6.1 MG/DL (ref 0.5–1.4)
DIFFERENTIAL METHOD BLD: ABNORMAL
EOSINOPHIL # BLD AUTO: 0.3 K/UL (ref 0–0.5)
EOSINOPHIL NFR BLD: 2.4 % (ref 0–8)
ERYTHROCYTE [DISTWIDTH] IN BLOOD BY AUTOMATED COUNT: 13.9 % (ref 11.5–14.5)
EST. GFR  (NO RACE VARIABLE): 9.7 ML/MIN/1.73 M^2
GLUCOSE SERPL-MCNC: 105 MG/DL (ref 70–110)
HCT VFR BLD AUTO: 25.2 % (ref 40–54)
HGB BLD-MCNC: 8.4 G/DL (ref 14–18)
IMM GRANULOCYTES # BLD AUTO: 0.47 K/UL (ref 0–0.04)
IMM GRANULOCYTES NFR BLD AUTO: 4.4 % (ref 0–0.5)
LYMPHOCYTES # BLD AUTO: 0.8 K/UL (ref 1–4.8)
LYMPHOCYTES NFR BLD: 7.4 % (ref 18–48)
MAGNESIUM SERPL-MCNC: 2.3 MG/DL (ref 1.6–2.6)
MCH RBC QN AUTO: 30.4 PG (ref 27–31)
MCHC RBC AUTO-ENTMCNC: 33.3 G/DL (ref 32–36)
MCV RBC AUTO: 91 FL (ref 82–98)
MONOCYTES # BLD AUTO: 1.6 K/UL (ref 0.3–1)
MONOCYTES NFR BLD: 15.2 % (ref 4–15)
NEUTROPHILS # BLD AUTO: 7.5 K/UL (ref 1.8–7.7)
NEUTROPHILS NFR BLD: 70.3 % (ref 38–73)
NRBC BLD-RTO: 0 /100 WBC
PHOSPHATE SERPL-MCNC: 3.8 MG/DL (ref 2.7–4.5)
PLATELET # BLD AUTO: 271 K/UL (ref 150–450)
PMV BLD AUTO: 10.4 FL (ref 9.2–12.9)
POTASSIUM SERPL-SCNC: 4 MMOL/L (ref 3.5–5.1)
RBC # BLD AUTO: 2.76 M/UL (ref 4.6–6.2)
SODIUM SERPL-SCNC: 139 MMOL/L (ref 136–145)
TACROLIMUS BLD-MCNC: 6.5 NG/ML (ref 5–15)
WBC # BLD AUTO: 10.6 K/UL (ref 3.9–12.7)

## 2024-04-24 PROCEDURE — 83735 ASSAY OF MAGNESIUM: CPT | Performed by: STUDENT IN AN ORGANIZED HEALTH CARE EDUCATION/TRAINING PROGRAM

## 2024-04-24 PROCEDURE — 85025 COMPLETE CBC W/AUTO DIFF WBC: CPT | Performed by: STUDENT IN AN ORGANIZED HEALTH CARE EDUCATION/TRAINING PROGRAM

## 2024-04-24 PROCEDURE — 80197 ASSAY OF TACROLIMUS: CPT | Performed by: STUDENT IN AN ORGANIZED HEALTH CARE EDUCATION/TRAINING PROGRAM

## 2024-04-24 PROCEDURE — 80069 RENAL FUNCTION PANEL: CPT | Performed by: STUDENT IN AN ORGANIZED HEALTH CARE EDUCATION/TRAINING PROGRAM

## 2024-04-24 PROCEDURE — 36415 COLL VENOUS BLD VENIPUNCTURE: CPT | Performed by: STUDENT IN AN ORGANIZED HEALTH CARE EDUCATION/TRAINING PROGRAM

## 2024-04-24 RX ORDER — TACROLIMUS 1 MG/1
5 CAPSULE ORAL EVERY 12 HOURS
Qty: 300 CAPSULE | Refills: 11 | Status: SHIPPED | OUTPATIENT
Start: 2024-04-24 | End: 2024-04-29

## 2024-04-24 NOTE — TELEPHONE ENCOUNTER
Received written order from Dr. Pierre.  Spoke to pt's wife and instructed to increase prograf to 5 mg BID.  Patient's wife repeated the changes back correctly and verbalized understanding.       ----- Message from Iain Pierre Jr., MD sent at 4/24/2024 11:20 AM CDT -----  Tac decreasing. Please increase from 4/4 to 5/5 and repeat with next labs

## 2024-04-26 ENCOUNTER — TELEPHONE (OUTPATIENT)
Dept: TRANSPLANT | Facility: CLINIC | Age: 64
End: 2024-04-26
Payer: MEDICARE

## 2024-04-26 ENCOUNTER — LAB VISIT (OUTPATIENT)
Dept: LAB | Facility: HOSPITAL | Age: 64
End: 2024-04-26
Payer: MEDICARE

## 2024-04-26 DIAGNOSIS — Z94.0 KIDNEY REPLACED BY TRANSPLANT: ICD-10-CM

## 2024-04-26 LAB
ALBUMIN SERPL BCP-MCNC: 2.8 G/DL (ref 3.5–5.2)
ANION GAP SERPL CALC-SCNC: 7 MMOL/L (ref 8–16)
BASOPHILS # BLD AUTO: 0.02 K/UL (ref 0–0.2)
BASOPHILS NFR BLD: 0.2 % (ref 0–1.9)
BUN SERPL-MCNC: 68 MG/DL (ref 8–23)
CALCIUM SERPL-MCNC: 8.5 MG/DL (ref 8.7–10.5)
CHLORIDE SERPL-SCNC: 110 MMOL/L (ref 95–110)
CO2 SERPL-SCNC: 24 MMOL/L (ref 23–29)
CREAT SERPL-MCNC: 5.4 MG/DL (ref 0.5–1.4)
DIFFERENTIAL METHOD BLD: ABNORMAL
EOSINOPHIL # BLD AUTO: 0.2 K/UL (ref 0–0.5)
EOSINOPHIL NFR BLD: 1.6 % (ref 0–8)
ERYTHROCYTE [DISTWIDTH] IN BLOOD BY AUTOMATED COUNT: 14.3 % (ref 11.5–14.5)
EST. GFR  (NO RACE VARIABLE): 11.2 ML/MIN/1.73 M^2
GLUCOSE SERPL-MCNC: 97 MG/DL (ref 70–110)
HCT VFR BLD AUTO: 27.6 % (ref 40–54)
HGB BLD-MCNC: 9.1 G/DL (ref 14–18)
IMM GRANULOCYTES # BLD AUTO: 0.12 K/UL (ref 0–0.04)
IMM GRANULOCYTES NFR BLD AUTO: 1.2 % (ref 0–0.5)
LYMPHOCYTES # BLD AUTO: 0.6 K/UL (ref 1–4.8)
LYMPHOCYTES NFR BLD: 5.3 % (ref 18–48)
MAGNESIUM SERPL-MCNC: 2.2 MG/DL (ref 1.6–2.6)
MCH RBC QN AUTO: 30.6 PG (ref 27–31)
MCHC RBC AUTO-ENTMCNC: 33 G/DL (ref 32–36)
MCV RBC AUTO: 93 FL (ref 82–98)
MONOCYTES # BLD AUTO: 0.7 K/UL (ref 0.3–1)
MONOCYTES NFR BLD: 7.1 % (ref 4–15)
NEUTROPHILS # BLD AUTO: 8.8 K/UL (ref 1.8–7.7)
NEUTROPHILS NFR BLD: 84.6 % (ref 38–73)
NRBC BLD-RTO: 0 /100 WBC
PHOSPHATE SERPL-MCNC: 3.4 MG/DL (ref 2.7–4.5)
PLATELET # BLD AUTO: 336 K/UL (ref 150–450)
PMV BLD AUTO: 10.1 FL (ref 9.2–12.9)
POTASSIUM SERPL-SCNC: 4.4 MMOL/L (ref 3.5–5.1)
RBC # BLD AUTO: 2.97 M/UL (ref 4.6–6.2)
SODIUM SERPL-SCNC: 141 MMOL/L (ref 136–145)
TACROLIMUS BLD-MCNC: 8.3 NG/ML (ref 5–15)
WBC # BLD AUTO: 10.37 K/UL (ref 3.9–12.7)

## 2024-04-26 PROCEDURE — 83735 ASSAY OF MAGNESIUM: CPT | Performed by: STUDENT IN AN ORGANIZED HEALTH CARE EDUCATION/TRAINING PROGRAM

## 2024-04-26 PROCEDURE — 85025 COMPLETE CBC W/AUTO DIFF WBC: CPT | Performed by: STUDENT IN AN ORGANIZED HEALTH CARE EDUCATION/TRAINING PROGRAM

## 2024-04-26 PROCEDURE — 80197 ASSAY OF TACROLIMUS: CPT | Performed by: STUDENT IN AN ORGANIZED HEALTH CARE EDUCATION/TRAINING PROGRAM

## 2024-04-26 PROCEDURE — 80069 RENAL FUNCTION PANEL: CPT | Performed by: STUDENT IN AN ORGANIZED HEALTH CARE EDUCATION/TRAINING PROGRAM

## 2024-04-26 PROCEDURE — 36415 COLL VENOUS BLD VENIPUNCTURE: CPT | Performed by: STUDENT IN AN ORGANIZED HEALTH CARE EDUCATION/TRAINING PROGRAM

## 2024-04-26 NOTE — TELEPHONE ENCOUNTER
----- Message from Iain Pierre Jr., MD sent at 4/26/2024  2:20 PM CDT -----  Regarding: RE: Rx  Nice. Ok!  ----- Message -----  From: Shanti Prieto RN  Sent: 4/26/2024   2:16 PM CDT  To: Iain Pierre Jr., MD  Subject: RE: Rx                                           After further investigation, looks like patient contacted his PCP requesting a refill for the oxycodone but received a prescription for hydrocodone on 4/19/24.  ----- Message -----  From: Iain Pierre Jr., MD  Sent: 4/26/2024   2:11 PM CDT  To: Shanti Prieto RN  Subject: RE: Rx                                           Yes that's fine. For 3 days  ----- Message -----  From: Shanti Prieto RN  Sent: 4/24/2024   2:27 PM CDT  To: Iain Pierre Jr., MD  Subject: FW: Rx                                           Are you ok with refilling this?  He still has some pain but mild.  I did tell him that we usually don't prescribe for more than 7 days and that you may recommend OTC tylenol.  Let me know and I'll relay the message.  ----- Message -----  From: Kirsten Cox  Sent: 4/24/2024   2:20 PM CDT  To: [facility] Post-Kidney Transplant Clinical  Subject: Rx                                                   Name Of Caller:    Murphy      Contact Preference:   [phone]       Nature of call:   The pt is requesting a script for Oxycodone sent the the pharmacy below.    [facility] [address] [address]  Phone: [phone] Fax: [phone]

## 2024-04-26 NOTE — TELEPHONE ENCOUNTER
Received TORB from Dr. Pierre to hold dialysis today.  Notified Maryjo Pimentel, JESSICA and Whitney Rivera, JESSICA with [facility].  Patient notified to hold dialysis today and reassess on Monday.  Pt and wife verbalized understanding.       Shanti Prieto, Iain Gloria Jr., MD  DGF Nursing Assessment:  I called pt and completed the following assessment prior to calling to review with MD to determine need for Dialysis:    Wednesday 4/24/24 Thursday 4/25/24    Wt:     196.4 lbs                           196.9 lbs  Temp:    97.9                                   97.2  BP:      135/70                            127/66  HR:        77                                   78  Intake:   2000 ml                        2000 ml  Output:   2700 ml                       2700 ml    Denies swelling, SOB, chest pain, diarrhea.      Please send recs.

## 2024-04-26 NOTE — TELEPHONE ENCOUNTER
----- Message from Iain Pierre Jr., MD sent at 4/26/2024  2:09 PM CDT -----  Regarding: RE: Rx  Yes that's fine. For 3 days  ----- Message -----  From: Shanti Prieto RN  Sent: 4/24/2024   2:27 PM CDT  To: Iain Pierre Jr., MD  Subject: FW: Rx                                           Are you ok with refilling this?  He still has some pain but mild.  I did tell him that we usually don't prescribe for more than 7 days and that you may recommend OTC tylenol.  Let me know and I'll relay the message.  ----- Message -----  From: Kirsten Cox  Sent: 4/24/2024   2:20 PM CDT  To: [facility] Post-Kidney Transplant Clinical  Subject: Rx                                                   Name Of Caller:    Murphy      Contact Preference:   [phone]       Nature of call:   The pt is requesting a script for Oxycodone sent the the pharmacy below.    [facility]  [address]  [place] [address]  Phone: [phone] Fax: [phone]

## 2024-04-26 NOTE — TELEPHONE ENCOUNTER
----- Message from Iain Pierre Jr., MD sent at 4/26/2024 11:05 AM CDT -----  Regarding: RE: DGF Assessment  Hold dialysis  ----- Message -----  From: Shanti Prieto RN  Sent: 4/26/2024   9:41 AM CDT  To: Iain Pierre Jr., MD  Subject: DGF Assessment                                   DGF Nursing Assessment:  I called pt and completed the following assessment prior to calling to review with MD to determine need for Dialysis:    Wednesday 4/24/24 Thursday 4/25/24    Wt:     196.4 lbs                           196.9 lbs  Temp:    97.9                                   97.2  BP:      135/70                            127/66  HR:        77                                   78  Intake:   2000 ml                        2000 ml  Output:   2700 ml                       2700 ml    Denies swelling, SOB, chest pain, diarrhea.      Please send recs.

## 2024-04-29 ENCOUNTER — TELEPHONE (OUTPATIENT)
Dept: TRANSPLANT | Facility: CLINIC | Age: 64
End: 2024-04-29

## 2024-04-29 ENCOUNTER — OFFICE VISIT (OUTPATIENT)
Dept: TRANSPLANT | Facility: CLINIC | Age: 64
End: 2024-04-29
Payer: MEDICARE

## 2024-04-29 ENCOUNTER — LAB VISIT (OUTPATIENT)
Dept: LAB | Facility: HOSPITAL | Age: 64
End: 2024-04-29
Payer: MEDICARE

## 2024-04-29 VITALS
OXYGEN SATURATION: 100 % | HEIGHT: 68 IN | BODY MASS INDEX: 30.71 KG/M2 | DIASTOLIC BLOOD PRESSURE: 74 MMHG | TEMPERATURE: 97 F | SYSTOLIC BLOOD PRESSURE: 156 MMHG | HEART RATE: 102 BPM | WEIGHT: 202.63 LBS

## 2024-04-29 DIAGNOSIS — E53.8 FOLIC ACID DEFICIENCY: ICD-10-CM

## 2024-04-29 DIAGNOSIS — Z94.0 KIDNEY REPLACED BY TRANSPLANT: Primary | ICD-10-CM

## 2024-04-29 DIAGNOSIS — Z29.89 PROPHYLACTIC IMMUNOTHERAPY: ICD-10-CM

## 2024-04-29 DIAGNOSIS — E61.1 IRON DEFICIENCY: ICD-10-CM

## 2024-04-29 DIAGNOSIS — Z79.4 TYPE 2 DIABETES MELLITUS WITH HYPOGLYCEMIA WITHOUT COMA, WITH LONG-TERM CURRENT USE OF INSULIN: ICD-10-CM

## 2024-04-29 DIAGNOSIS — T86.19 DELAYED GRAFT FUNCTION OF KIDNEY: ICD-10-CM

## 2024-04-29 DIAGNOSIS — E78.2 MIXED HYPERLIPIDEMIA: ICD-10-CM

## 2024-04-29 DIAGNOSIS — Z94.0 KIDNEY REPLACED BY TRANSPLANT: ICD-10-CM

## 2024-04-29 DIAGNOSIS — E11.649 TYPE 2 DIABETES MELLITUS WITH HYPOGLYCEMIA WITHOUT COMA, WITH LONG-TERM CURRENT USE OF INSULIN: ICD-10-CM

## 2024-04-29 DIAGNOSIS — I15.0 RENOVASCULAR HYPERTENSION: Chronic | ICD-10-CM

## 2024-04-29 LAB
ALBUMIN SERPL BCP-MCNC: 2.8 G/DL (ref 3.5–5.2)
ANION GAP SERPL CALC-SCNC: 9 MMOL/L (ref 8–16)
BASOPHILS # BLD AUTO: 0.02 K/UL (ref 0–0.2)
BASOPHILS NFR BLD: 0.2 % (ref 0–1.9)
BUN SERPL-MCNC: 83 MG/DL (ref 8–23)
CALCIUM SERPL-MCNC: 8.4 MG/DL (ref 8.7–10.5)
CHLORIDE SERPL-SCNC: 107 MMOL/L (ref 95–110)
CO2 SERPL-SCNC: 19 MMOL/L (ref 23–29)
CREAT SERPL-MCNC: 4.8 MG/DL (ref 0.5–1.4)
DIFFERENTIAL METHOD BLD: ABNORMAL
EOSINOPHIL # BLD AUTO: 0.1 K/UL (ref 0–0.5)
EOSINOPHIL NFR BLD: 0.9 % (ref 0–8)
ERYTHROCYTE [DISTWIDTH] IN BLOOD BY AUTOMATED COUNT: 14.1 % (ref 11.5–14.5)
EST. GFR  (NO RACE VARIABLE): 13 ML/MIN/1.73 M^2
GLUCOSE SERPL-MCNC: 99 MG/DL (ref 70–110)
HCT VFR BLD AUTO: 24.9 % (ref 40–54)
HGB BLD-MCNC: 8.3 G/DL (ref 14–18)
IMM GRANULOCYTES # BLD AUTO: 0.08 K/UL (ref 0–0.04)
IMM GRANULOCYTES NFR BLD AUTO: 0.7 % (ref 0–0.5)
LYMPHOCYTES # BLD AUTO: 0.4 K/UL (ref 1–4.8)
LYMPHOCYTES NFR BLD: 3.7 % (ref 18–48)
MAGNESIUM SERPL-MCNC: 2.2 MG/DL (ref 1.6–2.6)
MCH RBC QN AUTO: 31 PG (ref 27–31)
MCHC RBC AUTO-ENTMCNC: 33.3 G/DL (ref 32–36)
MCV RBC AUTO: 93 FL (ref 82–98)
MONOCYTES # BLD AUTO: 0.6 K/UL (ref 0.3–1)
MONOCYTES NFR BLD: 4.7 % (ref 4–15)
NEUTROPHILS # BLD AUTO: 10.6 K/UL (ref 1.8–7.7)
NEUTROPHILS NFR BLD: 89.8 % (ref 38–73)
NRBC BLD-RTO: 0 /100 WBC
PHOSPHATE SERPL-MCNC: 3.6 MG/DL (ref 2.7–4.5)
PLATELET # BLD AUTO: 268 K/UL (ref 150–450)
PMV BLD AUTO: 10.3 FL (ref 9.2–12.9)
POTASSIUM SERPL-SCNC: 4.6 MMOL/L (ref 3.5–5.1)
RBC # BLD AUTO: 2.68 M/UL (ref 4.6–6.2)
SODIUM SERPL-SCNC: 135 MMOL/L (ref 136–145)
TACROLIMUS BLD-MCNC: 12.9 NG/ML (ref 5–15)
WBC # BLD AUTO: 11.75 K/UL (ref 3.9–12.7)

## 2024-04-29 PROCEDURE — 85025 COMPLETE CBC W/AUTO DIFF WBC: CPT | Performed by: STUDENT IN AN ORGANIZED HEALTH CARE EDUCATION/TRAINING PROGRAM

## 2024-04-29 PROCEDURE — 1159F MED LIST DOCD IN RCRD: CPT | Mod: CPTII,S$GLB,, | Performed by: NURSE PRACTITIONER

## 2024-04-29 PROCEDURE — 99999 PR PBB SHADOW E&M-EST. PATIENT-LVL IV: CPT | Mod: PBBFAC,,, | Performed by: NURSE PRACTITIONER

## 2024-04-29 PROCEDURE — 3052F HG A1C>EQUAL 8.0%<EQUAL 9.0%: CPT | Mod: CPTII,S$GLB,, | Performed by: NURSE PRACTITIONER

## 2024-04-29 PROCEDURE — 1111F DSCHRG MED/CURRENT MED MERGE: CPT | Mod: CPTII,S$GLB,, | Performed by: NURSE PRACTITIONER

## 2024-04-29 PROCEDURE — 80069 RENAL FUNCTION PANEL: CPT | Performed by: STUDENT IN AN ORGANIZED HEALTH CARE EDUCATION/TRAINING PROGRAM

## 2024-04-29 PROCEDURE — 36415 COLL VENOUS BLD VENIPUNCTURE: CPT | Performed by: STUDENT IN AN ORGANIZED HEALTH CARE EDUCATION/TRAINING PROGRAM

## 2024-04-29 PROCEDURE — 3008F BODY MASS INDEX DOCD: CPT | Mod: CPTII,S$GLB,, | Performed by: NURSE PRACTITIONER

## 2024-04-29 PROCEDURE — 83735 ASSAY OF MAGNESIUM: CPT | Performed by: STUDENT IN AN ORGANIZED HEALTH CARE EDUCATION/TRAINING PROGRAM

## 2024-04-29 PROCEDURE — 3078F DIAST BP <80 MM HG: CPT | Mod: CPTII,S$GLB,, | Performed by: NURSE PRACTITIONER

## 2024-04-29 PROCEDURE — 3066F NEPHROPATHY DOC TX: CPT | Mod: CPTII,S$GLB,, | Performed by: NURSE PRACTITIONER

## 2024-04-29 PROCEDURE — 80197 ASSAY OF TACROLIMUS: CPT | Performed by: STUDENT IN AN ORGANIZED HEALTH CARE EDUCATION/TRAINING PROGRAM

## 2024-04-29 PROCEDURE — 99215 OFFICE O/P EST HI 40 MIN: CPT | Mod: S$GLB,,, | Performed by: NURSE PRACTITIONER

## 2024-04-29 PROCEDURE — 3077F SYST BP >= 140 MM HG: CPT | Mod: CPTII,S$GLB,, | Performed by: NURSE PRACTITIONER

## 2024-04-29 RX ORDER — SODIUM BICARBONATE 650 MG/1
1300 TABLET ORAL 2 TIMES DAILY
Qty: 120 TABLET | Refills: 11 | Status: SHIPPED | OUTPATIENT
Start: 2024-04-29

## 2024-04-29 RX ORDER — TACROLIMUS 1 MG/1
4 CAPSULE ORAL EVERY 12 HOURS
Qty: 240 CAPSULE | Refills: 11 | Status: SHIPPED | OUTPATIENT
Start: 2024-04-29 | End: 2024-05-01

## 2024-04-29 NOTE — TELEPHONE ENCOUNTER
Received written orders from Dr. Pierre.  Spoke to patient's wife and instructed to hold HD today.  Advised to start sodium bicarb 1300 mg bid and decrease tacro to 4 mg bid.  Instructed to increase water intake to 3 liters daily.  Patient's wife Marjorie repeated instructions back correctly and verbalized understanding.       ----- Message from Iain Pierre Jr., MD sent at 4/29/2024 10:14 AM CDT -----  Hold dialysis. Start sodium bicarb 1300 bid. Stop sevelamer. Ask patient what divalproex is for and if he is taking it. Tac elevated. Decrease dose from 5/5 to 4/4 and repeat with labs wed. Increase water intake, BUN is elevated. Iron panel with next labs. No dgf assessment needed wed and if creatinine continues to improve will cancel chair.

## 2024-04-29 NOTE — PROGRESS NOTES
Kidney Post-Transplant Assessment    Referring Physician: Cathryn Adair  Current Nephrologist: Cathryn Adair    ORGAN: RIGHT KIDNEY  Donor Type: donation after circulatory death   PHS Increased Risk: no  Cold Ischemia: 1,030 mins  Induction Medications: thymo    Subjective:     CC:  Reassessment of renal allograft function and management of chronic immunosuppression.    HPI:  Mr. Ha is a 63 y.o. year old Black or  male who received a donation after circulatory death  kidney transplant on 4/14/24. His most recent creatinine is 4.8. He takes mycophenolate mofetil, prednisone, and tacrolimus for maintenance immunosuppression. His post transplant course has been complicated by delayed graft function requiring dialysis.    Hospitalization/ ED visits  None    Interval HX:  Reports doing well. No complaints. Ambulating more. Fatigue is improving.    Intake 2.5L  UOP 3L  BP 140s/80s  Peripheral edema none  Weight lost about 5lbs since transplant  Appetite: eating well  Wound: with incision, scant drainage from proximal incision  fx assessment: ambulating without assistance. Reports PT at [facility]  Lab /diagnostic results reviewed with patient today.   All questions answered        Current Outpatient Medications:     atorvastatin (LIPITOR) 80 MG tablet, Take 1 tablet (80 mg total) by mouth every evening., Disp: 30 tablet, Rfl: 1    blood-glucose meter,continuous (DEXCOM G7 ) Misc, Use to check glucose with sensors, Disp: 1 each, Rfl: 0    blood-glucose sensor (DEXCOM G7 SENSOR) Lois, 1 Device by Misc.(Non-Drug; Combo Route) route every 10 days., Disp: 9 each, Rfl: 3    carvediloL (COREG) 25 MG tablet, Take 1 tablet (25 mg total) by mouth 2 (two) times daily., Disp: 60 tablet, Rfl: 11    divalproex (DEPAKOTE) 250 MG EC tablet, Take 250 mg by mouth 2 (two) times daily., Disp: , Rfl:     dorzolamide-timolol 2-0.5% (COSOPT) 22.3-6.8 mg/mL ophthalmic solution, Place 1 drop into the left  "eye 2 (two) times daily., Disp: 10 mL, Rfl: 5    famotidine (PEPCID) 20 MG tablet, Take 1 tablet (20 mg total) by mouth every evening., Disp: 30 tablet, Rfl: 11    gabapentin (NEURONTIN) 300 MG capsule, Take 1 capsule (300 mg total) by mouth every evening., Disp: 90 capsule, Rfl: 3    glucagon 3 mg/actuation Spry, 1 each by Nasal route as needed., Disp: 1 each, Rfl: 11    HYDROcodone-acetaminophen (NORCO) 5-325 mg per tablet, Take 1 tablet by mouth every 12 (twelve) hours as needed for Pain., Disp: 60 tablet, Rfl: 0    insulin aspart U-100 (NOVOLOG U-100 INSULIN ASPART) 100 unit/mL injection, Inject 6 Units into the skin 3 (three) times daily before meals. + sliding scale.  MDD 36 units/day, Disp: 10 mL, Rfl: 5    insulin glargine U-100, Lantus, (LANTUS) 100 unit/mL injection, Inject 11 Units into the skin every evening., Disp: 10 mL, Rfl: 5    insulin syringe-needle U-100 (BD INSULIN SYRINGE ULTRA-FINE) 1 mL 30 gauge x 1/2" Syrg, 1 each by Misc.(Non-Drug; Combo Route) route 4 (four) times daily with meals and nightly., Disp: 100 each, Rfl: 1    lancets 32 gauge Misc, 1 lancet by Misc.(Non-Drug; Combo Route) route 2 (two) times a day., Disp: 100 each, Rfl: 3    mycophenolate (CELLCEPT) 250 mg Cap, Take 4 capsules (1,000 mg total) by mouth 2 (two) times daily., Disp: 240 capsule, Rfl: 11    NIFEdipine (PROCARDIA-XL) 30 MG (OSM) 24 hr tablet, Take 2 tablets (60 mg total) by mouth 2 (two) times a day., Disp: 60 tablet, Rfl: 11    predniSONE (DELTASONE) 5 MG tablet, Take by mouth daily; 4/17/2024-4/23/2024: 20 mg, 4/24/2024-4/30/2024: 15 mg; 5/1/2024-5/7/2024: 10 mg; 5/8/2024- forever: 5 mg; do not stop, Disp: 70 tablet, Rfl: 11    sodium bicarbonate 650 MG tablet, Take 2 tablets (1,300 mg total) by mouth 2 (two) times daily., Disp: 120 tablet, Rfl: 11    sulfamethoxazole-trimethoprim 400-80mg (BACTRIM,SEPTRA) 400-80 mg per tablet, Take 1 tablet by mouth every Mon, Wed, Fri. Stop 10/11/24, Disp: 12 tablet, Rfl: 5    " tacrolimus (PROGRAF) 1 MG Cap, Take 4 capsules (4 mg total) by mouth every 12 (twelve) hours., Disp: 240 capsule, Rfl: 11    valGANciclovir (VALCYTE) 450 mg Tab, Take 1 tablet (450 mg total) by mouth every Mon, Wed, Fri. Stop 7/13/24, Disp: 12 tablet, Rfl: 2    tamsulosin (FLOMAX) 0.4 mg Cap, Take 1 capsule (0.4 mg total) by mouth once daily. (Patient not taking: Reported on 4/29/2024), Disp: 90 capsule, Rfl: 3  No current facility-administered medications for this visit.    Facility-Administered Medications Ordered in Other Visits:     mupirocin 2 % ointment, , Nasal, On Call Procedure, Lorelei Cha PA-C    sodium chloride 0.9% flush 10 mL, 10 mL, Intravenous, PRN, Lorelei Cha PA-C    tamsulosin 24 hr capsule 0.4 mg, 1 capsule, Oral, Daily, Lorelei Cha PA-C, 0.4 mg at 04/18/24 0840      Past Medical History:   Diagnosis Date    Anemia     Aqueous misdirection of right eye     Arthritis     Blind painful eye     Cataract     CKD (chronic kidney disease)     Diabetes mellitus, type 2     Disorder of kidney and ureter     Fever blister     GERD (gastroesophageal reflux disease)     Hyperlipidemia     Hypertension     Joint pain     Neuropathy        Review of Systems   Constitutional:  Negative for appetite change, chills, fatigue and fever.   HENT:  Negative for trouble swallowing.    Respiratory:  Negative for cough, chest tightness, shortness of breath and wheezing.    Cardiovascular:  Negative for chest pain, palpitations and leg swelling.   Gastrointestinal:  Negative for abdominal pain, constipation, diarrhea and nausea.   Genitourinary:  Negative for difficulty urinating, frequency and urgency.   Musculoskeletal:  Negative for arthralgias and myalgias.   Skin:  Negative for rash.   Allergic/Immunologic: Positive for immunocompromised state.   Neurological:  Negative for dizziness, weakness, light-headedness and headaches.   Psychiatric/Behavioral:  Negative for sleep  "disturbance.        Objective:   Blood pressure (!) 156/74, pulse 102, temperature 97.3 °F (36.3 °C), temperature source Temporal, height 5' 7.5" (1.715 m), weight 91.9 kg (202 lb 9.6 oz), SpO2 100%.body mass index is 31.26 kg/m².    Physical Exam  Constitutional:       General: He is not in acute distress.     Appearance: He is well-developed. He is not diaphoretic.   Cardiovascular:      Rate and Rhythm: Normal rate and regular rhythm.      Heart sounds: Normal heart sounds.   Pulmonary:      Effort: Pulmonary effort is normal.      Breath sounds: Normal breath sounds.   Abdominal:      General: Bowel sounds are normal.      Palpations: Abdomen is soft.   Musculoskeletal:         General: No tenderness. Normal range of motion.   Skin:     General: Skin is warm and dry.      Findings: No rash.      Nails: There is no clubbing.   Neurological:      Mental Status: He is alert and oriented to person, place, and time.   Psychiatric:         Behavior: Behavior normal.         Labs:  Lab Results   Component Value Date    WBC 11.75 04/29/2024    HGB 8.3 (L) 04/29/2024    HCT 24.9 (L) 04/29/2024     (L) 04/29/2024    K 4.6 04/29/2024     04/29/2024    CO2 19 (L) 04/29/2024    BUN 83 (H) 04/29/2024    CREATININE 4.8 (H) 04/29/2024    EGFRNORACEVR 13.0 (A) 04/29/2024    CALCIUM 8.4 (L) 04/29/2024    PHOS 3.6 04/29/2024    MG 2.2 04/29/2024    ALBUMIN 2.8 (L) 04/29/2024    AST 25 04/21/2024    ALT 17 04/21/2024    UTPCR 0.40 (H) 04/14/2024    .1 (H) 07/28/2023    TACROLIMUS 12.9 04/29/2024       No results found for: "EXTANC", "EXTWBC", "EXTSEGS", "EXTPLATELETS", "EXTHEMOGLOBI", "EXTHEMATOCRI", "EXTCREATININ", "EXTSODIUM", "EXTPOTASSIUM", "EXTBUN", "EXTCO2", "EXTCALCIUM", "EXTPHOSPHORU", "EXTGLUCOSE", "EXTALBUMIN", "EXTAST", "EXTALT", "EXTBILITOTAL", "EXTLIPASE", "EXTAMYLASE"    No results found for: "EXTCYCLOSLVL", "EXTSIROLIMUS", "EXTTACROLVL", "EXTPROTCRE", "EXTPTHINTACT", "EXTPROTEINUA", "EXTWBCUA", " ""EXTRBCUA"    Labs were reviewed with the patient    Assessment:     1. Kidney replaced by transplant    2. Delayed graft function of kidney    3. Renovascular hypertension    4. Mixed hyperlipidemia    5. Type 2 diabetes mellitus with hypoglycemia without coma, with long-term current use of insulin    6. Prophylactic immunotherapy        Plan:   Holding dialysis today, Last dialysis 4/22/24  Sevelamer was stopped. Started NaBicarb 1300mg BID. Tac adjusted to level  Continue current IS therapy regimen      1. CKD stage: will continue follow up as per our center guidelines. patient to continue close follow up with the local General nephrologist. Education provided in appropriate fluid intake, potassium intake. Continue with oral hydration.      2. Immunosuppression: Prograf trough 12.9, therapeutic target 8-10. Continue decreased dose Prograf 4/4, [id number] Mg BID, and Prednisone QD  Lab Results   Component Value Date    TACROLIMUS 12.9 04/29/2024    TACROLIMUS 8.3 04/26/2024    TACROLIMUS 6.5 04/24/2024   Will closely monitor for toxicities, education provided about adherence to medicines and need to communicate any side effect to the transplant nurse or physician.    3. Allograft Function:stable at baseline for the patient. Continue follow up as per our guidelines and with the local General nephrologist. Communication will be sent today.  Lab Results   Component Value Date    CREATININE 4.8 (H) 04/29/2024    CREATININE 5.4 (H) 04/26/2024    CREATININE 6.1 (H) 04/24/2024     No results found for: "AMYLASE", "LIPASE"    4. Hypertension management:  Continue with home blood pressure monitoring, low salt and healthy life discussed with the patient.  BP Readings from Last 3 Encounters:   04/29/24 (!) 156/74   04/22/24 138/68   04/22/24 (!) 176/82       5. Metabolic Bone Disease/Secondary Hyperparathyroidism:calcium and phosphorus level discussed with the patient, patient will continue follow up with the general " "nephrologist for management of metabolic bone disease calcium and phosphorus as per our center protocol. Will monitor PTH, Vit D level, calcium.   Lab Results   Component Value Date    .1 (H) 07/28/2023    CALCIUM 8.4 (L) 04/29/2024    PHOS 3.6 04/29/2024    PHOS 3.4 04/26/2024    PHOS 3.8 04/24/2024       6. Electrolytes: reviewed with the patient, essentially within the normal range no need for acute changes today, will monitor as per our center guidelines.  Lab Results   Component Value Date     (L) 04/29/2024    K 4.6 04/29/2024     04/29/2024    CO2 19 (L) 04/29/2024    CO2 24 04/26/2024    CO2 23 04/24/2024       7. Anemia: will continue monitoring as per our center guidelines. No indication for acute intervention today.  Lab Results   Component Value Date    WBC 11.75 04/29/2024    HGB 8.3 (L) 04/29/2024    HCT 24.9 (L) 04/29/2024    MCV 93 04/29/2024     04/29/2024       8.Proteinuria: will continue with pr/cr ratio as per our center guidelines  Lab Results   Component Value Date    PROTEINURINE 38 (H) 04/14/2024    CREATRANDUR 96.0 04/14/2024    UTPCR 0.40 (H) 04/14/2024    UTPCR 2.58 (H) 03/04/2021    UTPCR 3.44 (H) 05/25/2020        9. BK virus infection screening: will continue with urine or blood PCR as per our guidelines to prevent BK virus viremia and allograft dysfunction  No results found for: "BKVIRUSDNAUR", "BKQUANTURINE", "BKVIRUSLOG", "BKVIRUSURINE", "BKVIRUSPCRQB"      10. Weight education: provided during the clinic visit.  Body mass index is 31.26 kg/m².     11.Patient safety education regarding immunosuppression including prophylaxis posttransplant for CMV, PCP : Education provided about vaccination and prevention of infections.    12.  Cytopenias: no significant cytopenias will monitor as per our guidelines. Medicine list reviewed including potential causes of drug-induced cytopenias  Lab Results   Component Value Date    WBC 11.75 04/29/2024    HGB 8.3 (L) " 04/29/2024    HCT 24.9 (L) 04/29/2024    MCV 93 04/29/2024     04/29/2024       13. Post-transplant Prophylaxis; CMV Infection, PJP and Candida mucosistis and other indicated for this particular patient.         Follow-up:   Clinic: return to transplant clinic weekly for the first month after transplant; every 2 weeks during months 2-3; then at 6-, 9-, 12-, 18-, 24-, and 36- months post-transplant to reassess for complications from immunosuppression toxicity and monitor for rejection.  Annually thereafter.    Labs: since patient remains at high risk for rejection and drug-related complications that warrant close monitoring, labs will be ordered as follows: continue twice weekly CBC, renal panel, and drug level for first month; then same labs once weekly through 3rd month post-transplant.  Urine for UA and protein/creatinine ratio monthly.  Serum BK - PCR at 1-, 3-, 6-, 9-, 12-, 18-, 24-, 36- 48-, and 60 months post-transplant.  Hepatic panel at 1-, 2-, 3-, 6-, 9-, 12-, 18-, 24-, and 36- months post-transplant.    Education:   Material provided to the patient.  Patient reminded to call with any health changes since these can be early signs of significant complications.  Also, I advised the patient to be sure any new medications or changes of old medications are discussed with either a pharmacist or physician knowledgeable with transplant to avoid rejection/drug toxicity related to significant drug interactions.    Exercise: reminded Alcides of the importance of regular exercise for weight management, blood sugar and blood pressure management.  I also explained exercise has been shown to improve cardiovascular health, energy level, and sleep hygiene.  Lastly, I advised him that cardiovascular complications are leading cause of death for renal transplant recipients, and regular exercise can help lower this risk.    I spoke with the patient for 30 minutes. More than half dedicated to counseling and education. All  questions answered    Paz Paez NP-C  Transplant Nephrology    Follow-up:   Clinic: return to transplant clinic weekly for the first month after transplant; every 2 weeks during months 2-3; then at 6-, 9-, 12-, 18-, 24-, and 36- months post-transplant to reassess for complications from immunosuppression toxicity and monitor for rejection.  Annually thereafter.    Labs: since patient remains at high risk for rejection and drug-related complications that warrant close monitoring, labs will be ordered as follows: continue twice weekly CBC, renal panel, and drug level for first month; then same labs once weekly through 3rd month post-transplant.  Urine for UA and protein/creatinine ratio monthly.  Serum BK - PCR at 1-, 3-, 6-, 9-, 12-, 18-, 24-, 36-, 48-, and 60 months post-transplant.  Hepatic panel at 1-, 2-, 3-, 6-, 9-, 12-, 18-, 24-, and 36- months post-transplant.    Paz Paez NP       Education:   Material provided to the patient.  Patient reminded to call with any health changes since these can be early signs of significant complications.  Also, I advised the patient to be sure any new medications or changes of old medications are discussed with either a pharmacist or physician knowledgeable with transplant to avoid rejection/drug toxicity related to significant drug interactions.    Patient advised that it is recommended that all transplanted patients, and their close contacts and household members receive Covid vaccination.

## 2024-04-29 NOTE — TELEPHONE ENCOUNTER
Holding dialysis today per Dr. Pierre.  Patient notified.  Maryjo Pimentel, RN and Whitney Rivera, RN with [facility] notified.       ----- Message from Iain Pierre Jr., MD sent at 4/29/2024 10:09 AM CDT -----  Regarding: RE: DGF Assessment  Hold dialysis  ----- Message -----  From: Shanti Prieto RN  Sent: 4/29/2024  10:00 AM CDT  To: Iain Pierre Jr., MD  Subject: DGF Assessment                                   DGF Nursing Assessment:  I called pt and completed the following assessment prior to calling to review with MD to determine need for Dialysis:    Saturday 4/27/24 Sunday 4/28/24    Weight:     195.5                                194.4  Temp:       97.2                                   97.4  BP:        163/69  AM                        142/73 AM               134/73   PM                        122/63  PM  HR:         87                                        68  Intake:    2200 ml                            2150 ml  Output:   3100 ml                           3000 ml    Denies swelling, SOB, diarrhea.    FYI: Patient was taking Bactrim and Valcyte daily instead of MWF.  It was corrected this morning and pt now understands to only take these two medications of MWF only.

## 2024-04-29 NOTE — LETTER
April 29, 2024        Cathryn Adair  [address]  [place] [address]  Phone: [phone]  Fax: [phone]             [facility]  [address]  [facility] [address]  Phone: [phone]   Patient: Murphy Ha   MR Number: [id number]   YOB: 1960   Date of Visit: 4/29/2024       Dear Dr. Cathryn Adair    Thank you for referring Murphy Ha to me for evaluation. Attached you will find relevant portions of my assessment and plan of care.    If you have questions, please do not hesitate to call me. I look forward to following Murphy Ha along with you.    Sincerely,    Paz Paez, NP    Enclosure    If you would like to receive this communication electronically, please contact [email or web address] or [phone] to request [employer] access.    [employer] is a tool which provides read-only access to select patient information with whom you have a relationship. Its easy to use and provides real time access to review your patients record including encounter summaries, notes, results, and demographic information.    If you feel you have received this communication in error or would no longer like to receive these types of communications, please e-mail [email or web address]

## 2024-04-30 ENCOUNTER — TELEPHONE (OUTPATIENT)
Dept: TRANSPLANT | Facility: CLINIC | Age: 64
End: 2024-04-30
Payer: MEDICARE

## 2024-04-30 NOTE — TELEPHONE ENCOUNTER
----- Message from Iain Pierre Jr., MD sent at 4/30/2024 12:22 PM CDT -----  Agree. Unlikely tac. Tell him to moisturize within 3 minutes of getting out of shower. To avoid hot showers. He can use the benadryl if its that bad, but suspect dry skin. Ask him to keep us informed. Thank you  ----- Message -----  From: Shanti Prieto RN  Sent: 4/30/2024  12:02 PM CDT  To: Iain Pierre Jr., MD    [facility]  ----- Message -----  From: Tez Healy, PharmD  Sent: 4/30/2024  11:00 AM CDT  To: Shanti Prieto RN    [facility] I do not think it is his tacro causing itching. It could be from his elevated creatinine/BUN. He can take benedryl OTC. Alternatively, we can prescribe some hydroxyzine. If he does have any skin presentation of a rash or any redness, we could try holding his bactrim to see if that improves. If so, he may have a sulfa allergy and may benefit from switching PJP ppx agents  ----- Message -----  From: Shanti Prieto RN  Sent: 4/30/2024  10:47 AM CDT  To: Abdominal Transplant Pharmacists    Patient c/o itching with his medications.  I assume Prograf?  He asked if there's anything he can take to help with itching. Benadryl?   There's no skin reactions just dry and itchy.

## 2024-05-01 ENCOUNTER — LAB VISIT (OUTPATIENT)
Dept: LAB | Facility: HOSPITAL | Age: 64
End: 2024-05-01
Payer: MEDICARE

## 2024-05-01 ENCOUNTER — TELEPHONE (OUTPATIENT)
Dept: TRANSPLANT | Facility: CLINIC | Age: 64
End: 2024-05-01
Payer: MEDICARE

## 2024-05-01 DIAGNOSIS — Z94.0 KIDNEY REPLACED BY TRANSPLANT: ICD-10-CM

## 2024-05-01 DIAGNOSIS — E53.8 FOLIC ACID DEFICIENCY: ICD-10-CM

## 2024-05-01 DIAGNOSIS — E61.1 IRON DEFICIENCY: ICD-10-CM

## 2024-05-01 LAB
ALBUMIN SERPL BCP-MCNC: 3 G/DL (ref 3.5–5.2)
ANION GAP SERPL CALC-SCNC: 5 MMOL/L (ref 8–16)
BASOPHILS # BLD AUTO: 0.03 K/UL (ref 0–0.2)
BASOPHILS NFR BLD: 0.3 % (ref 0–1.9)
BUN SERPL-MCNC: 74 MG/DL (ref 8–23)
CALCIUM SERPL-MCNC: 8.9 MG/DL (ref 8.7–10.5)
CHLORIDE SERPL-SCNC: 108 MMOL/L (ref 95–110)
CO2 SERPL-SCNC: 23 MMOL/L (ref 23–29)
CREAT SERPL-MCNC: 4 MG/DL (ref 0.5–1.4)
DIFFERENTIAL METHOD BLD: ABNORMAL
EOSINOPHIL # BLD AUTO: 0.1 K/UL (ref 0–0.5)
EOSINOPHIL NFR BLD: 0.4 % (ref 0–8)
ERYTHROCYTE [DISTWIDTH] IN BLOOD BY AUTOMATED COUNT: 14.6 % (ref 11.5–14.5)
EST. GFR  (NO RACE VARIABLE): 16.1 ML/MIN/1.73 M^2
FERRITIN SERPL-MCNC: 2246 NG/ML (ref 20–300)
FOLATE SERPL-MCNC: 8.6 NG/ML (ref 4–24)
GLUCOSE SERPL-MCNC: 107 MG/DL (ref 70–110)
HCT VFR BLD AUTO: 24.5 % (ref 40–54)
HGB BLD-MCNC: 8.6 G/DL (ref 14–18)
IMM GRANULOCYTES # BLD AUTO: 0.05 K/UL (ref 0–0.04)
IMM GRANULOCYTES NFR BLD AUTO: 0.4 % (ref 0–0.5)
IRON SERPL-MCNC: 63 UG/DL (ref 45–160)
LYMPHOCYTES # BLD AUTO: 0.6 K/UL (ref 1–4.8)
LYMPHOCYTES NFR BLD: 5.1 % (ref 18–48)
MAGNESIUM SERPL-MCNC: 2.3 MG/DL (ref 1.6–2.6)
MCH RBC QN AUTO: 31.7 PG (ref 27–31)
MCHC RBC AUTO-ENTMCNC: 35.1 G/DL (ref 32–36)
MCV RBC AUTO: 90 FL (ref 82–98)
MONOCYTES # BLD AUTO: 0.6 K/UL (ref 0.3–1)
MONOCYTES NFR BLD: 5.2 % (ref 4–15)
NEUTROPHILS # BLD AUTO: 9.9 K/UL (ref 1.8–7.7)
NEUTROPHILS NFR BLD: 88.6 % (ref 38–73)
NRBC BLD-RTO: 0 /100 WBC
PHOSPHATE SERPL-MCNC: 2.8 MG/DL (ref 2.7–4.5)
PLATELET # BLD AUTO: 246 K/UL (ref 150–450)
PMV BLD AUTO: 9.9 FL (ref 9.2–12.9)
POTASSIUM SERPL-SCNC: 4.8 MMOL/L (ref 3.5–5.1)
RBC # BLD AUTO: 2.71 M/UL (ref 4.6–6.2)
SATURATED IRON: 32 % (ref 20–50)
SODIUM SERPL-SCNC: 136 MMOL/L (ref 136–145)
TACROLIMUS BLD-MCNC: 11.6 NG/ML (ref 5–15)
TOTAL IRON BINDING CAPACITY: 194 UG/DL (ref 250–450)
TRANSFERRIN SERPL-MCNC: 131 MG/DL (ref 200–375)
WBC # BLD AUTO: 11.18 K/UL (ref 3.9–12.7)

## 2024-05-01 PROCEDURE — 83540 ASSAY OF IRON: CPT | Performed by: STUDENT IN AN ORGANIZED HEALTH CARE EDUCATION/TRAINING PROGRAM

## 2024-05-01 PROCEDURE — 82728 ASSAY OF FERRITIN: CPT | Performed by: STUDENT IN AN ORGANIZED HEALTH CARE EDUCATION/TRAINING PROGRAM

## 2024-05-01 PROCEDURE — 36415 COLL VENOUS BLD VENIPUNCTURE: CPT | Performed by: STUDENT IN AN ORGANIZED HEALTH CARE EDUCATION/TRAINING PROGRAM

## 2024-05-01 PROCEDURE — 82746 ASSAY OF FOLIC ACID SERUM: CPT | Performed by: STUDENT IN AN ORGANIZED HEALTH CARE EDUCATION/TRAINING PROGRAM

## 2024-05-01 PROCEDURE — 80197 ASSAY OF TACROLIMUS: CPT | Performed by: STUDENT IN AN ORGANIZED HEALTH CARE EDUCATION/TRAINING PROGRAM

## 2024-05-01 PROCEDURE — 83735 ASSAY OF MAGNESIUM: CPT | Performed by: STUDENT IN AN ORGANIZED HEALTH CARE EDUCATION/TRAINING PROGRAM

## 2024-05-01 PROCEDURE — 85025 COMPLETE CBC W/AUTO DIFF WBC: CPT | Performed by: STUDENT IN AN ORGANIZED HEALTH CARE EDUCATION/TRAINING PROGRAM

## 2024-05-01 PROCEDURE — 80069 RENAL FUNCTION PANEL: CPT | Performed by: STUDENT IN AN ORGANIZED HEALTH CARE EDUCATION/TRAINING PROGRAM

## 2024-05-01 RX ORDER — TACROLIMUS 1 MG/1
3 CAPSULE ORAL EVERY 12 HOURS
Qty: 180 CAPSULE | Refills: 11 | Status: SHIPPED | OUTPATIENT
Start: 2024-05-01 | End: 2024-05-14

## 2024-05-01 NOTE — PROGRESS NOTES
Patient admitted for Kidney transplant.Transplant coordinator met with the patient on rounds to introduce self and explain the coordinator role. The post-transplant teaching book was given. Transplant Coordinator explained that she will follow the patient while in the hospital and assist with discharge.

## 2024-05-01 NOTE — TELEPHONE ENCOUNTER
Spoke to patient's wife Marjorie and instructed to have patient decrease Prograf to 3 mg twice a day and repeat labs as scheduled.  Marjorie verbalized understanding and did not have any further questions.       ----- Message from Iain Pierre Jr., MD sent at 5/1/2024 12:27 PM CDT -----  Tac high. Confirm true trough. Decrease dose from 4/4 to 3/3 and repeat with next labs

## 2024-05-01 NOTE — TELEPHONE ENCOUNTER
Written order received from Dr. Pierre.  Spoke to patient and wife.  Notified that we will hold dialysis today and reassess on Friday.  Pt voiced understanding.     ----- Message from Iain Pierre Jr., MD sent at 5/1/2024 11:01 AM CDT -----  Hold dialysis. No assessment needed Friday. Repeat labs Friday. Tac pending.

## 2024-05-01 NOTE — PROGRESS NOTES
Transplant coordinator  4/14-4/18/24    Pt admitted for a cadaveric kidney transplant from a DCD donor. Esrd 2/2 DM, also PD cath removal on 4/14/24 (CIT 17h, KDPI 83%).     Pt with DGF d/t prolonged CIT and DCD kidney.   Permacath placed 4/18 and has an outpt HD chair MWF. UOP improving.     RLQ incision KENNA with staples  Marinelli removed prior to d/c    Labs 4/19 and RCT to meet with coordinator/pharmD

## 2024-05-02 ENCOUNTER — TELEPHONE (OUTPATIENT)
Dept: TRANSPLANT | Facility: CLINIC | Age: 64
End: 2024-05-02
Payer: MEDICARE

## 2024-05-02 ENCOUNTER — LAB VISIT (OUTPATIENT)
Dept: LAB | Facility: HOSPITAL | Age: 64
End: 2024-05-02
Payer: MEDICARE

## 2024-05-02 DIAGNOSIS — R19.7 DIARRHEA, UNSPECIFIED TYPE: Primary | ICD-10-CM

## 2024-05-02 DIAGNOSIS — R19.7 DIARRHEA, UNSPECIFIED TYPE: ICD-10-CM

## 2024-05-02 LAB
ASTROVIRUS: NOT DETECTED
C COLI+JEJ+UPSA DNA STL QL NAA+NON-PROBE: NOT DETECTED
C DIFF GDH STL QL: POSITIVE
C DIFF TOX A+B STL QL IA: NEGATIVE
C DIFF TOX GENS STL QL NAA+PROBE: NEGATIVE
CYCLOSPORA CAYETANENSIS: NOT DETECTED
ENTEROAGGREGATIVE E COLI: NOT DETECTED
ENTEROPATHOGENIC E COLI: NOT DETECTED
GPP - ADENOVIRUS 40/41: NOT DETECTED
GPP - CRYPTOSPORIDIUM: NOT DETECTED
GPP - ENTAMOEBA HISTOLYTICA: NOT DETECTED
GPP - ENTEROTOXIGENIC E COLI (ETEC): NOT DETECTED
GPP - GIARDIA LAMBLIA: NOT DETECTED
GPP - NOROVIRUS GI/GII: NOT DETECTED
GPP - ROTAVIRUS A: NOT DETECTED
GPP - SALMONELLA: NOT DETECTED
GPP - VIBRIO CHOLERA: NOT DETECTED
GPP - YERSINIA ENTEROCOLITICA: NOT DETECTED
LACTATE PLASV-SCNC: NOT DETECTED MMOL/L
PLESIOMONAS SHIGELLOIDES: NOT DETECTED
SAPOVIRUS: NOT DETECTED
SHIGELLA SP+EIEC IPAH STL QL NAA+PROBE: NOT DETECTED
VIBRIO: NOT DETECTED

## 2024-05-02 PROCEDURE — 87324 CLOSTRIDIUM AG IA: CPT | Performed by: STUDENT IN AN ORGANIZED HEALTH CARE EDUCATION/TRAINING PROGRAM

## 2024-05-02 PROCEDURE — 87209 SMEAR COMPLEX STAIN: CPT | Performed by: STUDENT IN AN ORGANIZED HEALTH CARE EDUCATION/TRAINING PROGRAM

## 2024-05-02 PROCEDURE — 87427 SHIGA-LIKE TOXIN AG IA: CPT | Mod: 59 | Performed by: STUDENT IN AN ORGANIZED HEALTH CARE EDUCATION/TRAINING PROGRAM

## 2024-05-02 PROCEDURE — 87449 NOS EACH ORGANISM AG IA: CPT | Performed by: STUDENT IN AN ORGANIZED HEALTH CARE EDUCATION/TRAINING PROGRAM

## 2024-05-02 PROCEDURE — 87045 FECES CULTURE AEROBIC BACT: CPT | Performed by: STUDENT IN AN ORGANIZED HEALTH CARE EDUCATION/TRAINING PROGRAM

## 2024-05-02 PROCEDURE — 87493 C DIFF AMPLIFIED PROBE: CPT | Mod: 59 | Performed by: STUDENT IN AN ORGANIZED HEALTH CARE EDUCATION/TRAINING PROGRAM

## 2024-05-02 PROCEDURE — 87507 IADNA-DNA/RNA PROBE TQ 12-25: CPT | Performed by: STUDENT IN AN ORGANIZED HEALTH CARE EDUCATION/TRAINING PROGRAM

## 2024-05-02 PROCEDURE — 87449 NOS EACH ORGANISM AG IA: CPT | Mod: 91 | Performed by: STUDENT IN AN ORGANIZED HEALTH CARE EDUCATION/TRAINING PROGRAM

## 2024-05-02 PROCEDURE — 87046 STOOL CULTR AEROBIC BACT EA: CPT | Performed by: STUDENT IN AN ORGANIZED HEALTH CARE EDUCATION/TRAINING PROGRAM

## 2024-05-02 NOTE — TELEPHONE ENCOUNTER
Spoke to patient and instructed to provide a stool sample for lab.  Explained how to collect and drop off at the lab. Patient verbalized understanding.    ----- Message from Iain Pierre Jr., MD sent at 5/2/2024 10:40 AM CDT -----  Regarding: RE: diarrhea  Stool for ova parasite culture. Cdiff and GI PCR pathogen panel. Increase hydration. If he has fevers please advise.  ----- Message -----  From: Shanti Prieto RN  Sent: 5/2/2024  10:05 AM CDT  To: Iain Pierre Jr., MD  Subject: diarrhea                                         Patient started to have diarrhea last night around 9 PM and have had 3 episodes since.  No fevers or any other complaints.  He feels fine.  Please advise.

## 2024-05-03 ENCOUNTER — LAB VISIT (OUTPATIENT)
Dept: LAB | Facility: HOSPITAL | Age: 64
End: 2024-05-03
Payer: MEDICARE

## 2024-05-03 DIAGNOSIS — Z94.0 KIDNEY REPLACED BY TRANSPLANT: ICD-10-CM

## 2024-05-03 DIAGNOSIS — A04.72 C. DIFFICILE DIARRHEA: Primary | ICD-10-CM

## 2024-05-03 LAB
ALBUMIN SERPL BCP-MCNC: 2.9 G/DL (ref 3.5–5.2)
ANION GAP SERPL CALC-SCNC: 4 MMOL/L (ref 8–16)
BASOPHILS # BLD AUTO: 0.03 K/UL (ref 0–0.2)
BASOPHILS NFR BLD: 0.4 % (ref 0–1.9)
BUN SERPL-MCNC: 57 MG/DL (ref 8–23)
CALCIUM SERPL-MCNC: 8.9 MG/DL (ref 8.7–10.5)
CHLORIDE SERPL-SCNC: 111 MMOL/L (ref 95–110)
CO2 SERPL-SCNC: 22 MMOL/L (ref 23–29)
CREAT SERPL-MCNC: 3.3 MG/DL (ref 0.5–1.4)
DIFFERENTIAL METHOD BLD: ABNORMAL
E COLI SXT1 STL QL IA: NEGATIVE
E COLI SXT2 STL QL IA: NEGATIVE
EOSINOPHIL # BLD AUTO: 0.1 K/UL (ref 0–0.5)
EOSINOPHIL NFR BLD: 0.7 % (ref 0–8)
ERYTHROCYTE [DISTWIDTH] IN BLOOD BY AUTOMATED COUNT: 15 % (ref 11.5–14.5)
EST. GFR  (NO RACE VARIABLE): 20.3 ML/MIN/1.73 M^2
GLUCOSE SERPL-MCNC: 97 MG/DL (ref 70–110)
HCT VFR BLD AUTO: 26.8 % (ref 40–54)
HGB BLD-MCNC: 8.8 G/DL (ref 14–18)
IMM GRANULOCYTES # BLD AUTO: 0.04 K/UL (ref 0–0.04)
IMM GRANULOCYTES NFR BLD AUTO: 0.5 % (ref 0–0.5)
LYMPHOCYTES # BLD AUTO: 0.4 K/UL (ref 1–4.8)
LYMPHOCYTES NFR BLD: 5.8 % (ref 18–48)
MAGNESIUM SERPL-MCNC: 2.2 MG/DL (ref 1.6–2.6)
MCH RBC QN AUTO: 30.7 PG (ref 27–31)
MCHC RBC AUTO-ENTMCNC: 32.8 G/DL (ref 32–36)
MCV RBC AUTO: 93 FL (ref 82–98)
MONOCYTES # BLD AUTO: 0.5 K/UL (ref 0.3–1)
MONOCYTES NFR BLD: 6.3 % (ref 4–15)
NEUTROPHILS # BLD AUTO: 6.5 K/UL (ref 1.8–7.7)
NEUTROPHILS NFR BLD: 86.3 % (ref 38–73)
NRBC BLD-RTO: 0 /100 WBC
PHOSPHATE SERPL-MCNC: 2.7 MG/DL (ref 2.7–4.5)
PLATELET # BLD AUTO: 220 K/UL (ref 150–450)
PMV BLD AUTO: 10.2 FL (ref 9.2–12.9)
POTASSIUM SERPL-SCNC: 4.8 MMOL/L (ref 3.5–5.1)
RBC # BLD AUTO: 2.87 M/UL (ref 4.6–6.2)
SODIUM SERPL-SCNC: 137 MMOL/L (ref 136–145)
TACROLIMUS BLD-MCNC: 10 NG/ML (ref 5–15)
WBC # BLD AUTO: 7.58 K/UL (ref 3.9–12.7)

## 2024-05-03 PROCEDURE — 36415 COLL VENOUS BLD VENIPUNCTURE: CPT | Performed by: STUDENT IN AN ORGANIZED HEALTH CARE EDUCATION/TRAINING PROGRAM

## 2024-05-03 PROCEDURE — 83735 ASSAY OF MAGNESIUM: CPT | Performed by: STUDENT IN AN ORGANIZED HEALTH CARE EDUCATION/TRAINING PROGRAM

## 2024-05-03 PROCEDURE — 85025 COMPLETE CBC W/AUTO DIFF WBC: CPT | Performed by: STUDENT IN AN ORGANIZED HEALTH CARE EDUCATION/TRAINING PROGRAM

## 2024-05-03 PROCEDURE — 80069 RENAL FUNCTION PANEL: CPT | Performed by: STUDENT IN AN ORGANIZED HEALTH CARE EDUCATION/TRAINING PROGRAM

## 2024-05-03 PROCEDURE — 80197 ASSAY OF TACROLIMUS: CPT | Performed by: STUDENT IN AN ORGANIZED HEALTH CARE EDUCATION/TRAINING PROGRAM

## 2024-05-03 NOTE — TELEPHONE ENCOUNTER
----- Message from Haritha Banks MD sent at 5/3/2024 10:28 AM CDT -----  Regarding: RE: C. Diff  Can do fidaxomicin 200 mg PO BID x10 days  If too expensive then can do vancomycin 125 mg PO Q6 hours for 10 days  ----- Message -----  From: Shanti Prieto RN  Sent: 5/3/2024   9:51 AM CDT  To: Haritha Banks MD; Nivia Osorio DO  Subject: C. Diff                                          Good morning,  This pt is 19 days post kidney transplant c/o diarrhea x 2 days and tested positive for C. Diff.  Dr. Pierre asked for treatment recommendations.  Let me know if the patient needs an appt with you.  I don't think they're able to do a video visit.     Corie

## 2024-05-04 LAB — BACTERIA STL CULT: NORMAL

## 2024-05-06 ENCOUNTER — PATIENT MESSAGE (OUTPATIENT)
Dept: ENDOCRINOLOGY | Facility: CLINIC | Age: 64
End: 2024-05-06
Payer: MEDICARE

## 2024-05-06 ENCOUNTER — TELEPHONE (OUTPATIENT)
Dept: ENDOCRINOLOGY | Facility: CLINIC | Age: 64
End: 2024-05-06
Payer: MEDICARE

## 2024-05-06 ENCOUNTER — LAB VISIT (OUTPATIENT)
Dept: LAB | Facility: HOSPITAL | Age: 64
End: 2024-05-06
Payer: MEDICARE

## 2024-05-06 DIAGNOSIS — Z94.0 KIDNEY REPLACED BY TRANSPLANT: ICD-10-CM

## 2024-05-06 LAB
ALBUMIN SERPL BCP-MCNC: 3 G/DL (ref 3.5–5.2)
ANION GAP SERPL CALC-SCNC: 5 MMOL/L (ref 8–16)
BASOPHILS # BLD AUTO: 0.04 K/UL (ref 0–0.2)
BASOPHILS NFR BLD: 0.6 % (ref 0–1.9)
BUN SERPL-MCNC: 40 MG/DL (ref 8–23)
CALCIUM SERPL-MCNC: 8.9 MG/DL (ref 8.7–10.5)
CHLORIDE SERPL-SCNC: 117 MMOL/L (ref 95–110)
CO2 SERPL-SCNC: 21 MMOL/L (ref 23–29)
CREAT SERPL-MCNC: 2.8 MG/DL (ref 0.5–1.4)
DIFFERENTIAL METHOD BLD: ABNORMAL
EOSINOPHIL # BLD AUTO: 0.1 K/UL (ref 0–0.5)
EOSINOPHIL NFR BLD: 0.9 % (ref 0–8)
ERYTHROCYTE [DISTWIDTH] IN BLOOD BY AUTOMATED COUNT: 15.9 % (ref 11.5–14.5)
EST. GFR  (NO RACE VARIABLE): 24.7 ML/MIN/1.73 M^2
GLUCOSE SERPL-MCNC: 58 MG/DL (ref 70–110)
HCT VFR BLD AUTO: 29.2 % (ref 40–54)
HGB BLD-MCNC: 9.2 G/DL (ref 14–18)
IMM GRANULOCYTES # BLD AUTO: 0.03 K/UL (ref 0–0.04)
IMM GRANULOCYTES NFR BLD AUTO: 0.4 % (ref 0–0.5)
LYMPHOCYTES # BLD AUTO: 0.4 K/UL (ref 1–4.8)
LYMPHOCYTES NFR BLD: 5.9 % (ref 18–48)
MAGNESIUM SERPL-MCNC: 2.2 MG/DL (ref 1.6–2.6)
MCH RBC QN AUTO: 30.5 PG (ref 27–31)
MCHC RBC AUTO-ENTMCNC: 31.5 G/DL (ref 32–36)
MCV RBC AUTO: 97 FL (ref 82–98)
MONOCYTES # BLD AUTO: 0.4 K/UL (ref 0.3–1)
MONOCYTES NFR BLD: 6.4 % (ref 4–15)
NEUTROPHILS # BLD AUTO: 5.9 K/UL (ref 1.8–7.7)
NEUTROPHILS NFR BLD: 85.8 % (ref 38–73)
NRBC BLD-RTO: 0 /100 WBC
PHOSPHATE SERPL-MCNC: 2.4 MG/DL (ref 2.7–4.5)
PLATELET # BLD AUTO: 237 K/UL (ref 150–450)
PMV BLD AUTO: 10.4 FL (ref 9.2–12.9)
POTASSIUM SERPL-SCNC: 4.9 MMOL/L (ref 3.5–5.1)
RBC # BLD AUTO: 3.02 M/UL (ref 4.6–6.2)
SODIUM SERPL-SCNC: 143 MMOL/L (ref 136–145)
TACROLIMUS BLD-MCNC: 8 NG/ML (ref 5–15)
WBC # BLD AUTO: 6.83 K/UL (ref 3.9–12.7)

## 2024-05-06 PROCEDURE — 83735 ASSAY OF MAGNESIUM: CPT | Performed by: STUDENT IN AN ORGANIZED HEALTH CARE EDUCATION/TRAINING PROGRAM

## 2024-05-06 PROCEDURE — 85025 COMPLETE CBC W/AUTO DIFF WBC: CPT | Performed by: STUDENT IN AN ORGANIZED HEALTH CARE EDUCATION/TRAINING PROGRAM

## 2024-05-06 PROCEDURE — 36415 COLL VENOUS BLD VENIPUNCTURE: CPT | Performed by: STUDENT IN AN ORGANIZED HEALTH CARE EDUCATION/TRAINING PROGRAM

## 2024-05-06 PROCEDURE — 80197 ASSAY OF TACROLIMUS: CPT | Performed by: STUDENT IN AN ORGANIZED HEALTH CARE EDUCATION/TRAINING PROGRAM

## 2024-05-06 PROCEDURE — 80069 RENAL FUNCTION PANEL: CPT | Performed by: STUDENT IN AN ORGANIZED HEALTH CARE EDUCATION/TRAINING PROGRAM

## 2024-05-06 RX ORDER — CALCITRIOL 0.25 UG/1
0.25 CAPSULE ORAL DAILY
Qty: 30 CAPSULE | Refills: 5 | Status: SHIPPED | OUTPATIENT
Start: 2024-05-06

## 2024-05-06 RX ORDER — ACETAMINOPHEN 500 MG
5000 TABLET ORAL DAILY
Qty: 30 TABLET | Refills: 5 | Status: SHIPPED | OUTPATIENT
Start: 2024-05-06

## 2024-05-06 NOTE — TELEPHONE ENCOUNTER
Notified by transplant team for assistance of low blood sugar level.  Attempted to call pt x3 went straight to voicemail with full inbox.  Will send message through the portal.

## 2024-05-06 NOTE — TELEPHONE ENCOUNTER
Received written order from Dr. Pierre.  Spoke to patient and his wife and notified that we will cancel dialysis chair.  Maryjo Pimentel RN with [facility] notified.  Explained the need to start vitamin D and calcitriol.  Instructed on a high phosphorus diet.  Discussed the need to follow up with endocrine for insulin adjustment.  Staff message sent to Bryan fernandez to reach out to patient for blood glucose management.  Patient has an appt with his endocrinologist at [facility] Thursday but may need to cancel while staying here in [place].  Patient will continue logging blood sugars to report to Bryan Campos when he hears from them.       ----- Message from Iain Pierre Jr., MD sent at 5/6/2024 10:55 AM CDT -----  Needs insulin adjustment by ASAP. No dialysis. Cancel chair. Start vitamin D 5000 daily and calcitriol 0.25 daily. High phos diet. Cancel dialysis chair. Tac pending

## 2024-05-07 ENCOUNTER — TELEPHONE (OUTPATIENT)
Dept: ENDOCRINOLOGY | Facility: HOSPITAL | Age: 64
End: 2024-05-07
Payer: MEDICARE

## 2024-05-07 ENCOUNTER — NURSE TRIAGE (OUTPATIENT)
Dept: ADMINISTRATIVE | Facility: CLINIC | Age: 64
End: 2024-05-07
Payer: MEDICARE

## 2024-05-07 LAB — O+P STL MICRO: NORMAL

## 2024-05-07 NOTE — TELEPHONE ENCOUNTER
"Pt is a kidney Tx on 4/14/2024. Having cramps real bad during the night and has started again and won;t go away. Rates pain > 10 on pain scale. Advised to ED now. Call EMS if unable to ambulate to car. Pt states residing across the street in apts from main campus.  Reason for Disposition   [1] SEVERE pain (e.g., excruciating) AND [2] present > 1 hour    Additional Information   Negative: Shock suspected (e.g., cold/pale/clammy skin, too weak to stand, low BP, rapid pulse)   Negative: Difficult to awaken or acting confused (e.g., disoriented, slurred speech)   Negative: Passed out (i.e., lost consciousness, collapsed and was not responding)   Negative: Sounds like a life-threatening emergency to the triager   Negative: Chest pain   Negative: Pain is mainly in upper abdomen  (if needed ask: "is it mainly above the belly button?")   Negative: Followed an abdomen (stomach) injury   Negative: Abdomen bloating or swelling are main symptoms    Protocols used: Abdominal Pain - Male-A-AH    "

## 2024-05-07 NOTE — TELEPHONE ENCOUNTER
Spoke with patient, and reports low blood sugar secondary to taking breakfast insulin and not eating.  Reports it to be an isolated event.  All other sugar checks have been in the low 100s.  Educated on the importance of eating if taking mealtime insulin.  Requested pt send logs through portal or bring to 6th floor endocrine clinic.

## 2024-05-08 ENCOUNTER — OFFICE VISIT (OUTPATIENT)
Dept: TRANSPLANT | Facility: CLINIC | Age: 64
End: 2024-05-08
Payer: MEDICARE

## 2024-05-08 ENCOUNTER — TELEPHONE (OUTPATIENT)
Dept: UROLOGY | Facility: CLINIC | Age: 64
End: 2024-05-08

## 2024-05-08 VITALS
BODY MASS INDEX: 29.27 KG/M2 | HEIGHT: 67 IN | RESPIRATION RATE: 16 BRPM | DIASTOLIC BLOOD PRESSURE: 79 MMHG | HEART RATE: 78 BPM | TEMPERATURE: 97 F | WEIGHT: 186.5 LBS | OXYGEN SATURATION: 100 % | SYSTOLIC BLOOD PRESSURE: 174 MMHG

## 2024-05-08 VITALS
HEIGHT: 67 IN | RESPIRATION RATE: 16 BRPM | SYSTOLIC BLOOD PRESSURE: 174 MMHG | OXYGEN SATURATION: 100 % | HEART RATE: 78 BPM | DIASTOLIC BLOOD PRESSURE: 79 MMHG | BODY MASS INDEX: 29.27 KG/M2 | WEIGHT: 186.5 LBS | TEMPERATURE: 97 F

## 2024-05-08 DIAGNOSIS — Z79.899 IMMUNODEFICIENCY DUE TO LONG TERM DRUG THERAPY: ICD-10-CM

## 2024-05-08 DIAGNOSIS — Z94.0 S/P KIDNEY TRANSPLANT: ICD-10-CM

## 2024-05-08 DIAGNOSIS — D84.821 IMMUNODEFICIENCY DUE TO LONG TERM DRUG THERAPY: ICD-10-CM

## 2024-05-08 DIAGNOSIS — Z94.0 KIDNEY REPLACED BY TRANSPLANT: Primary | ICD-10-CM

## 2024-05-08 DIAGNOSIS — T86.19 DELAYED GRAFT FUNCTION OF KIDNEY: ICD-10-CM

## 2024-05-08 DIAGNOSIS — D63.8 ANEMIA OF CHRONIC DISEASE: ICD-10-CM

## 2024-05-08 DIAGNOSIS — Z79.60 LONG-TERM USE OF IMMUNOSUPPRESSANT MEDICATION: ICD-10-CM

## 2024-05-08 DIAGNOSIS — Z91.89 AT RISK FOR OPPORTUNISTIC INFECTIONS: ICD-10-CM

## 2024-05-08 DIAGNOSIS — I15.0 RENOVASCULAR HYPERTENSION: Chronic | ICD-10-CM

## 2024-05-08 DIAGNOSIS — N25.81 SECONDARY HYPERPARATHYROIDISM: ICD-10-CM

## 2024-05-08 DIAGNOSIS — Z29.89 PROPHYLACTIC IMMUNOTHERAPY: ICD-10-CM

## 2024-05-08 PROBLEM — Z76.82 PATIENT ON WAITING LIST FOR KIDNEY TRANSPLANT: Status: RESOLVED | Noted: 2022-04-08 | Resolved: 2024-05-08

## 2024-05-08 PROBLEM — N18.5 CKD (CHRONIC KIDNEY DISEASE), STAGE V: Status: RESOLVED | Noted: 2020-05-26 | Resolved: 2024-05-08

## 2024-05-08 PROCEDURE — 99999 PR PBB SHADOW E&M-EST. PATIENT-LVL IV: CPT | Mod: PBBFAC,,, | Performed by: TRANSPLANT SURGERY

## 2024-05-08 PROCEDURE — G2211 COMPLEX E/M VISIT ADD ON: HCPCS | Mod: S$GLB,,, | Performed by: STUDENT IN AN ORGANIZED HEALTH CARE EDUCATION/TRAINING PROGRAM

## 2024-05-08 PROCEDURE — 1160F RVW MEDS BY RX/DR IN RCRD: CPT | Mod: CPTII,S$GLB,, | Performed by: STUDENT IN AN ORGANIZED HEALTH CARE EDUCATION/TRAINING PROGRAM

## 2024-05-08 PROCEDURE — 3066F NEPHROPATHY DOC TX: CPT | Mod: CPTII,S$GLB,, | Performed by: TRANSPLANT SURGERY

## 2024-05-08 PROCEDURE — 3052F HG A1C>EQUAL 8.0%<EQUAL 9.0%: CPT | Mod: CPTII,S$GLB,, | Performed by: TRANSPLANT SURGERY

## 2024-05-08 PROCEDURE — 1159F MED LIST DOCD IN RCRD: CPT | Mod: CPTII,S$GLB,, | Performed by: TRANSPLANT SURGERY

## 2024-05-08 PROCEDURE — 3077F SYST BP >= 140 MM HG: CPT | Mod: CPTII,S$GLB,, | Performed by: STUDENT IN AN ORGANIZED HEALTH CARE EDUCATION/TRAINING PROGRAM

## 2024-05-08 PROCEDURE — 1111F DSCHRG MED/CURRENT MED MERGE: CPT | Mod: CPTII,S$GLB,, | Performed by: STUDENT IN AN ORGANIZED HEALTH CARE EDUCATION/TRAINING PROGRAM

## 2024-05-08 PROCEDURE — 3066F NEPHROPATHY DOC TX: CPT | Mod: CPTII,S$GLB,, | Performed by: STUDENT IN AN ORGANIZED HEALTH CARE EDUCATION/TRAINING PROGRAM

## 2024-05-08 PROCEDURE — 1159F MED LIST DOCD IN RCRD: CPT | Mod: CPTII,S$GLB,, | Performed by: STUDENT IN AN ORGANIZED HEALTH CARE EDUCATION/TRAINING PROGRAM

## 2024-05-08 PROCEDURE — 99024 POSTOP FOLLOW-UP VISIT: CPT | Mod: S$GLB,,, | Performed by: TRANSPLANT SURGERY

## 2024-05-08 PROCEDURE — 3008F BODY MASS INDEX DOCD: CPT | Mod: CPTII,S$GLB,, | Performed by: STUDENT IN AN ORGANIZED HEALTH CARE EDUCATION/TRAINING PROGRAM

## 2024-05-08 PROCEDURE — 99999 PR PBB SHADOW E&M-EST. PATIENT-LVL III: CPT | Mod: PBBFAC,,, | Performed by: STUDENT IN AN ORGANIZED HEALTH CARE EDUCATION/TRAINING PROGRAM

## 2024-05-08 PROCEDURE — 3052F HG A1C>EQUAL 8.0%<EQUAL 9.0%: CPT | Mod: CPTII,S$GLB,, | Performed by: STUDENT IN AN ORGANIZED HEALTH CARE EDUCATION/TRAINING PROGRAM

## 2024-05-08 PROCEDURE — 3078F DIAST BP <80 MM HG: CPT | Mod: CPTII,S$GLB,, | Performed by: TRANSPLANT SURGERY

## 2024-05-08 PROCEDURE — 3078F DIAST BP <80 MM HG: CPT | Mod: CPTII,S$GLB,, | Performed by: STUDENT IN AN ORGANIZED HEALTH CARE EDUCATION/TRAINING PROGRAM

## 2024-05-08 PROCEDURE — 99215 OFFICE O/P EST HI 40 MIN: CPT | Mod: S$GLB,,, | Performed by: STUDENT IN AN ORGANIZED HEALTH CARE EDUCATION/TRAINING PROGRAM

## 2024-05-08 PROCEDURE — 3077F SYST BP >= 140 MM HG: CPT | Mod: CPTII,S$GLB,, | Performed by: TRANSPLANT SURGERY

## 2024-05-08 NOTE — LETTER
May 8, 2024        Cathryn Adair  [address]  [place] [address]  Phone: [phone]  Fax: [phone]             [facility]  [address]  [facility] [address]  Phone: [phone]   Patient: Murphy Ha   MR Number: [id number]   YOB: 1960   Date of Visit: 5/8/2024       Dear Dr. Cathryn Adair    Thank you for referring Murphy Ha to me for evaluation. Attached you will find relevant portions of my assessment and plan of care.    If you have questions, please do not hesitate to call me. I look forward to following Murphy Ha along with you.    Sincerely,    Jesus Manuel Delcid MD    Enclosure    If you would like to receive this communication electronically, please contact [email or web address] or [phone] to request Namshi Link access.    Namshi Link is a tool which provides read-only access to select patient information with whom you have a relationship. Its easy to use and provides real time access to review your patients record including encounter summaries, notes, results, and demographic information.    If you feel you have received this communication in error or would no longer like to receive these types of communications, please e-mail [email or web address]

## 2024-05-08 NOTE — LETTER
May 8, 2024        Cathryn Adair  [address]  [place] [address]  Phone: [phone]  Fax: [phone]             [facility] 1st Fl  [address]  [facility] [address]  Phone: [phone]   Patient: Murphy Ha   MR Number: [id number]   YOB: 1960   Date of Visit: 5/8/2024       Dear Dr. Cathryn Adair    Thank you for referring Murphy Ha to me for evaluation. Attached you will find relevant portions of my assessment and plan of care.    If you have questions, please do not hesitate to call me. I look forward to following Murphy Ha along with you.    Sincerely,    Iain Pierre Jr., MD    Enclosure    If you would like to receive this communication electronically, please contact [email or web address] or [phone] to request Chirpify Link access.    Chirpify Link is a tool which provides read-only access to select patient information with whom you have a relationship. Its easy to use and provides real time access to review your patients record including encounter summaries, notes, results, and demographic information.    If you feel you have received this communication in error or would no longer like to receive these types of communications, please e-mail [email or web address]

## 2024-05-08 NOTE — PROGRESS NOTES
Post-Transplant Assessment    Referring Physician: Cathryn Adair  Current Nephrologist: Cathryn Adair    ORGAN: RIGHT KIDNEY  Donor Type: donation after circulatory death   PHS Increased Risk: no  Cold Ischemia: 1,030 mins  Induction Medications: thymo    Chief Complaint   Patient presents with    Kidney Transplant Reassessment     Presents to the clinic today for medical conditions listed below.  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 OR no bold. DGF.     Patient On Waiting List for Kidney Transplant (Resolved) 4/8/2022   Ckd (Chronic Kidney Disease), Stage V (Resolved) 5/26/2020   Stage 4 Chronic Kidney Disease (Resolved) 6/2/2016     Today:  History of Present Illness    Patient presents today with episodes of severe lower abdominal cramping pain.    The patient reports severe lower abdominal pain following bowel movements, sometimes so severe it leaves him unable to walk. The pain varies but consistently reoccurs after each bowel movement. He also reports an increase in the frequency of bowel movements, starting two days ago, and occasional inability to control these movements. No presence of blood in his stool was reported.    He sought emergency care due to severe and painful abdominal cramps. By the time he waited in the ER for around an hour and a half, the pain had subsided naturally.    Post his recent kidney transplant, staples were used at the site of surgery and have since been removed. He experiences abdominal cramping after using the bathroom. The pain, while less severe than initial episodes, consistently follows bowel movements. There is no presence of blood in urine or  stools. He is prescribed Prograf, CellCept, and prednisone for post-kidney transplant rejection prevention.    The patient expresses uncertainty regarding his Vitamin D supplementation. He reports receiving only one prescription of Vitamin D 5,000 mg but thinks there might be another one which he has not received.    A high blood pressure reading was noted during today's visit. Home readings usually range within 140s to 150s.    Despite current conditions of intermittent diarrhea and some pain related to his recent kidney transplant, the patient expresses eagerness for hospital discharge and to continue recovery at home.    ROS:  Gastrointestinal: +abdominal pain, +diarrhea         Physical Exam :  Vitals:    05/08/24 1428   BP: (!) 174/79   Pulse: 78   Resp: 16   Temp: 97.3 °F (36.3 °C)     Physical Exam    General: Well-appearing.  Lungs: Normal respiratory effort. No respiratory distress.  Extremities: Mild edema in legs.         Last 3 sets of metabolic panel, blood count, drug levels reviewed     Assessment & Plan    T86.10 Unspecified complication of kidney transplant  K59.00 Constipation, unspecified  R10.30 Lower abdominal pain, unspecified  N39.0 Urinary tract infection, site not specified  E11.9 Type 2 diabetes mellitus without complications  I10 Essential (primary) hypertension  K21.9 Gastro-esophageal reflux disease without esophagitis  E78.5 Hyperlipidemia, unspecified  R60.0 Localized edema  GASTROINTESTINAL ISSUES:  - Suspected that the patient's abdominal pain and frequent bowel movements may be due to his current medications, particularly CellCept.  - Planned to order a GI PCR panel and switch medication from CellCept to Myfortic if diarrhea persists by Monday.  - Considered prescribing Imodium to slow down the patient's GI tract if symptoms do not improve.  - Advised the patient to monitor symptoms and report if the pain worsens, if a fever develops, or if blood is noticed in his stool.  - Educated  "the patient about the potential causes of his symptoms, including the effects of his medications and recent kidney transplant surgery.  KIDNEY TRANSPLANT:  - Noted potential strain on the patient's bladder due to recent kidney transplant surgery.  - Listed the patient's current medications as Prograf, CellCept, and prednisone, prescribed to prevent kidney rejection, and fidaxomicin for a C. diff infection.  - Explained the function of the prescribed medications and their potential side effects.  LIFESTYLE AND DIET:  - Encouraged the patient to maintain physical activity and avoid a sedentary lifestyle.  - Recommend monitoring of dietary intake, particularly potassium, and suggested reducing consumption of high-potassium foods such as potatoes, broccoli, bananas, and tomatoes.  - Discussed the importance of maintaining a balanced diet, particularly in relation to potassium intake.         Immediate changes:    DSA and allosure at 1 month  Let us know if diarrhea improved by monday    1. CKD stage:   Will continue follow up as per our center guidelines. patient to continue close follow up with the local General nephrologist. Education provided in appropriate fluid intake, potassium intake. Continue with oral hydration.    2. Immunosuppression:   Will closely monitor for toxicities, education provided about adherence to medicines and need to communicate any side effect to the transplant nurse or physician.  Lab Results   Component Value Date    TACROLIMUS 8.0 05/06/2024    TACROLIMUS 10.0 05/03/2024    TACROLIMUS 11.6 05/01/2024     No results found for: "CYCLOSPORINE"    3. Allograft Function:  Stable at baseline for the patient. Continue follow up as per our guidelines and with the local General nephrologist. Communication will be sent today.  Lab Results   Component Value Date    CREATININE 2.8 (H) 05/06/2024    CREATININE 3.3 (H) 05/03/2024    CREATININE 4.0 (H) 05/01/2024     No results found for: "AMYLASE", " ""LIPASE"    4. Hypertension management:    Continue with home blood pressure monitoring, low salt and healthy life discussed with the patient    5. Metabolic Bone Disease/Secondary Hyperparathyroidism:   Calcium and phosphorus level discussed with the patient, patient will continue follow up with the general nephrologist for management of metabolic bone disease. Calcium and phosphorus as per our center protocol.  Lab Results   Component Value Date    .1 (H) 07/28/2023    CALCIUM 8.9 05/06/2024    PHOS 2.4 (L) 05/06/2024    PHOS 2.7 05/03/2024    PHOS 2.8 05/01/2024       6. Electrolytes:   Reviewed with the patient, essentially within the normal range no need for acute changes today, will monitor as per our center guidelines.  Lab Results   Component Value Date     05/06/2024    K 4.9 05/06/2024     (H) 05/06/2024    CO2 21 (L) 05/06/2024    CO2 22 (L) 05/03/2024    CO2 23 05/01/2024       7. Anemia:   Will continue monitoring as per our center guidelines. No indication for acute intervention today.  Lab Results   Component Value Date    WBC 6.83 05/06/2024    HGB 9.2 (L) 05/06/2024    HCT 29.2 (L) 05/06/2024    MCV 97 05/06/2024     05/06/2024       8.Proteinuria:   Will continue with pr/cr ratio as per our center guidelines  Lab Results   Component Value Date    PROTEINURINE 38 (H) 04/14/2024    CREATRANDUR 96.0 04/14/2024    UTPCR 0.40 (H) 04/14/2024    UTPCR 2.58 (H) 03/04/2021    UTPCR 3.44 (H) 05/25/2020        9. BK virus infection screening:   Will continue with urine or blood PCR as per our guidelines to prevent BK virus viremia and allograft dysfunction  No results found for: "BKVIRUSDNAUR", "BKQUANTURINE", "BKVIRUSLOG", "BKVIRUSURINE", "BKVIRUSPCRQB"      10. Weight education:   Provided during the clinic visit.  Body mass index is 29.21 kg/m².     11.Patient safety education regarding immunosuppression including prophylaxis posttransplant for CMV, PCP :   Education provided " about vaccination and prevention of infections.    12.  Cytopenias:   No significant cytopenias will monitor as per our guidelines. Medicine list reviewed including potential causes of drug-induced cytopenias  Lab Results   Component Value Date    WBC 6.83 05/06/2024    HGB 9.2 (L) 05/06/2024    HCT 29.2 (L) 05/06/2024    MCV 97 05/06/2024     05/06/2024       13. Post-transplant Prophylaxis: CMV Infection, PJP and Candida mucosistis and other indicated for this particular patient.     No LOS data to display      No orders of the defined types were placed in this encounter.    There are no discontinued medications.   Future Appointments   Date Time Provider Department Center   5/9/2024  8:20 AM LAB, TRANSPLANT [facility]   5/9/2024  8:30 AM PROCEDURE, UROLOGY ROOM 3 [facility] UROPRO [facility]   5/9/2024 10:40 AM Winsome Berrios NP [facility] ENDOCRN [facility]   5/10/2024  8:00 AM OPHTHALMOLOGY RETINA, [facility] OPHTHAL [facility]   5/27/2024  2:00 PM DIABETES DISCHARGE CLINIC [facility] ENDODIA [facility]   6/28/2024  8:00 AM OPHTHALMOLOGY RETINA, [facility] OPHTHAL [place] [facility]   8/6/2024  1:30 PM CONTINUING CARE CLINIC, PHYSICIAN [facility]       Follow-up:   Clinic: return to transplant clinic weekly for the first month after transplant; every 2 weeks during months 2-3; then at 6-, 9-, 12-, 18-, 24-, and 36- months post-transplant to reassess for complications from immunosuppression toxicity and monitor for rejection.  Annually thereafter.    Labs: since patient remains at high risk for rejection and drug-related complications that warrant close monitoring, labs will be ordered as follows: continue twice weekly CBC, renal panel, and drug level for first month; then same labs once weekly through 3rd month post-transplant.  Urine for UA and protein/creatinine ratio monthly.  Serum BK - PCR at 1-, 3-, 6-, 9-, 12-, 18-, 24-, 36- 48-, and 60 months post-transplant.  Hepatic panel at 1-, 2-, 3-, 6-, 9-, 12-,  18-, 24-, and 36- months post-transplant.    Iain Pierre Jr., MD       Education:   Material provided to the patient.  Patient reminded to call with any health changes since these can be early signs of significant complications.  Also, I advised the patient to be sure any new medications or changes of old medications are discussed with either a pharmacist or physician knowledgeable with transplant to avoid rejection/drug toxicity related to significant drug interactions.    Patient advised that it is recommended that all transplanted patients, and their close contacts and household members receive Covid vaccination.    Visit today included increased complexity associated with the care of the episodic problem kidney transplant addressed and managing the longitudinal care of the patient due to the serious and/or complex managed problem(s) kidney transplant, CKD, HTN, secondary hyperparathyroidism, and anemia of chronic disease.    UNOS Patient Status  Functional Status: 70% - Cares for self: unable to carry on normal activity or active work  Physical Capacity: No Limitations

## 2024-05-09 ENCOUNTER — PROCEDURE VISIT (OUTPATIENT)
Dept: UROLOGY | Facility: CLINIC | Age: 64
End: 2024-05-09
Payer: MEDICARE

## 2024-05-09 ENCOUNTER — LAB VISIT (OUTPATIENT)
Dept: LAB | Facility: HOSPITAL | Age: 64
End: 2024-05-09
Attending: STUDENT IN AN ORGANIZED HEALTH CARE EDUCATION/TRAINING PROGRAM
Payer: MEDICARE

## 2024-05-09 VITALS
HEIGHT: 67 IN | HEART RATE: 77 BPM | DIASTOLIC BLOOD PRESSURE: 85 MMHG | RESPIRATION RATE: 18 BRPM | SYSTOLIC BLOOD PRESSURE: 159 MMHG | TEMPERATURE: 98 F | WEIGHT: 187.19 LBS | BODY MASS INDEX: 29.38 KG/M2

## 2024-05-09 DIAGNOSIS — Z94.0 KIDNEY REPLACED BY TRANSPLANT: ICD-10-CM

## 2024-05-09 DIAGNOSIS — I10 HYPERTENSION, UNSPECIFIED TYPE: Chronic | ICD-10-CM

## 2024-05-09 DIAGNOSIS — N17.9 AKI (ACUTE KIDNEY INJURY): Primary | ICD-10-CM

## 2024-05-09 DIAGNOSIS — E83.39 HYPOPHOSPHATEMIA: Primary | ICD-10-CM

## 2024-05-09 LAB
ALBUMIN SERPL BCP-MCNC: 3.1 G/DL (ref 3.5–5.2)
ANION GAP SERPL CALC-SCNC: 4 MMOL/L (ref 8–16)
BASOPHILS # BLD AUTO: 0.03 K/UL (ref 0–0.2)
BASOPHILS NFR BLD: 0.5 % (ref 0–1.9)
BUN SERPL-MCNC: 27 MG/DL (ref 8–23)
CALCIUM SERPL-MCNC: 9.1 MG/DL (ref 8.7–10.5)
CHLORIDE SERPL-SCNC: 113 MMOL/L (ref 95–110)
CO2 SERPL-SCNC: 23 MMOL/L (ref 23–29)
CREAT SERPL-MCNC: 2.5 MG/DL (ref 0.5–1.4)
DIFFERENTIAL METHOD BLD: ABNORMAL
EOSINOPHIL # BLD AUTO: 0 K/UL (ref 0–0.5)
EOSINOPHIL NFR BLD: 0.7 % (ref 0–8)
ERYTHROCYTE [DISTWIDTH] IN BLOOD BY AUTOMATED COUNT: 16 % (ref 11.5–14.5)
EST. GFR  (NO RACE VARIABLE): 28.3 ML/MIN/1.73 M^2
GLUCOSE SERPL-MCNC: 125 MG/DL (ref 70–110)
HCT VFR BLD AUTO: 28.2 % (ref 40–54)
HGB BLD-MCNC: 9.5 G/DL (ref 14–18)
IMM GRANULOCYTES # BLD AUTO: 0.04 K/UL (ref 0–0.04)
IMM GRANULOCYTES NFR BLD AUTO: 0.7 % (ref 0–0.5)
LYMPHOCYTES # BLD AUTO: 0.4 K/UL (ref 1–4.8)
LYMPHOCYTES NFR BLD: 6.7 % (ref 18–48)
MAGNESIUM SERPL-MCNC: 2 MG/DL (ref 1.6–2.6)
MCH RBC QN AUTO: 31.9 PG (ref 27–31)
MCHC RBC AUTO-ENTMCNC: 33.7 G/DL (ref 32–36)
MCV RBC AUTO: 95 FL (ref 82–98)
MONOCYTES # BLD AUTO: 0.5 K/UL (ref 0.3–1)
MONOCYTES NFR BLD: 8 % (ref 4–15)
NEUTROPHILS # BLD AUTO: 4.8 K/UL (ref 1.8–7.7)
NEUTROPHILS NFR BLD: 83.4 % (ref 38–73)
NRBC BLD-RTO: 0 /100 WBC
PHOSPHATE SERPL-MCNC: 2 MG/DL (ref 2.7–4.5)
PLATELET # BLD AUTO: 238 K/UL (ref 150–450)
PMV BLD AUTO: 10.3 FL (ref 9.2–12.9)
POTASSIUM SERPL-SCNC: 4.9 MMOL/L (ref 3.5–5.1)
RBC # BLD AUTO: 2.98 M/UL (ref 4.6–6.2)
SODIUM SERPL-SCNC: 140 MMOL/L (ref 136–145)
TACROLIMUS BLD-MCNC: 6.4 NG/ML (ref 5–15)
WBC # BLD AUTO: 5.78 K/UL (ref 3.9–12.7)

## 2024-05-09 PROCEDURE — 80069 RENAL FUNCTION PANEL: CPT | Performed by: STUDENT IN AN ORGANIZED HEALTH CARE EDUCATION/TRAINING PROGRAM

## 2024-05-09 PROCEDURE — 52310 CYSTOSCOPY AND TREATMENT: CPT | Mod: S$GLB,,, | Performed by: UROLOGY

## 2024-05-09 PROCEDURE — 80197 ASSAY OF TACROLIMUS: CPT | Performed by: STUDENT IN AN ORGANIZED HEALTH CARE EDUCATION/TRAINING PROGRAM

## 2024-05-09 PROCEDURE — 85025 COMPLETE CBC W/AUTO DIFF WBC: CPT | Performed by: STUDENT IN AN ORGANIZED HEALTH CARE EDUCATION/TRAINING PROGRAM

## 2024-05-09 PROCEDURE — 83735 ASSAY OF MAGNESIUM: CPT | Performed by: STUDENT IN AN ORGANIZED HEALTH CARE EDUCATION/TRAINING PROGRAM

## 2024-05-09 PROCEDURE — 36415 COLL VENOUS BLD VENIPUNCTURE: CPT | Performed by: STUDENT IN AN ORGANIZED HEALTH CARE EDUCATION/TRAINING PROGRAM

## 2024-05-09 RX ORDER — CHLORTHALIDONE 25 MG/1
12.5 TABLET ORAL DAILY
Qty: 15 TABLET | Refills: 11 | Status: SHIPPED | OUTPATIENT
Start: 2024-05-09

## 2024-05-09 RX ORDER — SODIUM CHLORIDE 9 MG/ML
INJECTION, SOLUTION INTRAVENOUS CONTINUOUS
Status: CANCELLED | OUTPATIENT
Start: 2024-05-09 | End: 2024-05-09

## 2024-05-09 RX ORDER — SODIUM CHLORIDE 0.9 % (FLUSH) 0.9 %
10 SYRINGE (ML) INJECTION
Status: DISCONTINUED | OUTPATIENT
Start: 2024-05-09 | End: 2024-05-27

## 2024-05-09 RX ORDER — LIDOCAINE HYDROCHLORIDE 20 MG/ML
JELLY TOPICAL ONCE
Status: COMPLETED | OUTPATIENT
Start: 2024-05-09 | End: 2024-05-09

## 2024-05-09 RX ADMIN — LIDOCAINE HYDROCHLORIDE 10 ML: 20 JELLY TOPICAL at 09:05

## 2024-05-09 NOTE — PATIENT INSTRUCTIONS
What to Expect After a Cystoscopy with Stent Removal  For the next 24-48 hours, you may feel a mild burning when you urinate. This burning is normal and expected. Drink plenty of water to dilute the urine to help relieve the burning sensation. You may also see a small amount of blood in your urine after the procedure.    Unless you are already taking antibiotics, you may be given an antibiotic after the test to prevent infection.    Signs and Symptoms to Report  Call the [facility] at [phone] if you develop any of the following:  Fever of 101 degrees or higher  Chills or persistent bleeding  Inability to urinate    After hours or on weekends, you may reach a urology resident on call at this number: [phone].

## 2024-05-09 NOTE — PROCEDURES
CYSTOSCOPY W/ STENT REMOVAL    Date/Time: 5/9/2024 8:30 AM    Performed by: Dominic Perry MD  Authorized by: Naga Acosta Jr., MD  Comments: Date of Procedure: 05/09/2024    Procedure:                                                                        Cystourethroscopy with Successful Removal of transplant kidney Ureteral Stent(s)                                                                                    Pre-OP Diagnosis:                                                                transplant kidney  ureteral stent                                 Post-OP Diagnosis:                                                                same                              Anesthesia:                                                                       Anesthesia Administered:                                                     Intraurethral instillation of 10 mL 2% lidocaine (Xylocaine) jelly.     Findings:                                                                         Double J stent in the ureteral orifice.                                - The stent is not encrusted.                                           Description of Procedure:                                                         Informed Consent:                                                            - Risks, benefits and alternatives of procedure discussed with               patient and informed consent obtained.                                       Patient Position:                                                            - Supine. Careful padding and pressure point care performed.                 Prep and Drape:                                                              - Patient prepped and draped in usual sterile fashion using povidone         iodine (Betadine).                                                           Instruments:                                                                 - Alligator forceps.                                                          - Flexible Cystoscope: With 0 degree lens 16 Fr.                             Procedure Details:                                                           - Cystoscope passed under vision into bladder.                               - Bladder and urethra examined in their entirety with findings as            above.                                                                       - Ureteral stent visualized in bladder, grasped and removed.            Complications:                                                                    No immediate complications.                                             Post-OP Plan:                                                                    transplant kidney team follow up

## 2024-05-09 NOTE — TELEPHONE ENCOUNTER
Spoke to patient and instructed to start the following medications per Dr. Pierre.  Patient verbalized understanding.       ----- Message from Iain Pierre Jr., MD sent at 5/9/2024  9:57 AM CDT -----  Start kphos 500 bid. Start chlorthalidone 12.5 daily. Tac pending

## 2024-05-13 ENCOUNTER — TELEPHONE (OUTPATIENT)
Dept: NEPHROLOGY | Facility: CLINIC | Age: 64
End: 2024-05-13
Payer: MEDICARE

## 2024-05-13 DIAGNOSIS — N18.6 ESRD (END STAGE RENAL DISEASE): Primary | ICD-10-CM

## 2024-05-13 DIAGNOSIS — R19.7 DIARRHEA, UNSPECIFIED TYPE: Primary | ICD-10-CM

## 2024-05-13 DIAGNOSIS — Z94.0 KIDNEY REPLACED BY TRANSPLANT: ICD-10-CM

## 2024-05-13 RX ORDER — MYCOPHENOLIC ACID 180 MG/1
720 TABLET, DELAYED RELEASE ORAL 2 TIMES DAILY
Qty: 240 TABLET | Refills: 11 | Status: SHIPPED | OUTPATIENT
Start: 2024-05-13

## 2024-05-13 RX ORDER — LOPERAMIDE HYDROCHLORIDE 2 MG/1
CAPSULE ORAL
Qty: 40 CAPSULE | Refills: 0 | Status: SHIPPED | OUTPATIENT
Start: 2024-05-13

## 2024-05-13 NOTE — TELEPHONE ENCOUNTER
Received written orders from Dr. Pierre.  Spoke to patient and instructed to take imodium 2 mg after each bowel movement up to 8 times daily, switch to Myfortic  mg BID and stop MMF when able to  myfortic.  Patient will drop off stool sample when he comes to clinic on Wednesday.  Advised to drink plenty of fluids to replace fluids loss from diarrhea.  Pt and wife both verbalized understanding.        ----- Message from Iain Pierre Jr., MD sent at 5/13/2024 11:40 AM CDT -----  Regarding: RE: diarrhea  Lets do GI PCR pathogen panel and switch MMF to EC-/720.  ----- Message -----  From: Shanti Prieto RN  Sent: 5/13/2024  11:37 AM CDT  To: Iain Pierre Jr., MD  Subject: diarrhea                                         He said to let you know if he's still having diarrhea and he is.  He's going about every 30 mins to an hour and it's mostly watery.  He is also having nausea.  He completed his antibiotic for the c.diff yesterday.  I instructed him to increase his water intake at this time due to diarrhea. Please advise.

## 2024-05-13 NOTE — TELEPHONE ENCOUNTER
----- Message from Iain Pierre Jr., MD sent at 5/13/2024 11:41 AM CDT -----  Regarding: RE: diarrhea  If he is not having fevers, chills, or severe abdominal cramps, he can use loperamide 2mg after each BM up to 8 times a day. If he does not have a BM, he should stop the loperamide.  ----- Message -----  From: Shanti Prieto RN  Sent: 5/13/2024  11:37 AM CDT  To: Iain Pierre Jr., MD  Subject: diarrhea                                         He said to let you know if he's still having diarrhea and he is.  He's going about every 30 mins to an hour and it's mostly watery.  He is also having nausea.  He completed his antibiotic for the c.diff yesterday.  I instructed him to increase his water intake at this time due to diarrhea. Please advise.

## 2024-05-13 NOTE — TELEPHONE ENCOUNTER
Informed no aspirin 5 days before catheter removal date.   Instructed to take BP meds with sip of water that morning and bring meds to the hospital.   Instructed will stay about 4 hours.  Instructed will need to ask his PCP about the insulin dose he should take the night before and that morning since he is fasting.  Instructed to get labs on 5/23-apptm made with him.  Instructed to report to [facility] at 530am on 5/24/2024--directions given.  He said he had no questions after discussing the above.

## 2024-05-13 NOTE — TELEPHONE ENCOUNTER
Informed no aspirin 5 days before procedure date.   Instructed to take BP meds with sip of water that morning, otherwise NPO after Mn and bring meds to the hospital.   Instructed will stay about 4 hours.  Instructed to get labs on 5/23-apptm made with him.  Instructed to report to [facility]--directions given.  He said he had no questions after discussing the above.Instructed to ask his PCP regarding how to take his insulin the night before and morning of the procedure since he will ne NPO after MN.

## 2024-05-14 ENCOUNTER — CLINICAL SUPPORT (OUTPATIENT)
Dept: TRANSPLANT | Facility: CLINIC | Age: 64
End: 2024-05-14
Payer: MEDICARE

## 2024-05-14 VITALS
DIASTOLIC BLOOD PRESSURE: 62 MMHG | WEIGHT: 184.5 LBS | TEMPERATURE: 97 F | OXYGEN SATURATION: 100 % | HEART RATE: 82 BPM | BODY MASS INDEX: 28.96 KG/M2 | HEIGHT: 67 IN | SYSTOLIC BLOOD PRESSURE: 133 MMHG | RESPIRATION RATE: 18 BRPM

## 2024-05-14 DIAGNOSIS — D63.8 ANEMIA OF CHRONIC DISEASE: Primary | ICD-10-CM

## 2024-05-14 DIAGNOSIS — Z94.0 KIDNEY REPLACED BY TRANSPLANT: ICD-10-CM

## 2024-05-14 PROCEDURE — 96372 THER/PROPH/DIAG INJ SC/IM: CPT | Mod: S$GLB,,, | Performed by: STUDENT IN AN ORGANIZED HEALTH CARE EDUCATION/TRAINING PROGRAM

## 2024-05-14 PROCEDURE — 99999 PR PBB SHADOW E&M-EST. PATIENT-LVL III: CPT | Mod: PBBFAC,,,

## 2024-05-14 RX ORDER — TACROLIMUS 1 MG/1
4 CAPSULE ORAL EVERY 12 HOURS
Qty: 240 CAPSULE | Refills: 11 | Status: SHIPPED | OUTPATIENT
Start: 2024-05-14 | End: 2024-05-17 | Stop reason: DRUGHIGH

## 2024-05-14 NOTE — PROGRESS NOTES
Retacrit 10,000 units administered sq to left  posterior arm per MD order. Patient tolerated with no complaints. Patient informed of side effects including bone pain, muscle aches and tiredness. Verbalized acknowledgment.

## 2024-05-14 NOTE — TELEPHONE ENCOUNTER
Received written orders from Dr. Pierre.  Spoke to patient and wife to increase Prograf to 4 mg twice a day and the need for epogen injection.  Patient is on site at the moment and agreed to go to clinic to receive the injection.  Patient stated understanding and did not have any questions.       ----- Message from Iain Pierre Jr., MD sent at 5/14/2024  9:57 AM CDT -----  Increase tac to 4/4 from 3/3. Start epo 10,000 weekly if we have not already. Other labs acceptable.

## 2024-05-15 ENCOUNTER — OFFICE VISIT (OUTPATIENT)
Dept: TRANSPLANT | Facility: CLINIC | Age: 64
End: 2024-05-15
Payer: MEDICARE

## 2024-05-15 VITALS
DIASTOLIC BLOOD PRESSURE: 60 MMHG | SYSTOLIC BLOOD PRESSURE: 121 MMHG | WEIGHT: 186.75 LBS | RESPIRATION RATE: 18 BRPM | TEMPERATURE: 97 F | BODY MASS INDEX: 29.31 KG/M2 | HEART RATE: 78 BPM | HEIGHT: 67 IN | OXYGEN SATURATION: 99 %

## 2024-05-15 DIAGNOSIS — Z94.0 S/P KIDNEY TRANSPLANT: Primary | ICD-10-CM

## 2024-05-15 PROCEDURE — 3008F BODY MASS INDEX DOCD: CPT | Mod: CPTII,S$GLB,, | Performed by: TRANSPLANT SURGERY

## 2024-05-15 PROCEDURE — 3052F HG A1C>EQUAL 8.0%<EQUAL 9.0%: CPT | Mod: CPTII,S$GLB,, | Performed by: TRANSPLANT SURGERY

## 2024-05-15 PROCEDURE — 3066F NEPHROPATHY DOC TX: CPT | Mod: CPTII,S$GLB,, | Performed by: TRANSPLANT SURGERY

## 2024-05-15 PROCEDURE — 3078F DIAST BP <80 MM HG: CPT | Mod: CPTII,S$GLB,, | Performed by: TRANSPLANT SURGERY

## 2024-05-15 PROCEDURE — 1111F DSCHRG MED/CURRENT MED MERGE: CPT | Mod: CPTII,S$GLB,, | Performed by: TRANSPLANT SURGERY

## 2024-05-15 PROCEDURE — 99999 PR PBB SHADOW E&M-EST. PATIENT-LVL IV: CPT | Mod: PBBFAC,,,

## 2024-05-15 PROCEDURE — 3074F SYST BP LT 130 MM HG: CPT | Mod: CPTII,S$GLB,, | Performed by: TRANSPLANT SURGERY

## 2024-05-15 PROCEDURE — 1159F MED LIST DOCD IN RCRD: CPT | Mod: CPTII,S$GLB,, | Performed by: TRANSPLANT SURGERY

## 2024-05-15 PROCEDURE — 99214 OFFICE O/P EST MOD 30 MIN: CPT | Mod: 24,GC,S$GLB, | Performed by: TRANSPLANT SURGERY

## 2024-05-15 NOTE — PROGRESS NOTES
Transplant Surgery  Kidney Transplant Recipient Follow-up    Referring Physician: Cathryn Adair  Current Nephrologist: Cathryn Adair    Subjective:     Chief Complaint: Murphy Ha is a 63 y.o. year old Black or  male who is status post Kidney transplant performed on 4/14/2024.    ORGAN:   RIGHT KIDNEY  Disease Etiology: Diabetes Mellitus - Type II  Donor Type:   Donation after Circulatory Death   Donor CMV Status:   Positive  Donor HBcAB:   Negative  Donor HCV Status:   Negative    History of Present Illness: He reports  wound dehisence of PD catheter removal site .  From a transplant perspective, he reports normal urination and no incisional pain.  Murphy is here for management of his immunosuppression medication.  Murphy states that his immunosuppression is being well tolerated.  Hypertension is is reportedly well controlled.    Patient returns to clinic to evaluate PD catheter removal site.  Skin has dehisenced and wound has been drainage a small volume of serous output daily.  No purulent drainage.  No fever, surrounding erythema, or significant pain at site.  Remaining incisions not painful.    External provider notes reviewed: Yes    Review of Systems   Constitutional:  Negative for fatigue and fever.   HENT:  Negative for sore throat.    Respiratory:  Negative for shortness of breath.    Cardiovascular:  Negative for chest pain.   Gastrointestinal:  Negative for abdominal pain, nausea and vomiting.   Genitourinary:  Negative for dysuria.   Skin:  Positive for wound.   Allergic/Immunologic: Positive for immunocompromised state.   Neurological:  Negative for weakness.   Psychiatric/Behavioral:  Negative for sleep disturbance.        Objective:     Physical Exam  Constitutional:       General: He is not in acute distress.     Appearance: Normal appearance. He is not ill-appearing.   HENT:      Head: Normocephalic and atraumatic.   Eyes:      Extraocular Movements:  Extraocular movements intact.   Cardiovascular:      Rate and Rhythm: Normal rate.      Pulses: Normal pulses.   Abdominal:      Comments: Soft, non-tender, non-distended, RLQ incision well healing, covered in steri strips.  2x PD catheter removal sites appear to be well healing.  Left lateral PD catheter site open with small amount of serous drainage.  Tunnels ~4cm.  Fascial feels intact.   Musculoskeletal:      Right lower leg: No edema.      Left lower leg: No edema.   Skin:     Capillary Refill: Capillary refill takes less than 2 seconds.   Neurological:      General: No focal deficit present.      Mental Status: He is alert and oriented to person, place, and time.       Lab Results   Component Value Date    CREATININE 2.7 (H) 05/13/2024    BUN 28 (H) 05/13/2024     Lab Results   Component Value Date    WBC 4.98 05/13/2024    HGB 9.1 (L) 05/13/2024    HCT 27.7 (L) 05/13/2024    HCT 26 (L) 04/14/2024     05/13/2024     Lab Results   Component Value Date    TACROLIMUS 6.0 05/13/2024       Diagnostics:  The following labs have been reviewed: CBC  CMP  TACROLIMUS LEVEL  The following radiology images have been independently reviewed and interpreted: Renal US    Assessment and Plan:        S/P Kidney transplant.  Chronic immunosuppressive medications for rejection prophylaxis at target.  Plan: no adjustment needed.  Continue monitoring symptoms, labs and drug levels for drug-related toxicity and side effects.  Renal hypertension at target.  PD catheter site packed with 1/2inch packing.  Instructed family to perform daily packing changes.  Family stated they were comforable performing packing changes.  Supplies provided.  Plan to follow-up in 2 weeks for wound re-evaluation    Additional testing to be completed according to the Kidney: Written Order Guideline for Kidney Transplant Follow-Up (KI-09)    Interpretation of tests and discussion of patient management involves all members of the multidisciplinary  transplant team.  Patient advised that it is recommended that all transplant candidates, and their close contacts and household members receive Covid vaccination.  Follow-up: Patient reminded to call with any health changes, since these can be early signs of significant complications.  Also, I advised the patient to be sure any new medications or changes of old medications are discussed with either a pharmacist, or physician knowledgeable with transplant to avoid rejection/drug toxicity related to significant drug interactions.    RAFAEL MARTINEZ MD       [facility] Patient Status  Functional Status: 90% - Able to carry on normal activity: minor symptoms of disease  Physical Capacity: No Limitations

## 2024-05-17 DIAGNOSIS — Z94.0 KIDNEY REPLACED BY TRANSPLANT: Primary | ICD-10-CM

## 2024-05-17 DIAGNOSIS — Z94.0 KIDNEY REPLACED BY TRANSPLANT: ICD-10-CM

## 2024-05-17 RX ORDER — TACROLIMUS 1 MG/1
5 CAPSULE ORAL EVERY 12 HOURS
Qty: 300 CAPSULE | Refills: 11 | Status: SHIPPED | OUTPATIENT
Start: 2024-05-17 | End: 2025-05-17

## 2024-05-17 NOTE — TELEPHONE ENCOUNTER
Patient repeated back and voice a understanding of orders.  Allosure and DSA added to 5/20 labs.  Agreed to have labs drawn here on Monday 5/20.  ----- Message from Iain Pierre Jr., MD sent at 5/17/2024  3:11 PM CDT -----  Lets get allosure, dsa, kidney biopsy. Increase tac from 4/4 to 5/5. Repeat labs on Monday. Other labs acceptable

## 2024-05-20 ENCOUNTER — LAB VISIT (OUTPATIENT)
Dept: LAB | Facility: HOSPITAL | Age: 64
End: 2024-05-20
Attending: STUDENT IN AN ORGANIZED HEALTH CARE EDUCATION/TRAINING PROGRAM
Payer: MEDICARE

## 2024-05-20 DIAGNOSIS — Z94.0 KIDNEY REPLACED BY TRANSPLANT: ICD-10-CM

## 2024-05-20 LAB
ALBUMIN SERPL BCP-MCNC: 2.6 G/DL (ref 3.5–5.2)
ANION GAP SERPL CALC-SCNC: 7 MMOL/L (ref 8–16)
BASOPHILS # BLD AUTO: 0.03 K/UL (ref 0–0.2)
BASOPHILS NFR BLD: 0.5 % (ref 0–1.9)
BUN SERPL-MCNC: 26 MG/DL (ref 8–23)
CALCIUM SERPL-MCNC: 8.6 MG/DL (ref 8.7–10.5)
CHLORIDE SERPL-SCNC: 103 MMOL/L (ref 95–110)
CO2 SERPL-SCNC: 25 MMOL/L (ref 23–29)
CREAT SERPL-MCNC: 2.8 MG/DL (ref 0.5–1.4)
DIFFERENTIAL METHOD BLD: ABNORMAL
EOSINOPHIL # BLD AUTO: 0 K/UL (ref 0–0.5)
EOSINOPHIL NFR BLD: 0.7 % (ref 0–8)
ERYTHROCYTE [DISTWIDTH] IN BLOOD BY AUTOMATED COUNT: 14.6 % (ref 11.5–14.5)
EST. GFR  (NO RACE VARIABLE): 24.7 ML/MIN/1.73 M^2
GLUCOSE SERPL-MCNC: 128 MG/DL (ref 70–110)
HCT VFR BLD AUTO: 28.6 % (ref 40–54)
HGB BLD-MCNC: 9.4 G/DL (ref 14–18)
IMM GRANULOCYTES # BLD AUTO: 0.05 K/UL (ref 0–0.04)
IMM GRANULOCYTES NFR BLD AUTO: 0.9 % (ref 0–0.5)
LYMPHOCYTES # BLD AUTO: 0.3 K/UL (ref 1–4.8)
LYMPHOCYTES NFR BLD: 5.6 % (ref 18–48)
MAGNESIUM SERPL-MCNC: 1.7 MG/DL (ref 1.6–2.6)
MCH RBC QN AUTO: 30.6 PG (ref 27–31)
MCHC RBC AUTO-ENTMCNC: 32.9 G/DL (ref 32–36)
MCV RBC AUTO: 93 FL (ref 82–98)
MONOCYTES # BLD AUTO: 0.9 K/UL (ref 0.3–1)
MONOCYTES NFR BLD: 15 % (ref 4–15)
NEUTROPHILS # BLD AUTO: 4.4 K/UL (ref 1.8–7.7)
NEUTROPHILS NFR BLD: 77.3 % (ref 38–73)
NRBC BLD-RTO: 0 /100 WBC
PHOSPHATE SERPL-MCNC: 2.8 MG/DL (ref 2.7–4.5)
PLATELET # BLD AUTO: 284 K/UL (ref 150–450)
PMV BLD AUTO: 10.3 FL (ref 9.2–12.9)
POTASSIUM SERPL-SCNC: 4.1 MMOL/L (ref 3.5–5.1)
RBC # BLD AUTO: 3.07 M/UL (ref 4.6–6.2)
SODIUM SERPL-SCNC: 135 MMOL/L (ref 136–145)
TACROLIMUS BLD-MCNC: 8 NG/ML (ref 5–15)
WBC # BLD AUTO: 5.68 K/UL (ref 3.9–12.7)

## 2024-05-20 PROCEDURE — 86832 HLA CLASS I HIGH DEFIN QUAL: CPT | Performed by: STUDENT IN AN ORGANIZED HEALTH CARE EDUCATION/TRAINING PROGRAM

## 2024-05-20 PROCEDURE — 80197 ASSAY OF TACROLIMUS: CPT | Performed by: STUDENT IN AN ORGANIZED HEALTH CARE EDUCATION/TRAINING PROGRAM

## 2024-05-20 PROCEDURE — 85025 COMPLETE CBC W/AUTO DIFF WBC: CPT | Performed by: STUDENT IN AN ORGANIZED HEALTH CARE EDUCATION/TRAINING PROGRAM

## 2024-05-20 PROCEDURE — 83735 ASSAY OF MAGNESIUM: CPT | Performed by: STUDENT IN AN ORGANIZED HEALTH CARE EDUCATION/TRAINING PROGRAM

## 2024-05-20 PROCEDURE — 86977 RBC SERUM PRETX INCUBJ/INHIB: CPT | Performed by: STUDENT IN AN ORGANIZED HEALTH CARE EDUCATION/TRAINING PROGRAM

## 2024-05-20 PROCEDURE — 80069 RENAL FUNCTION PANEL: CPT | Performed by: STUDENT IN AN ORGANIZED HEALTH CARE EDUCATION/TRAINING PROGRAM

## 2024-05-20 PROCEDURE — 86833 HLA CLASS II HIGH DEFIN QUAL: CPT | Performed by: STUDENT IN AN ORGANIZED HEALTH CARE EDUCATION/TRAINING PROGRAM

## 2024-05-20 PROCEDURE — 36415 COLL VENOUS BLD VENIPUNCTURE: CPT | Performed by: STUDENT IN AN ORGANIZED HEALTH CARE EDUCATION/TRAINING PROGRAM

## 2024-05-21 NOTE — H&P (VIEW-ONLY)
Clinic Note  INTERNAL MEDICINE      SUBJECTIVE         Patient ID: Murphy Ha is a 63 y.o. male.    CHIEF COMPLAINT:   Chief Complaint   Patient presents with    Leg Pain     Left leg. X 3 days. Constant pain. Pain 7/10.         H & P:  Murphy Ha is a 63 y.o. male with PMHx T2DM ( A1c  8.6) HLD, HTN , ANDREY, s/p R kidney transplant ( 4/14/24)    Pt presents for follow up visit , last seen in clinic on 1/24. Since last visit on chart review pt had R kidney transplant on 4/24. Today pt reports he has been experiencing L leg pain since Sunday. Pain noted on Lower left leg more on side calf.  . Reports that he feels a knot that's tender touch , has radiation to ankle. Denied fevers , chills with pain. Has not tried any OTC medications due to medications he is taking for his Kidney s/p transplant. No recent hx of long travel , or blood clots that run in family.     Denies any recent travel or exposure to sick contacts.    Any outstanding health maintenance topics were also addressed at this visit.      PAST MEDICAL HISTORY  Past Medical History:   Diagnosis Date    Anemia     Aqueous misdirection of right eye     Arthritis     Blind painful eye     Cataract     CKD (chronic kidney disease)     Diabetes mellitus, type 2     Disorder of kidney and ureter     Fever blister     GERD (gastroesophageal reflux disease)     Hyperlipidemia     Hypertension     Joint pain     Neuropathy        MEDICATIONS  Current Outpatient Medications   Medication Sig Dispense Refill    atorvastatin (LIPITOR) 80 MG tablet Take 1 tablet (80 mg total) by mouth every evening. 30 tablet 1    blood-glucose meter,continuous (DEXCOM G7 ) Misc Use to check glucose with sensors 1 each 0    blood-glucose sensor (DEXCOM G7 SENSOR) Lois 1 Device by Misc.(Non-Drug; Combo Route) route every 10 days. 9 each 3    calcitRIOL (ROCALTROL) 0.25 MCG Cap Take 1 capsule (0.25 mcg total) by mouth once daily. 30 capsule 5    carvediloL (COREG)  "25 MG tablet Take 1 tablet (25 mg total) by mouth 2 (two) times daily. 60 tablet 11    chlorthalidone (HYGROTEN) 25 MG Tab Take 0.5 tablets (12.5 mg total) by mouth once daily. Take half a tablet by mouth once a day. 15 tablet 11    cholecalciferol, vitamin D3, (VITAMIN D3) 125 mcg (5,000 unit) Tab Take 1 tablet (5,000 Units total) by mouth once daily. 30 tablet 5    divalproex (DEPAKOTE) 250 MG EC tablet Take 250 mg by mouth 2 (two) times daily.      famotidine (PEPCID) 20 MG tablet Take 1 tablet (20 mg total) by mouth every evening. 30 tablet 11    gabapentin (NEURONTIN) 300 MG capsule Take 1 capsule (300 mg total) by mouth every evening. 90 capsule 3    insulin aspart U-100 (NOVOLOG U-100 INSULIN ASPART) 100 unit/mL injection Inject 6 Units into the skin 3 (three) times daily before meals. + sliding scale.  MDD 36 units/day 10 mL 5    insulin glargine U-100, Lantus, 100 unit/mL injection Inject 11 Units into the skin every evening. 10 mL 5    insulin syringe-needle U-100 (BD INSULIN SYRINGE ULTRA-FINE) 1 mL 30 gauge x 1/2" Syrg 1 each by Misc.(Non-Drug; Combo Route) route 4 (four) times daily with meals and nightly. 100 each 1    k phos di & mono-sod phos mono (K-PHOS-NEUTRAL) 250 mg Tab Take 2 tablets by mouth every 12 (twelve) hours. 120 tablet 11    mycophenolate sodium 180 MG TbEC Take 4 tablets (720 mg total) by mouth 2 (two) times daily. 240 tablet 11    NIFEdipine (PROCARDIA-XL) 30 MG (OSM) 24 hr tablet Take 2 tablets (60 mg total) by mouth 2 (two) times a day. (Patient taking differently: Take 30 mg by mouth once daily.) 60 tablet 11    predniSONE (DELTASONE) 5 MG tablet Take by mouth daily; 4/17/2024-4/23/2024: 20 mg, 4/24/2024-4/30/2024: 15 mg; 5/1/2024-5/7/2024: 10 mg; 5/8/2024- forever: 5 mg; do not stop 70 tablet 11    sodium bicarbonate 650 MG tablet Take 2 tablets (1,300 mg total) by mouth 2 (two) times daily. 120 tablet 11    sulfamethoxazole-trimethoprim 400-80mg (BACTRIM,SEPTRA) 400-80 mg per " tablet Take 1 tablet by mouth every Mon, Wed, Fri. Stop 10/11/24 12 tablet 5    tacrolimus (PROGRAF) 1 MG Cap Take 5 capsules (5 mg total) by mouth every 12 (twelve) hours. 300 capsule 11    tamsulosin (FLOMAX) 0.4 mg Cap Take 1 capsule (0.4 mg total) by mouth once daily. 90 capsule 3    valGANciclovir (VALCYTE) 450 mg Tab Take 1 tablet (450 mg total) by mouth every Mon, Wed, Fri. Stop 7/13/24 12 tablet 2    dorzolamide-timolol 2-0.5% (COSOPT) 22.3-6.8 mg/mL ophthalmic solution Place 1 drop into the left eye 2 (two) times daily. (Patient not taking: Reported on 5/21/2024) 10 mL 5    gentamicin (GARAMYCIN) 0.1 % cream APPLY A SMALL AMOUNT TO AFFECTED AREA ONCE DAILY (Patient not taking: Reported on 5/21/2024)      glucagon 3 mg/actuation Spry 1 each by Nasal route as needed. (Patient not taking: Reported on 5/21/2024) 1 each 11    HYDROcodone-acetaminophen (NORCO) 5-325 mg per tablet Take 1 tablet by mouth every 12 (twelve) hours as needed for Pain. (Patient not taking: Reported on 5/21/2024) 60 tablet 0    loperamide (IMODIUM) 2 mg capsule Take 2 mg after each bowel movement up to 8 times a day.  Stop if no bowel movement. (Patient not taking: Reported on 5/21/2024) 40 capsule 0     Current Facility-Administered Medications   Medication Dose Route Frequency Provider Last Rate Last Admin    sodium chloride 0.9% flush 10 mL  10 mL Intravenous PRN Sobeida Morales MD         Facility-Administered Medications Ordered in Other Visits   Medication Dose Route Frequency Provider Last Rate Last Admin    mupirocin 2 % ointment   Nasal On Call Procedure Lorelei Cha PA-C        sodium chloride 0.9% flush 10 mL  10 mL Intravenous PRN Lorelei Cha PA-C        tamsulosin 24 hr capsule 0.4 mg  1 capsule Oral Daily Lorelei Cha PA-C   0.4 mg at 04/18/24 0840       ALLERGIES  Review of patient's allergies indicates:  No Known Allergies    SURGICAL HISTORY  Past Surgical History:   Procedure  Laterality Date    AIR FLUID EXCHANGE OF EYE Left 6/24/2022    Procedure: AIR FLUID EXCHANGE, EYE;  Surgeon: Arun Veronica MD;  Location: [facility];  Service: Ophthalmology;  Laterality: Left;    CATARACT EXTRACTION Left 8/27/2014    COLONOSCOPY N/A 9/8/2016    Procedure: COLONOSCOPY;  Surgeon: Luis Bogran-Reyes, MD;  Location: [facility] ENDO;  Service: Endoscopy;  Laterality: N/A;    COLONOSCOPY N/A 11/10/2023    Procedure: COLONOSCOPY;  Surgeon: Nomi Del Real MD;  Location: [facility] ENDO;  Service: Endoscopy;  Laterality: N/A;    CONJUNCTIVOPLASTY Right 6/17/2020    Procedure: CONJUNCTIVOPLASTY;  Surgeon: Myrna Alba MD;  Location: [facility];  Service: Ophthalmology;  Laterality: Right;    ENDOLASER PHOTOCOAGULATION Left 6/24/2022    Procedure: PHOTOCOAGULATION, ENDOLASER;  Surgeon: Arun Veronica MD;  Location: [facility];  Service: Ophthalmology;  Laterality: Left;    ENUCLEATION Right 6/17/2020    Procedure: ENUCLEATION, EYE;  Surgeon: Myrna Alba MD;  Location: [facility];  Service: Ophthalmology;  Laterality: Right;    EXPLORATION OF ORBIT Right 6/17/2020    Procedure: EXPLORATION, ORBIT;  Surgeon: Myrna Alba MD;  Location: [facility];  Service: Ophthalmology;  Laterality: Right;    EYE SURGERY      INSERTION OF TUNNELED CENTRAL VENOUS HEMODIALYSIS CATHETER Right 4/18/2024    Procedure: Insertion, Catheter, Central Venous, Hemodialysis;  Surgeon: Sobeida Morales MD;  Location: [facility] CATH LAB;  Service: Interventional Nephrology;  Laterality: Right;    KIDNEY TRANSPLANT N/A 4/14/2024    Procedure: TRANSPLANT, KIDNEY;  Surgeon: Trent Burt MD;  Location: [address] FLR;  Service: Transplant;  Laterality: N/A;    TARSORRHAPHY Right 6/17/2020    Procedure: BLEPHARORRHAPHY;  Surgeon: Myrna Alba MD;  Location: [facility];  Service: Ophthalmology;  Laterality: Right;    VITRECTOMY Right     VITRECTOMY BY PARS PLANA APPROACH Left 6/24/2022    Procedure: VITRECTOMY, PARS PLANA  "APPROACH;  Surgeon: Arun Veronica MD;  Location: [facility];  Service: Ophthalmology;  Laterality: Left;       SOCIAL HISTORY  Social History     Tobacco Use   Smoking Status Never    Passive exposure: Never   Smokeless Tobacco Never     Social History     Substance and Sexual Activity   Alcohol Use Not Currently    Alcohol/week: 0.0 standard drinks of alcohol    Comment: rarely     Social History     Substance and Sexual Activity   Drug Use No       FAMILY HISTORY  Family History   Problem Relation Name Age of Onset    Diabetes Mother      Hypertension Mother      Cataracts Mother      Glaucoma Mother      Diabetes Father      Kidney failure Father      Cataracts Father      Hypertension Sister      Hypertension Brother         REVIEW OF SYSTEMS  Review of Systems   Constitutional:  Negative for activity change, appetite change, chills and fever.   Respiratory:  Negative for cough, chest tightness and shortness of breath.    Cardiovascular:  Positive for leg swelling. Negative for chest pain and palpitations.   Gastrointestinal:  Negative for abdominal pain.   Musculoskeletal:  Positive for myalgias. Negative for gait problem, joint swelling and neck stiffness.   Skin:  Negative for pallor and rash.   Neurological:  Negative for dizziness, light-headedness, numbness and headaches.           OBJECTIVE     VITALS  /60   Pulse 81   Temp 98.4 °F (36.9 °C) (Temporal)   Resp 18   Ht 5' 7" (1.702 m)   Wt 83.5 kg (184 lb 1.4 oz)   BMI 28.83 kg/m²     BMI Readings from Last 2 Encounters:   05/21/24 28.83 kg/m²   05/15/24 29.25 kg/m²         PHYSICAL EXAM    Physical Exam  Constitutional:       Appearance: He is not toxic-appearing or diaphoretic.   HENT:      Head: Normocephalic and atraumatic.      Nose: No congestion or rhinorrhea.      Mouth/Throat:      Pharynx: No oropharyngeal exudate or posterior oropharyngeal erythema.   Cardiovascular:      Rate and Rhythm: Normal rate.      Heart sounds: No murmur " heard.     No friction rub. No gallop.   Pulmonary:      Breath sounds: Normal breath sounds. No wheezing, rhonchi or rales.   Chest:      Chest wall: No tenderness.   Abdominal:      General: Abdomen is flat. There is no distension.      Palpations: There is no mass.      Tenderness: There is no abdominal tenderness.      Hernia: No hernia is present.   Musculoskeletal:         General: Tenderness present. No swelling.      Right lower leg: No edema.      Left lower leg: No edema.      Comments: Left lower leg    Skin:     General: Skin is warm and dry.      Findings: No bruising, erythema, lesion or rash.   Neurological:      General: No focal deficit present.      Mental Status: He is alert and oriented to person, place, and time.            Temp Readings from Last 5 Encounters:   05/21/24 98.4 °F (36.9 °C) (Temporal)   05/15/24 97.2 °F (36.2 °C) (Temporal)   05/14/24 97.2 °F (36.2 °C) (Tympanic)   05/09/24 97.7 °F (36.5 °C)   05/08/24 97.3 °F (36.3 °C) (Temporal)     BP Readings from Last 5 Encounters:   05/21/24 115/60   05/15/24 121/60   05/14/24 133/62   05/09/24 (!) 159/85   05/08/24 (!) 174/79     Pulse Readings from Last 5 Encounters:   05/21/24 81   05/15/24 78   05/14/24 82   05/09/24 77   05/08/24 78     Wt Readings from Last 5 Encounters:   05/21/24 83.5 kg (184 lb 1.4 oz)   05/15/24 84.7 kg (186 lb 11.7 oz)   05/14/24 83.7 kg (184 lb 8.4 oz)   05/09/24 84.9 kg (187 lb 2.7 oz)   05/08/24 84.6 kg (186 lb 8.2 oz)          LABS    Lab Results   Component Value Date    WBC 5.68 05/20/2024    HGB 9.4 (L) 05/20/2024    HCT 28.6 (L) 05/20/2024    MCV 93 05/20/2024     05/20/2024     Lab Results   Component Value Date     (L) 05/20/2024    K 4.1 05/20/2024     05/20/2024    CO2 25 05/20/2024    BUN 26 (H) 05/20/2024    CREATININE 2.8 (H) 05/20/2024    CALCIUM 8.6 (L) 05/20/2024    ANIONGAP 7 (L) 05/20/2024    ESTGFRAFRICA 6.5 (A) 06/17/2022    EGFRNONAA 5.6 (A) 06/17/2022     Lab Results    Component Value Date    ALT 10 05/16/2024    AST 11 05/16/2024    ALKPHOS 98 05/16/2024    BILITOT 0.2 05/16/2024     INR   Date Value Ref Range Status   03/04/2021 1.1 0.8 - 1.2 Final     Comment:     Coumadin Therapy:  2.0 - 3.0 for INR for all indicators except mechanical heart valves  and antiphospholipid syndromes which should use 2.5 - 3.5.       Hemoglobin A1C   Date Value Ref Range Status   04/14/2024 8.6 (H) 4.0 - 5.6 % Final     Comment:     ADA Screening Guidelines:  5.7-6.4%  Consistent with prediabetes  >or=6.5%  Consistent with diabetes    High levels of fetal hemoglobin interfere with the HbA1C  assay. Heterozygous hemoglobin variants (HbS, HgC, etc)do  not significantly interfere with this assay.   However, presence of multiple variants may affect accuracy.       Lab Results   Component Value Date    TSH 2.051 11/12/2019       Lab Results   Component Value Date    CHOL 82 (L) 01/29/2024    HDL 31 (L) 01/29/2024    LDLCALC 38.6 (L) 01/29/2024    TRIG 62 01/29/2024    CHOLHDL 37.8 01/29/2024         ASSESSMENT AND PLAN:     Murphy was seen today for leg pain. Reports pain started on Sunday , pain waxes and wanes . Has not tried OTC medications due to medications for his kidney transplant. Informed will try conservative management for pain with warm compresses. U/S of L lower to r./o DVT.     -U/S of Lower Leg to r/o DVT- exam performed today no evidence of blood clots   -conservative management for pain ( warm compress )   -If pain were to worsen and no improvement to contact clinic     Diagnoses and all orders for this visit:    Pain of left lower extremity  -     US Lower Extremity Veins Left; Future    Essential hypertension   -CPT  Mixed hyperlipidemia    Type 2 diabetes mellitus with other specified complication, with long-term current use of insulin    S/P kidney transplant   -follow with kidney transplant         Healthcare Maintenance      IMMUNIZATIONS  Immunization History   Administered  Date(s) Administered    COVID-19 MRNA, LN-S PF (MODERNA HALF 0.25 ML DOSE) 04/28/2022    COVID-19, MRNA, LN-S, PF (MODERNA FULL 0.5 ML DOSE) 02/12/2021, 03/23/2021, 03/23/2021, 10/26/2021    COVID-19, MRNA, LN-S, PF (Pfizer) (Purple Cap) 09/27/2022    COVID-19, mRNA, LNP-S, PF (Moderna 2023)Ages 12+ 09/27/2023    COVID-19, mRNA, LNP-S, bivalent booster, PF (Moderna Omicron)12 + YEARS 09/27/2022, 09/27/2023    Hepatitis B 06/03/2021, 11/05/2021    Hepatitis B (recombinant) Adjuvanted, 2 dose 06/03/2021, 11/05/2021    Hepatitis B, Adult 03/08/2021, 05/05/2021    Influenza 11/05/2003, 12/06/2005, 11/26/2013    Influenza (FLUBLOK) - Quadrivalent - Recombinant - PF *Preferred* (egg allergy) 09/14/2022    Influenza - Quadrivalent 09/14/2022    Influenza - Quadrivalent - MDCK - PF 11/30/2017    Influenza - Quadrivalent - PF *Preferred* (6 months and older) 11/05/2003, 12/06/2005, 11/16/2015, 09/15/2016, 09/04/2018, 09/03/2019, 09/22/2020, 10/06/2021, 08/14/2023    Influenza - Trivalent (ADULT) 11/05/2003, 12/06/2005    Influenza - Trivalent - PF (ADULT) 11/26/2013    Pneumococcal Conjugate - 13 Valent 08/17/2017    Pneumococcal Polysaccharide - 23 Valent 06/02/2016, 12/01/2017    RSVpreF (Arexvy) 09/05/2023    Td - PF (ADULT) 09/15/2016    Tdap 12/17/2020    Zoster Recombinant 09/14/2022, 12/29/2022       COLON CANCER SCREEN  - Last Colonoscopy completed on 11/10/2023       HEPATITIS SCREEN (one time if born [id number])   Lab Results   Component Value Date    HEPAIGM Negative 02/22/2021    HEPBIGM Non-reactive 04/14/2024    HEPBCAB Non-reactive 04/14/2024    HEPCAB Non-reactive 04/14/2024         CARDIAC SCREEN  1) ASCVD   The ASCVD Risk score (Fely CIFUENTES, et al., 2019) failed to calculate for the following reasons:    The valid total cholesterol range is 130 to 320 mg/dL             FUTURE APPOINTMENTS    Future Appointments   Date Time Provider Department Center   5/21/2024  3:30 PM NANCY [place]    5/23/2024  9:00 AM [facility] LAB [facility]   5/24/2024  5:30 AM 3PREP, AGUILAR [facility] SSCU [facility]   5/27/2024  9:00 AM Samia Wilkinson, NP [facility] KIDNTX [facility]   5/27/2024  9:30 AM KIDNEY INJECTION, TRANSPLANT [facility] KIDNTX [facility]   5/27/2024  2:00 PM DIABETES DISCHARGE CLINIC [facility] ENDODIA [facility]   5/28/2024  8:30 AM [facility] LAB [facility] LAB [facility]   5/31/2024  8:30 AM [facility] [id number] [facility] RAD IR [facility]   6/28/2024  8:00 AM OPHTHALMOLOGY RETINA, [facility] GERMÁN [place]URN [place]   8/6/2024  1:30 PM CONTINUING CARE CLINIC[facility]       Dispo: RTC in 6 months with labs    Keo Bryan MD  Department of Internal Medicine  [facility]    05/21/2024

## 2024-05-22 LAB
ALLOSURE COMMENT: NORMAL
ALLOSURE SCORE KIDNEY: 0.21 %
INFORMATION: NORMAL

## 2024-05-23 LAB
CLASS I ANTIBODY COMMENTS - LUMINEX: NORMAL
CLASS II ANTIBODY COMMENTS - LUMINEX: NORMAL
DSA1 TESTING DATE: NORMAL
DSA12 TESTING DATE: NORMAL
DSA2 TESTING DATE: NORMAL
SERUM COLLECTION DT - LUMINEX CLASS I: NORMAL
SERUM COLLECTION DT - LUMINEX CLASS II: NORMAL

## 2024-05-23 NOTE — PROGRESS NOTES
Kidney Post-Transplant Assessment    Referring Physician: Cathryn Adair  Current Nephrologist: Cathryn Adair    ORGAN: RIGHT KIDNEY  Donor Type: donation after circulatory death   PHS Increased Risk: no  Cold Ischemia: 1,030 mins  Induction Medications: thymo    Subjective:     CC:  Reassessment of renal allograft function and management of chronic immunosuppression.    HPI:  Mr. Ha is a 63 y.o. year old Black or  male with history of ESRD secondary to DM who received a donation after circulatory death  kidney transplant on 4/14/24. His most recent creatinine is 2.8. He takes mycophenolic acid, prednisone, and tacrolimus for maintenance immunosuppression. His post transplant course has been complicated by delayed graft function requiring dialysis (resolved).    5/24 had permacath removed. ER visit 5/24 for evaluation of left left knot. Had US done that was negative for DVT but showed superficial thrombophlebitis involving the left greater saphenous vein at the level of the left upper to medial calf. He was discharged with instructions to place warm compresses and use Tylenol for analgesia. He has been using icy hot patch on the area with some improvement in discomfort.     Staying hydrated, no problems with urination. Home -130, no peripheral edema. Appetite good, weight stable. Reports no problems with BMs. PD cath site still healing, still packing site.     Current Outpatient Medications   Medication Sig Dispense Refill    atorvastatin (LIPITOR) 80 MG tablet Take 1 tablet (80 mg total) by mouth every evening. 30 tablet 1    blood-glucose meter,continuous (DEXCOM G7 ) Misc Use to check glucose with sensors 1 each 0    blood-glucose sensor (DEXCOM G7 SENSOR) Lois 1 Device by Misc.(Non-Drug; Combo Route) route every 10 days. 9 each 3    calcitRIOL (ROCALTROL) 0.25 MCG Cap Take 1 capsule (0.25 mcg total) by mouth once daily. 30 capsule 5    carvediloL (COREG) 25 MG  "tablet Take 1 tablet (25 mg total) by mouth 2 (two) times daily. 60 tablet 11    chlorthalidone (HYGROTEN) 25 MG Tab Take 0.5 tablets (12.5 mg total) by mouth once daily. Take half a tablet by mouth once a day. 15 tablet 11    cholecalciferol, vitamin D3, (VITAMIN D3) 125 mcg (5,000 unit) Tab Take 1 tablet (5,000 Units total) by mouth once daily. 30 tablet 5    divalproex (DEPAKOTE) 250 MG EC tablet Take 250 mg by mouth 2 (two) times daily.      dorzolamide-timolol 2-0.5% (COSOPT) 22.3-6.8 mg/mL ophthalmic solution Place 1 drop into the left eye 2 (two) times daily. 10 mL 5    famotidine (PEPCID) 20 MG tablet Take 1 tablet (20 mg total) by mouth every evening. 30 tablet 11    gabapentin (NEURONTIN) 300 MG capsule Take 1 capsule (300 mg total) by mouth every evening. 90 capsule 3    glucagon 3 mg/actuation Spry 1 each by Nasal route as needed. 1 each 11    HYDROcodone-acetaminophen (NORCO) 5-325 mg per tablet Take 1 tablet by mouth every 12 (twelve) hours as needed for Pain. 60 tablet 0    insulin aspart U-100 (NOVOLOG U-100 INSULIN ASPART) 100 unit/mL injection Inject 6 Units into the skin 3 (three) times daily before meals. + sliding scale.  MDD 36 units/day 10 mL 5    insulin glargine U-100, Lantus, 100 unit/mL injection Inject 11 Units into the skin every evening. (Patient taking differently: Inject 28 Units into the skin every evening.) 10 mL 5    insulin syringe-needle U-100 (BD INSULIN SYRINGE ULTRA-FINE) 1 mL 30 gauge x 1/2" Syrg 1 each by Misc.(Non-Drug; Combo Route) route 4 (four) times daily with meals and nightly. 100 each 1    k phos di & mono-sod phos mono (K-PHOS-NEUTRAL) 250 mg Tab Take 2 tablets by mouth every 12 (twelve) hours. 120 tablet 11    loperamide (IMODIUM) 2 mg capsule Take 2 mg after each bowel movement up to 8 times a day.  Stop if no bowel movement. 40 capsule 0    mycophenolate sodium 180 MG TbEC Take 4 tablets (720 mg total) by mouth 2 (two) times daily. 240 tablet 11    NIFEdipine " (PROCARDIA-XL) 30 MG (OSM) 24 hr tablet Take 2 tablets (60 mg total) by mouth 2 (two) times a day. (Patient taking differently: Take 30 mg by mouth 2 (two) times a day.) 60 tablet 11    predniSONE (DELTASONE) 5 MG tablet Take by mouth daily; 4/17/2024-4/23/2024: 20 mg, 4/24/2024-4/30/2024: 15 mg; 5/1/2024-5/7/2024: 10 mg; 5/8/2024- forever: 5 mg; do not stop (Patient taking differently: Take 5 mg by mouth once daily. Take by mouth daily; 4/17/2024-4/23/2024: 20 mg, 4/24/2024-4/30/2024: 15 mg; 5/1/2024-5/7/2024: 10 mg; 5/8/2024- forever: 5 mg; do not stop) 70 tablet 11    sodium bicarbonate 650 MG tablet Take 2 tablets (1,300 mg total) by mouth 2 (two) times daily. 120 tablet 11    sulfamethoxazole-trimethoprim 400-80mg (BACTRIM,SEPTRA) 400-80 mg per tablet Take 1 tablet by mouth every Mon, Wed, Fri. Stop 10/11/24 12 tablet 5    tacrolimus (PROGRAF) 1 MG Cap Take 5 capsules (5 mg total) by mouth every 12 (twelve) hours. 300 capsule 11    tamsulosin (FLOMAX) 0.4 mg Cap Take 1 capsule (0.4 mg total) by mouth once daily. 90 capsule 3    valGANciclovir (VALCYTE) 450 mg Tab Take 1 tablet (450 mg total) by mouth every Mon, Wed, Fri. Stop 7/13/24 12 tablet 2    gentamicin (GARAMYCIN) 0.1 % cream APPLY A SMALL AMOUNT TO AFFECTED AREA ONCE DAILY (Patient not taking: Reported on 5/21/2024)       Current Facility-Administered Medications   Medication Dose Route Frequency Provider Last Rate Last Admin    sodium chloride 0.9% flush 10 mL  10 mL Intravenous PRN Sobeida Morales MD         Facility-Administered Medications Ordered in Other Visits   Medication Dose Route Frequency Provider Last Rate Last Admin    mupirocin 2 % ointment   Nasal On Call Procedure Lorelei Cha PA-C        sodium chloride 0.9% flush 10 mL  10 mL Intravenous PRN Lorelei Cha PA-C        tamsulosin 24 hr capsule 0.4 mg  1 capsule Oral Daily Lorelei Cha PA-C   0.4 mg at 04/18/24 0840       Past Medical History:  "  Diagnosis Date    Anemia     Aqueous misdirection of right eye     Arthritis     Blind painful eye     Cataract     CKD (chronic kidney disease)     Diabetes mellitus, type 2     Disorder of kidney and ureter     Fever blister     GERD (gastroesophageal reflux disease)     Hyperlipidemia     Hypertension     Joint pain     Neuropathy        Review of Systems   Constitutional:  Negative for activity change and fever.   Eyes:  Positive for visual disturbance.        Wears glasses   Respiratory:  Negative for cough and shortness of breath.    Cardiovascular:  Negative for chest pain and leg swelling.   Gastrointestinal:  Negative for abdominal pain, constipation, diarrhea and nausea.   Genitourinary:  Negative for difficulty urinating, frequency and hematuria.   Musculoskeletal:  Negative for arthralgias and myalgias.   Skin:  Positive for wound.   Allergic/Immunologic: Positive for immunocompromised state.   Neurological:  Negative for weakness.   Psychiatric/Behavioral:  Negative for sleep disturbance.        Objective:   Blood pressure 130/66, pulse 72, temperature 97.2 °F (36.2 °C), temperature source Tympanic, resp. rate 18, height 5' 7" (1.702 m), weight 82 kg (180 lb 12.4 oz), SpO2 100%.body mass index is 28.31 kg/m².    Physical Exam  Vitals and nursing note reviewed.   Constitutional:       Appearance: Normal appearance.   Cardiovascular:      Rate and Rhythm: Normal rate and regular rhythm.      Heart sounds: Normal heart sounds.   Pulmonary:      Effort: Pulmonary effort is normal.      Breath sounds: Normal breath sounds.   Abdominal:      General: There is no distension.      Comments: Surgical incision healed nicely; PD cath site healing with packing in place   Musculoskeletal:         General: Normal range of motion.   Skin:     General: Skin is warm and dry.   Neurological:      Mental Status: He is alert.         Labs:  Lab Results   Component Value Date    WBC 7.11 05/23/2024    HGB 8.8 (L) " 05/23/2024    HCT 25.8 (L) 05/23/2024     (L) 05/20/2024    K 4.1 05/20/2024     05/20/2024    CO2 25 05/20/2024    BUN 26 (H) 05/20/2024    CREATININE 2.8 (H) 05/20/2024    EGFRNORACEVR 24.7 (A) 05/20/2024    CALCIUM 8.6 (L) 05/20/2024    PHOS 2.8 05/20/2024    MG 1.7 05/20/2024    ALBUMIN 2.6 (L) 05/20/2024    AST 11 05/16/2024    ALT 10 05/16/2024    UTPCR 0.11 05/16/2024    .1 (H) 07/28/2023    TACROLIMUS 8.0 05/20/2024       Labs were reviewed with the patient    Assessment:     1. Kidney replaced by transplant    2. Long-term use of immunosuppressant medication    3. Delayed graft function of kidney    4. At risk for opportunistic infections    5. Renovascular hypertension      Plan:   Anemia-JORGE ALBERTO injection today  DGF resolved but with slow graft function, planning for biopsy 5/31      1. Immunosuppression: Prograf trough 8.0, which is therapeutic (goal 8-10). Continue Prograf 5/5, MyF 720 mg BID, and Prednisone 5 mg QD. Will continue to monitor for drug toxicities    2. Allograft Function: creatinine remains elevated, planning for biopsy 5/31  - ESRD secondary to DM s/p donation after circulatory death  kidney transplant on 4/14/24.   - post transplant with DGF, resolved   Lab Results   Component Value Date    CREATININE 2.8 (H) 05/20/2024      Latest Reference Range & Units POD 36,  Kidney-Post 1 Year  05/20/24 09:18   eGFR >60 mL/min/1.73 m^2 24.7 !       3. Hypertension management: advise low salt diet and home BP monitoring      4. Metabolic Bone Disease/Secondary Hyperparathyroidism:  Lab Results   Component Value Date    .1 (H) 07/28/2023    CALCIUM 8.6 (L) 05/20/2024    PHOS 2.8 05/20/2024       5. Electrolytes: stable. No need for intervention   Lab Results   Component Value Date     (L) 05/20/2024    K 4.1 05/20/2024     05/20/2024    CO2 25 05/20/2024       6. Anemia: JORGE ALBERTO per protocol, last injection today  Lab Results   Component Value Date    WBC 7.11  05/23/2024    HGB 8.8 (L) 05/23/2024    HCT 25.8 (L) 05/23/2024    MCV 91 05/23/2024     05/23/2024         7. Proteinuria: continue p/c ratio as per guidelines   [facility] 5/20/2024: 0.21    8. BK virus infection screening:  BK PCR per protocol   BK PCR 5/16/2024: not detected    9. Weight education: provided during the clinic visit   Body mass index is 28.31 kg/m².     10. Patient safety education regarding immunosuppression including prophylaxis posttransplant for CMV, PCP            Follow-up:   Clinic: return to transplant clinic weekly for the first month after transplant; every 2 weeks during months 2-3; then at 6-, 9-, 12-, 18-, 24-, and 36- months post-transplant to reassess for complications from immunosuppression toxicity and monitor for rejection.  Annually thereafter.    Labs: since patient remains at high risk for rejection and drug-related complications that warrant close monitoring, labs will be ordered as follows: continue twice weekly CBC, renal panel, and drug level for first month; then same labs once weekly through 3rd month post-transplant.  Urine for UA and protein/creatinine ratio monthly.  Serum BK - PCR at 1-, 3-, 6-, 9-, 12-, 18-, 24-, 36-, 48-, and 60 months post-transplant.  Hepatic panel at 1-, 2-, 3-, 6-, 9-, 12-, 18-, 24-, and 36- months post-transplant.    Samia Wilkinson NP       Education:   Material provided to the patient.  Patient reminded to call with any health changes since these can be early signs of significant complications.  Also, I advised the patient to be sure any new medications or changes of old medications are discussed with either a pharmacist or physician knowledgeable with transplant to avoid rejection/drug toxicity related to significant drug interactions.    Patient advised that it is recommended that all transplanted patients, and their close contacts and household members receive Covid vaccination.

## 2024-05-24 ENCOUNTER — HOSPITAL ENCOUNTER (OUTPATIENT)
Facility: HOSPITAL | Age: 64
Discharge: HOME | End: 2024-05-24
Attending: INTERNAL MEDICINE | Admitting: INTERNAL MEDICINE
Payer: MEDICARE

## 2024-05-24 VITALS
HEIGHT: 67 IN | WEIGHT: 185 LBS | RESPIRATION RATE: 18 BRPM | TEMPERATURE: 97 F | BODY MASS INDEX: 29.03 KG/M2 | OXYGEN SATURATION: 98 % | SYSTOLIC BLOOD PRESSURE: 142 MMHG | DIASTOLIC BLOOD PRESSURE: 68 MMHG | HEART RATE: 85 BPM

## 2024-05-24 DIAGNOSIS — N17.9 AKI (ACUTE KIDNEY INJURY): ICD-10-CM

## 2024-05-24 LAB
OHS QRS DURATION: 78 MS
OHS QTC CALCULATION: 427 MS
POCT GLUCOSE: 135 MG/DL (ref 70–110)
POCT GLUCOSE: 144 MG/DL (ref 70–110)

## 2024-05-24 PROCEDURE — 82962 GLUCOSE BLOOD TEST: CPT | Performed by: INTERNAL MEDICINE

## 2024-05-24 PROCEDURE — 36558 INSERT TUNNELED CV CATH: CPT | Mod: RT | Performed by: INTERNAL MEDICINE

## 2024-05-24 PROCEDURE — 36589 REMOVAL TUNNELED CV CATH: CPT | Performed by: INTERNAL MEDICINE

## 2024-05-24 PROCEDURE — 36558 INSERT TUNNELED CV CATH: CPT | Mod: RT,,, | Performed by: INTERNAL MEDICINE

## 2024-05-24 PROCEDURE — 25000003 PHARM REV CODE 250: Performed by: INTERNAL MEDICINE

## 2024-05-24 RX ORDER — SODIUM CHLORIDE 9 MG/ML
INJECTION, SOLUTION INTRAVENOUS CONTINUOUS
Status: ACTIVE | OUTPATIENT
Start: 2024-05-24 | End: 2024-05-24

## 2024-05-24 RX ORDER — LIDOCAINE HYDROCHLORIDE AND EPINEPHRINE 10; 10 MG/ML; UG/ML
INJECTION, SOLUTION INFILTRATION; PERINEURAL
Status: DISCONTINUED | OUTPATIENT
Start: 2024-05-24 | End: 2024-05-24 | Stop reason: HOSPADM

## 2024-05-24 RX ORDER — NITROGLYCERIN 400 UG/1
SPRAY ORAL
Status: DISCONTINUED | OUTPATIENT
Start: 2024-05-24 | End: 2024-05-24 | Stop reason: HOSPADM

## 2024-05-24 NOTE — DISCHARGE SUMMARY
CC:Removal of cuffed tunneled HD cath.     Clinical summary:   Mr. Ha came today for remove of a Rt. IJ cuffed tunneled HD catheter. He is a known case of ESRD and was on PD. He got a cadaveric kidney transplant on 4/14/2024. He had delayed graft function and was on HD postoperatively. I have placed earlier a Rt. IJ cuffed tunneled HD catheter on 4/18/2024. He is known to have HTN, DM II and ANDREY. His graft started working and hemodialysis was stopped. He came today for removal of a his Rt. IJ cuffed tunneled HD cath.   The procedure was explained with all the complications. HE agreed and signed the consent.          Past Medical History:   Diagnosis Date    Anemia     Aqueous misdirection of right eye     Arthritis     Blind painful eye     Cataract     CKD (chronic kidney disease)     Diabetes mellitus, type 2     Disorder of kidney and ureter     Fever blister     GERD (gastroesophageal reflux disease)     Hyperlipidemia     Hypertension     Joint pain     Neuropathy        Past Surgical History:   Procedure Laterality Date    AIR FLUID EXCHANGE OF EYE Left 6/24/2022    Procedure: AIR FLUID EXCHANGE, EYE;  Surgeon: Arun Veronica MD;  Location: [facility];  Service: Ophthalmology;  Laterality: Left;    CATARACT EXTRACTION Left 8/27/2014    COLONOSCOPY N/A 9/8/2016    Procedure: COLONOSCOPY;  Surgeon: Luis Bogran-Reyes, MD;  Location: [facility];  Service: Endoscopy;  Laterality: N/A;    COLONOSCOPY N/A 11/10/2023    Procedure: COLONOSCOPY;  Surgeon: Nomi Del Real MD;  Location: [facility];  Service: Endoscopy;  Laterality: N/A;    CONJUNCTIVOPLASTY Right 6/17/2020    Procedure: CONJUNCTIVOPLASTY;  Surgeon: Myrna Alba MD;  Location: [facility];  Service: Ophthalmology;  Laterality: Right;    ENDOLASER PHOTOCOAGULATION Left 6/24/2022    Procedure: PHOTOCOAGULATION, ENDOLASER;  Surgeon: Arun Veronica MD;  Location: [facility];  Service: Ophthalmology;  Laterality: Left;    ENUCLEATION Right  6/17/2020    Procedure: ENUCLEATION, EYE;  Surgeon: Myrna Alba MD;  Location: [facility];  Service: Ophthalmology;  Laterality: Right;    EXPLORATION OF ORBIT Right 6/17/2020    Procedure: EXPLORATION, ORBIT;  Surgeon: Myrna Alba MD;  Location: [facility];  Service: Ophthalmology;  Laterality: Right;    EYE SURGERY      INSERTION OF TUNNELED CENTRAL VENOUS HEMODIALYSIS CATHETER Right 4/18/2024    Procedure: Insertion, Catheter, Central Venous, Hemodialysis;  Surgeon: Sobeida Morales MD;  Location: [facility];  Service: Interventional Nephrology;  Laterality: Right;    KIDNEY TRANSPLANT N/A 4/14/2024    Procedure: TRANSPLANT, KIDNEY;  Surgeon: Trent Burt MD;  Location: [address] [facility];  Service: Transplant;  Laterality: N/A;    TARSORRHAPHY Right 6/17/2020    Procedure: BLEPHARORRHAPHY;  Surgeon: Myrna Alba MD;  Location: [facility];  Service: Ophthalmology;  Laterality: Right;    VITRECTOMY Right     VITRECTOMY BY PARS PLANA APPROACH Left 6/24/2022    Procedure: VITRECTOMY, PARS PLANA APPROACH;  Surgeon: Arun Veronica MD;  Location: [facility];  Service: Ophthalmology;  Laterality: Left;     Current Facility-Administered Medications   Medication Dose Route Frequency Provider Last Rate Last Admin    sodium chloride 0.9% flush 10 mL  10 mL Intravenous PRN Sobeida Morales MD         Current Outpatient Medications   Medication Sig Dispense Refill    atorvastatin (LIPITOR) 80 MG tablet Take 1 tablet (80 mg total) by mouth every evening. 30 tablet 1    calcitRIOL (ROCALTROL) 0.25 MCG Cap Take 1 capsule (0.25 mcg total) by mouth once daily. 30 capsule 5    carvediloL (COREG) 25 MG tablet Take 1 tablet (25 mg total) by mouth 2 (two) times daily. 60 tablet 11    chlorthalidone (HYGROTEN) 25 MG Tab Take 0.5 tablets (12.5 mg total) by mouth once daily. Take half a tablet by mouth once a day. 15 tablet 11    cholecalciferol, vitamin D3, (VITAMIN D3) 125 mcg (5,000 unit) Tab Take 1 tablet  (5,000 Units total) by mouth once daily. 30 tablet 5    divalproex (DEPAKOTE) 250 MG EC tablet Take 250 mg by mouth 2 (two) times daily.      dorzolamide-timolol 2-0.5% (COSOPT) 22.3-6.8 mg/mL ophthalmic solution Place 1 drop into the left eye 2 (two) times daily. 10 mL 5    famotidine (PEPCID) 20 MG tablet Take 1 tablet (20 mg total) by mouth every evening. 30 tablet 11    gabapentin (NEURONTIN) 300 MG capsule Take 1 capsule (300 mg total) by mouth every evening. 90 capsule 3    insulin aspart U-100 (NOVOLOG U-100 INSULIN ASPART) 100 unit/mL injection Inject 6 Units into the skin 3 (three) times daily before meals. + sliding scale.  MDD 36 units/day 10 mL 5    insulin glargine U-100, Lantus, 100 unit/mL injection Inject 11 Units into the skin every evening. 10 mL 5    k phos di & mono-sod phos mono (K-PHOS-NEUTRAL) 250 mg Tab Take 2 tablets by mouth every 12 (twelve) hours. 120 tablet 11    loperamide (IMODIUM) 2 mg capsule Take 2 mg after each bowel movement up to 8 times a day.  Stop if no bowel movement. 40 capsule 0    mycophenolate sodium 180 MG TbEC Take 4 tablets (720 mg total) by mouth 2 (two) times daily. 240 tablet 11    NIFEdipine (PROCARDIA-XL) 30 MG (OSM) 24 hr tablet Take 2 tablets (60 mg total) by mouth 2 (two) times a day. (Patient taking differently: Take 30 mg by mouth once daily.) 60 tablet 11    predniSONE (DELTASONE) 5 MG tablet Take by mouth daily; 4/17/2024-4/23/2024: 20 mg, 4/24/2024-4/30/2024: 15 mg; 5/1/2024-5/7/2024: 10 mg; 5/8/2024- forever: 5 mg; do not stop 70 tablet 11    sodium bicarbonate 650 MG tablet Take 2 tablets (1,300 mg total) by mouth 2 (two) times daily. 120 tablet 11    sulfamethoxazole-trimethoprim 400-80mg (BACTRIM,SEPTRA) 400-80 mg per tablet Take 1 tablet by mouth every Mon, Wed, Fri. Stop 10/11/24 12 tablet 5    tacrolimus (PROGRAF) 1 MG Cap Take 5 capsules (5 mg total) by mouth every 12 (twelve) hours. 300 capsule 11    tamsulosin (FLOMAX) 0.4 mg Cap Take 1 capsule  "(0.4 mg total) by mouth once daily. 90 capsule 3    valGANciclovir (VALCYTE) 450 mg Tab Take 1 tablet (450 mg total) by mouth every Mon, Wed, Fri. Stop 7/13/24 12 tablet 2    blood-glucose meter,continuous (DEXCOM G7 ) Misc Use to check glucose with sensors 1 each 0    blood-glucose sensor (DEXCOM G7 SENSOR) Lois 1 Device by Misc.(Non-Drug; Combo Route) route every 10 days. 9 each 3    gentamicin (GARAMYCIN) 0.1 % cream APPLY A SMALL AMOUNT TO AFFECTED AREA ONCE DAILY (Patient not taking: Reported on 5/21/2024)      glucagon 3 mg/actuation Spry 1 each by Nasal route as needed. (Patient not taking: Reported on 5/21/2024) 1 each 11    HYDROcodone-acetaminophen (NORCO) 5-325 mg per tablet Take 1 tablet by mouth every 12 (twelve) hours as needed for Pain. (Patient not taking: Reported on 5/21/2024) 60 tablet 0    insulin syringe-needle U-100 (BD INSULIN SYRINGE ULTRA-FINE) 1 mL 30 gauge x 1/2" Syrg 1 each by Misc.(Non-Drug; Combo Route) route 4 (four) times daily with meals and nightly. 100 each 1     Facility-Administered Medications Ordered in Other Encounters   Medication Dose Route Frequency Provider Last Rate Last Admin    mupirocin 2 % ointment   Nasal On Call Procedure Lorelei Cha PA-C        sodium chloride 0.9% flush 10 mL  10 mL Intravenous PRN Lorelei Cha PA-C        tamsulosin 24 hr capsule 0.4 mg  1 capsule Oral Daily Lorelei Cha PA-C   0.4 mg at 04/18/24 0840       Vitals:    05/24/24 0915 05/24/24 0930 05/24/24 1000 05/24/24 1030   BP: 136/65 (!) 149/68 (!) 141/65 (!) 142/68   BP Location:       Patient Position:       Pulse: 81 81 86 85   Resp:       Temp:       TempSrc:       SpO2:       Weight:       Height:            Physical Exam  Constitutional:       Appearance: Normal appearance.   HENT:      Head: Normocephalic.      Nose: Nose normal.   Cardiovascular:      Rate and Rhythm: Normal rate.      Pulses: Normal pulses.   Pulmonary:      Effort: " Pulmonary effort is normal.   Abdominal:      General: Abdomen is flat.   Musculoskeletal:      Cervical back: Normal range of motion.   Neurological:      Mental Status: He is alert.        Removal of Rt. IJ cuffed tunneled cath. With local anesthesia:     The Consent was obtained after explaining the procedure.   The skin was sterilized with Chlorhexidine.   1% Xylocaine with Epineph. was used to anesthetize the area.   The catheter tunnel was dissected to release all the fibrous tissues.   The catheter was pulled out.   Pressure was held for 5 min. on the area.   The procedure was smooth with no complications.   The patient will remain for couple of hours monitoring.      Care at home:  Keep resting for 24 hours.   Head elevation at 45 degrees over the coming 8 hours.   Avoid any strenuous activities.   Keep the pressure dressing for 2 days.  Avoid getting water over the site for 2 days.   If there was any pain, Tylenol tab. 325mg could be used PRN.        Impression and plan:  ESRD post renal transplant with delayed graft function for insertion of cuffed tunneled HD catheter scheduled on Thursday. Please send PT and PTT tomorrow. Hold heparin dose in Wednesday evening, Keep him NPO on Wed. after midnight.     HTN with well controlled BP.    DM II with elevated blood sugar.    ANDREY. With normal oxygenation.            VELASQUEZ WILEY.Raymond. MD. KYLE. FACP.  , [employer]  Nephrology Consultant. [facility].   [address] [place] [address].    email: [email or web address]  Tel: Office: [phone]

## 2024-05-24 NOTE — OP NOTE
Removal of Rt. IJ cuffed tunneled cath. With local anesthesia:    The Consent was obtained after explaining the procedure.   The skin was sterilized with Chlorhexidine.   1% Xylocaine with Epineph. was used to anesthetize the area.   The catheter tunnel was dissected to release all the fibrous tissues.   The catheter was pulled out.   Pressure was held for 5 min. on the area.   The procedure was smooth with no complications.   The patient will remain for couple of hours monitoring.     Care at home:  Keep resting for 24 hours.   Head elevation at 45 degrees over the coming 8 hours.   Avoid any strenuous activities.   Keep the pressure dressing for 2 days.  Avoid getting water over the site for 2 days.   If there was any pain, Tylenol tab. 325mg could be used PRN.       GELY HOPPER MD. RHODAN. FACP.  Nephrology Consultant.    Department of Nephrology.     [facility].   [address] [place] [address] [place]. 5th [place] [address].    email: [email or web address]  Tel: Office: [phone]

## 2024-05-24 NOTE — DISCHARGE INSTRUCTIONS
Care post HD cuffed tunneled catheter removal:        Bed rest for  2 hours.  With head elevation 45 degree.   When at home stay in bed or resting on a chair or couch for over night.   Avoid water over the site for  48 hours.   Keep the pressure dressing for 24 hours.   The whole dressing can be removed after taking a bath in  48 hours.    A small band aid could be applied as needed.   If there is any bleeding noticed after you leave the hospital, please come to ER immediatly.     If you have any question, please do not hesitate to call.      Follow Staff attestation.        Moises Krishnamurthy   Interventional Nephrology.

## 2024-05-24 NOTE — PLAN OF CARE
Received report from JESSICA Shaw. Patient s/p tunnel cath insertion, AAOx3. VSS, no c/o pain or discomfort at this time, resp even and unlabored. Gauze/tegaderm with pressure dressing to R chest wall is CDI. No active bleeding. No hematoma noted. Post procedure protocol reviewed with patient and patient's family. Understanding verbalized. Family members at bedside. Nurse call bell within reach. Will continue to monitor per post procedure protocol.

## 2024-05-24 NOTE — Clinical Note
The pre-existing tunneled cath in right upper chest was removed. Manual pressure was applied to removal area in right upper chest for 5 minutes.

## 2024-05-24 NOTE — INTERVAL H&P NOTE
The patient has been examined and the H&P has been reviewed:    I concur with the findings and no changes have occurred since H&P was written.    Procedure risks, benefits and alternative options discussed and understood by patient/family.    WE are removing his cuffed tunneled Hd catheter today.       There are no hospital problems to display for this patient.

## 2024-05-24 NOTE — Clinical Note
dry, intact, no bleeding and no hematoma. Right chest, biopatch and tegaderm applied, hemostasis 0814

## 2024-05-27 ENCOUNTER — OFFICE VISIT (OUTPATIENT)
Dept: TRANSPLANT | Facility: CLINIC | Age: 64
End: 2024-05-27
Payer: MEDICARE

## 2024-05-27 ENCOUNTER — PATIENT MESSAGE (OUTPATIENT)
Dept: ENDOCRINOLOGY | Facility: HOSPITAL | Age: 64
End: 2024-05-27
Payer: MEDICARE

## 2024-05-27 ENCOUNTER — CLINICAL SUPPORT (OUTPATIENT)
Dept: TRANSPLANT | Facility: CLINIC | Age: 64
End: 2024-05-27
Payer: MEDICARE

## 2024-05-27 ENCOUNTER — OFFICE VISIT (OUTPATIENT)
Dept: ENDOCRINOLOGY | Facility: CLINIC | Age: 64
End: 2024-05-27
Payer: MEDICARE

## 2024-05-27 VITALS
OXYGEN SATURATION: 100 % | HEIGHT: 67 IN | TEMPERATURE: 97 F | RESPIRATION RATE: 18 BRPM | RESPIRATION RATE: 18 BRPM | BODY MASS INDEX: 28.37 KG/M2 | TEMPERATURE: 97 F | BODY MASS INDEX: 28.37 KG/M2 | DIASTOLIC BLOOD PRESSURE: 66 MMHG | HEART RATE: 72 BPM | SYSTOLIC BLOOD PRESSURE: 130 MMHG | DIASTOLIC BLOOD PRESSURE: 66 MMHG | SYSTOLIC BLOOD PRESSURE: 130 MMHG | HEIGHT: 67 IN | OXYGEN SATURATION: 100 % | HEART RATE: 72 BPM | WEIGHT: 180.75 LBS | WEIGHT: 180.75 LBS

## 2024-05-27 DIAGNOSIS — Z79.60 LONG-TERM USE OF IMMUNOSUPPRESSANT MEDICATION: ICD-10-CM

## 2024-05-27 DIAGNOSIS — I15.0 RENOVASCULAR HYPERTENSION: ICD-10-CM

## 2024-05-27 DIAGNOSIS — E11.649 TYPE 2 DIABETES MELLITUS WITH HYPOGLYCEMIA WITHOUT COMA, WITH LONG-TERM CURRENT USE OF INSULIN: Primary | ICD-10-CM

## 2024-05-27 DIAGNOSIS — Z94.0 KIDNEY REPLACED BY TRANSPLANT: Primary | ICD-10-CM

## 2024-05-27 DIAGNOSIS — Z91.89 AT RISK FOR OPPORTUNISTIC INFECTIONS: ICD-10-CM

## 2024-05-27 DIAGNOSIS — D63.8 ANEMIA OF CHRONIC DISEASE: Primary | ICD-10-CM

## 2024-05-27 DIAGNOSIS — T86.19 DELAYED GRAFT FUNCTION OF KIDNEY: ICD-10-CM

## 2024-05-27 DIAGNOSIS — Z79.4 TYPE 2 DIABETES MELLITUS WITH HYPOGLYCEMIA WITHOUT COMA, WITH LONG-TERM CURRENT USE OF INSULIN: Primary | ICD-10-CM

## 2024-05-27 PROCEDURE — 96372 THER/PROPH/DIAG INJ SC/IM: CPT | Mod: S$GLB,,, | Performed by: STUDENT IN AN ORGANIZED HEALTH CARE EDUCATION/TRAINING PROGRAM

## 2024-05-27 PROCEDURE — 1160F RVW MEDS BY RX/DR IN RCRD: CPT | Mod: CPTII,S$GLB,, | Performed by: NURSE PRACTITIONER

## 2024-05-27 PROCEDURE — 3052F HG A1C>EQUAL 8.0%<EQUAL 9.0%: CPT | Mod: CPTII,S$GLB,, | Performed by: NURSE PRACTITIONER

## 2024-05-27 PROCEDURE — 99999 PR PBB SHADOW E&M-EST. PATIENT-LVL III: CPT | Mod: PBBFAC,,,

## 2024-05-27 PROCEDURE — 3008F BODY MASS INDEX DOCD: CPT | Mod: CPTII,S$GLB,, | Performed by: NURSE PRACTITIONER

## 2024-05-27 PROCEDURE — 3066F NEPHROPATHY DOC TX: CPT | Mod: CPTII,S$GLB,, | Performed by: NURSE PRACTITIONER

## 2024-05-27 PROCEDURE — 3052F HG A1C>EQUAL 8.0%<EQUAL 9.0%: CPT | Mod: CPTII,95,, | Performed by: NURSE PRACTITIONER

## 2024-05-27 PROCEDURE — 3075F SYST BP GE 130 - 139MM HG: CPT | Mod: CPTII,S$GLB,, | Performed by: NURSE PRACTITIONER

## 2024-05-27 PROCEDURE — 1159F MED LIST DOCD IN RCRD: CPT | Mod: CPTII,S$GLB,, | Performed by: NURSE PRACTITIONER

## 2024-05-27 PROCEDURE — 99215 OFFICE O/P EST HI 40 MIN: CPT | Mod: S$GLB,,, | Performed by: NURSE PRACTITIONER

## 2024-05-27 PROCEDURE — 3078F DIAST BP <80 MM HG: CPT | Mod: CPTII,S$GLB,, | Performed by: NURSE PRACTITIONER

## 2024-05-27 PROCEDURE — 3066F NEPHROPATHY DOC TX: CPT | Mod: CPTII,95,, | Performed by: NURSE PRACTITIONER

## 2024-05-27 PROCEDURE — 99213 OFFICE O/P EST LOW 20 MIN: CPT | Mod: 95,,, | Performed by: NURSE PRACTITIONER

## 2024-05-27 PROCEDURE — 99999 PR PBB SHADOW E&M-EST. PATIENT-LVL III: CPT | Mod: PBBFAC,,, | Performed by: NURSE PRACTITIONER

## 2024-05-27 RX ORDER — ASPIRIN 81 MG/1
81 TABLET ORAL DAILY
COMMUNITY
Start: 2024-05-27 | End: 2025-05-27

## 2024-05-27 NOTE — TELEPHONE ENCOUNTER
Patient repeated back and voice a understanding of orders and states he will restart his asa 81mg QD today.  ----- Message from Iain Pierre Jr., MD sent at 5/24/2024 11:41 AM CDT -----  1 day after permcath removal  ----- Message -----  From: Tomas Feliciano, JESSICA  Sent: 5/24/2024   9:48 AM CDT  To: Iain Pierre Jr., MD    When would we like him to restart it ?  He is presently getting his Permcath D/C by Dr Morales in Nephrology.  ----- Message -----  From: Iain Pierre Jr., MD  Sent: 5/24/2024   9:06 AM CDT  To: [facility] Post-Kidney Transplant Clinical    Please restart his home aspirin

## 2024-05-27 NOTE — LETTER
May 27, 2024        Cathryn Adair  [address]  [place] [address]  Phone: [phone]  Fax: [phone]             [facility]  [address]  [facility] [address]  Phone: [phone]   Patient: Murphy Ha   MR Number: [id number]   YOB: 1960   Date of Visit: 5/27/2024       Dear Dr. Cathryn Adair    Thank you for referring Murphy Ha to me for evaluation. Attached you will find relevant portions of my assessment and plan of care.    If you have questions, please do not hesitate to call me. I look forward to following Murphy Ha along with you.    Sincerely,    Samia Wilkinson, NAI    Enclosure    If you would like to receive this communication electronically, please contact [email or web address] or [phone] to request [employer] access.    [employer] is a tool which provides read-only access to select patient information with whom you have a relationship. Its easy to use and provides real time access to review your patients record including encounter summaries, notes, results, and demographic information.    If you feel you have received this communication in error or would no longer like to receive these types of communications, please e-mail [email or web address]

## 2024-05-27 NOTE — PROGRESS NOTES
The patient location is: home   The chief complaint leading to consultation is: T2DM     Visit type: audiovisual    Face to Face time with patient: 30 minutes   30  minutes of total time spent on the encounter, which includes face to face time and non-face to face time preparing to see the patient (eg, review of tests), Obtaining and/or reviewing separately obtained history, Documenting clinical information in the electronic or other health record, Independently interpreting results (not separately reported) and communicating results to the patient/family/caregiver, or Care coordination (not separately reported).         Each patient to whom he or she provides medical services by telemedicine is:  (1) informed of the relationship between the physician and patient and the respective role of any other health care provider with respect to management of the patient; and (2) notified that he or she may decline to receive medical services by telemedicine and may withdraw from such care at any time.    Notes:      Subjective:      Patient ID: Murphy Ha is a 63 y.o. male.    Chief Complaint:  No chief complaint on file.      History of Present Illness    Pt is a 63 y.o. male who presents to clinic for follow up.  Pt recently admitted and endocrine was consulted for management of hyperglycemia.    Discharge date: 4/18/24   Discharge regimen specific to hyperglycemia:   Lantus 11 units nightly  Novolog 4 units with meals  Add correction scale if needed.  Blood sugar 180 to 230 add 2 units  Blood sugar 231 to 280 add 4 units  Blood sugar 281 to 330 add 6 units  Blood sugar 331 to 380 add 8 units  Blood sugar greater than 380 add 10 units          Glucocorticoid: Prednisone 5 mg daily   Date of transplant? Kidney Transplant 4/14/24     Was able to fill prescription for medications and supplies     Etiology of the hyperglycemia: known T2DM      Diabetes was diagnosed: 1995   Known complications:  Hyperglycemia,  Diabetic nephropathy  , Diabetic chronic kidney disease , Diabetic cataract , and Diabetic peripheral neuropathy         Current regimen:  Lantus 15-20 units nightly  Novolog 4-6 units with meals (based on carb content of meals)   Add correction scale if needed.  Blood sugar 180 to 230 add 2 units  Blood sugar 231 to 280 add 4 units  Blood sugar 281 to 330 add 6 units  Blood sugar 331 to 380 add 8 units  Blood sugar greater than 380 add 10 units       Missed doses?  None     Prior medications not tolerated or Failed treatments: None        # times a day testing  Dexcom G7     No log provided - patient reported to be:     Pre breakfast - 120-130s   Pre lunch - 130-150s   Pre dinner - 130-150s   Qhs - 120-125s     Hypoglycemic event? None   If yes, needed assistance? No   Hypoglycemia unawareness N/A     Typical meals:   Breakfast - grits, eggs, pancakes and sugar free syrup, frosted flakes   Lunch - tuna salads, baked chicken   Dinner - lean meats, veggies   Snacks -  bong crackers, ritz crackers       Education - last visit: yes a few years ago     No Polyuria polydipsia nocturia or vision changes    With regards to reason for admit:  Doing better       Review of Systems   Constitutional:  Negative for appetite change.   Gastrointestinal:  Negative for nausea.       Objective:   Physical Exam  Vitals reviewed.   Constitutional:       Appearance: Normal appearance.   HENT:      Head: Normocephalic.   Skin:     Comments: Injections sites without redness    Neurological:      General: No focal deficit present.      Mental Status: He is alert and oriented to person, place, and time.   Psychiatric:         Mood and Affect: Mood normal.         Behavior: Behavior normal.         Thought Content: Thought content normal.       There is no height or weight on file to calculate BMI.    Lab Review:   Lab Results   Component Value Date    HGBA1C 8.6 (H) 04/14/2024     Lab Results   Component Value Date    CHOL 82 (L)  01/29/2024    HDL 31 (L) 01/29/2024    LDLCALC 38.6 (L) 01/29/2024    TRIG 62 01/29/2024    CHOLHDL 37.8 01/29/2024     Lab Results   Component Value Date     (L) 05/20/2024    K 4.1 05/20/2024     05/20/2024    CO2 25 05/20/2024     (H) 05/20/2024    BUN 26 (H) 05/20/2024    CREATININE 2.8 (H) 05/20/2024    CALCIUM 8.6 (L) 05/20/2024    PROT 6.0 05/16/2024    ALBUMIN 2.6 (L) 05/20/2024    BILITOT 0.2 05/16/2024    ALKPHOS 98 05/16/2024    AST 11 05/16/2024    ALT 10 05/16/2024    ANIONGAP 7 (L) 05/20/2024    ESTGFRAFRICA 6.5 (A) 06/17/2022    EGFRNONAA 5.6 (A) 06/17/2022    TSH 2.051 11/12/2019       Assessment and Plan     Problem List Items Addressed This Visit          Endocrine    Type 2 diabetes mellitus with hypoglycemia without coma, with long-term current use of insulin - Primary    Current Assessment & Plan     Reviewed goals of therapy are to get the best control we can without hypoglycemia      Medication changes:   Lantus  20 units nightly  Novolog 6 units with meals   Add correction scale if needed.  Blood sugar 180 to 230 add 2 units  Blood sugar 231 to 280 add 4 units  Blood sugar 281 to 330 add 6 units  Blood sugar 331 to 380 add 8 units  Blood sugar greater than 380 add 10 units       Reviewed patient's current insulin regimen. Clarified proper insulin dose and timing in relation to meals, etc. Insulin injection sites and proper rotation instructed.       -Advised frequent self blood glucose monitoring.  Patient encouraged to document glucose results and bring them to every clinic visit      -Hypoglycemia precautions discussed. Instructed on precautions before driving.      -Close adherence to lifestyle changes recommended.      -Periodic follow ups for eye evaluations, foot care and dental care suggested.    rtc in 3 months

## 2024-05-27 NOTE — ASSESSMENT & PLAN NOTE
Reviewed goals of therapy are to get the best control we can without hypoglycemia      Medication changes:   Lantus  20 units nightly  Novolog 6 units with meals   Add correction scale if needed.  Blood sugar 180 to 230 add 2 units  Blood sugar 231 to 280 add 4 units  Blood sugar 281 to 330 add 6 units  Blood sugar 331 to 380 add 8 units  Blood sugar greater than 380 add 10 units       Reviewed patient's current insulin regimen. Clarified proper insulin dose and timing in relation to meals, etc. Insulin injection sites and proper rotation instructed.       -Advised frequent self blood glucose monitoring.  Patient encouraged to document glucose results and bring them to every clinic visit      -Hypoglycemia precautions discussed. Instructed on precautions before driving.      -Close adherence to lifestyle changes recommended.      -Periodic follow ups for eye evaluations, foot care and dental care suggested.    rtc in 3 months

## 2024-05-29 ENCOUNTER — TELEPHONE (OUTPATIENT)
Dept: INTERVENTIONAL RADIOLOGY/VASCULAR | Facility: HOSPITAL | Age: 64
End: 2024-05-29
Payer: MEDICARE

## 2024-05-29 NOTE — NURSING
Pre-procedure call complete.  Pt instructed not to eat or drink anything after midnight the night before procedure.  Pt aware will need someone to provide transport home and monitor pt 8 hours post procedure.  No driving for at least 24 hours after procedure.   Patient advised to take blood pressure, heart medications, seizure and anti-rejections medications with a sip of water morning of procedure.  Patient verbalized aware of which medications to take.  Do not take oral diabetic medication, sleep medication (including OTC) and anxiety medication the night before procedure.  Arrival time and location given.  Expected length of stay reviewed.  Covid screening completed.  Pt verbalized understanding of all pre-procedure instructions.  Written instructions and directions sent to patient in Mychart/portal.

## 2024-05-31 ENCOUNTER — HOSPITAL ENCOUNTER (OUTPATIENT)
Dept: INTERVENTIONAL RADIOLOGY/VASCULAR | Facility: HOSPITAL | Age: 64
Discharge: HOME | End: 2024-05-31
Attending: STUDENT IN AN ORGANIZED HEALTH CARE EDUCATION/TRAINING PROGRAM
Payer: MEDICARE

## 2024-05-31 VITALS
BODY MASS INDEX: 29.03 KG/M2 | HEIGHT: 67 IN | HEART RATE: 82 BPM | TEMPERATURE: 98 F | OXYGEN SATURATION: 100 % | WEIGHT: 185 LBS | DIASTOLIC BLOOD PRESSURE: 71 MMHG | SYSTOLIC BLOOD PRESSURE: 138 MMHG | RESPIRATION RATE: 18 BRPM

## 2024-05-31 DIAGNOSIS — Z94.0 KIDNEY REPLACED BY TRANSPLANT: Primary | ICD-10-CM

## 2024-05-31 LAB — POCT GLUCOSE: 212 MG/DL (ref 70–110)

## 2024-05-31 PROCEDURE — 27201068 IR BIOPSY KIDNEY

## 2024-05-31 PROCEDURE — 63600175 PHARM REV CODE 636 W HCPCS: Performed by: RADIOLOGY

## 2024-05-31 RX ORDER — MIDAZOLAM HYDROCHLORIDE 1 MG/ML
INJECTION, SOLUTION INTRAMUSCULAR; INTRAVENOUS
Status: COMPLETED | OUTPATIENT
Start: 2024-05-31 | End: 2024-05-31

## 2024-05-31 RX ORDER — SODIUM CHLORIDE 9 MG/ML
INJECTION, SOLUTION INTRAVENOUS CONTINUOUS
Status: DISCONTINUED | OUTPATIENT
Start: 2024-05-31 | End: 2024-06-01 | Stop reason: HOSPADM

## 2024-05-31 RX ORDER — LIDOCAINE HYDROCHLORIDE 10 MG/ML
1 INJECTION, SOLUTION EPIDURAL; INFILTRATION; INTRACAUDAL; PERINEURAL ONCE
Status: DISCONTINUED | OUTPATIENT
Start: 2024-05-31 | End: 2024-06-01 | Stop reason: HOSPADM

## 2024-05-31 RX ORDER — FENTANYL CITRATE 50 UG/ML
INJECTION, SOLUTION INTRAMUSCULAR; INTRAVENOUS
Status: COMPLETED | OUTPATIENT
Start: 2024-05-31 | End: 2024-05-31

## 2024-05-31 RX ADMIN — MIDAZOLAM HYDROCHLORIDE 1 MG: 2 INJECTION, SOLUTION INTRAMUSCULAR; INTRAVENOUS at 10:05

## 2024-05-31 RX ADMIN — FENTANYL CITRATE 50 MCG: 50 INJECTION, SOLUTION INTRAMUSCULAR; INTRAVENOUS at 10:05

## 2024-05-31 NOTE — DISCHARGE INSTRUCTIONS
"Please call with any questions or concerns.      Monday through Friday 8:00 am - 4:30 pm    Interventional Radiology Clinic  [phone]    After Hours    Ask the  for the "Radiology Resident on call"  [phone]      "

## 2024-05-31 NOTE — PLAN OF CARE
Patient given discharge instructions and verbalized understanding of at home care. IV discontinued with catheter intact. Bandage to right lower abdomen clean, dry, and intact. Patient refused wheelchair and ambulated to garage with spouse by side.

## 2024-05-31 NOTE — SEDATION DOCUMENTATION
Tx kidney bx complete. Pt tolerated well with no distress noted. Dressing to site left clean, dry, and intact. Pt will complete 2 hour procedural recovery In MPU before discharge home into the care of family.

## 2024-05-31 NOTE — H&P
Radiology History & Physical      SUBJECTIVE:       History of Present Illness:  Murphy Ha is a 63 y.o. male who presents for renal transplant biopsy.    Past Medical History:   Diagnosis Date    Anemia     Aqueous misdirection of right eye     Arthritis     Blind painful eye     Cataract     CKD (chronic kidney disease)     Diabetes mellitus, type 2     Disorder of kidney and ureter     Fever blister     GERD (gastroesophageal reflux disease)     Hyperlipidemia     Hypertension     Joint pain     Neuropathy      Past Surgical History:   Procedure Laterality Date    AIR FLUID EXCHANGE OF EYE Left 6/24/2022    Procedure: AIR FLUID EXCHANGE, EYE;  Surgeon: Arun Veronica MD;  Location: [facility];  Service: Ophthalmology;  Laterality: Left;    CATARACT EXTRACTION Left 8/27/2014    COLONOSCOPY N/A 9/8/2016    Procedure: COLONOSCOPY;  Surgeon: Luis Bogran-Reyes, MD;  Location: [facility];  Service: Endoscopy;  Laterality: N/A;    COLONOSCOPY N/A 11/10/2023    Procedure: COLONOSCOPY;  Surgeon: Nomi Del Real MD;  Location: [facility];  Service: Endoscopy;  Laterality: N/A;    CONJUNCTIVOPLASTY Right 6/17/2020    Procedure: CONJUNCTIVOPLASTY;  Surgeon: Myrna Alba MD;  Location: [facility];  Service: Ophthalmology;  Laterality: Right;    ENDOLASER PHOTOCOAGULATION Left 6/24/2022    Procedure: PHOTOCOAGULATION, ENDOLASER;  Surgeon: Arun Veronica MD;  Location: [facility];  Service: Ophthalmology;  Laterality: Left;    ENUCLEATION Right 6/17/2020    Procedure: ENUCLEATION, EYE;  Surgeon: Myrna Alba MD;  Location: [facility];  Service: Ophthalmology;  Laterality: Right;    EXPLORATION OF ORBIT Right 6/17/2020    Procedure: EXPLORATION, ORBIT;  Surgeon: Myrna Alba MD;  Location: [facility];  Service: Ophthalmology;  Laterality: Right;    EYE SURGERY      INSERTION OF TUNNELED CENTRAL VENOUS HEMODIALYSIS CATHETER Right 4/18/2024    Procedure: Insertion, Catheter, Central Venous, Hemodialysis;   Surgeon: Sobeiad Morales MD;  Location: [facility] CATH LAB;  Service: Interventional Nephrology;  Laterality: Right;    KIDNEY TRANSPLANT N/A 4/14/2024    Procedure: TRANSPLANT, KIDNEY;  Surgeon: Trent Burt MD;  Location: [facility] OR [facility] FLR;  Service: Transplant;  Laterality: N/A;    REMOVAL OF TUNNELED CENTRAL VENOUS CATHETER (CVC) N/A 5/24/2024    Procedure: REMOVAL, CATHETER, CENTRAL VENOUS, TUNNELED;  Surgeon: Sobeida Morales MD;  Location: [facility] CATH LAB;  Service: Interventional Nephrology;  Laterality: N/A;    TARSORRHAPHY Right 6/17/2020    Procedure: BLEPHARORRHAPHY;  Surgeon: Myrna Alba MD;  Location: [facility];  Service: Ophthalmology;  Laterality: Right;    VITRECTOMY Right     VITRECTOMY BY PARS PLANA APPROACH Left 6/24/2022    Procedure: VITRECTOMY, PARS PLANA APPROACH;  Surgeon: Arun Veronica MD;  Location: [facility];  Service: Ophthalmology;  Laterality: Left;       Home Meds:   Prior to Admission medications    Medication Sig Start Date End Date Taking? Authorizing Provider   aspirin (ECOTRIN) 81 MG EC tablet Take 1 tablet (81 mg total) by mouth once daily. 5/27/24 5/27/25 Yes Iain Pierre Jr., MD   atorvastatin (LIPITOR) 80 MG tablet Take 1 tablet (80 mg total) by mouth every evening. 4/18/24  Yes Angela Powell MD   calcitRIOL (ROCALTROL) 0.25 MCG Cap Take 1 capsule (0.25 mcg total) by mouth once daily. 5/6/24  Yes Iain Pierre Jr., MD   carvediloL (COREG) 25 MG tablet Take 1 tablet (25 mg total) by mouth 2 (two) times daily. 4/18/24  Yes Angela Powell MD   chlorthalidone (HYGROTEN) 25 MG Tab Take 0.5 tablets (12.5 mg total) by mouth once daily. Take half a tablet by mouth once a day. 5/9/24  Yes Iain Pierre Jr., MD   cholecalciferol, vitamin D3, (VITAMIN D3) 125 mcg (5,000 unit) Tab Take 1 tablet (5,000 Units total) by mouth once daily. 5/6/24  Yes Iain Pierre Jr., MD   divalproex (DEPAKOTE) 250 MG EC tablet Take 250 mg by  mouth 2 (two) times daily. 4/21/23  Yes Provider, Historical   dorzolamide-timolol 2-0.5% (COSOPT) 22.3-6.8 mg/mL ophthalmic solution Place 1 drop into the left eye 2 (two) times daily. 5/9/23  Yes Nahun Walsh MD   famotidine (PEPCID) 20 MG tablet Take 1 tablet (20 mg total) by mouth every evening. 4/15/24 4/15/25 Yes Trent Burt MD   gabapentin (NEURONTIN) 300 MG capsule Take 1 capsule (300 mg total) by mouth every evening. 2/16/24 2/10/25 Yes Luis Alfredo Norton MD   HYDROcodone-acetaminophen (NORCO) 5-325 mg per tablet Take 1 tablet by mouth every 12 (twelve) hours as needed for Pain. 5/27/24  Yes Linwood Trevino MD   insulin aspart U-100 (NOVOLOG U-100 INSULIN ASPART) 100 unit/mL injection Inject 6 Units into the skin 3 (three) times daily before meals. + sliding scale.  MDD 36 units/day 4/18/24  Yes Angela Powell MD   insulin glargine U-100, Lantus, 100 unit/mL injection Inject 11 Units into the skin every evening.  Patient taking differently: Inject 28 Units into the skin every evening. 4/18/24  Yes Angela Powell MD   k phos di & mono-sod phos mono (K-PHOS-NEUTRAL) 250 mg Tab Take 2 tablets by mouth every 12 (twelve) hours. 5/9/24  Yes Iain Pierre Jr., MD   loperamide (IMODIUM) 2 mg capsule Take 2 mg after each bowel movement up to 8 times a day.  Stop if no bowel movement. 5/13/24  Yes Iain Pierre Jr., MD   mycophenolate sodium 180 MG TbEC Take 4 tablets (720 mg total) by mouth 2 (two) times daily. 5/13/24  Yes Iain Pierre Jr., MD   NIFEdipine (PROCARDIA-XL) 30 MG (OSM) 24 hr tablet Take 2 tablets (60 mg total) by mouth 2 (two) times a day.  Patient taking differently: Take 30 mg by mouth 2 (two) times a day. 4/22/24  Yes Raymundo, Stacy RAZO PA-C   predniSONE (DELTASONE) 5 MG tablet Take by mouth daily; 4/17/2024-4/23/2024: 20 mg, 4/24/2024-4/30/2024: 15 mg; 5/1/2024-5/7/2024: 10 mg; 5/8/2024- forever: 5 mg; do not stop  Patient taking differently:  "Take 5 mg by mouth once daily. Take by mouth daily; 4/17/2024-4/23/2024: 20 mg, 4/24/2024-4/30/2024: 15 mg; 5/1/2024-5/7/2024: 10 mg; 5/8/2024- forever: 5 mg; do not stop 4/17/24  Yes Trent Burt MD   sodium bicarbonate 650 MG tablet Take 2 tablets (1,300 mg total) by mouth 2 (two) times daily. 4/29/24  Yes Iain Pierre Jr., MD   tacrolimus (PROGRAF) 1 MG Cap Take 5 capsules (5 mg total) by mouth every 12 (twelve) hours. 5/17/24 5/17/25 Yes Iain Pierre Jr., MD   tamsulosin (FLOMAX) 0.4 mg Cap Take 1 capsule (0.4 mg total) by mouth once daily. 1/29/24  Yes Luis Alfredo Norton MD   blood-glucose meter,continuous (DEXCOM G7 ) Misc Use to check glucose with sensors 7/31/23   Winsome Berrios NP   blood-glucose sensor (DEXCOM G7 SENSOR) Lois 1 Device by Misc.(Non-Drug; Combo Route) route every 10 days. 7/31/23 7/30/24  Winsome Berrios NP   glucagon 3 mg/actuation Spry 1 each by Nasal route as needed. 5/9/23   Winsome Berrios NP   insulin syringe-needle U-100 (BD INSULIN SYRINGE ULTRA-FINE) 1 mL 30 gauge x 1/2" Syrg 1 each by Misc.(Non-Drug; Combo Route) route 4 (four) times daily with meals and nightly. 4/18/24   Angela Powell MD   sulfamethoxazole-trimethoprim 400-80mg (BACTRIM,SEPTRA) 400-80 mg per tablet Take 1 tablet by mouth every Mon, Wed, Fri. Stop 10/11/24 4/19/24 10/16/24  Angela Powell MD   valGANciclovir (VALCYTE) 450 mg Tab Take 1 tablet (450 mg total) by mouth every Mon, Wed, Fri. Stop 7/13/24 4/19/24 7/13/24  Angela Powell MD     Anticoagulants/Antiplatelets: aspirin    Allergies: Review of patient's allergies indicates:  No Known Allergies  Sedation History:  no adverse reactions    Review of Systems:   Hematological: no known coagulopathies  Respiratory: no shortness of breath  Cardiovascular: no chest pain  Gastrointestinal: no abdominal pain  Genito-Urinary: no dysuria  Musculoskeletal: negative  Neurological: no TIA or stroke symptoms "         OBJECTIVE:     Vital Signs (Most Recent)  Temp: 98.8 °F (37.1 °C) (05/31/24 0710)  Pulse: 73 (05/31/24 0710)  Resp: 16 (05/31/24 0710)  BP: (!) 147/72 (05/31/24 0712)  SpO2: 100 % (05/31/24 0710)    Physical Exam:  ASA: 2  Mallampati: 2    General: no acute distress  Mental Status: alert and oriented to person, place and time  HEENT: normocephalic, atraumatic  Chest: unlabored breathing  Heart: regular heart rate  Abdomen: nondistended  Extremity: moves all extremities    Laboratory  Lab Results   Component Value Date    INR 1.2 05/23/2024       Lab Results   Component Value Date    WBC 8.34 05/28/2024    HGB 9.5 (L) 05/28/2024    HCT 28.8 (L) 05/28/2024    MCV 92 05/28/2024     05/28/2024      Lab Results   Component Value Date    GLU 91 05/30/2024     05/30/2024    K 4.3 05/30/2024     05/30/2024    CO2 23 05/30/2024    BUN 32 (H) 05/30/2024    CREATININE 2.9 (H) 05/30/2024    CALCIUM 9.4 05/30/2024    MG 1.8 05/28/2024    ALT 13 05/30/2024    AST 12 05/30/2024    ALBUMIN 2.7 (L) 05/30/2024    BILITOT 0.1 05/30/2024    BILIDIR 0.1 05/16/2024       ASSESSMENT/PLAN:     Sedation Plan: up to moderate  Patient will undergo renal transplant biopsy.    Merry Duarte MD MSCR  PGY-5 Radiology Resident

## 2024-05-31 NOTE — PROCEDURES
Radiology Post-Procedure Note    Pre Op Diagnosis: Post renal transplant  Post Op Diagnosis: Same    Procedure: US-guided renal transplant biopsy    Procedure performed by: Trent Rios MD    Written Informed Consent Obtained: Yes  Specimen Removed: YES 3, 18g x 20 mm core biopsies placed on telfa  Estimated Blood Loss: Minimal    Findings:   Unremarkable sonographic appearance of the transplant kidney.     Patient tolerated procedure well.    Trent Rios MD  Interventional Radiology  Department of Radiology

## 2024-05-31 NOTE — SEDATION DOCUMENTATION
Pt arrived to IR Room 90 for Tx Kidney Bx. Pt oriented to unit and staff, Pt safely transferred from stretcher to procedural table. Fall risk reviewed and comfort measures utilized with interventions. Safety strap applied, position pillows to minimize pressure points. Blankets applied. Pt prepped and draped utilizing standard sterile technique. Patient placed on continuous monitoring, as required by sedation policy. Timeouts implemented utilizing standard universal time-out per department and facility policy.

## 2024-06-03 DIAGNOSIS — R11.0 NAUSEA: Primary | ICD-10-CM

## 2024-06-03 PROBLEM — E87.1 HYPONATREMIA: Status: ACTIVE | Noted: 2024-06-03

## 2024-06-03 RX ORDER — ONDANSETRON 4 MG/1
4 TABLET, ORALLY DISINTEGRATING ORAL EVERY 6 HOURS PRN
Qty: 30 TABLET | Refills: 0 | Status: SHIPPED | OUTPATIENT
Start: 2024-06-03

## 2024-06-03 NOTE — TELEPHONE ENCOUNTER
Pt called c/o nausea that started yesterday with one episode of vomiting.  Pt is able to keep meds down.  TORB from Dr. Morrison to send in zofran 4 mg q6 hrs PRN #30 0 refills.  Pt has been made aware to call us if additional symptoms occur or if nausea and vomiting does not resolve.  Pt KALEE.

## 2024-06-05 ENCOUNTER — TELEPHONE (OUTPATIENT)
Dept: TRANSPLANT | Facility: CLINIC | Age: 64
End: 2024-06-05
Payer: MEDICARE

## 2024-06-05 DIAGNOSIS — Z94.0 KIDNEY REPLACED BY TRANSPLANT: Primary | ICD-10-CM

## 2024-06-05 PROBLEM — E87.1 HYPONATREMIA: Status: RESOLVED | Noted: 2024-06-03 | Resolved: 2024-06-05

## 2024-06-05 PROBLEM — R78.81 BACTEREMIA: Status: RESOLVED | Noted: 2024-06-05 | Resolved: 2024-06-05

## 2024-06-05 PROBLEM — R78.81 BACTEREMIA: Status: ACTIVE | Noted: 2024-06-05

## 2024-06-05 NOTE — TELEPHONE ENCOUNTER
----- Message from Ricardo Mccartney MD sent at 6/5/2024 12:08 PM CDT -----  This gentleman had a kidney biopsy last week   He went to [facility] on Monday with nausea. I asked the MD to admit patient get some imaging studies including CT of the abdomen and kidney US. Apparently he had some hematoma post biopsy with free fluid in the abdomen h/h remained stable for 48 hrs. Please bring the patient tomorrow for a repeat kidney US and blood work. They did a blood culture which came back positive but repeat culture x 2 was negative likely a contaminant.  Hey are discharging the patient today. I going to double check with surgery so they can review the CT scan and kidney US. Per hospital medicine doctor patient was feeling fine no pain tolerating diet and ready and willing to go home.

## 2024-06-06 ENCOUNTER — HOSPITAL ENCOUNTER (OUTPATIENT)
Dept: RADIOLOGY | Facility: HOSPITAL | Age: 64
Discharge: HOME | End: 2024-06-06
Attending: STUDENT IN AN ORGANIZED HEALTH CARE EDUCATION/TRAINING PROGRAM
Payer: MEDICARE

## 2024-06-06 DIAGNOSIS — Z94.0 KIDNEY REPLACED BY TRANSPLANT: ICD-10-CM

## 2024-06-06 PROBLEM — I95.9 SYMPTOMATIC HYPOTENSION: Status: ACTIVE | Noted: 2024-06-06

## 2024-06-06 LAB
FINAL PATHOLOGIC DIAGNOSIS: NORMAL
GROSS: NORMAL
Lab: NORMAL

## 2024-06-06 PROCEDURE — 76776 US EXAM K TRANSPL W/DOPPLER: CPT | Mod: 26,,, | Performed by: RADIOLOGY

## 2024-06-06 PROCEDURE — 76776 US EXAM K TRANSPL W/DOPPLER: CPT | Mod: TC

## 2024-06-13 DIAGNOSIS — E78.2 MIXED HYPERLIPIDEMIA: ICD-10-CM

## 2024-06-13 RX ORDER — ATORVASTATIN CALCIUM 80 MG/1
80 TABLET, FILM COATED ORAL NIGHTLY
Qty: 30 TABLET | Refills: 1 | OUTPATIENT
Start: 2024-06-13

## 2024-06-17 ENCOUNTER — OFFICE VISIT (OUTPATIENT)
Dept: TRANSPLANT | Facility: CLINIC | Age: 64
End: 2024-06-17
Payer: MEDICARE

## 2024-06-17 DIAGNOSIS — E83.39 HYPOPHOSPHATEMIA: ICD-10-CM

## 2024-06-17 DIAGNOSIS — N18.32 STAGE 3B CHRONIC KIDNEY DISEASE: ICD-10-CM

## 2024-06-17 DIAGNOSIS — Z94.0 KIDNEY REPLACED BY TRANSPLANT: Primary | ICD-10-CM

## 2024-06-17 DIAGNOSIS — Z79.60 LONG-TERM USE OF IMMUNOSUPPRESSANT MEDICATION: ICD-10-CM

## 2024-06-17 DIAGNOSIS — I15.0 RENOVASCULAR HYPERTENSION: ICD-10-CM

## 2024-06-17 DIAGNOSIS — Z91.89 AT RISK FOR OPPORTUNISTIC INFECTIONS: ICD-10-CM

## 2024-06-17 PROCEDURE — 3066F NEPHROPATHY DOC TX: CPT | Mod: CPTII,95,, | Performed by: NURSE PRACTITIONER

## 2024-06-17 PROCEDURE — 3052F HG A1C>EQUAL 8.0%<EQUAL 9.0%: CPT | Mod: CPTII,95,, | Performed by: NURSE PRACTITIONER

## 2024-06-17 PROCEDURE — 99214 OFFICE O/P EST MOD 30 MIN: CPT | Mod: 95,,, | Performed by: NURSE PRACTITIONER

## 2024-06-17 PROCEDURE — 1160F RVW MEDS BY RX/DR IN RCRD: CPT | Mod: CPTII,95,, | Performed by: NURSE PRACTITIONER

## 2024-06-17 PROCEDURE — 1159F MED LIST DOCD IN RCRD: CPT | Mod: CPTII,95,, | Performed by: NURSE PRACTITIONER

## 2024-06-17 RX ORDER — SULFAMETHOXAZOLE AND TRIMETHOPRIM 400; 80 MG/1; MG/1
1 TABLET ORAL
Qty: 12 TABLET | Refills: 5 | Status: SHIPPED | OUTPATIENT
Start: 2024-06-17 | End: 2024-12-14

## 2024-06-17 NOTE — LETTER
June 17, 2024        Cathryn Adair  [address]  [place] [address]  Phone: [phone]  Fax: [phone]             [facility]  [address]  [facility] [address]  Phone: [phone]   Patient: Murphy Ha   MR Number: [id number]   YOB: 1960   Date of Visit: 6/17/2024       Dear Dr. Cathryn Adair    Thank you for referring Murphy Ha to me for evaluation. Attached you will find relevant portions of my assessment and plan of care.    If you have questions, please do not hesitate to call me. I look forward to following Murphy Ha along with you.    Sincerely,    Samia Wilkinson, NAI    Enclosure    If you would like to receive this communication electronically, please contact [email or web address] or [phone] to request [employer] Link access.    [employer] Link is a tool which provides read-only access to select patient information with whom you have a relationship. Its easy to use and provides real time access to review your patients record including encounter summaries, notes, results, and demographic information.    If you feel you have received this communication in error or would no longer like to receive these types of communications, please e-mail [email or web address]

## 2024-06-17 NOTE — PATIENT INSTRUCTIONS
Restart bactrim (MWF), stop date 10/11/2024  Decrease KPhos 250 mg (1 pill) twice a day    It is my privilege to participate in your transplant care! Please be sure to let us know if you have any questions or concerns about your health care - we cannot help you if we do not know. Don't forget we are on call 24/7 for any emergencies.      Best Wishes,  Samia CARTWRIGHTC

## 2024-06-17 NOTE — PROGRESS NOTES
Kidney Post-Transplant Assessment    Referring Physician: Cathryn Adair  Current Nephrologist: Cathryn Adair    ORGAN: RIGHT KIDNEY  Donor Type: donation after circulatory death   PHS Increased Risk: no  Cold Ischemia: 1,030 mins  Induction Medications: thymo    Subjective:   The patient location is: [place]  The chief complaint leading to consultation is: Kidney Post-Transplant Assessment    Visit type: audiovisual    Face to Face time with patient:   30 minutes of total time spent on the encounter, which includes face to face time and non-face to face time preparing to see the patient (eg, review of tests), Obtaining and/or reviewing separately obtained history, Documenting clinical information in the electronic or other health record, Independently interpreting results (not separately reported) and communicating results to the patient/family/caregiver, or Care coordination (not separately reported).     Each patient to whom he or she provides medical services by telemedicine is:  (1) informed of the relationship between the physician and patient and the respective role of any other health care provider with respect to management of the patient; and (2) notified that he or she may decline to receive medical services by telemedicine and may withdraw from such care at any time.      CC:  Reassessment of renal allograft function and management of chronic immunosuppression.    HPI:  Mr. Ha is a 63 y.o. year old Black or  male with history of ESRD secondary to DM who received a donation after circulatory death  kidney transplant on 4/14/24. His most recent creatinine is 2.9. He takes mycophenolic acid, prednisone, and tacrolimus for maintenance immunosuppression. His post transplant course has been complicated by delayed graft function requiring dialysis (resolved).    KIDNEY (PERCUTANEOUS TRANSPLANT BIOPSY) 5/31/2024:  VERY LIMITED SAMPLE;   1)  INADEQUATE FOR EXCLUDING ACUTE T-CELL  MEDIATED REJECTION.   2)  MILD ACUTE TUBULAR INJURY.   3)  MODERATE-TO-SEVERE ARTERIOSCLEROSIS, DONOR-DERIVED (SEE COMMENT).         Hospitalized earlier this month for nausea, has since resolved. Feels well, he is worried that his creatinine is not coming down. Drinking 4-5 bottles of water daily, no problems with urination. Home -140 and blood sugars have been 120s. PD cath site nearly healed, unable to put packing in anymore, no drainage. Intermittent mild ache over allograft when moving, no severe discomfort. Appetite good, weight stable. Tolerating IS without issues, no diarrhea or vomiting.     Current Outpatient Medications   Medication Sig Dispense Refill    aspirin (ECOTRIN) 81 MG EC tablet Take 1 tablet (81 mg total) by mouth once daily.      atorvastatin (LIPITOR) 80 MG tablet Take 1 tablet (80 mg total) by mouth every evening. 30 tablet 1    blood-glucose meter,continuous (DEXCOM G7 ) Misc Use to check glucose with sensors 1 each 0    blood-glucose sensor (DEXCOM G7 SENSOR) Lois 1 Device by Misc.(Non-Drug; Combo Route) route every 10 days. 9 each 3    calcitRIOL (ROCALTROL) 0.25 MCG Cap Take 1 capsule (0.25 mcg total) by mouth once daily. 30 capsule 5    carvediloL (COREG) 25 MG tablet Take 1 tablet (25 mg total) by mouth 2 (two) times daily. 60 tablet 11    chlorthalidone (HYGROTEN) 25 MG Tab Take 0.5 tablets (12.5 mg total) by mouth once daily. Take half a tablet by mouth once a day. 15 tablet 11    cholecalciferol, vitamin D3, (VITAMIN D3) 125 mcg (5,000 unit) Tab Take 1 tablet (5,000 Units total) by mouth once daily. 30 tablet 5    divalproex (DEPAKOTE) 250 MG EC tablet Take 250 mg by mouth 2 (two) times daily.      dorzolamide-timolol 2-0.5% (COSOPT) 22.3-6.8 mg/mL ophthalmic solution Place 1 drop into the left eye 2 (two) times daily. 10 mL 5    famotidine (PEPCID) 20 MG tablet Take 1 tablet (20 mg total) by mouth every evening. 30 tablet 11    gabapentin (NEURONTIN) 300 MG capsule  "Take 1 capsule (300 mg total) by mouth every evening. 90 capsule 3    glucagon 3 mg/actuation Spry 1 each by Nasal route as needed. 1 each 11    HYDROcodone-acetaminophen (NORCO) 5-325 mg per tablet Take 1 tablet by mouth every 12 (twelve) hours as needed for Pain. 60 tablet 0    insulin aspart U-100 (NOVOLOG U-100 INSULIN ASPART) 100 unit/mL injection Inject 6 Units into the skin 3 (three) times daily before meals. + sliding scale.  MDD 36 units/day 10 mL 5    insulin glargine U-100, Lantus, 100 unit/mL injection Inject 11 Units into the skin every evening. (Patient taking differently: Inject 15-22 Units into the skin every evening.) 10 mL 5    insulin syringe-needle U-100 (BD INSULIN SYRINGE ULTRA-FINE) 1 mL 30 gauge x 1/2" Syrg 1 each by Misc.(Non-Drug; Combo Route) route 4 (four) times daily with meals and nightly. 100 each 1    k phos di & mono-sod phos mono (K-PHOS-NEUTRAL) 250 mg Tab Take 2 tablets by mouth every 12 (twelve) hours. 120 tablet 11    loperamide (IMODIUM) 2 mg capsule Take 2 mg after each bowel movement up to 8 times a day.  Stop if no bowel movement. 40 capsule 0    mycophenolate sodium 180 MG TbEC Take 4 tablets (720 mg total) by mouth 2 (two) times daily. (Patient taking differently: Take 1,000 mg by mouth 2 (two) times daily.) 240 tablet 11    NIFEdipine (PROCARDIA-XL) 30 MG (OSM) 24 hr tablet Take 2 tablets (60 mg total) by mouth 2 (two) times a day. (Patient taking differently: Take 30 mg by mouth 2 (two) times a day.) 60 tablet 11    ondansetron (ZOFRAN-ODT) 4 MG TbDL Take 1 tablet (4 mg total) by mouth every 6 (six) hours as needed (Nausea). 30 tablet 0    predniSONE (DELTASONE) 5 MG tablet Take by mouth daily; 4/17/2024-4/23/2024: 20 mg, 4/24/2024-4/30/2024: 15 mg; 5/1/2024-5/7/2024: 10 mg; 5/8/2024- forever: 5 mg; do not stop (Patient taking differently: Take 5 mg by mouth once daily. Take by mouth daily; 4/17/2024-4/23/2024: 20 mg, 4/24/2024-4/30/2024: 15 mg; 5/1/2024-5/7/2024: 10 mg; " 5/8/2024- forever: 5 mg; do not stop) 70 tablet 11    sodium bicarbonate 650 MG tablet Take 2 tablets (1,300 mg total) by mouth 2 (two) times daily. 120 tablet 11    tacrolimus (PROGRAF) 1 MG Cap Take 5 capsules (5 mg total) by mouth every 12 (twelve) hours. 300 capsule 11    tamsulosin (FLOMAX) 0.4 mg Cap Take 1 capsule (0.4 mg total) by mouth once daily. 90 capsule 3    valGANciclovir (VALCYTE) 450 mg Tab Take 1 tablet (450 mg total) by mouth every Mon, Wed, Fri. Stop 7/13/24 12 tablet 2     No current facility-administered medications for this visit.     Facility-Administered Medications Ordered in Other Visits   Medication Dose Route Frequency Provider Last Rate Last Admin    tamsulosin 24 hr capsule 0.4 mg  1 capsule Oral Daily Lorelei Cha PA-C   0.4 mg at 04/18/24 0840       Past Medical History:   Diagnosis Date    Anemia     Aqueous misdirection of right eye     Arthritis     Blind painful eye     Cataract     CKD (chronic kidney disease)     Diabetes mellitus, type 2     Disorder of kidney and ureter     Fever blister     GERD (gastroesophageal reflux disease)     Hyperlipidemia     Hypertension     Joint pain     Neuropathy        Review of Systems   Constitutional:  Negative for activity change and fever.   Eyes:  Positive for visual disturbance.        Wears glasses   Respiratory:  Negative for cough and shortness of breath.    Cardiovascular:  Negative for chest pain and leg swelling.   Gastrointestinal:  Negative for abdominal pain, constipation, diarrhea and nausea.   Genitourinary:  Negative for difficulty urinating, frequency and hematuria.   Musculoskeletal:  Negative for arthralgias and myalgias.   Skin:  Negative for wound.   Allergic/Immunologic: Positive for immunocompromised state.   Neurological:  Negative for weakness.   Psychiatric/Behavioral:  Negative for sleep disturbance.        Objective:   There were no vitals taken for this visit.body mass index is unknown because there  is no height or weight on file.    Physical Exam- VIRTUAL VISIT    Labs:  Lab Results   Component Value Date    WBC 5.98 06/11/2024    HGB 9.4 (L) 06/11/2024    HCT 28.4 (L) 06/11/2024     06/11/2024    K 5.0 06/11/2024     06/11/2024    CO2 24 06/11/2024    BUN 32 (H) 06/11/2024    CREATININE 2.9 (H) 06/11/2024    EGFRNORACEVR 23.7 (A) 06/11/2024    CALCIUM 9.4 06/11/2024    PHOS 3.4 06/11/2024    MG 1.6 06/11/2024    ALBUMIN 2.9 (L) 06/11/2024    AST 14 06/03/2024    ALT 13 06/03/2024    UTPCR 0.11 05/16/2024    .1 (H) 07/28/2023    TACROLIMUS 8.6 06/11/2024       Labs were reviewed with the patient    Assessment:     1. Kidney replaced by transplant    2. Long-term use of immunosuppressant medication    3. Renovascular hypertension    4. Stage 3b chronic kidney disease    5. At risk for opportunistic infections      Plan:   Decreased  mg BID  Restarted bactrim for PCP prophylaxis   Scheduled for labs tomorrow      1. Immunosuppression: Prograf trough 8.6, which is therapeutic (goal 8-10). Continue Prograf 5/5, MyF 720 mg BID, and Prednisone 5 mg QD. Will continue to monitor for drug toxicities    2. Allograft Function: creatinine remains elevated, checking allosure and DSA with next labs  - ESRD secondary to DM s/p donation after circulatory death  kidney transplant on 4/14/24.   - post transplant with DGF, resolved   Lab Results   Component Value Date    CREATININE 2.9 (H) 06/11/2024      Latest Reference Range & Units POD 58,  Kidney-Post 1 Year  06/11/24 08:21   eGFR >60 mL/min/1.73 m^2 23.7 !         3. Hypertension management: advise low salt diet and home BP monitoring      4. Metabolic Bone Disease/Secondary Hyperparathyroidism:  Decreased  mg BID  Lab Results   Component Value Date    .1 (H) 07/28/2023    CALCIUM 9.4 06/11/2024    PHOS 3.4 06/11/2024      Latest Reference Range & Units POD 58,  Kidney-Post 1 Year  06/11/24 08:21   Magnesium  1.6 - 2.6 mg/dL 1.6        5. Electrolytes: stable. No need for intervention   Lab Results   Component Value Date     06/11/2024    K 5.0 06/11/2024     06/11/2024    CO2 24 06/11/2024       6. Anemia: JORGE ALBERTO per protocol, last injection today  Lab Results   Component Value Date    WBC 5.98 06/11/2024    HGB 9.4 (L) 06/11/2024    HCT 28.4 (L) 06/11/2024    MCV 92 06/11/2024     06/11/2024         7. Proteinuria: continue p/c ratio as per guidelines   [facility] [place]/16/2024: 0.11    8. BK virus infection screening:  BK PCR per protocol   BK PCR 6/11/2024: not detected    9. Weight education: provided during the clinic visit   There is no height or weight on file to calculate BMI.     10. Patient safety education regarding immunosuppression including prophylaxis posttransplant for CMV, PCP  PCP prophylaxis: bactrim (stop 10/11/2024)  CMV prophylaxis: valcyte (stop 7/13/2024)              Follow-up:   Clinic: return to transplant clinic weekly for the first month after transplant; every 2 weeks during months 2-3; then at 6-, 9-, 12-, 18-, 24-, and 36- months post-transplant to reassess for complications from immunosuppression toxicity and monitor for rejection.  Annually thereafter.    Labs: since patient remains at high risk for rejection and drug-related complications that warrant close monitoring, labs will be ordered as follows: continue twice weekly CBC, renal panel, and drug level for first month; then same labs once weekly through 3rd month post-transplant.  Urine for UA and protein/creatinine ratio monthly.  Serum BK - PCR at 1-, 3-, 6-, 9-, 12-, 18-, 24-, 36-, 48-, and 60 months post-transplant.  Hepatic panel at 1-, 2-, 3-, 6-, 9-, 12-, 18-, 24-, and 36- months post-transplant.    Samia Wilkinson NP

## 2024-06-18 ENCOUNTER — LAB VISIT (OUTPATIENT)
Dept: LAB | Facility: HOSPITAL | Age: 64
End: 2024-06-18
Attending: STUDENT IN AN ORGANIZED HEALTH CARE EDUCATION/TRAINING PROGRAM
Payer: MEDICARE

## 2024-06-18 ENCOUNTER — TELEPHONE (OUTPATIENT)
Dept: TRANSPLANT | Facility: CLINIC | Age: 64
End: 2024-06-18
Payer: MEDICARE

## 2024-06-18 DIAGNOSIS — Z94.0 KIDNEY REPLACED BY TRANSPLANT: ICD-10-CM

## 2024-06-18 LAB
ALBUMIN SERPL BCP-MCNC: 3.2 G/DL (ref 3.5–5.2)
ANION GAP SERPL CALC-SCNC: 10 MMOL/L (ref 3–11)
BASOPHILS # BLD AUTO: 0.03 K/UL (ref 0–0.2)
BASOPHILS NFR BLD: 0.5 % (ref 0–1.9)
BUN SERPL-MCNC: 34 MG/DL (ref 8–23)
CALCIUM SERPL-MCNC: 9.4 MG/DL (ref 8.7–10.5)
CHLORIDE SERPL-SCNC: 103 MMOL/L (ref 95–110)
CO2 SERPL-SCNC: 24 MMOL/L (ref 23–29)
CREAT SERPL-MCNC: 2.5 MG/DL (ref 0.5–1.4)
DIFFERENTIAL METHOD BLD: ABNORMAL
EOSINOPHIL # BLD AUTO: 0.1 K/UL (ref 0–0.5)
EOSINOPHIL NFR BLD: 1.3 % (ref 0–8)
ERYTHROCYTE [DISTWIDTH] IN BLOOD BY AUTOMATED COUNT: 15.2 % (ref 11.5–14.5)
EST. GFR  (NO RACE VARIABLE): 28.3 ML/MIN/1.73 M^2
GLUCOSE SERPL-MCNC: 145 MG/DL (ref 70–110)
HCT VFR BLD AUTO: 32.1 % (ref 40–54)
HGB BLD-MCNC: 10.9 G/DL (ref 14–18)
IMM GRANULOCYTES # BLD AUTO: 0.31 K/UL (ref 0–0.04)
IMM GRANULOCYTES NFR BLD AUTO: 5 % (ref 0–0.5)
LYMPHOCYTES # BLD AUTO: 0.6 K/UL (ref 1–4.8)
LYMPHOCYTES NFR BLD: 9.3 % (ref 18–48)
MAGNESIUM SERPL-MCNC: 1.6 MG/DL (ref 1.6–2.6)
MCH RBC QN AUTO: 30.7 PG (ref 27–31)
MCHC RBC AUTO-ENTMCNC: 34 G/DL (ref 32–36)
MCV RBC AUTO: 90 FL (ref 82–98)
MONOCYTES # BLD AUTO: 0.6 K/UL (ref 0.3–1)
MONOCYTES NFR BLD: 10 % (ref 4–15)
NEUTROPHILS # BLD AUTO: 4.6 K/UL (ref 1.8–7.7)
NEUTROPHILS NFR BLD: 73.9 % (ref 38–73)
NRBC BLD-RTO: 0 /100 WBC
PHOSPHATE SERPL-MCNC: 3.1 MG/DL (ref 2.7–4.5)
PLATELET # BLD AUTO: 255 K/UL (ref 150–450)
PMV BLD AUTO: 11.1 FL (ref 9.2–12.9)
POTASSIUM SERPL-SCNC: 4.4 MMOL/L (ref 3.5–5.1)
RBC # BLD AUTO: 3.55 M/UL (ref 4.6–6.2)
SODIUM SERPL-SCNC: 137 MMOL/L (ref 136–145)
WBC # BLD AUTO: 6.21 K/UL (ref 3.9–12.7)

## 2024-06-18 PROCEDURE — 86977 RBC SERUM PRETX INCUBJ/INHIB: CPT | Performed by: STUDENT IN AN ORGANIZED HEALTH CARE EDUCATION/TRAINING PROGRAM

## 2024-06-18 PROCEDURE — 83735 ASSAY OF MAGNESIUM: CPT | Performed by: STUDENT IN AN ORGANIZED HEALTH CARE EDUCATION/TRAINING PROGRAM

## 2024-06-18 PROCEDURE — 36415 COLL VENOUS BLD VENIPUNCTURE: CPT | Performed by: STUDENT IN AN ORGANIZED HEALTH CARE EDUCATION/TRAINING PROGRAM

## 2024-06-18 PROCEDURE — 85027 COMPLETE CBC AUTOMATED: CPT | Performed by: STUDENT IN AN ORGANIZED HEALTH CARE EDUCATION/TRAINING PROGRAM

## 2024-06-18 PROCEDURE — 86833 HLA CLASS II HIGH DEFIN QUAL: CPT | Performed by: STUDENT IN AN ORGANIZED HEALTH CARE EDUCATION/TRAINING PROGRAM

## 2024-06-18 PROCEDURE — 80069 RENAL FUNCTION PANEL: CPT | Performed by: STUDENT IN AN ORGANIZED HEALTH CARE EDUCATION/TRAINING PROGRAM

## 2024-06-18 PROCEDURE — 80197 ASSAY OF TACROLIMUS: CPT | Performed by: STUDENT IN AN ORGANIZED HEALTH CARE EDUCATION/TRAINING PROGRAM

## 2024-06-18 PROCEDURE — 85007 BL SMEAR W/DIFF WBC COUNT: CPT | Performed by: STUDENT IN AN ORGANIZED HEALTH CARE EDUCATION/TRAINING PROGRAM

## 2024-06-18 PROCEDURE — 86832 HLA CLASS I HIGH DEFIN QUAL: CPT | Performed by: STUDENT IN AN ORGANIZED HEALTH CARE EDUCATION/TRAINING PROGRAM

## 2024-06-18 NOTE — TELEPHONE ENCOUNTER
----- Message from Summer Pimentel sent at 6/18/2024  3:39 PM CDT -----  Regarding: Consult/Advisory  Contact: [email or web address]     Consult/Advisory     Name Of Caller:[email or web address]        Contact Preference:fax#[phone]     Nature of call:Katiuska is calling stating that she need a a signed order . Patient came in with kit and paper wasn't signed. Requesting  a fax back

## 2024-06-19 LAB — TACROLIMUS BLD-MCNC: 8.8 NG/ML (ref 5–15)

## 2024-06-21 LAB
CLASS I ANTIBODY COMMENTS - LUMINEX: NORMAL
CLASS II ANTIBODIES - LUMINEX: NORMAL
CLASS II ANTIBODY COMMENTS - LUMINEX: NORMAL
DSA1 TESTING DATE: NORMAL
DSA12 TESTING DATE: NORMAL
DSA2 TESTING DATE: NORMAL
SERUM COLLECTION DT - LUMINEX CLASS I: NORMAL
SERUM COLLECTION DT - LUMINEX CLASS II: NORMAL

## 2024-06-23 DIAGNOSIS — E78.2 MIXED HYPERLIPIDEMIA: ICD-10-CM

## 2024-06-23 DIAGNOSIS — Z94.0 KIDNEY REPLACED BY TRANSPLANT: ICD-10-CM

## 2024-06-23 RX ORDER — ATORVASTATIN CALCIUM 80 MG/1
80 TABLET, FILM COATED ORAL NIGHTLY
Qty: 30 TABLET | Refills: 1 | Status: CANCELLED | OUTPATIENT
Start: 2024-06-23

## 2024-06-24 DIAGNOSIS — E78.2 MIXED HYPERLIPIDEMIA: ICD-10-CM

## 2024-06-24 RX ORDER — ATORVASTATIN CALCIUM 80 MG/1
80 TABLET, FILM COATED ORAL NIGHTLY
Qty: 90 TABLET | Refills: 3 | Status: SHIPPED | OUTPATIENT
Start: 2024-06-24

## 2024-06-24 RX ORDER — VALGANCICLOVIR 450 MG/1
450 TABLET, FILM COATED ORAL
Qty: 12 TABLET | Refills: 0 | Status: SHIPPED | OUTPATIENT
Start: 2024-06-24

## 2024-07-03 DIAGNOSIS — Z94.0 KIDNEY REPLACED BY TRANSPLANT: ICD-10-CM

## 2024-07-03 DIAGNOSIS — T86.19 OTHER COMPLICATION OF KIDNEY TRANSPLANT: Primary | ICD-10-CM

## 2024-07-03 RX ORDER — TACROLIMUS 1 MG/1
3 CAPSULE ORAL EVERY 12 HOURS
Qty: 180 CAPSULE | Refills: 11 | Status: SHIPPED | OUTPATIENT
Start: 2024-07-03

## 2024-07-03 NOTE — TELEPHONE ENCOUNTER
Spoke to patient and instructed to lower Prograf to 3 mg twice a day.  Explained the need for repeat labs at [facility] and for a repeat kidney biopsy.  Patient verbalized understanding and did not have any questions.       ----- Message from Iain Pierre Jr., MD sent at 7/3/2024 11:12 AM CDT -----  Quick it. I'm sorry. Thank you. Proceed with repeat DSA allosure next week again. May avoid kidney biopsy, but should probably set it up anyway for 1-2 weeks  ----- Message -----  From: Shanti Prieto RN  Sent: 7/3/2024  11:08 AM CDT  To: Iain Pierre Jr., MD    Allosure was 0.14.  there was an issue and it didn't go through to EPIC.  I will manually enter it.  ----- Message -----  From: Iain Pierre Jr., MD  Sent: 7/3/2024  11:02 AM CDT  [facility]    If true trough, decrease tac from 5/5 to 3/3. Repeat trough and renal panel Monday. Can we check on the allosure from a few weeks ago, not sure what happened to it. Repeat DSA and allosure within 1 week. Schedule for repeat biopsy in 1-2 weeks. Hopefully we will have dsa and allosure results before biopsy

## 2024-07-05 ENCOUNTER — TELEPHONE (OUTPATIENT)
Dept: INTERVENTIONAL RADIOLOGY/VASCULAR | Facility: CLINIC | Age: 64
End: 2024-07-05
Payer: MEDICARE

## 2024-07-08 ENCOUNTER — LAB VISIT (OUTPATIENT)
Dept: LAB | Facility: HOSPITAL | Age: 64
End: 2024-07-08
Attending: STUDENT IN AN ORGANIZED HEALTH CARE EDUCATION/TRAINING PROGRAM
Payer: MEDICARE

## 2024-07-08 DIAGNOSIS — Z94.0 KIDNEY REPLACED BY TRANSPLANT: ICD-10-CM

## 2024-07-08 LAB
ACANTHOCYTES BLD QL SMEAR: PRESENT
ALBUMIN SERPL BCP-MCNC: 3.1 G/DL (ref 3.5–5.2)
ALBUMIN SERPL BCP-MCNC: 3.1 G/DL (ref 3.5–5.2)
ALP SERPL-CCNC: 91 U/L (ref 55–135)
ALT SERPL W/O P-5'-P-CCNC: 23 U/L (ref 10–44)
ANION GAP SERPL CALC-SCNC: 8 MMOL/L (ref 3–11)
ANISOCYTOSIS BLD QL SMEAR: SLIGHT
AST SERPL-CCNC: 13 U/L (ref 10–40)
BASOPHILS NFR BLD: 0 % (ref 0–1.9)
BILIRUB DIRECT SERPL-MCNC: <0.1 MG/DL (ref 0.1–0.3)
BILIRUB SERPL-MCNC: 0.2 MG/DL (ref 0.1–1)
BILIRUB UR QL STRIP: NEGATIVE
BUN SERPL-MCNC: 40 MG/DL (ref 8–23)
CALCIUM SERPL-MCNC: 9.3 MG/DL (ref 8.7–10.5)
CHLORIDE SERPL-SCNC: 105 MMOL/L (ref 95–110)
CHOLEST SERPL-MCNC: 73 MG/DL (ref 120–199)
CHOLEST/HDLC SERPL: 2 {RATIO} (ref 2–5)
CLARITY UR: CLEAR
CO2 SERPL-SCNC: 26 MMOL/L (ref 23–29)
COLOR UR: YELLOW
CREAT SERPL-MCNC: 2.8 MG/DL (ref 0.5–1.4)
DACRYOCYTES BLD QL SMEAR: ABNORMAL
DIFFERENTIAL METHOD BLD: ABNORMAL
EOSINOPHIL NFR BLD: 2 % (ref 0–8)
ERYTHROCYTE [DISTWIDTH] IN BLOOD BY AUTOMATED COUNT: 14.8 % (ref 11.5–14.5)
EST. GFR  (NO RACE VARIABLE): 24.7 ML/MIN/1.73 M^2
GLUCOSE SERPL-MCNC: 136 MG/DL (ref 70–110)
GLUCOSE UR QL STRIP: NEGATIVE
HCT VFR BLD AUTO: 30 % (ref 40–54)
HDLC SERPL-MCNC: 37 MG/DL (ref 40–75)
HDLC SERPL: 50.7 % (ref 20–50)
HGB BLD-MCNC: 10.1 G/DL (ref 14–18)
HGB UR QL STRIP: NEGATIVE
IMM GRANULOCYTES # BLD AUTO: ABNORMAL K/UL (ref 0–0.04)
IMM GRANULOCYTES NFR BLD AUTO: ABNORMAL % (ref 0–0.5)
KETONES UR QL STRIP: NEGATIVE
LDLC SERPL CALC-MCNC: 22.2 MG/DL (ref 63–159)
LEUKOCYTE ESTERASE UR QL STRIP: NEGATIVE
LYMPHOCYTES NFR BLD: 15 % (ref 18–48)
MAGNESIUM SERPL-MCNC: 1.5 MG/DL (ref 1.6–2.6)
MCH RBC QN AUTO: 30.3 PG (ref 27–31)
MCHC RBC AUTO-ENTMCNC: 33.7 G/DL (ref 32–36)
MCV RBC AUTO: 90 FL (ref 82–98)
METAMYELOCYTES NFR BLD MANUAL: 1 %
MONOCYTES NFR BLD: 6 % (ref 4–15)
MYELOCYTES NFR BLD MANUAL: 1 %
NEUTROPHILS NFR BLD: 71 % (ref 38–73)
NEUTS BAND NFR BLD MANUAL: 4 %
NITRITE UR QL STRIP: NEGATIVE
NONHDLC SERPL-MCNC: 36 MG/DL
NRBC BLD-RTO: 0 /100 WBC
OVALOCYTES BLD QL SMEAR: ABNORMAL
PH UR STRIP: 6 [PH] (ref 5–8)
PHOSPHATE SERPL-MCNC: 3.5 MG/DL (ref 2.7–4.5)
PLATELET # BLD AUTO: 268 K/UL (ref 150–450)
PMV BLD AUTO: 11.1 FL (ref 9.2–12.9)
POTASSIUM SERPL-SCNC: 4.6 MMOL/L (ref 3.5–5.1)
PROT SERPL-MCNC: 6.7 G/DL (ref 6–8.4)
PROT UR QL STRIP: NEGATIVE
RBC # BLD AUTO: 3.33 M/UL (ref 4.6–6.2)
SCHISTOCYTES BLD QL SMEAR: PRESENT
SODIUM SERPL-SCNC: 139 MMOL/L (ref 136–145)
SP GR UR STRIP: 1.01 (ref 1–1.03)
TRIGL SERPL-MCNC: 69 MG/DL (ref 30–150)
URN SPEC COLLECT METH UR: NORMAL
UROBILINOGEN UR STRIP-ACNC: NEGATIVE EU/DL
WBC # BLD AUTO: 5.62 K/UL (ref 3.9–12.7)

## 2024-07-08 PROCEDURE — 86832 HLA CLASS I HIGH DEFIN QUAL: CPT | Performed by: STUDENT IN AN ORGANIZED HEALTH CARE EDUCATION/TRAINING PROGRAM

## 2024-07-08 PROCEDURE — 84450 TRANSFERASE (AST) (SGOT): CPT | Performed by: STUDENT IN AN ORGANIZED HEALTH CARE EDUCATION/TRAINING PROGRAM

## 2024-07-08 PROCEDURE — 81003 URINALYSIS AUTO W/O SCOPE: CPT | Performed by: STUDENT IN AN ORGANIZED HEALTH CARE EDUCATION/TRAINING PROGRAM

## 2024-07-08 PROCEDURE — 36415 COLL VENOUS BLD VENIPUNCTURE: CPT | Performed by: STUDENT IN AN ORGANIZED HEALTH CARE EDUCATION/TRAINING PROGRAM

## 2024-07-08 PROCEDURE — 83735 ASSAY OF MAGNESIUM: CPT | Performed by: STUDENT IN AN ORGANIZED HEALTH CARE EDUCATION/TRAINING PROGRAM

## 2024-07-08 PROCEDURE — 85007 BL SMEAR W/DIFF WBC COUNT: CPT | Performed by: STUDENT IN AN ORGANIZED HEALTH CARE EDUCATION/TRAINING PROGRAM

## 2024-07-08 PROCEDURE — 86833 HLA CLASS II HIGH DEFIN QUAL: CPT | Performed by: STUDENT IN AN ORGANIZED HEALTH CARE EDUCATION/TRAINING PROGRAM

## 2024-07-08 PROCEDURE — 86977 RBC SERUM PRETX INCUBJ/INHIB: CPT | Performed by: STUDENT IN AN ORGANIZED HEALTH CARE EDUCATION/TRAINING PROGRAM

## 2024-07-08 PROCEDURE — 80061 LIPID PANEL: CPT | Performed by: STUDENT IN AN ORGANIZED HEALTH CARE EDUCATION/TRAINING PROGRAM

## 2024-07-08 PROCEDURE — 80069 RENAL FUNCTION PANEL: CPT | Performed by: STUDENT IN AN ORGANIZED HEALTH CARE EDUCATION/TRAINING PROGRAM

## 2024-07-08 PROCEDURE — 80197 ASSAY OF TACROLIMUS: CPT | Performed by: STUDENT IN AN ORGANIZED HEALTH CARE EDUCATION/TRAINING PROGRAM

## 2024-07-08 PROCEDURE — 85027 COMPLETE CBC AUTOMATED: CPT | Performed by: STUDENT IN AN ORGANIZED HEALTH CARE EDUCATION/TRAINING PROGRAM

## 2024-07-09 LAB — TACROLIMUS BLD-MCNC: 4.3 NG/ML (ref 5–15)

## 2024-07-10 ENCOUNTER — PATIENT MESSAGE (OUTPATIENT)
Dept: TRANSPLANT | Facility: CLINIC | Age: 64
End: 2024-07-10
Payer: MEDICARE

## 2024-07-10 DIAGNOSIS — Z94.0 KIDNEY REPLACED BY TRANSPLANT: ICD-10-CM

## 2024-07-10 RX ORDER — TACROLIMUS 1 MG/1
4 CAPSULE ORAL EVERY 12 HOURS
Qty: 240 CAPSULE | Refills: 11 | Status: SHIPPED | OUTPATIENT
Start: 2024-07-10

## 2024-07-10 NOTE — PROGRESS NOTES
Kidney Post-Transplant Assessment    Referring Physician: Cathryn Adair  Current Nephrologist: Cathryn Adair    ORGAN: RIGHT KIDNEY  Donor Type: donation after circulatory death   PHS Increased Risk: no  Cold Ischemia: 1,030 mins  Induction Medications: thymo    Subjective:     CC:  Reassessment of renal allograft function and management of chronic immunosuppression.    HPI:  Mr. Ha is a 63 y.o. year old Black or  male with history of ESRD secondary to DM who received a donation after circulatory death  kidney transplant on 4/14/24. His most recent creatinine is 2.9. He takes mycophenolic acid, prednisone, and tacrolimus for maintenance immunosuppression. His post transplant course has been complicated by delayed graft function requiring dialysis (resolved).    KIDNEY (PERCUTANEOUS TRANSPLANT BIOPSY) 5/31/2024:  VERY LIMITED SAMPLE;   1)  INADEQUATE FOR EXCLUDING ACUTE T-CELL MEDIATED REJECTION.   2)  MILD ACUTE TUBULAR INJURY.   3)  MODERATE-TO-SEVERE ARTERIOSCLEROSIS, DONOR-DERIVED (SEE COMMENT).       Still with elevated creatinine, repeat biopsy scheduled 7/18.    Here today with wife and 2 young grandchildren who have been keeping him busy. Staying hydrated, reports no problems with urination. Home -140 and blood sugars stable. Appetite good, weight stable. Tolerating IS without issues, no diarrhea or vomiting. He would like new rx for imodium to have on hand in case he gets diarrhea.     Current Outpatient Medications   Medication Sig Dispense Refill    aspirin (ECOTRIN) 81 MG EC tablet Take 1 tablet (81 mg total) by mouth once daily.      atorvastatin (LIPITOR) 80 MG tablet Take 1 tablet (80 mg total) by mouth every evening. 90 tablet 3    blood-glucose meter,continuous (DEXCOM G7 ) Misc Use to check glucose with sensors 1 each 0    blood-glucose sensor (DEXCOM G7 SENSOR) Lois 1 Device by Misc.(Non-Drug; Combo Route) route every 10 days. 9 each 3    calcitRIOL  "(ROCALTROL) 0.25 MCG Cap Take 1 capsule (0.25 mcg total) by mouth once daily. 30 capsule 5    carvediloL (COREG) 25 MG tablet Take 1 tablet (25 mg total) by mouth 2 (two) times daily. 60 tablet 11    chlorthalidone (HYGROTEN) 25 MG Tab Take 0.5 tablets (12.5 mg total) by mouth once daily. Take half a tablet by mouth once a day. 15 tablet 11    cholecalciferol, vitamin D3, (VITAMIN D3) 125 mcg (5,000 unit) Tab Take 1 tablet (5,000 Units total) by mouth once daily. 30 tablet 5    divalproex (DEPAKOTE) 250 MG EC tablet Take 250 mg by mouth 2 (two) times daily.      docusate sodium (COLACE) 100 MG capsule Take 100 mg by mouth 3 (three) times daily as needed for Constipation.      dorzolamide-timolol 2-0.5% (COSOPT) 22.3-6.8 mg/mL ophthalmic solution Place 1 drop into the left eye 2 (two) times daily. 10 mL 5    gabapentin (NEURONTIN) 300 MG capsule Take 1 capsule (300 mg total) by mouth every evening. 90 capsule 3    glucagon 3 mg/actuation Spry 1 each by Nasal route as needed. 1 each 11    HYDROcodone-acetaminophen (NORCO) 5-325 mg per tablet Take 1 tablet by mouth every 12 (twelve) hours as needed for Pain. 60 tablet 0    insulin aspart U-100 (NOVOLOG U-100 INSULIN ASPART) 100 unit/mL injection Inject 6 Units into the skin 3 (three) times daily before meals. + sliding scale.  MDD 36 units/day 10 mL 5    insulin glargine U-100, Lantus, 100 unit/mL injection Inject 11 Units into the skin every evening. (Patient taking differently: Inject 15-22 Units into the skin every evening.) 10 mL 5    insulin syringe-needle U-100 (BD INSULIN SYRINGE ULTRA-FINE) 1 mL 30 gauge x 1/2" Syrg 1 each by Misc.(Non-Drug; Combo Route) route 4 (four) times daily with meals and nightly. 100 each 1    k phos di & mono-sod phos mono (K-PHOS-NEUTRAL) 250 mg Tab Take 1 tablet by mouth every 12 (twelve) hours. 60 tablet 11    mycophenolate sodium 180 MG TbEC Take 4 tablets (720 mg total) by mouth 2 (two) times daily. (Patient taking differently: " Take 1,000 mg by mouth 2 (two) times daily.) 240 tablet 11    NIFEdipine (PROCARDIA-XL) 30 MG (OSM) 24 hr tablet Take 2 tablets (60 mg total) by mouth 2 (two) times a day. (Patient taking differently: Take 30 mg by mouth once daily.) 60 tablet 11    ondansetron (ZOFRAN-ODT) 4 MG TbDL Take 1 tablet (4 mg total) by mouth every 6 (six) hours as needed (Nausea). 30 tablet 0    predniSONE (DELTASONE) 5 MG tablet Take by mouth daily; 4/17/2024-4/23/2024: 20 mg, 4/24/2024-4/30/2024: 15 mg; 5/1/2024-5/7/2024: 10 mg; 5/8/2024- forever: 5 mg; do not stop (Patient taking differently: Take 5 mg by mouth once daily. Take by mouth daily; 4/17/2024-4/23/2024: 20 mg, 4/24/2024-4/30/2024: 15 mg; 5/1/2024-5/7/2024: 10 mg; 5/8/2024- forever: 5 mg; do not stop) 70 tablet 11    sodium bicarbonate 650 MG tablet Take 2 tablets (1,300 mg total) by mouth 2 (two) times daily. 120 tablet 11    sulfamethoxazole-trimethoprim 400-80mg (BACTRIM,SEPTRA) 400-80 mg per tablet Take 1 tablet by mouth every Mon, Wed, Fri. Stop 10/11/24 12 tablet 5    tacrolimus (PROGRAF) 1 MG Cap Take 4 capsules (4 mg total) by mouth every 12 (twelve) hours. 240 capsule 11    tamsulosin (FLOMAX) 0.4 mg Cap Take 1 capsule (0.4 mg total) by mouth once daily. 90 capsule 3    valGANciclovir (VALCYTE) 450 mg Tab Take 1 tablet (450 mg total) by mouth every Mon, Wed, Fri. Stop 7/13/24 12 tablet 0    loperamide (IMODIUM) 2 mg capsule Take 2 mg after each bowel movement up to 8 times a day.  Stop if no bowel movement. 40 capsule 0     No current facility-administered medications for this visit.     Facility-Administered Medications Ordered in Other Visits   Medication Dose Route Frequency Provider Last Rate Last Admin    tamsulosin 24 hr capsule 0.4 mg  1 capsule Oral Daily Lorelei Cha PA-C   0.4 mg at 04/18/24 0840       Past Medical History:   Diagnosis Date    Anemia     Aqueous misdirection of right eye     Arthritis     Blind painful eye     Cataract     CKD  "(chronic kidney disease)     Diabetes mellitus, type 2     Disorder of kidney and ureter     Fever blister     GERD (gastroesophageal reflux disease)     Hyperlipidemia     Hypertension     Joint pain     Neuropathy        Review of Systems   Constitutional:  Negative for activity change and fever.   Eyes:  Positive for visual disturbance.        Wears glasses   Respiratory:  Negative for cough and shortness of breath.    Cardiovascular:  Negative for chest pain and leg swelling.   Gastrointestinal:  Negative for abdominal pain, constipation, diarrhea and nausea.   Genitourinary:  Negative for difficulty urinating, frequency and hematuria.   Musculoskeletal:  Negative for arthralgias and myalgias.   Skin:  Negative for wound.   Allergic/Immunologic: Positive for immunocompromised state.   Neurological:  Negative for weakness.   Psychiatric/Behavioral:  Negative for sleep disturbance.        Objective:   Blood pressure (!) 169/76, pulse 75, temperature 97.3 °F (36.3 °C), temperature source Temporal, resp. rate 16, height 5' 7" (1.702 m), weight 81.5 kg (179 lb 10.8 oz), SpO2 100%.body mass index is 28.14 kg/m².    Physical Exam  Vitals and nursing note reviewed.   Constitutional:       Appearance: Normal appearance.   Cardiovascular:      Rate and Rhythm: Normal rate and regular rhythm.      Heart sounds: Normal heart sounds.   Pulmonary:      Effort: Pulmonary effort is normal.      Breath sounds: Normal breath sounds.   Abdominal:      General: There is no distension.   Musculoskeletal:         General: Normal range of motion.   Skin:     General: Skin is warm and dry.   Neurological:      Mental Status: He is alert.         Labs:  Lab Results   Component Value Date    WBC 5.62 07/08/2024    HGB 10.1 (L) 07/08/2024    HCT 30.0 (L) 07/08/2024     07/08/2024    K 4.6 07/08/2024     07/08/2024    CO2 26 07/08/2024    BUN 40 (H) 07/08/2024    CREATININE 2.8 (H) 07/08/2024    EGFRNORACEVR 24.7 (A) 07/08/2024 "    CALCIUM 9.3 07/08/2024    PHOS 3.5 07/08/2024    MG 1.5 (L) 07/08/2024    ALBUMIN 3.1 (L) 07/08/2024    ALBUMIN 3.1 (L) 07/08/2024    AST 13 07/08/2024    ALT 23 07/08/2024    UTPCR 0.07 07/02/2024    .1 (H) 07/28/2023    TACROLIMUS 4.3 (L) 07/08/2024       Labs were reviewed with the patient    Assessment:     1. Kidney replaced by transplant    2. Long-term use of immunosuppressant medication    3. Delayed graft function of kidney    4. Renovascular hypertension    5. At risk for opportunistic infections    6. Diarrhea, unspecified type      Plan:   Creatinine remains elevated, scheduled for biopsy 7/18. Discussed possible belatacept in the future for GFR preservation   Rx sent for imodium-currently without diarrhea, would like to have just in case      1. Immunosuppression: Prograf trough 4.3, which is SUBtherapeutic (goal 8-10). Continue Prograf 4/4 (dose increased 7/9/2024), MyF 720 mg BID, and Prednisone 5 mg QD. Will continue to monitor for drug toxicities    2. Allograft Function: creatinine remains elevated, scheduled for biopsy 7/18. Discussed possible belatacept in the future for GFR preservation   - ESRD secondary to DM s/p donation after circulatory death  kidney transplant on 4/14/24.   - post transplant with DGF, resolved   Lab Results   Component Value Date    CREATININE 2.8 (H) 07/08/2024      Latest Reference Range & Units POD 85,  Kidney-Post 1 Year  07/08/24   eGFR >60 mL/min/1.73 m^2 24.7 !       3. Hypertension management: advise low salt diet and home BP monitoring      4. Metabolic Bone Disease/Secondary Hyperparathyroidism:  Lab Results   Component Value Date    .1 (H) 07/28/2023    CALCIUM 9.3 07/08/2024    PHOS 3.5 07/08/2024      Latest Reference Range & Units POD 85,  Kidney-Post 1 Year  07/08/24   Magnesium  1.6 - 2.6 mg/dL 1.5 (L)         5. Electrolytes: stable. No need for intervention   Lab Results   Component Value Date     07/08/2024    K 4.6 07/08/2024      07/08/2024    CO2 26 07/08/2024       6. Anemia: stable, no need for intervention  Lab Results   Component Value Date    WBC 5.62 07/08/2024    HGB 10.1 (L) 07/08/2024    HCT 30.0 (L) 07/08/2024    MCV 90 07/08/2024     07/08/2024         7. Proteinuria: continue p/c ratio as per guidelines   [facility] 7/2/2024: 0.07    8. BK virus infection screening:  BK PCR per protocol   BK PCR 7/2/2024: not detected    9. Weight education: provided during the clinic visit   Body mass index is 28.14 kg/m².     10. Patient safety education regarding immunosuppression including prophylaxis posttransplant for CMV, PCP  PCP prophylaxis: bactrim (stop 10/11/2024)  CMV prophylaxis: valcyte (stop 7/13/2024)              Follow-up:   Clinic: return to transplant clinic weekly for the first month after transplant; every 2 weeks during months 2-3; then at 6-, 9-, 12-, 18-, 24-, and 36- months post-transplant to reassess for complications from immunosuppression toxicity and monitor for rejection.  Annually thereafter.    Labs: since patient remains at high risk for rejection and drug-related complications that warrant close monitoring, labs will be ordered as follows: continue twice weekly CBC, renal panel, and drug level for first month; then same labs once weekly through 3rd month post-transplant.  Urine for UA and protein/creatinine ratio monthly.  Serum BK - PCR at 1-, 3-, 6-, 9-, 12-, 18-, 24-, 36-, 48-, and 60 months post-transplant.  Hepatic panel at 1-, 2-, 3-, 6-, 9-, 12-, 18-, 24-, and 36- months post-transplant.    Samia Wilkinson NP

## 2024-07-10 NOTE — TELEPHONE ENCOUNTER
Messaged pt and instructed him to increase Prograf to 4 mg twice a day starting this morning.  Asked pt to contact us if he has any questions or concerns.    ----- Message -----  From: Iain Pierre Jr., MD  Sent: 7/9/2024   5:26 PM CDT  To: [facility] Post-Kidney Transplant Clinical    Tac low. If true trough please increase dose from 3/3 to 4/4 and repeat in 7-10 days. I havent talked with him about it, but he will be a good roberto candidate for gfr preservation .

## 2024-07-11 LAB
ALLOSURE COMMENT: NORMAL
ALLOSURE SCORE KIDNEY: NORMAL
CLASS I ANTIBODY COMMENTS - LUMINEX: NORMAL
CLASS II ANTIBODY COMMENTS - LUMINEX: NORMAL
DSA1 TESTING DATE: NORMAL
DSA12 TESTING DATE: NORMAL
DSA2 TESTING DATE: NORMAL
SERUM COLLECTION DT - LUMINEX CLASS I: NORMAL
SERUM COLLECTION DT - LUMINEX CLASS II: NORMAL

## 2024-07-12 ENCOUNTER — OFFICE VISIT (OUTPATIENT)
Dept: TRANSPLANT | Facility: CLINIC | Age: 64
End: 2024-07-12
Payer: MEDICARE

## 2024-07-12 VITALS
HEART RATE: 75 BPM | BODY MASS INDEX: 28.2 KG/M2 | WEIGHT: 179.69 LBS | HEIGHT: 67 IN | DIASTOLIC BLOOD PRESSURE: 76 MMHG | SYSTOLIC BLOOD PRESSURE: 169 MMHG | TEMPERATURE: 97 F | OXYGEN SATURATION: 100 % | RESPIRATION RATE: 16 BRPM

## 2024-07-12 DIAGNOSIS — Z91.89 AT RISK FOR OPPORTUNISTIC INFECTIONS: ICD-10-CM

## 2024-07-12 DIAGNOSIS — T86.19 DELAYED GRAFT FUNCTION OF KIDNEY: ICD-10-CM

## 2024-07-12 DIAGNOSIS — Z94.0 KIDNEY REPLACED BY TRANSPLANT: Primary | ICD-10-CM

## 2024-07-12 DIAGNOSIS — Z79.60 LONG-TERM USE OF IMMUNOSUPPRESSANT MEDICATION: ICD-10-CM

## 2024-07-12 DIAGNOSIS — I15.0 RENOVASCULAR HYPERTENSION: ICD-10-CM

## 2024-07-12 DIAGNOSIS — R19.7 DIARRHEA, UNSPECIFIED TYPE: ICD-10-CM

## 2024-07-12 PROCEDURE — 99999 PR PBB SHADOW E&M-EST. PATIENT-LVL III: CPT | Mod: PBBFAC,,, | Performed by: NURSE PRACTITIONER

## 2024-07-12 RX ORDER — LOPERAMIDE HYDROCHLORIDE 2 MG/1
CAPSULE ORAL
Qty: 40 CAPSULE | Refills: 0 | Status: SHIPPED | OUTPATIENT
Start: 2024-07-12

## 2024-07-12 NOTE — LETTER
July 12, 2024        Cathryn Adair  [address]  [place] [address]  Phone: [phone]  Fax: [phone]             [facility]  [address]  [facility] [address]  Phone: [phone]   Patient: Murphy Ha   MR Number: [id number]   YOB: 1960   Date of Visit: 7/12/2024       Dear Dr. Cathryn Adair    Thank you for referring Murphy Ha to me for evaluation. Attached you will find relevant portions of my assessment and plan of care.    If you have questions, please do not hesitate to call me. I look forward to following Murphy Ha along with you.    Sincerely,    Samia Wilkinson, NAI    Enclosure    If you would like to receive this communication electronically, please contact [email or web address] or [phone] to request [employer] access.    [employer] is a tool which provides read-only access to select patient information with whom you have a relationship. Its easy to use and provides real time access to review your patients record including encounter summaries, notes, results, and demographic information.    If you feel you have received this communication in error or would no longer like to receive these types of communications, please e-mail [email or web address]

## 2024-07-16 ENCOUNTER — PATIENT MESSAGE (OUTPATIENT)
Dept: INTERVENTIONAL RADIOLOGY/VASCULAR | Facility: HOSPITAL | Age: 64
End: 2024-07-16
Payer: MEDICARE

## 2024-07-16 NOTE — NURSING
Pre-procedure call complete.  2 patient identifier used (name and ).  Pt instructed not to eat or drink anything after midnight the night before procedure.  Pt aware will need someone to provide transport home and monitor pt 8 hours post procedure.  No driving for at least 24 hours after procedure.   Patient advised to take blood pressure, heart medications, seizure and anti-rejections medications with a sip of water morning of procedure.  Patient verbalized aware of which medications to take.  Do not take sleep medication (including OTC) the night before procedure.  Arrival time and location given.  Expected length of stay reviewed.  Covid screening completed.  Pt verbalized understanding of all pre-procedure instructions.  Written instructions and directions sent to patient in [facility]/portal.

## 2024-07-18 ENCOUNTER — HOSPITAL ENCOUNTER (OUTPATIENT)
Dept: INTERVENTIONAL RADIOLOGY/VASCULAR | Facility: HOSPITAL | Age: 64
Discharge: HOME | End: 2024-07-18
Attending: STUDENT IN AN ORGANIZED HEALTH CARE EDUCATION/TRAINING PROGRAM
Payer: MEDICARE

## 2024-07-18 DIAGNOSIS — T86.19 OTHER COMPLICATION OF KIDNEY TRANSPLANT: Primary | ICD-10-CM

## 2024-07-18 DIAGNOSIS — Z79.01 LONG TERM CURRENT USE OF ANTICOAGULANT: ICD-10-CM

## 2024-07-18 LAB — POCT GLUCOSE: 76 MG/DL (ref 70–110)

## 2024-07-18 PROCEDURE — 25000003 PHARM REV CODE 250

## 2024-07-18 PROCEDURE — 50200 RENAL BIOPSY PERQ: CPT | Mod: RT | Performed by: STUDENT IN AN ORGANIZED HEALTH CARE EDUCATION/TRAINING PROGRAM

## 2024-07-18 PROCEDURE — 63600175 PHARM REV CODE 636 W HCPCS: Performed by: STUDENT IN AN ORGANIZED HEALTH CARE EDUCATION/TRAINING PROGRAM

## 2024-07-18 PROCEDURE — 82962 GLUCOSE BLOOD TEST: CPT

## 2024-07-18 PROCEDURE — 99152 MOD SED SAME PHYS/QHP 5/>YRS: CPT | Performed by: STUDENT IN AN ORGANIZED HEALTH CARE EDUCATION/TRAINING PROGRAM

## 2024-07-18 PROCEDURE — 25000003 PHARM REV CODE 250: Performed by: STUDENT IN AN ORGANIZED HEALTH CARE EDUCATION/TRAINING PROGRAM

## 2024-07-18 PROCEDURE — 76942 ECHO GUIDE FOR BIOPSY: CPT | Mod: TC | Performed by: STUDENT IN AN ORGANIZED HEALTH CARE EDUCATION/TRAINING PROGRAM

## 2024-07-18 PROCEDURE — 63600175 PHARM REV CODE 636 W HCPCS: Mod: JG

## 2024-07-18 RX ORDER — LIDOCAINE HYDROCHLORIDE 10 MG/ML
1 INJECTION, SOLUTION EPIDURAL; INFILTRATION; INTRACAUDAL; PERINEURAL ONCE
Status: DISCONTINUED | OUTPATIENT
Start: 2024-07-18 | End: 2024-07-19 | Stop reason: HOSPADM

## 2024-07-18 RX ORDER — LIDOCAINE HYDROCHLORIDE 10 MG/ML
INJECTION INFILTRATION; PERINEURAL
Status: COMPLETED | OUTPATIENT
Start: 2024-07-18 | End: 2024-07-18

## 2024-07-18 RX ORDER — ONDANSETRON HYDROCHLORIDE 2 MG/ML
4 INJECTION, SOLUTION INTRAVENOUS EVERY 6 HOURS PRN
Status: DISCONTINUED | OUTPATIENT
Start: 2024-07-18 | End: 2024-07-19 | Stop reason: HOSPADM

## 2024-07-18 RX ORDER — SODIUM CHLORIDE 9 MG/ML
INJECTION, SOLUTION INTRAVENOUS CONTINUOUS
Status: DISCONTINUED | OUTPATIENT
Start: 2024-07-18 | End: 2024-07-19 | Stop reason: HOSPADM

## 2024-07-18 RX ORDER — MIDAZOLAM HYDROCHLORIDE 1 MG/ML
INJECTION, SOLUTION INTRAMUSCULAR; INTRAVENOUS
Status: COMPLETED | OUTPATIENT
Start: 2024-07-18 | End: 2024-07-18

## 2024-07-18 RX ORDER — FENTANYL CITRATE 50 UG/ML
INJECTION, SOLUTION INTRAMUSCULAR; INTRAVENOUS
Status: COMPLETED | OUTPATIENT
Start: 2024-07-18 | End: 2024-07-18

## 2024-07-18 RX ADMIN — MIDAZOLAM HYDROCHLORIDE 0.5 MG: 2 INJECTION, SOLUTION INTRAMUSCULAR; INTRAVENOUS at 01:07

## 2024-07-18 RX ADMIN — FENTANYL CITRATE 25 MCG: 50 INJECTION, SOLUTION INTRAMUSCULAR; INTRAVENOUS at 01:07

## 2024-07-18 RX ADMIN — DESMOPRESSIN ACETATE 12.22 MCG: 4 SOLUTION INTRAVENOUS at 12:07

## 2024-07-18 RX ADMIN — LIDOCAINE HYDROCHLORIDE 5 ML: 10 INJECTION, SOLUTION INFILTRATION; PERINEURAL at 01:07

## 2024-07-18 RX ADMIN — FENTANYL CITRATE 50 MCG: 50 INJECTION, SOLUTION INTRAMUSCULAR; INTRAVENOUS at 01:07

## 2024-07-18 RX ADMIN — MIDAZOLAM HYDROCHLORIDE 1 MG: 2 INJECTION, SOLUTION INTRAMUSCULAR; INTRAVENOUS at 01:07

## 2024-07-18 NOTE — PLAN OF CARE
IR recovery completed. NAD noted. Dressing CDI. Discharge instructions reviewed and acknowledged by patient and family. Questions and concerns encouraged and answered. PIV discontinued. Patient discharged via ambulation, accompanied by wife.

## 2024-07-18 NOTE — DISCHARGE INSTRUCTIONS
Questions or Concerns   - [facility] [phone] (MON-FRI 8 AM- 5PM).   - Radiology Resident on call [phone] (Weekends and After hours).  - IR Clinic [phone]

## 2024-07-18 NOTE — PLAN OF CARE
Pt arrived to  for tx kidney biopsy. Pt oriented to unit and staff. Plan of care reviewed with patient, patient verbalizes understanding. Comfort measures utilized. Blankets applied. Pt prepped and draped utilizing standard sterile technique. Patient placed on continuous monitoring, as required by sedation policy. Timeouts completed utilizing standard universal time-out, per department and facility policy. RN to remain at bedside, continuous monitoring maintained. Pt resting comfortably. Denies pain/discomfort. Will continue to monitor. See flow sheets for monitoring, medication administration, and updates.

## 2024-07-18 NOTE — PLAN OF CARE
Patient arrived to room. PIV placed. Admit assessment completed. Plan of care discussed with patient. Waiting for DDAVP per pharmacy/ Dr Schmidt IR fellow at bedside and OK if patient has it when med is ready. Right eye prosthesis in place.

## 2024-07-18 NOTE — PLAN OF CARE
Patient arrived to [facility]PU for post procedure, tx kidney biopsy, recovery. Report received from JESSICA Sheffield. NAD noted. Dressing CDI. VSS, afebrile. Patient placed on cardiac and continuous monitoring. Bed at lowest position, side rails x2, call light and belongings within reach.

## 2024-07-18 NOTE — PLAN OF CARE
Tx kidney biopsy complete. Pt tolerated well. VSS. No signs or symptoms of distress noted.  RLQ site CDI.  Pt will be transferred to recovery bed and report to be given at bedside.

## 2024-07-18 NOTE — H&P
Radiology History & Physical      SUBJECTIVE:     Chief Complaint: Kidney dysfunction    History of Present Illness:  Murphy Ha is a 63 y.o. male who presents for txp kidney biopsy  Past Medical History:   Diagnosis Date    Anemia     Aqueous misdirection of right eye     Arthritis     Blind painful eye     Cataract     CKD (chronic kidney disease)     Diabetes mellitus, type 2     Disorder of kidney and ureter     Fever blister     GERD (gastroesophageal reflux disease)     Hyperlipidemia     Hypertension     Joint pain     Neuropathy      Past Surgical History:   Procedure Laterality Date    AIR FLUID EXCHANGE OF EYE Left 6/24/2022    Procedure: AIR FLUID EXCHANGE, EYE;  Surgeon: Arun Veronica MD;  Location: [facility];  Service: Ophthalmology;  Laterality: Left;    CATARACT EXTRACTION Left 8/27/2014    COLONOSCOPY N/A 9/8/2016    Procedure: COLONOSCOPY;  Surgeon: Luis Bogran-Reyes, MD;  Location: [facility];  Service: Endoscopy;  Laterality: N/A;    COLONOSCOPY N/A 11/10/2023    Procedure: COLONOSCOPY;  Surgeon: Nomi Del Real MD;  Location: [facility];  Service: Endoscopy;  Laterality: N/A;    CONJUNCTIVOPLASTY Right 6/17/2020    Procedure: CONJUNCTIVOPLASTY;  Surgeon: Myrna Alba MD;  Location: [facility];  Service: Ophthalmology;  Laterality: Right;    ENDOLASER PHOTOCOAGULATION Left 6/24/2022    Procedure: PHOTOCOAGULATION, ENDOLASER;  Surgeon: Arun Veronica MD;  Location: [facility];  Service: Ophthalmology;  Laterality: Left;    ENUCLEATION Right 6/17/2020    Procedure: ENUCLEATION, EYE;  Surgeon: Myrna Alba MD;  Location: [facility];  Service: Ophthalmology;  Laterality: Right;    EXPLORATION OF ORBIT Right 6/17/2020    Procedure: EXPLORATION, ORBIT;  Surgeon: Myrna Alba MD;  Location: [facility];  Service: Ophthalmology;  Laterality: Right;    EYE SURGERY      INSERTION OF TUNNELED CENTRAL VENOUS HEMODIALYSIS CATHETER Right 4/18/2024    Procedure: Insertion, Catheter,  Central Venous, Hemodialysis;  Surgeon: Sobeida Morales MD;  Location: [facility] CATH LAB;  Service: Interventional Nephrology;  Laterality: Right;    KIDNEY TRANSPLANT N/A 4/14/2024    Procedure: TRANSPLANT, KIDNEY;  Surgeon: Trent Burt MD;  Location: [facility] OR [facility] FLR;  Service: Transplant;  Laterality: N/A;    REMOVAL OF TUNNELED CENTRAL VENOUS CATHETER (CVC) N/A 5/24/2024    Procedure: REMOVAL, CATHETER, CENTRAL VENOUS, TUNNELED;  Surgeon: Sobeida Morales MD;  Location: [facility] CATH LAB;  Service: Interventional Nephrology;  Laterality: N/A;    TARSORRHAPHY Right 6/17/2020    Procedure: BLEPHARORRHAPHY;  Surgeon: Myrna Alba MD;  Location: [facility];  Service: Ophthalmology;  Laterality: Right;    VITRECTOMY Right     VITRECTOMY BY PARS PLANA APPROACH Left 6/24/2022    Procedure: VITRECTOMY, PARS PLANA APPROACH;  Surgeon: Arun Veronica MD;  Location: [facility];  Service: Ophthalmology;  Laterality: Left;       Home Meds:   Prior to Admission medications    Medication Sig Start Date End Date Taking? Authorizing Provider   aspirin (ECOTRIN) 81 MG EC tablet Take 1 tablet (81 mg total) by mouth once daily. 5/27/24 5/27/25 Yes Iain Pierre Jr., MD   atorvastatin (LIPITOR) 80 MG tablet Take 1 tablet (80 mg total) by mouth every evening. 6/24/24  Yes Amber Abraham MD   blood-glucose meter,continuous (DEXCOM G7 ) Misc Use to check glucose with sensors 7/31/23  Yes Winsome Berrios NP   blood-glucose sensor (DEXCOM G7 SENSOR) Lois 1 Device by Misc.(Non-Drug; Combo Route) route every 10 days. 7/31/23 7/30/24 Yes Winsome Berrios NP   calcitRIOL (ROCALTROL) 0.25 MCG Cap Take 1 capsule (0.25 mcg total) by mouth once daily. 5/6/24  Yes Iain Pierre Jr., MD   carvediloL (COREG) 25 MG tablet Take 1 tablet (25 mg total) by mouth 2 (two) times daily. 4/18/24  Yes Angela Powell MD   chlorthalidone (HYGROTEN) 25 MG Tab Take 0.5 tablets (12.5 mg total) by mouth once daily.  "Take half a tablet by mouth once a day. 5/9/24  Yes Iain Pierre Jr., MD   cholecalciferol, vitamin D3, (VITAMIN D3) 125 mcg (5,000 unit) Tab Take 1 tablet (5,000 Units total) by mouth once daily. 5/6/24  Yes Iain Pierre Jr., MD   divalproex (DEPAKOTE) 250 MG EC tablet Take 250 mg by mouth 2 (two) times daily. 4/21/23  Yes Provider, Tayla   dorzolamide-timolol 2-0.5% (COSOPT) 22.3-6.8 mg/mL ophthalmic solution Place 1 drop into the left eye 2 (two) times daily. 5/9/23  Yes Nahun Walsh MD   gabapentin (NEURONTIN) 300 MG capsule Take 1 capsule (300 mg total) by mouth every evening. 2/16/24 2/10/25 Yes Luis Alfredo Norton MD   HYDROcodone-acetaminophen (NORCO) 5-325 mg per tablet Take 1 tablet by mouth every 12 (twelve) hours as needed for Pain. 6/26/24  Yes Linwood Trevino MD   insulin aspart U-100 (NOVOLOG U-100 INSULIN ASPART) 100 unit/mL injection Inject 6 Units into the skin 3 (three) times daily before meals. + sliding scale.  MDD 36 units/day 4/18/24  Yes Angela Powell MD   insulin glargine U-100, Lantus, 100 unit/mL injection Inject 11 Units into the skin every evening.  Patient taking differently: Inject 15-22 Units into the skin every evening. 4/18/24  Yes Angela Powell MD   insulin syringe-needle U-100 (BD INSULIN SYRINGE ULTRA-FINE) 1 mL 30 gauge x 1/2" Syrg 1 each by Misc.(Non-Drug; Combo Route) route 4 (four) times daily with meals and nightly. 4/18/24  Yes Angela Powell MD   mycophenolate sodium 180 MG TbEC Take 4 tablets (720 mg total) by mouth 2 (two) times daily.  Patient taking differently: Take 1,000 mg by mouth 2 (two) times daily. 5/13/24  Yes Iain Pierre Jr., MD   NIFEdipine (PROCARDIA-XL) 30 MG (OSM) 24 hr tablet Take 2 tablets (60 mg total) by mouth 2 (two) times a day.  Patient taking differently: Take 30 mg by mouth once daily. 4/22/24  Yes Fonseca, Stacy RAZO PA-C   predniSONE (DELTASONE) 5 MG tablet Take by mouth daily; " 4/17/2024-4/23/2024: 20 mg, 4/24/2024-4/30/2024: 15 mg; 5/1/2024-5/7/2024: 10 mg; 5/8/2024- forever: 5 mg; do not stop  Patient taking differently: Take 5 mg by mouth once daily. Take by mouth daily; 4/17/2024-4/23/2024: 20 mg, 4/24/2024-4/30/2024: 15 mg; 5/1/2024-5/7/2024: 10 mg; 5/8/2024- forever: 5 mg; do not stop 4/17/24  Yes Trent Burt MD   sodium bicarbonate 650 MG tablet Take 2 tablets (1,300 mg total) by mouth 2 (two) times daily. 4/29/24  Yes Iain Pierre Jr., MD   sulfamethoxazole-trimethoprim 400-80mg (BACTRIM,SEPTRA) 400-80 mg per tablet Take 1 tablet by mouth every Mon, Wed, Fri. Stop 10/11/24 6/17/24 12/14/24 Yes Samia Wilkinson NP   tacrolimus (PROGRAF) 1 MG Cap Take 4 capsules (4 mg total) by mouth every 12 (twelve) hours. 7/10/24  Yes Iain Pierre Jr., MD   tamsulosin (FLOMAX) 0.4 mg Cap Take 1 capsule (0.4 mg total) by mouth once daily. 1/29/24  Yes Luis Alfredo Norton MD   valGANciclovir (VALCYTE) 450 mg Tab Take 1 tablet (450 mg total) by mouth every Mon, Wed, Fri. Stop 7/13/24 6/24/24  Yes Iain Pierre Jr., MD   docusate sodium (COLACE) 100 MG capsule Take 100 mg by mouth 3 (three) times daily as needed for Constipation.    Provider, Tayla   famotidine (PEPCID) 20 MG tablet Take 1 tablet (20 mg total) by mouth every evening.  Patient not taking: Reported on 7/12/2024 4/15/24 4/15/25  Trent Burt MD   glucagon 3 mg/actuation Spry 1 each by Nasal route as needed. 5/9/23   Winsome Berrios, NP   k phos di & mono-sod phos mono (K-PHOS-NEUTRAL) 250 mg Tab Take 1 tablet by mouth every 12 (twelve) hours. 6/17/24   Samia Wilkinson, NP   loperamide (IMODIUM) 2 mg capsule Take 2 mg after each bowel movement up to 8 times a day.  Stop if no bowel movement. 7/12/24   Samia Wilkinson, NP   ondansetron (ZOFRAN-ODT) 4 MG TbDL Take 1 tablet (4 mg total) by mouth every 6 (six) hours as needed (Nausea). 6/3/24   Stephany Morrison., DO      Anticoagulants/Antiplatelets: no anticoagulation, patient holding aspirin    Allergies: Review of patient's allergies indicates:  No Known Allergies  Sedation History:  no adverse reactions    Review of Systems:   Hematological: no known coagulopathies  Respiratory: no shortness of breath  Cardiovascular: no chest pain  Gastrointestinal: no abdominal pain  Genito-Urinary: no dysuria  Musculoskeletal: negative  Neurological: no TIA or stroke symptoms         OBJECTIVE:     Vital Signs (Most Recent)  Temp: 98.2 °F (36.8 °C) (07/18/24 1151)  Pulse: 73 (07/18/24 1151)  Resp: 14 (07/18/24 1151)  BP: 127/70 (07/18/24 1151)  SpO2: 97 % (07/18/24 1151)    Physical Exam:  ASA: 2  Mallampati: 2   Access: 20G PIV    General: no acute distress  Mental Status: alert and oriented to person, place and time  HEENT: normocephalic, atraumatic  Chest: unlabored breathing  Abdomen: distended  Extremity: moves all extremities    ASSESSMENT/PLAN:     Sedation Plan: up to moderate  Patient will undergo: image guided percutaneous txp kidney bx    Leeroy Schmidt  Diagnostic Radiology PGY-2

## 2024-07-18 NOTE — PROCEDURES
"  Pre Op Diagnosis: Renal dysfunction  Post Op Diagnosis: Same    Procedure: transplant kidney biopsy    Procedure performed by: Anand    Written Informed Consent Obtained: Yes  Specimen Removed: YES 3 18 g cores  Estimated Blood Loss: Minimal    Findings:   Successful biopsy of renal transplant. Gelfoam pledgets used for tract embolization     Patient tolerated procedure well.    Norm Michel MD ([place])  Interventional Radiology  [phone]      "

## 2024-07-19 VITALS
OXYGEN SATURATION: 100 % | DIASTOLIC BLOOD PRESSURE: 71 MMHG | BODY MASS INDEX: 27.68 KG/M2 | SYSTOLIC BLOOD PRESSURE: 144 MMHG | HEIGHT: 67 IN | WEIGHT: 176.38 LBS | RESPIRATION RATE: 19 BRPM | HEART RATE: 79 BPM | TEMPERATURE: 97 F

## 2024-07-23 LAB
FINAL PATHOLOGIC DIAGNOSIS: NORMAL
GROSS: NORMAL
Lab: NORMAL

## 2024-07-29 DIAGNOSIS — Z94.0 KIDNEY REPLACED BY TRANSPLANT: ICD-10-CM

## 2024-07-29 RX ORDER — VALGANCICLOVIR 450 MG/1
450 TABLET, FILM COATED ORAL
Qty: 12 TABLET | Refills: 0 | Status: CANCELLED | OUTPATIENT
Start: 2024-07-29

## 2024-07-29 RX ORDER — SULFAMETHOXAZOLE AND TRIMETHOPRIM 400; 80 MG/1; MG/1
1 TABLET ORAL
Qty: 12 TABLET | Refills: 5 | OUTPATIENT
Start: 2024-07-29 | End: 2025-01-25

## 2024-07-29 RX ORDER — SULFAMETHOXAZOLE AND TRIMETHOPRIM 400; 80 MG/1; MG/1
1 TABLET ORAL
Qty: 12 TABLET | Refills: 2 | Status: SHIPPED | OUTPATIENT
Start: 2024-07-29

## 2024-08-06 ENCOUNTER — TELEPHONE (OUTPATIENT)
Dept: TRANSPLANT | Facility: CLINIC | Age: 64
End: 2024-08-06
Payer: MEDICARE

## 2024-08-06 PROBLEM — Z00.00 HEALTH CARE MAINTENANCE: Status: ACTIVE | Noted: 2024-08-06

## 2024-08-08 ENCOUNTER — TELEPHONE (OUTPATIENT)
Dept: TRANSPLANT | Facility: CLINIC | Age: 64
End: 2024-08-08
Payer: MEDICARE

## 2024-08-08 DIAGNOSIS — E09.22: ICD-10-CM

## 2024-08-08 DIAGNOSIS — Z94.0 KIDNEY REPLACED BY TRANSPLANT: Primary | ICD-10-CM

## 2024-08-12 DIAGNOSIS — Z94.0 KIDNEY REPLACED BY TRANSPLANT: Primary | ICD-10-CM

## 2024-09-04 ENCOUNTER — EXTERNAL HOME HEALTH (OUTPATIENT)
Dept: HOME HEALTH SERVICES | Facility: HOSPITAL | Age: 64
End: 2024-09-04
Payer: MEDICARE

## 2024-09-06 RX ORDER — EPINEPHRINE 0.3 MG/.3ML
0.3 INJECTION SUBCUTANEOUS ONCE AS NEEDED
OUTPATIENT
Start: 2024-09-09

## 2024-09-06 RX ORDER — DIPHENHYDRAMINE HYDROCHLORIDE 50 MG/ML
50 INJECTION INTRAMUSCULAR; INTRAVENOUS ONCE AS NEEDED
OUTPATIENT
Start: 2024-09-09

## 2024-09-06 RX ORDER — HEPARIN 100 UNIT/ML
500 SYRINGE INTRAVENOUS
OUTPATIENT
Start: 2024-09-09

## 2024-09-09 ENCOUNTER — DOCUMENTATION ONLY (OUTPATIENT)
Dept: TRANSPLANT | Facility: CLINIC | Age: 64
End: 2024-09-09
Payer: MEDICARE

## 2024-09-09 ENCOUNTER — PATIENT MESSAGE (OUTPATIENT)
Dept: TRANSPLANT | Facility: CLINIC | Age: 64
End: 2024-09-09
Payer: MEDICARE

## 2024-09-09 NOTE — PROGRESS NOTES
Belatacept (Nulojix) Note:    Murphy Ha  is a 64 y.o. male  S/p   RIGHT KIDNEY   transplant on 4/14/2024 (Kidney) for Diabetes Mellitus - Type II.      Patient was evaluated for belatacept conversion and deemed to be a suitable candidate.      Current Outpatient Medications   Medication Sig Dispense Refill    aspirin (ECOTRIN) 81 MG EC tablet Take 1 tablet (81 mg total) by mouth once daily.      atorvastatin (LIPITOR) 80 MG tablet Take 1 tablet (80 mg total) by mouth every evening. 90 tablet 3    blood-glucose meter,continuous (DEXCOM G7 ) Misc Use to check glucose with sensors 1 each 0    blood-glucose sensor (DEXCOM G7 SENSOR) Lois 1 Device by Misc.(Non-Drug; Combo Route) route every 10 days. 9 each 3    calcitRIOL (ROCALTROL) 0.25 MCG Cap Take 1 capsule (0.25 mcg total) by mouth once daily. 30 capsule 5    carvediloL (COREG) 25 MG tablet Take 1 tablet (25 mg total) by mouth 2 (two) times daily. 60 tablet 11    chlorthalidone (HYGROTEN) 25 MG Tab Take 0.5 tablets (12.5 mg total) by mouth once daily. Take half a tablet by mouth once a day. 15 tablet 11    cholecalciferol, vitamin D3, (VITAMIN D3) 125 mcg (5,000 unit) Tab Take 1 tablet (5,000 Units total) by mouth once daily. 30 tablet 5    divalproex (DEPAKOTE) 250 MG EC tablet Take 1 tablet (250 mg total) by mouth 2 (two) times daily. 180 tablet 3    docusate sodium (COLACE) 100 MG capsule Take 100 mg by mouth 3 (three) times daily as needed for Constipation.      dorzolamide-timolol 2-0.5% (COSOPT) 22.3-6.8 mg/mL ophthalmic solution Place 1 drop into the left eye 2 (two) times daily. 10 mL 5    famotidine (PEPCID) 20 MG tablet Take 1 tablet (20 mg total) by mouth every evening. (Patient not taking: Reported on 7/12/2024) 30 tablet 11    gabapentin (NEURONTIN) 300 MG capsule Take 1 capsule (300 mg total) by mouth every evening. 90 capsule 3    glucagon 3 mg/actuation Spry 1 each by Nasal route as needed. 1 each 11    HYDROcodone-acetaminophen  "(NORCO)  mg per tablet Take 1 tablet by mouth every 12 (twelve) hours as needed for Pain (as needed pain). 60 tablet 0    insulin aspart U-100 (NOVOLOG U-100 INSULIN ASPART) 100 unit/mL injection Inject 4-6 Units into the skin 3 (three) times daily before meals. + sliding scale.  MDD 36 units/day 10 mL 5    insulin glargine U-100, Lantus, 100 unit/mL injection Inject 32-36 Units into the skin every evening. 32.4 mL 3    insulin syringe-needle U-100 (BD INSULIN SYRINGE ULTRA-FINE) 1 mL 30 gauge x 1/2" Syrg 1 each by Misc.(Non-Drug; Combo Route) route 4 (four) times daily with meals and nightly. 100 each 1    k phos di & mono-sod phos mono (K-PHOS-NEUTRAL) 250 mg Tab Take 1 tablet by mouth every 12 (twelve) hours. 60 tablet 11    loperamide (IMODIUM) 2 mg capsule Take 1 capsule (2 mg total) by mouth as needed for Diarrhea. Take 2 mg after each bowel movement up to 8 times a day.  Stop if no bowel movement. 90 capsule 4    mycophenolate sodium 180 MG TbEC Take 4 tablets (720 mg total) by mouth 2 (two) times daily. (Patient taking differently: Take 1,000 mg by mouth 2 (two) times daily.) 240 tablet 11    NIFEdipine (PROCARDIA-XL) 30 MG (OSM) 24 hr tablet Take 1 tablet (30 mg total) by mouth 2 (two) times a day. 60 tablet 11    ondansetron (ZOFRAN-ODT) 4 MG TbDL Take 1 tablet (4 mg total) by mouth every 6 (six) hours as needed (Nausea). 30 tablet 0    predniSONE (DELTASONE) 5 MG tablet Take by mouth daily; 4/17/2024-4/23/2024: 20 mg, 4/24/2024-4/30/2024: 15 mg; 5/1/2024-5/7/2024: 10 mg; 5/8/2024- forever: 5 mg; do not stop (Patient taking differently: Take 5 mg by mouth once daily. Take by mouth daily; 4/17/2024-4/23/2024: 20 mg, 4/24/2024-4/30/2024: 15 mg; 5/1/2024-5/7/2024: 10 mg; 5/8/2024- forever: 5 mg; do not stop) 70 tablet 11    sodium bicarbonate 650 MG tablet Take 2 tablets (1,300 mg total) by mouth 2 (two) times daily. 120 tablet 11    sulfamethoxazole-trimethoprim 400-80mg (BACTRIM,SEPTRA) 400-80 mg per " tablet Take 1 tablet by mouth every Mon, Wed, Fri. Stop 10/11/24 (Patient not taking: Reported on 8/6/2024) 12 tablet 5    sulfamethoxazole-trimethoprim 400-80mg (BACTRIM,SEPTRA) 400-80 mg per tablet Take 1 tablet by mouth every Mon, Wed, Fri. Stop 10/11/24 (Patient not taking: Reported on 8/6/2024) 12 tablet 2    tacrolimus (PROGRAF) 1 MG Cap Take 4 capsules (4 mg total) by mouth every 12 (twelve) hours. 240 capsule 11    tamsulosin (FLOMAX) 0.4 mg Cap Take 1 capsule (0.4 mg total) by mouth once daily. 90 capsule 3     No current facility-administered medications for this visit.     Facility-Administered Medications Ordered in Other Visits   Medication Dose Route Frequency Provider Last Rate Last Admin    tamsulosin 24 hr capsule 0.4 mg  1 capsule Oral Daily Lorelei Cha PA-C   0.4 mg at 04/18/24 0840           Assessment/Plan:    1) Current Immunosuppression: Prograf 4/4, MPA 720mg BID, Pred 5mg daily  Reason for conversion: Need for increased immunosuppression       2) EBV IgG status: Positive      Quantiferon Gold Status: Negative (date: 9/6/24)       4) Benefits verification:   Copay Estimate: 0  Patient receives drug from [employer] Access Support? No     5) Immunosuppression Plan:     Belatacept conversion with CNI withdrawal at 1 year post-transplant    Location/Infusion Provider: [facility]    Day 1   (9/13)   belatacept 5 mg/kg, continue tacrolimus and mycophenolate per protocol  Day 14 (9/27)       belatacept 5 mg/kg, continue mycophenolate per protocol,   Day 28 (10/11)       belatacept 5 mg/kg, continue mycophenolate per protocol,   Day 42 (10/25)       belatacept 5 mg/kg, continue mycophenolate per protocol,   Day 56 (11/8)       belatacept 5 mg/kg, continue mycophenolate per protocol,   Day 84 (12/6)       belatacept 5 mg/kg EVERY 28 DAYS, continue mycophenolate per protocol    Plan has been discussed with Murphy who verbalized understanding and had the opportunity to ask questions.

## 2024-09-13 ENCOUNTER — INFUSION (OUTPATIENT)
Dept: INFUSION THERAPY | Facility: HOSPITAL | Age: 64
End: 2024-09-13
Payer: MEDICARE

## 2024-09-13 VITALS
SYSTOLIC BLOOD PRESSURE: 160 MMHG | HEIGHT: 67 IN | RESPIRATION RATE: 18 BRPM | WEIGHT: 181 LBS | TEMPERATURE: 98 F | HEART RATE: 71 BPM | DIASTOLIC BLOOD PRESSURE: 82 MMHG | BODY MASS INDEX: 28.41 KG/M2

## 2024-09-13 DIAGNOSIS — Z29.89 PROPHYLACTIC IMMUNOTHERAPY: Primary | ICD-10-CM

## 2024-09-13 PROCEDURE — 63600175 PHARM REV CODE 636 W HCPCS: Mod: JW,JG | Performed by: STUDENT IN AN ORGANIZED HEALTH CARE EDUCATION/TRAINING PROGRAM

## 2024-09-13 PROCEDURE — 25000003 PHARM REV CODE 250: Performed by: STUDENT IN AN ORGANIZED HEALTH CARE EDUCATION/TRAINING PROGRAM

## 2024-09-13 PROCEDURE — 96365 THER/PROPH/DIAG IV INF INIT: CPT

## 2024-09-13 RX ORDER — HEPARIN 100 UNIT/ML
500 SYRINGE INTRAVENOUS
Status: DISCONTINUED | OUTPATIENT
Start: 2024-09-13 | End: 2024-09-13 | Stop reason: HOSPADM

## 2024-09-13 RX ORDER — DIPHENHYDRAMINE HYDROCHLORIDE 50 MG/ML
50 INJECTION INTRAMUSCULAR; INTRAVENOUS ONCE AS NEEDED
Status: DISCONTINUED | OUTPATIENT
Start: 2024-09-13 | End: 2024-09-13 | Stop reason: HOSPADM

## 2024-09-13 RX ORDER — DIPHENHYDRAMINE HYDROCHLORIDE 50 MG/ML
50 INJECTION INTRAMUSCULAR; INTRAVENOUS ONCE AS NEEDED
OUTPATIENT
Start: 2024-09-27

## 2024-09-13 RX ORDER — EPINEPHRINE 0.3 MG/.3ML
0.3 INJECTION SUBCUTANEOUS ONCE AS NEEDED
OUTPATIENT
Start: 2024-09-27

## 2024-09-13 RX ORDER — EPINEPHRINE 0.3 MG/.3ML
0.3 INJECTION SUBCUTANEOUS ONCE AS NEEDED
Status: DISCONTINUED | OUTPATIENT
Start: 2024-09-13 | End: 2024-09-13 | Stop reason: HOSPADM

## 2024-09-13 RX ORDER — HEPARIN 100 UNIT/ML
500 SYRINGE INTRAVENOUS
OUTPATIENT
Start: 2024-09-27

## 2024-09-13 RX ADMIN — DEXTROSE 412.5 MG: 50 INJECTION, SOLUTION INTRAVENOUS at 11:09

## 2024-09-13 NOTE — PROGRESS NOTES
Limited head to toe assessment due to privacy issues and visit reason (infusion) though opportunity was given for the patient to express concerns.    Belatacept 1/5 completed. PIV removed. Pt ambulated out of the infusion suite.

## 2024-09-26 ENCOUNTER — INFUSION (OUTPATIENT)
Dept: INFUSION THERAPY | Facility: HOSPITAL | Age: 64
End: 2024-09-26
Payer: MEDICARE

## 2024-09-26 VITALS
HEART RATE: 73 BPM | BODY MASS INDEX: 28.82 KG/M2 | SYSTOLIC BLOOD PRESSURE: 158 MMHG | WEIGHT: 183.63 LBS | HEIGHT: 67 IN | TEMPERATURE: 98 F | DIASTOLIC BLOOD PRESSURE: 74 MMHG

## 2024-09-26 DIAGNOSIS — Z29.89 PROPHYLACTIC IMMUNOTHERAPY: Primary | ICD-10-CM

## 2024-09-26 PROCEDURE — 25000003 PHARM REV CODE 250: Performed by: STUDENT IN AN ORGANIZED HEALTH CARE EDUCATION/TRAINING PROGRAM

## 2024-09-26 PROCEDURE — 63600175 PHARM REV CODE 636 W HCPCS: Mod: JG | Performed by: STUDENT IN AN ORGANIZED HEALTH CARE EDUCATION/TRAINING PROGRAM

## 2024-09-26 PROCEDURE — 96365 THER/PROPH/DIAG IV INF INIT: CPT

## 2024-09-26 RX ORDER — DIPHENHYDRAMINE HYDROCHLORIDE 50 MG/ML
50 INJECTION INTRAMUSCULAR; INTRAVENOUS ONCE AS NEEDED
Status: DISCONTINUED | OUTPATIENT
Start: 2024-09-26 | End: 2024-09-26 | Stop reason: HOSPADM

## 2024-09-26 RX ORDER — HEPARIN 100 UNIT/ML
500 SYRINGE INTRAVENOUS
Status: DISCONTINUED | OUTPATIENT
Start: 2024-09-26 | End: 2024-09-26 | Stop reason: HOSPADM

## 2024-09-26 RX ORDER — EPINEPHRINE 0.3 MG/.3ML
0.3 INJECTION SUBCUTANEOUS ONCE AS NEEDED
Status: DISCONTINUED | OUTPATIENT
Start: 2024-09-26 | End: 2024-09-26 | Stop reason: HOSPADM

## 2024-09-26 RX ORDER — EPINEPHRINE 0.3 MG/.3ML
0.3 INJECTION SUBCUTANEOUS ONCE AS NEEDED
OUTPATIENT
Start: 2024-10-10

## 2024-09-26 RX ORDER — DIPHENHYDRAMINE HYDROCHLORIDE 50 MG/ML
50 INJECTION INTRAMUSCULAR; INTRAVENOUS ONCE AS NEEDED
OUTPATIENT
Start: 2024-10-10

## 2024-09-26 RX ORDER — HEPARIN 100 UNIT/ML
500 SYRINGE INTRAVENOUS
OUTPATIENT
Start: 2024-10-10

## 2024-09-26 RX ADMIN — DEXTROSE 412.5 MG: 50 INJECTION, SOLUTION INTRAVENOUS at 11:09

## 2024-09-26 NOTE — PROGRESS NOTES
Limited head to toe assessment due to privacy issues and visit reason (infusion) though opportunity was given for the patient to express concerns.    Belatacept 2/5 completed. PIV removed. Pt ambulated out of the infusion suite

## 2024-09-30 ENCOUNTER — TELEPHONE (OUTPATIENT)
Dept: TRANSPLANT | Facility: CLINIC | Age: 64
End: 2024-09-30
Payer: MEDICARE

## 2024-09-30 NOTE — TELEPHONE ENCOUNTER
Attempted to call both numbers listed to inform pt of getting DSA/Allosure labs drawn on 10/7 when coming in for clinic - no answer and no voicemail set up for either numbers. Message sent through portal.  ----- Message from Iain Pierre MD sent at 9/30/2024 11:21 AM CDT -----  DSA and allosure with next labs. Other labs acceptable. Started roberto. Tac, bk, upcr pending

## 2024-10-04 NOTE — PROGRESS NOTES
Kidney Post-Transplant Assessment    Referring Physician: Cathryn Adair  Current Nephrologist: Cathryn Adair    ORGAN: RIGHT KIDNEY  Donor Type: donation after circulatory death  PHS Increased Risk: no  Cold Ischemia: 1,030 mins  Induction Medications: thymo    Subjective:     CC:  Reassessment of renal allograft function and management of chronic immunosuppression.    HPI:  Mr. Ha is a 64 y.o. year old Black or  male with history of ESRD secondary to DM who received a donation after circulatory death kidney transplant on 4/14/24. His most recent creatinine is 2.6. He takes mycophenolic acid, prednisone, tacrolimus, and belatacept  for maintenance immunosuppression. His post transplant course has been complicated by delayed graft function requiring dialysis (resolved).    Started belatacept infusions 9/13, has been tolerating well.    Here today with his wife. Only complaint is nausea in the evenings. No vomiting or diarrhea. BP elevated in clinic, has been running lower at home. No peripheral edema. No fevers or pain over allograft. Energy levels great, staying busy with the grandchildren. Tolerating IS without issue.     Current Outpatient Medications   Medication Sig Dispense Refill    aspirin (ECOTRIN) 81 MG EC tablet Take 1 tablet (81 mg total) by mouth once daily.      atorvastatin (LIPITOR) 80 MG tablet Take 1 tablet (80 mg total) by mouth every evening. 90 tablet 3    blood-glucose meter,continuous (DEXCOM G7 ) Misc Use to check glucose with sensors 1 each 0    calcitRIOL (ROCALTROL) 0.25 MCG Cap Take 1 capsule (0.25 mcg total) by mouth once daily. 30 capsule 5    carvediloL (COREG) 25 MG tablet Take 1 tablet (25 mg total) by mouth 2 (two) times daily. 60 tablet 11    chlorthalidone (HYGROTEN) 25 MG Tab Take 0.5 tablets (12.5 mg total) by mouth once daily. Take half a tablet by mouth once a day. 15 tablet 11    cholecalciferol, vitamin D3, (VITAMIN D3) 125 mcg (5,000  "unit) Tab Take 1 tablet (5,000 Units total) by mouth once daily. 30 tablet 5    divalproex (DEPAKOTE) 250 MG EC tablet Take 1 tablet (250 mg total) by mouth 2 (two) times daily. 180 tablet 3    docusate sodium (COLACE) 100 MG capsule Take 100 mg by mouth 3 (three) times daily as needed for Constipation.      dorzolamide-timolol 2-0.5% (COSOPT) 22.3-6.8 mg/mL ophthalmic solution Place 1 drop into the left eye 2 (two) times daily. 10 mL 5    gabapentin (NEURONTIN) 300 MG capsule Take 1 capsule (300 mg total) by mouth every evening. 90 capsule 3    glucagon 3 mg/actuation Spry 1 each by Nasal route as needed. 1 each 11    HYDROcodone-acetaminophen (NORCO)  mg per tablet Take 1 tablet by mouth every 12 (twelve) hours as needed for Pain. 60 tablet 0    insulin aspart U-100 (NOVOLOG U-100 INSULIN ASPART) 100 unit/mL injection Inject 4-6 Units into the skin 3 (three) times daily before meals. + sliding scale.  MDD 36 units/day 10 mL 5    insulin glargine U-100, Lantus, 100 unit/mL injection Inject 32-36 Units into the skin every evening. 32.4 mL 3    insulin syringe-needle U-100 (BD INSULIN SYRINGE ULTRA-FINE) 1 mL 30 gauge x 1/2" Syrg 1 each by Misc.(Non-Drug; Combo Route) route 4 (four) times daily with meals and nightly. 100 each 1    loperamide (IMODIUM) 2 mg capsule Take 1 capsule (2 mg total) by mouth as needed for Diarrhea. Take 2 mg after each bowel movement up to 8 times a day.  Stop if no bowel movement. 90 capsule 4    mycophenolate sodium 180 MG TbEC Take 4 tablets (720 mg total) by mouth 2 (two) times daily. 240 tablet 11    NIFEdipine (PROCARDIA-XL) 30 MG (OSM) 24 hr tablet Take 1 tablet (30 mg total) by mouth 2 (two) times a day. 60 tablet 11    ondansetron (ZOFRAN-ODT) 4 MG TbDL Take 1 tablet (4 mg total) by mouth every 6 (six) hours as needed (Nausea). 30 tablet 0    predniSONE (DELTASONE) 5 MG tablet Take by mouth daily; 4/17/2024-4/23/2024: 20 mg, 4/24/2024-4/30/2024: 15 mg; 5/1/2024-5/7/2024: 10 " mg; 5/8/2024- forever: 5 mg; do not stop 70 tablet 11    sodium bicarbonate 650 MG tablet Take 2 tablets (1,300 mg total) by mouth 2 (two) times daily. 120 tablet 11    tacrolimus (PROGRAF) 1 MG Cap Take 4 capsules (4 mg total) by mouth every 12 (twelve) hours. 240 capsule 11    tamsulosin (FLOMAX) 0.4 mg Cap Take 1 capsule (0.4 mg total) by mouth once daily. 90 capsule 3    blood-glucose sensor (DEXCOM G7 SENSOR) Lois 1 Device by Misc.(Non-Drug; Combo Route) route every 10 days. 9 each 3    famotidine (PEPCID) 20 MG tablet Take 1 tablet (20 mg total) by mouth every evening. 30 tablet 11     No current facility-administered medications for this visit.     Facility-Administered Medications Ordered in Other Visits   Medication Dose Route Frequency Provider Last Rate Last Admin    tamsulosin 24 hr capsule 0.4 mg  1 capsule Oral Daily Lorelei Cha PA-C   0.4 mg at 04/18/24 0840       Past Medical History:   Diagnosis Date    Anemia     Aqueous misdirection of right eye     Arthritis     Blind painful eye     Cataract     CKD (chronic kidney disease)     Diabetes mellitus, type 2     Disorder of kidney and ureter     Fever blister     GERD (gastroesophageal reflux disease)     Hyperlipidemia     Hypertension     Joint pain     Neuropathy        Review of Systems   Constitutional:  Negative for activity change and fever.   Eyes:  Positive for visual disturbance.        Wears glasses   Respiratory:  Negative for cough and shortness of breath.    Cardiovascular:  Negative for chest pain and leg swelling.   Gastrointestinal:  Negative for abdominal pain, constipation, diarrhea and nausea.   Genitourinary:  Negative for difficulty urinating, frequency and hematuria.   Musculoskeletal:  Negative for arthralgias and myalgias.   Skin:  Negative for wound.   Allergic/Immunologic: Positive for immunocompromised state.   Neurological:  Negative for weakness.   Psychiatric/Behavioral:  Negative for sleep disturbance.   "      Objective:   Blood pressure (!) 173/79, pulse 70, temperature 97.2 °F (36.2 °C), temperature source Temporal, resp. rate 16, height 5' 7" (1.702 m), weight 82.9 kg (182 lb 12.2 oz), SpO2 100%.body mass index is 28.62 kg/m².    Physical Exam  Vitals and nursing note reviewed.   Constitutional:       Appearance: Normal appearance.   Cardiovascular:      Rate and Rhythm: Normal rate and regular rhythm.      Heart sounds: Normal heart sounds.   Pulmonary:      Effort: Pulmonary effort is normal.      Breath sounds: Normal breath sounds.   Abdominal:      General: There is no distension.   Musculoskeletal:         General: Normal range of motion.   Skin:     General: Skin is warm and dry.   Neurological:      Mental Status: He is alert.         Labs:  Lab Results   Component Value Date    WBC 6.10 09/30/2024    HGB 11.8 (L) 09/30/2024    HCT 35.9 (L) 09/30/2024     09/30/2024    K 4.8 09/30/2024     (H) 09/30/2024    CO2 22 (L) 09/30/2024    BUN 43 (H) 09/30/2024    CREATININE 2.6 (H) 09/30/2024    EGFRNORACEVR 26.9 (A) 09/30/2024    CALCIUM 8.6 (L) 09/30/2024    PHOS 2.8 09/30/2024    MG 1.7 09/30/2024    ALBUMIN 3.2 (L) 09/30/2024    AST 14 07/17/2024    ALT 18 07/17/2024    UTPCR 0.10 09/30/2024    .1 (H) 07/28/2023    TACROLIMUS 8.7 09/30/2024       Labs were reviewed with the patient    Assessment:     1. Kidney replaced by transplant    2. Long-term use of immunosuppressant medication    3. Renovascular hypertension    4. Delayed graft function of kidney    5. At risk for opportunistic infections      Plan:   Getting DSA and allosure drawn today  DC KPN  Evening nausea-restart pepcid in the evening    1. Immunosuppression: Prograf trough 8.7. Continue Prograf 4/4, belatacept infusions, MyF 720 mg BID, and Prednisone 5 mg QD. Will continue to monitor for drug toxicities    2. Allograft Function: started on roberto for GFR preservation, allosure and DSA drawn today  - ESRD secondary to DM s/p " donation after circulatory death  kidney transplant on 4/14/24.   - post transplant with DGF, resolved   Lab Results   Component Value Date    CREATININE 2.6 (H) 09/30/2024         3. Hypertension management: advise low salt diet and home BP monitoring      4. Hypophosphatemia     - resolved, DC KPN    5. Acidosis   - continue sodium bicarbonate 1300 mg BID      6. Anemia: stable, no need for intervention  Lab Results   Component Value Date    WBC 6.10 09/30/2024    HGB 11.8 (L) 09/30/2024    HCT 35.9 (L) 09/30/2024    MCV 91 09/30/2024     09/30/2024         7. Proteinuria: continue p/c ratio as per guidelines    - last UPC 0.10    8. BK virus infection screening:  BK PCR per protocol   - last PCR not detected    9. Weight education: provided during the clinic visit   Body mass index is 28.62 kg/m².     10. Patient safety education regarding immunosuppression including prophylaxis posttransplant for CMV, PCP  - PCP prophylaxis: bactrim (stop 10/11/2024)  - CMV prophylaxis: valcyte (stop 7/13/2024)              Follow-up:   Clinic: return to transplant clinic weekly for the first month after transplant; every 2 weeks during months 2-3; then at 6-, 9-, 12-, 18-, 24-, and 36- months post-transplant to reassess for complications from immunosuppression toxicity and monitor for rejection.  Annually thereafter.    Labs: since patient remains at high risk for rejection and drug-related complications that warrant close monitoring, labs will be ordered as follows: continue twice weekly CBC, renal panel, and drug level for first month; then same labs once weekly through 3rd month post-transplant.  Urine for UA and protein/creatinine ratio monthly.  Serum BK - PCR at 1-, 3-, 6-, 9-, 12-, 18-, 24-, 36-, 48-, and 60 months post-transplant.  Hepatic panel at 1-, 2-, 3-, 6-, 9-, 12-, 18-, 24-, and 36- months post-transplant.    Samia Wilkinson NP

## 2024-10-07 ENCOUNTER — OFFICE VISIT (OUTPATIENT)
Dept: TRANSPLANT | Facility: CLINIC | Age: 64
End: 2024-10-07
Payer: MEDICARE

## 2024-10-07 ENCOUNTER — LAB VISIT (OUTPATIENT)
Dept: LAB | Facility: HOSPITAL | Age: 64
End: 2024-10-07
Payer: MEDICARE

## 2024-10-07 VITALS
HEIGHT: 67 IN | BODY MASS INDEX: 28.68 KG/M2 | RESPIRATION RATE: 16 BRPM | OXYGEN SATURATION: 100 % | WEIGHT: 182.75 LBS | HEART RATE: 70 BPM | SYSTOLIC BLOOD PRESSURE: 173 MMHG | DIASTOLIC BLOOD PRESSURE: 79 MMHG | TEMPERATURE: 97 F

## 2024-10-07 DIAGNOSIS — Z79.60 LONG-TERM USE OF IMMUNOSUPPRESSANT MEDICATION: ICD-10-CM

## 2024-10-07 DIAGNOSIS — I15.0 RENOVASCULAR HYPERTENSION: ICD-10-CM

## 2024-10-07 DIAGNOSIS — Z94.0 KIDNEY REPLACED BY TRANSPLANT: ICD-10-CM

## 2024-10-07 DIAGNOSIS — T86.19 DELAYED GRAFT FUNCTION OF KIDNEY: ICD-10-CM

## 2024-10-07 DIAGNOSIS — Z91.89 AT RISK FOR OPPORTUNISTIC INFECTIONS: ICD-10-CM

## 2024-10-07 DIAGNOSIS — Z94.0 KIDNEY REPLACED BY TRANSPLANT: Primary | ICD-10-CM

## 2024-10-07 PROCEDURE — 3008F BODY MASS INDEX DOCD: CPT | Mod: CPTII,S$GLB,, | Performed by: NURSE PRACTITIONER

## 2024-10-07 PROCEDURE — 99215 OFFICE O/P EST HI 40 MIN: CPT | Mod: S$GLB,,, | Performed by: NURSE PRACTITIONER

## 2024-10-07 PROCEDURE — 3052F HG A1C>EQUAL 8.0%<EQUAL 9.0%: CPT | Mod: CPTII,S$GLB,, | Performed by: NURSE PRACTITIONER

## 2024-10-07 PROCEDURE — 86977 RBC SERUM PRETX INCUBJ/INHIB: CPT | Mod: 59 | Performed by: STUDENT IN AN ORGANIZED HEALTH CARE EDUCATION/TRAINING PROGRAM

## 2024-10-07 PROCEDURE — 3077F SYST BP >= 140 MM HG: CPT | Mod: CPTII,S$GLB,, | Performed by: NURSE PRACTITIONER

## 2024-10-07 PROCEDURE — 1159F MED LIST DOCD IN RCRD: CPT | Mod: CPTII,S$GLB,, | Performed by: NURSE PRACTITIONER

## 2024-10-07 PROCEDURE — 3078F DIAST BP <80 MM HG: CPT | Mod: CPTII,S$GLB,, | Performed by: NURSE PRACTITIONER

## 2024-10-07 PROCEDURE — 86832 HLA CLASS I HIGH DEFIN QUAL: CPT | Performed by: STUDENT IN AN ORGANIZED HEALTH CARE EDUCATION/TRAINING PROGRAM

## 2024-10-07 PROCEDURE — 1160F RVW MEDS BY RX/DR IN RCRD: CPT | Mod: CPTII,S$GLB,, | Performed by: NURSE PRACTITIONER

## 2024-10-07 PROCEDURE — 99999 PR PBB SHADOW E&M-EST. PATIENT-LVL V: CPT | Mod: PBBFAC,,, | Performed by: NURSE PRACTITIONER

## 2024-10-07 PROCEDURE — 86833 HLA CLASS II HIGH DEFIN QUAL: CPT | Performed by: STUDENT IN AN ORGANIZED HEALTH CARE EDUCATION/TRAINING PROGRAM

## 2024-10-07 PROCEDURE — 36415 COLL VENOUS BLD VENIPUNCTURE: CPT | Performed by: STUDENT IN AN ORGANIZED HEALTH CARE EDUCATION/TRAINING PROGRAM

## 2024-10-07 PROCEDURE — 3066F NEPHROPATHY DOC TX: CPT | Mod: CPTII,S$GLB,, | Performed by: NURSE PRACTITIONER

## 2024-10-07 RX ORDER — FAMOTIDINE 20 MG/1
20 TABLET, FILM COATED ORAL NIGHTLY
Qty: 30 TABLET | Refills: 11 | Status: SHIPPED | OUTPATIENT
Start: 2024-10-07 | End: 2025-10-07

## 2024-10-07 NOTE — LETTER
October 7, 2024        Cathryn Adair  [address]  [place] [address]  Phone: [phone]  Fax: [phone]             [facility]  [address]  [facility] [address]  Phone: [phone]   Patient: Murphy Ha   MR Number: [id number]   YOB: 1960   Date of Visit: 10/7/2024       Dear Dr. Cathryn Adair    Thank you for referring Murphy Ha to me for evaluation. Attached you will find relevant portions of my assessment and plan of care.    If you have questions, please do not hesitate to call me. I look forward to following Murphy Ha along with you.    Sincerely,    Samia Wilkinson, NAI    Enclosure    If you would like to receive this communication electronically, please contact [email or web address] or [phone] to request [employer] access.    [employer] is a tool which provides read-only access to select patient information with whom you have a relationship. Its easy to use and provides real time access to review your patients record including encounter summaries, notes, results, and demographic information.    If you feel you have received this communication in error or would no longer like to receive these types of communications, please e-mail [email or web address]

## 2024-10-09 LAB
ALLOSURE COMMENT: NORMAL
ALLOSURE SCORE KIDNEY: 0.05 %
INFORMATION: NORMAL

## 2024-10-10 ENCOUNTER — INFUSION (OUTPATIENT)
Dept: INFUSION THERAPY | Facility: HOSPITAL | Age: 64
End: 2024-10-10
Payer: MEDICARE

## 2024-10-10 VITALS
DIASTOLIC BLOOD PRESSURE: 76 MMHG | SYSTOLIC BLOOD PRESSURE: 160 MMHG | HEART RATE: 68 BPM | WEIGHT: 188.5 LBS | BODY MASS INDEX: 29.58 KG/M2 | TEMPERATURE: 98 F | HEIGHT: 67 IN

## 2024-10-10 DIAGNOSIS — Z29.89 PROPHYLACTIC IMMUNOTHERAPY: Primary | ICD-10-CM

## 2024-10-10 PROCEDURE — 25000003 PHARM REV CODE 250: Performed by: STUDENT IN AN ORGANIZED HEALTH CARE EDUCATION/TRAINING PROGRAM

## 2024-10-10 PROCEDURE — 96365 THER/PROPH/DIAG IV INF INIT: CPT

## 2024-10-10 PROCEDURE — 63600175 PHARM REV CODE 636 W HCPCS: Mod: JW,JG | Performed by: STUDENT IN AN ORGANIZED HEALTH CARE EDUCATION/TRAINING PROGRAM

## 2024-10-10 RX ORDER — EPINEPHRINE 0.3 MG/.3ML
0.3 INJECTION SUBCUTANEOUS ONCE AS NEEDED
OUTPATIENT
Start: 2024-10-24

## 2024-10-10 RX ORDER — DIPHENHYDRAMINE HYDROCHLORIDE 50 MG/ML
50 INJECTION INTRAMUSCULAR; INTRAVENOUS ONCE AS NEEDED
Status: DISCONTINUED | OUTPATIENT
Start: 2024-10-10 | End: 2024-10-10 | Stop reason: HOSPADM

## 2024-10-10 RX ORDER — EPINEPHRINE 0.3 MG/.3ML
0.3 INJECTION SUBCUTANEOUS ONCE AS NEEDED
Status: DISCONTINUED | OUTPATIENT
Start: 2024-10-10 | End: 2024-10-10 | Stop reason: HOSPADM

## 2024-10-10 RX ORDER — DIPHENHYDRAMINE HYDROCHLORIDE 50 MG/ML
50 INJECTION INTRAMUSCULAR; INTRAVENOUS ONCE AS NEEDED
OUTPATIENT
Start: 2024-10-24

## 2024-10-10 RX ORDER — HEPARIN 100 UNIT/ML
500 SYRINGE INTRAVENOUS
Status: DISCONTINUED | OUTPATIENT
Start: 2024-10-10 | End: 2024-10-10 | Stop reason: HOSPADM

## 2024-10-10 RX ORDER — HEPARIN 100 UNIT/ML
500 SYRINGE INTRAVENOUS
OUTPATIENT
Start: 2024-10-24

## 2024-10-10 RX ADMIN — DEXTROSE 425 MG: 50 INJECTION, SOLUTION INTRAVENOUS at 11:10

## 2024-10-10 NOTE — PROGRESS NOTES
Limited head to toe assessment due to privacy issues and visit reason (infusion) though opportunity was given for the patient to express concerns.    Belatacept 3/5 completed. PIV removed. Pt ambulated out fo the infusion suite

## 2024-10-24 ENCOUNTER — INFUSION (OUTPATIENT)
Dept: INFUSION THERAPY | Facility: HOSPITAL | Age: 64
End: 2024-10-24
Payer: MEDICARE

## 2024-10-24 VITALS
DIASTOLIC BLOOD PRESSURE: 88 MMHG | TEMPERATURE: 98 F | RESPIRATION RATE: 18 BRPM | WEIGHT: 185.88 LBS | BODY MASS INDEX: 29.17 KG/M2 | SYSTOLIC BLOOD PRESSURE: 154 MMHG | HEIGHT: 67 IN | HEART RATE: 80 BPM

## 2024-10-24 DIAGNOSIS — Z29.89 PROPHYLACTIC IMMUNOTHERAPY: Primary | ICD-10-CM

## 2024-10-24 PROCEDURE — 25000003 PHARM REV CODE 250: Performed by: STUDENT IN AN ORGANIZED HEALTH CARE EDUCATION/TRAINING PROGRAM

## 2024-10-24 PROCEDURE — 96365 THER/PROPH/DIAG IV INF INIT: CPT

## 2024-10-24 PROCEDURE — 63600175 PHARM REV CODE 636 W HCPCS: Mod: JW,JG | Performed by: STUDENT IN AN ORGANIZED HEALTH CARE EDUCATION/TRAINING PROGRAM

## 2024-10-24 RX ORDER — EPINEPHRINE 0.3 MG/.3ML
0.3 INJECTION SUBCUTANEOUS ONCE AS NEEDED
Status: DISCONTINUED | OUTPATIENT
Start: 2024-10-24 | End: 2024-10-24 | Stop reason: HOSPADM

## 2024-10-24 RX ORDER — EPINEPHRINE 0.3 MG/.3ML
0.3 INJECTION SUBCUTANEOUS ONCE AS NEEDED
OUTPATIENT
Start: 2024-11-07

## 2024-10-24 RX ORDER — HEPARIN 100 UNIT/ML
500 SYRINGE INTRAVENOUS
Status: DISCONTINUED | OUTPATIENT
Start: 2024-10-24 | End: 2024-10-24 | Stop reason: HOSPADM

## 2024-10-24 RX ORDER — DIPHENHYDRAMINE HYDROCHLORIDE 50 MG/ML
50 INJECTION INTRAMUSCULAR; INTRAVENOUS ONCE AS NEEDED
OUTPATIENT
Start: 2024-11-07

## 2024-10-24 RX ORDER — DIPHENHYDRAMINE HYDROCHLORIDE 50 MG/ML
50 INJECTION INTRAMUSCULAR; INTRAVENOUS ONCE AS NEEDED
Status: DISCONTINUED | OUTPATIENT
Start: 2024-10-24 | End: 2024-10-24 | Stop reason: HOSPADM

## 2024-10-24 RX ORDER — HEPARIN 100 UNIT/ML
500 SYRINGE INTRAVENOUS
OUTPATIENT
Start: 2024-11-07

## 2024-10-24 RX ADMIN — DEXTROSE 425 MG: 50 INJECTION, SOLUTION INTRAVENOUS at 09:10

## 2024-10-24 NOTE — PROGRESS NOTES
Limited head to toe assessment due to privacy issues and visit reason (infusion) though opportunity was given for the patient to express concerns.    Belatacept 4/5 completed. PIV removed. Pt ambulated out of the infusion suite

## 2024-11-07 ENCOUNTER — PATIENT MESSAGE (OUTPATIENT)
Dept: TRANSPLANT | Facility: CLINIC | Age: 64
End: 2024-11-07
Payer: MEDICARE

## 2024-11-07 ENCOUNTER — INFUSION (OUTPATIENT)
Dept: INFUSION THERAPY | Facility: HOSPITAL | Age: 64
End: 2024-11-07
Payer: MEDICARE

## 2024-11-07 VITALS
DIASTOLIC BLOOD PRESSURE: 80 MMHG | OXYGEN SATURATION: 100 % | HEIGHT: 67 IN | HEART RATE: 71 BPM | RESPIRATION RATE: 18 BRPM | TEMPERATURE: 98 F | BODY MASS INDEX: 29.76 KG/M2 | SYSTOLIC BLOOD PRESSURE: 147 MMHG | WEIGHT: 189.63 LBS

## 2024-11-07 DIAGNOSIS — Z94.0 KIDNEY REPLACED BY TRANSPLANT: ICD-10-CM

## 2024-11-07 DIAGNOSIS — Z29.89 PROPHYLACTIC IMMUNOTHERAPY: Primary | ICD-10-CM

## 2024-11-07 PROCEDURE — 96365 THER/PROPH/DIAG IV INF INIT: CPT

## 2024-11-07 PROCEDURE — 63600175 PHARM REV CODE 636 W HCPCS: Mod: JG | Performed by: STUDENT IN AN ORGANIZED HEALTH CARE EDUCATION/TRAINING PROGRAM

## 2024-11-07 PROCEDURE — 25000003 PHARM REV CODE 250: Performed by: STUDENT IN AN ORGANIZED HEALTH CARE EDUCATION/TRAINING PROGRAM

## 2024-11-07 RX ORDER — EPINEPHRINE 0.3 MG/.3ML
0.3 INJECTION SUBCUTANEOUS ONCE AS NEEDED
OUTPATIENT
Start: 2024-12-06

## 2024-11-07 RX ORDER — DIPHENHYDRAMINE HYDROCHLORIDE 50 MG/ML
50 INJECTION INTRAMUSCULAR; INTRAVENOUS ONCE AS NEEDED
Status: DISCONTINUED | OUTPATIENT
Start: 2024-11-07 | End: 2024-11-07 | Stop reason: HOSPADM

## 2024-11-07 RX ORDER — TACROLIMUS 1 MG/1
2 CAPSULE ORAL EVERY 12 HOURS
Qty: 120 CAPSULE | Refills: 11 | Status: SHIPPED | OUTPATIENT
Start: 2024-11-07

## 2024-11-07 RX ORDER — HEPARIN 100 UNIT/ML
500 SYRINGE INTRAVENOUS
Status: DISCONTINUED | OUTPATIENT
Start: 2024-11-07 | End: 2024-11-07 | Stop reason: HOSPADM

## 2024-11-07 RX ORDER — EPINEPHRINE 0.3 MG/.3ML
0.3 INJECTION SUBCUTANEOUS ONCE AS NEEDED
Status: DISCONTINUED | OUTPATIENT
Start: 2024-11-07 | End: 2024-11-07 | Stop reason: HOSPADM

## 2024-11-07 RX ORDER — DIPHENHYDRAMINE HYDROCHLORIDE 50 MG/ML
50 INJECTION INTRAMUSCULAR; INTRAVENOUS ONCE AS NEEDED
OUTPATIENT
Start: 2024-12-06

## 2024-11-07 RX ORDER — HEPARIN 100 UNIT/ML
500 SYRINGE INTRAVENOUS
OUTPATIENT
Start: 2024-12-06

## 2024-11-07 RX ADMIN — DEXTROSE MONOHYDRATE 425 MG: 50 INJECTION, SOLUTION INTRAVENOUS at 10:11

## 2024-11-07 NOTE — PLAN OF CARE
Limited head to toe assessment due to privacy issues and visit reason (infusion) though opportunity was given for the patient to express concerns.  Fall risk assessment done - no recent falls  Infection prevention - hand wishing encouraged

## 2024-11-07 NOTE — TELEPHONE ENCOUNTER
Messaged pt instructing him to adjust prograf dose to 2 mg twice a day, hydrate and repeat labs on 11/21.  Asked pt to contact us with any questions.       ----- Message from Iain Pierre MD sent at 11/7/2024  2:08 PM CST -----  Tac high. If true tac trough, please decrease dose from 4/4 to 2/2 and repeat in 1-2 weeks with renal panel. Other labs acceptable.

## 2024-11-07 NOTE — PROGRESS NOTES
Belatacept infusion completed. Peripheral IV removed with catheter tip intact. Patient ambulated without assistance out of the infusion suite.

## 2024-12-06 ENCOUNTER — INFUSION (OUTPATIENT)
Dept: INFUSION THERAPY | Facility: HOSPITAL | Age: 64
End: 2024-12-06
Attending: STUDENT IN AN ORGANIZED HEALTH CARE EDUCATION/TRAINING PROGRAM
Payer: MEDICARE

## 2024-12-06 VITALS
HEIGHT: 67 IN | SYSTOLIC BLOOD PRESSURE: 138 MMHG | TEMPERATURE: 99 F | WEIGHT: 191.38 LBS | RESPIRATION RATE: 18 BRPM | DIASTOLIC BLOOD PRESSURE: 81 MMHG | HEART RATE: 79 BPM | BODY MASS INDEX: 30.04 KG/M2

## 2024-12-06 DIAGNOSIS — Z29.89 PROPHYLACTIC IMMUNOTHERAPY: Primary | ICD-10-CM

## 2024-12-06 PROCEDURE — 25000003 PHARM REV CODE 250: Performed by: STUDENT IN AN ORGANIZED HEALTH CARE EDUCATION/TRAINING PROGRAM

## 2024-12-06 PROCEDURE — 96365 THER/PROPH/DIAG IV INF INIT: CPT

## 2024-12-06 PROCEDURE — 63600175 PHARM REV CODE 636 W HCPCS: Mod: JZ,JG | Performed by: STUDENT IN AN ORGANIZED HEALTH CARE EDUCATION/TRAINING PROGRAM

## 2024-12-06 RX ORDER — DIPHENHYDRAMINE HYDROCHLORIDE 50 MG/ML
50 INJECTION INTRAMUSCULAR; INTRAVENOUS ONCE AS NEEDED
OUTPATIENT
Start: 2025-01-03

## 2024-12-06 RX ORDER — HEPARIN 100 UNIT/ML
500 SYRINGE INTRAVENOUS
OUTPATIENT
Start: 2025-01-03

## 2024-12-06 RX ORDER — DIPHENHYDRAMINE HYDROCHLORIDE 50 MG/ML
50 INJECTION INTRAMUSCULAR; INTRAVENOUS ONCE AS NEEDED
Status: DISCONTINUED | OUTPATIENT
Start: 2024-12-06 | End: 2024-12-06 | Stop reason: HOSPADM

## 2024-12-06 RX ORDER — EPINEPHRINE 0.3 MG/.3ML
0.3 INJECTION SUBCUTANEOUS ONCE AS NEEDED
OUTPATIENT
Start: 2025-01-03

## 2024-12-06 RX ORDER — HEPARIN 100 UNIT/ML
500 SYRINGE INTRAVENOUS
Status: DISCONTINUED | OUTPATIENT
Start: 2024-12-06 | End: 2024-12-06 | Stop reason: HOSPADM

## 2024-12-06 RX ORDER — EPINEPHRINE 0.3 MG/.3ML
0.3 INJECTION SUBCUTANEOUS ONCE AS NEEDED
Status: DISCONTINUED | OUTPATIENT
Start: 2024-12-06 | End: 2024-12-06 | Stop reason: HOSPADM

## 2024-12-06 RX ADMIN — DEXTROSE MONOHYDRATE 437.5 MG: 50 INJECTION, SOLUTION INTRAVENOUS at 10:12

## 2025-01-03 ENCOUNTER — INFUSION (OUTPATIENT)
Dept: INFUSION THERAPY | Facility: HOSPITAL | Age: 65
End: 2025-01-03
Payer: MEDICARE

## 2025-01-03 VITALS
SYSTOLIC BLOOD PRESSURE: 158 MMHG | HEIGHT: 67 IN | WEIGHT: 190.69 LBS | OXYGEN SATURATION: 100 % | DIASTOLIC BLOOD PRESSURE: 88 MMHG | RESPIRATION RATE: 18 BRPM | TEMPERATURE: 98 F | HEART RATE: 69 BPM | BODY MASS INDEX: 29.93 KG/M2

## 2025-01-03 DIAGNOSIS — Z29.89 PROPHYLACTIC IMMUNOTHERAPY: Primary | ICD-10-CM

## 2025-01-03 PROCEDURE — 96365 THER/PROPH/DIAG IV INF INIT: CPT

## 2025-01-03 PROCEDURE — 25000003 PHARM REV CODE 250: Performed by: STUDENT IN AN ORGANIZED HEALTH CARE EDUCATION/TRAINING PROGRAM

## 2025-01-03 PROCEDURE — 63600175 PHARM REV CODE 636 W HCPCS: Mod: JZ,TB | Performed by: STUDENT IN AN ORGANIZED HEALTH CARE EDUCATION/TRAINING PROGRAM

## 2025-01-03 RX ORDER — HEPARIN 100 UNIT/ML
500 SYRINGE INTRAVENOUS
OUTPATIENT
Start: 2025-01-31

## 2025-01-03 RX ORDER — HEPARIN 100 UNIT/ML
500 SYRINGE INTRAVENOUS
Status: DISCONTINUED | OUTPATIENT
Start: 2025-01-03 | End: 2025-01-03 | Stop reason: HOSPADM

## 2025-01-03 RX ORDER — DIPHENHYDRAMINE HYDROCHLORIDE 50 MG/ML
50 INJECTION INTRAMUSCULAR; INTRAVENOUS ONCE AS NEEDED
OUTPATIENT
Start: 2025-01-31

## 2025-01-03 RX ORDER — EPINEPHRINE 0.3 MG/.3ML
0.3 INJECTION SUBCUTANEOUS ONCE AS NEEDED
Status: DISCONTINUED | OUTPATIENT
Start: 2025-01-03 | End: 2025-01-03 | Stop reason: HOSPADM

## 2025-01-03 RX ORDER — EPINEPHRINE 0.3 MG/.3ML
0.3 INJECTION SUBCUTANEOUS ONCE AS NEEDED
OUTPATIENT
Start: 2025-01-31

## 2025-01-03 RX ORDER — DIPHENHYDRAMINE HYDROCHLORIDE 50 MG/ML
50 INJECTION INTRAMUSCULAR; INTRAVENOUS ONCE AS NEEDED
Status: DISCONTINUED | OUTPATIENT
Start: 2025-01-03 | End: 2025-01-03 | Stop reason: HOSPADM

## 2025-01-03 RX ADMIN — DEXTROSE 437.5 MG: 50 INJECTION, SOLUTION INTRAVENOUS at 09:01

## 2025-01-06 ENCOUNTER — PATIENT MESSAGE (OUTPATIENT)
Dept: TRANSPLANT | Facility: CLINIC | Age: 65
End: 2025-01-06
Payer: MEDICARE

## 2025-01-10 NOTE — PROGRESS NOTES
Kidney Post-Transplant Assessment    Referring Physician: Cathryn Adair  Current Nephrologist: Cathryn Adair    ORGAN: RIGHT KIDNEY  Donor Type: donation after circulatory death  PHS Increased Risk: no  Cold Ischemia: 1,030 mins  Induction Medications: thymo    Subjective:   The patient location is: [place]  The chief complaint leading to consultation is: Kidney Post-Transplant Assessment    Visit type: audiovisual as well as phone call as he was having connectivity issues with [facility]    Face to Face time with patient:   30 minutes of total time spent on the encounter, which includes face to face time and non-face to face time preparing to see the patient (eg, review of tests), Obtaining and/or reviewing separately obtained history, Documenting clinical information in the electronic or other health record, Independently interpreting results (not separately reported) and communicating results to the patient/family/caregiver, or Care coordination (not separately reported).       Each patient to whom he or she provides medical services by telemedicine is:  (1) informed of the relationship between the physician and patient and the respective role of any other health care provider with respect to management of the patient; and (2) notified that he or she may decline to receive medical services by telemedicine and may withdraw from such care at any time.      CC:  Reassessment of renal allograft function and management of chronic immunosuppression.    HPI:  Mr. Ha is a 64 y.o. year old Black or  male with history of ESRD secondary to DM who received a donation after circulatory death kidney transplant on 4/14/24. His most recent creatinine is 2.2. He takes mycophenolic acid, prednisone, tacrolimus, and belatacept  for maintenance immunosuppression. His post transplant course has been complicated by delayed graft function requiring dialysis (resolved).    Started belatacept infusions 9/13/24,  has been tolerating well. Has not re-established with general nephrology post transplant, plans to return to Dr. Adair. Feels well, still with nausea in the evenings. Happening about every 3-4 days. No vomiting. No diarrhea. Has been staying hydrated, no problems with urination. BP at goal, no peripheral edema. Energy levels great, staying busy with the grandchildren. Tolerating IS without issue.     Current Outpatient Medications   Medication Sig Dispense Refill    alprostadiL 500 mcg/mL Soln 50 mcg, phentolamine 5 mg SolR 5 mg, papaverine 30 mg/mL Soln 30 mcg USE 0.2ML PER INJECTION INTRACAVERNOSALLY AS NEEDED ERECTILE DYSFUNCTION. MAX DOSE ONE INJECTION EVERY 48 HOURS.      aspirin (ECOTRIN) 81 MG EC tablet Take 1 tablet (81 mg total) by mouth once daily.      atorvastatin (LIPITOR) 80 MG tablet Take 1 tablet (80 mg total) by mouth every evening. 90 tablet 3    blood-glucose meter,continuous (DEXCOM G7 ) Misc Use to check glucose with sensors 1 each 0    blood-glucose sensor (DEXCOM G7 SENSOR) Lois 1 Device by Misc.(Non-Drug; Combo Route) route every 10 days. 9 each 3    calcitRIOL (ROCALTROL) 0.25 MCG Cap Take 1 capsule (0.25 mcg total) by mouth once daily. 30 capsule 5    carvediloL (COREG) 25 MG tablet Take 1 tablet (25 mg total) by mouth 2 (two) times daily. 60 tablet 11    chlorthalidone (HYGROTEN) 25 MG Tab Take 0.5 tablets (12.5 mg total) by mouth once daily. Take half a tablet by mouth once a day. 15 tablet 11    cholecalciferol, vitamin D3, (VITAMIN D3) 125 mcg (5,000 unit) Tab Take 1 tablet (5,000 Units total) by mouth once daily. 30 tablet 5    COMIRNATY 2024-25, 12Y UP,,PF, 30 mcg/0.3 mL IM vaccine (>/= 11 yo) SMARTSIG:IM      divalproex (DEPAKOTE) 250 MG EC tablet Take 1 tablet (250 mg total) by mouth 2 (two) times daily. 180 tablet 3    docusate sodium (COLACE) 100 MG capsule Take 100 mg by mouth 3 (three) times daily as needed for Constipation.      dorzolamide-timolol 2-0.5% (COSOPT)  "22.3-6.8 mg/mL ophthalmic solution Place 1 drop into the left eye 2 (two) times daily. 10 mL 5    famotidine (PEPCID) 20 MG tablet Take 1 tablet (20 mg total) by mouth every evening. 30 tablet 11    gabapentin (NEURONTIN) 300 MG capsule Take 1 capsule (300 mg total) by mouth every evening. 90 capsule 3    glucagon 3 mg/actuation Spry 1 each by Nasal route as needed. 1 each 11    HYDROcodone-acetaminophen (NORCO)  mg per tablet Take 1 tablet by mouth every 12 (twelve) hours as needed for Pain. 60 tablet 0    insulin aspart U-100 (NOVOLOG U-100 INSULIN ASPART) 100 unit/mL injection Inject 4-6 Units into the skin 3 (three) times daily before meals. + sliding scale.  MDD 36 units/day 10 mL 5    insulin glargine U-100, Lantus, 100 unit/mL injection Inject 32-36 Units into the skin every evening. 32.4 mL 3    insulin syringe-needle U-100 (BD INSULIN SYRINGE ULTRA-FINE) 1 mL 30 gauge x 1/2" Syrg 1 each by Misc.(Non-Drug; Combo Route) route 4 (four) times daily with meals and nightly. 100 each 1    loperamide (IMODIUM) 2 mg capsule Take 1 capsule (2 mg total) by mouth as needed for Diarrhea. Take 2 mg after each bowel movement up to 8 times a day.  Stop if no bowel movement. 90 capsule 4    mycophenolate sodium 180 MG TbEC Take 4 tablets (720 mg total) by mouth 2 (two) times daily. 240 tablet 11    NIFEdipine (PROCARDIA-XL) 30 MG (OSM) 24 hr tablet Take 1 tablet (30 mg total) by mouth 2 (two) times a day. 60 tablet 11    ondansetron (ZOFRAN-ODT) 4 MG TbDL Take 1 tablet (4 mg total) by mouth every 6 (six) hours as needed (Nausea). 30 tablet 0    predniSONE (DELTASONE) 5 MG tablet Take by mouth daily; 4/17/2024-4/23/2024: 20 mg, 4/24/2024-4/30/2024: 15 mg; 5/1/2024-5/7/2024: 10 mg; 5/8/2024- forever: 5 mg; do not stop 70 tablet 11    sodium bicarbonate 650 MG tablet Take 2 tablets (1,300 mg total) by mouth 2 (two) times daily. 120 tablet 11    tacrolimus (PROGRAF) 1 MG Cap Take 2 capsules (2 mg total) by mouth every 12 " (twelve) hours. 120 capsule 11    tamsulosin (FLOMAX) 0.4 mg Cap Take 1 capsule (0.4 mg total) by mouth once daily. 90 capsule 3     No current facility-administered medications for this visit.     Facility-Administered Medications Ordered in Other Visits   Medication Dose Route Frequency Provider Last Rate Last Admin    tamsulosin 24 hr capsule 0.4 mg  1 capsule Oral Daily Starla CyndieCARLOS Miller   0.4 mg at 04/18/24 0840       Past Medical History:   Diagnosis Date    Anemia     Aqueous misdirection of right eye     Arthritis     Blind painful eye     Cataract     CKD (chronic kidney disease)     Diabetes mellitus, type 2     Disorder of kidney and ureter     Fever blister     GERD (gastroesophageal reflux disease)     Hyperlipidemia     Hypertension     Joint pain     Neuropathy        Review of Systems   Constitutional:  Negative for activity change and fever.   Eyes:  Positive for visual disturbance.        Wears glasses   Respiratory:  Negative for cough and shortness of breath.    Cardiovascular:  Negative for chest pain and leg swelling.   Gastrointestinal:  Positive for nausea (at night, every 3-4 days). Negative for abdominal pain, constipation and diarrhea.   Genitourinary:  Negative for difficulty urinating, frequency and hematuria.   Musculoskeletal:  Negative for arthralgias and myalgias.   Skin:  Negative for wound.   Allergic/Immunologic: Positive for immunocompromised state.   Neurological:  Negative for weakness.   Psychiatric/Behavioral:  Negative for sleep disturbance.        Objective:   There were no vitals taken for this visit.body mass index is unknown because there is no height or weight on file.    Physical Exam - VIRTUAL VISIT    Labs:  Lab Results   Component Value Date    WBC 4.59 01/03/2025    HGB 12.4 (L) 01/03/2025    HCT 38.1 (L) 01/03/2025     01/03/2025    K 4.6 01/03/2025     (H) 01/03/2025    CO2 23 01/03/2025    BUN 43 (H) 01/03/2025    CREATININE 2.2 (H)  01/03/2025    EGFRNORACEVR 32.8 (A) 01/03/2025    CALCIUM 9.2 01/03/2025    PHOS 3.0 01/03/2025    MG 2.0 01/03/2025    ALBUMIN 3.3 (L) 01/03/2025    AST 14 07/17/2024    ALT 18 07/17/2024    UTPCR 0.10 01/03/2025    .1 (H) 07/28/2023    TACROLIMUS 4.8 (L) 01/03/2025       Labs were reviewed with the patient    Assessment:     1. Kidney replaced by transplant    2. Long-term use of immunosuppressant medication    3. Renovascular hypertension    4. Stage 3b chronic kidney disease    5. At risk for opportunistic infections      Plan:   Evening nausea-increase pepcid 40 mg, to notify if no improvement           1. Immunosuppression: Prograf trough 4.8. Continue Prograf 2/2, belatacept monthly infusions, MyF 720 mg BID, and Prednisone 5 mg QD. Will continue to monitor for drug toxicities    2. Allograft Function: started on roberto for GFR preservation with decrease in creatinine   - ESRD secondary to DM s/p donation after circulatory death  kidney transplant on 4/14/24.   - post transplant with DGF, resolved   Lab Results   Component Value Date    CREATININE 2.2 (H) 01/03/2025       3. Hypertension management:   - advise low salt diet and home BP monitoring      4. Hypophosphatemia     - resolved, off KPN    5. Acidosis   - continue sodium bicarbonate 1300 mg BID      6. Anemia: stable, no need for intervention  Lab Results   Component Value Date    WBC 4.59 01/03/2025    HGB 12.4 (L) 01/03/2025    HCT 38.1 (L) 01/03/2025    MCV 88 01/03/2025     01/03/2025         7. Proteinuria: continue p/c ratio as per guidelines    - last UPC 0.10    8. BK virus infection screening:  BK PCR per protocol   - last PCR not detected    9. Weight education: provided during the clinic visit   There is no height or weight on file to calculate BMI.     10. Patient safety education regarding immunosuppression including prophylaxis posttransplant for CMV, PCP  - PCP prophylaxis: bactrim (stop 10/11/2024) - completed  - CMV  prophylaxis: valcyte (stop 7/13/2024) - completed               Follow-up:   Clinic: return to transplant clinic weekly for the first month after transplant; every 2 weeks during months 2-3; then at 6-, 9-, 12-, 18-, 24-, and 36- months post-transplant to reassess for complications from immunosuppression toxicity and monitor for rejection.  Annually thereafter.    Labs: since patient remains at high risk for rejection and drug-related complications that warrant close monitoring, labs will be ordered as follows: continue twice weekly CBC, renal panel, and drug level for first month; then same labs once weekly through 3rd month post-transplant.  Urine for UA and protein/creatinine ratio monthly.  Serum BK - PCR at 1-, 3-, 6-, 9-, 12-, 18-, 24-, 36-, 48-, and 60 months post-transplant.  Hepatic panel at 1-, 2-, 3-, 6-, 9-, 12-, 18-, 24-, and 36- months post-transplant.    Samia Wilkinson, NP

## 2025-01-13 ENCOUNTER — OFFICE VISIT (OUTPATIENT)
Dept: TRANSPLANT | Facility: CLINIC | Age: 65
End: 2025-01-13
Payer: MEDICARE

## 2025-01-13 DIAGNOSIS — N18.32 STAGE 3B CHRONIC KIDNEY DISEASE: ICD-10-CM

## 2025-01-13 DIAGNOSIS — Z79.60 LONG-TERM USE OF IMMUNOSUPPRESSANT MEDICATION: ICD-10-CM

## 2025-01-13 DIAGNOSIS — I15.0 RENOVASCULAR HYPERTENSION: ICD-10-CM

## 2025-01-13 DIAGNOSIS — Z94.0 KIDNEY REPLACED BY TRANSPLANT: Primary | ICD-10-CM

## 2025-01-13 DIAGNOSIS — Z91.89 AT RISK FOR OPPORTUNISTIC INFECTIONS: ICD-10-CM

## 2025-01-13 RX ORDER — FAMOTIDINE 20 MG/1
40 TABLET, FILM COATED ORAL NIGHTLY
Qty: 60 TABLET | Refills: 11 | Status: SHIPPED | OUTPATIENT
Start: 2025-01-13 | End: 2026-01-13

## 2025-01-13 NOTE — LETTER
January 13, 2025        Cathryn Adair  [address]  [place] [address]  Phone: [phone]  Fax: [phone]             [facility]  [address]  [place] [address]  Phone: [phone]   Patient: Murphy Ha   MR Number: [id number]   YOB: 1960   Date of Visit: 1/13/2025       Dear Dr. Cathryn Adair    Thank you for referring Murphy Ha to me for evaluation. Attached you will find relevant portions of my assessment and plan of care.    If you have questions, please do not hesitate to call me. I look forward to following Murphy Ha along with you.    Sincerely,    Samia Wilkinson, NAI    Enclosure    If you would like to receive this communication electronically, please contact [email or web address] or [phone] to request [employer] Link access.    [employer] Link is a tool which provides read-only access to select patient information with whom you have a relationship. Its easy to use and provides real time access to review your patients record including encounter summaries, notes, results, and demographic information.    If you feel you have received this communication in error or would no longer like to receive these types of communications, please e-mail [email or web address]

## 2025-01-17 RX ORDER — CALCITRIOL 0.25 UG/1
0.25 CAPSULE ORAL DAILY
Qty: 30 CAPSULE | Refills: 5 | Status: CANCELLED | OUTPATIENT
Start: 2025-01-17

## 2025-01-30 ENCOUNTER — INFUSION (OUTPATIENT)
Dept: INFUSION THERAPY | Facility: HOSPITAL | Age: 65
End: 2025-01-30
Payer: MEDICARE

## 2025-01-30 VITALS
SYSTOLIC BLOOD PRESSURE: 146 MMHG | HEART RATE: 73 BPM | OXYGEN SATURATION: 99 % | RESPIRATION RATE: 18 BRPM | HEIGHT: 67 IN | TEMPERATURE: 98 F | BODY MASS INDEX: 30.27 KG/M2 | WEIGHT: 192.88 LBS | DIASTOLIC BLOOD PRESSURE: 83 MMHG

## 2025-01-30 DIAGNOSIS — Z29.89 PROPHYLACTIC IMMUNOTHERAPY: Primary | ICD-10-CM

## 2025-01-30 PROCEDURE — 63600175 PHARM REV CODE 636 W HCPCS: Mod: TB | Performed by: STUDENT IN AN ORGANIZED HEALTH CARE EDUCATION/TRAINING PROGRAM

## 2025-01-30 PROCEDURE — 96365 THER/PROPH/DIAG IV INF INIT: CPT

## 2025-01-30 PROCEDURE — 25000003 PHARM REV CODE 250: Performed by: STUDENT IN AN ORGANIZED HEALTH CARE EDUCATION/TRAINING PROGRAM

## 2025-01-30 RX ORDER — HEPARIN 100 UNIT/ML
500 SYRINGE INTRAVENOUS
Status: DISCONTINUED | OUTPATIENT
Start: 2025-01-30 | End: 2025-01-30 | Stop reason: HOSPADM

## 2025-01-30 RX ORDER — DIPHENHYDRAMINE HYDROCHLORIDE 50 MG/ML
50 INJECTION INTRAMUSCULAR; INTRAVENOUS ONCE AS NEEDED
OUTPATIENT
Start: 2025-01-31

## 2025-01-30 RX ORDER — DIPHENHYDRAMINE HYDROCHLORIDE 50 MG/ML
50 INJECTION INTRAMUSCULAR; INTRAVENOUS ONCE AS NEEDED
Status: DISCONTINUED | OUTPATIENT
Start: 2025-01-30 | End: 2025-01-30 | Stop reason: HOSPADM

## 2025-01-30 RX ORDER — EPINEPHRINE 0.3 MG/.3ML
0.3 INJECTION SUBCUTANEOUS ONCE AS NEEDED
Status: DISCONTINUED | OUTPATIENT
Start: 2025-01-30 | End: 2025-01-30 | Stop reason: HOSPADM

## 2025-01-30 RX ORDER — HEPARIN 100 UNIT/ML
500 SYRINGE INTRAVENOUS
OUTPATIENT
Start: 2025-01-31

## 2025-01-30 RX ORDER — EPINEPHRINE 0.3 MG/.3ML
0.3 INJECTION SUBCUTANEOUS ONCE AS NEEDED
OUTPATIENT
Start: 2025-01-31

## 2025-01-30 RX ADMIN — DEXTROSE MONOHYDRATE 437.5 MG: 50 INJECTION, SOLUTION INTRAVENOUS at 10:01

## 2025-02-17 ENCOUNTER — TELEPHONE (OUTPATIENT)
Dept: INFUSION THERAPY | Facility: HOSPITAL | Age: 65
End: 2025-02-17
Payer: MEDICARE

## 2025-02-17 RX ORDER — CALCITRIOL 0.25 UG/1
0.25 CAPSULE ORAL DAILY
Qty: 30 CAPSULE | Refills: 5 | Status: CANCELLED | OUTPATIENT
Start: 2025-02-17

## 2025-02-19 RX ORDER — CALCITRIOL 0.25 UG/1
0.25 CAPSULE ORAL DAILY
Qty: 30 CAPSULE | Refills: 5 | Status: SHIPPED | OUTPATIENT
Start: 2025-02-19

## 2025-02-20 ENCOUNTER — TELEPHONE (OUTPATIENT)
Dept: INFUSION THERAPY | Facility: HOSPITAL | Age: 65
End: 2025-02-20
Payer: MEDICARE

## 2025-02-20 NOTE — TELEPHONE ENCOUNTER
Patient called to change appointment from 2/28/25 to 2/26/25 at 9 a.m. Patient agreed to  change scheduled appointment from 2/28/25 to 2/26/25 at 9 a.m.

## 2025-02-26 ENCOUNTER — INFUSION (OUTPATIENT)
Dept: INFUSION THERAPY | Facility: HOSPITAL | Age: 65
End: 2025-02-26
Payer: MEDICARE

## 2025-02-26 VITALS
OXYGEN SATURATION: 99 % | SYSTOLIC BLOOD PRESSURE: 156 MMHG | DIASTOLIC BLOOD PRESSURE: 74 MMHG | HEIGHT: 67 IN | BODY MASS INDEX: 31.35 KG/M2 | HEART RATE: 70 BPM | TEMPERATURE: 98 F | WEIGHT: 199.75 LBS

## 2025-02-26 DIAGNOSIS — Z29.89 PROPHYLACTIC IMMUNOTHERAPY: Primary | ICD-10-CM

## 2025-02-26 PROCEDURE — 25000003 PHARM REV CODE 250: Performed by: STUDENT IN AN ORGANIZED HEALTH CARE EDUCATION/TRAINING PROGRAM

## 2025-02-26 PROCEDURE — 96365 THER/PROPH/DIAG IV INF INIT: CPT

## 2025-02-26 PROCEDURE — 63600175 PHARM REV CODE 636 W HCPCS: Mod: JZ,TB | Performed by: STUDENT IN AN ORGANIZED HEALTH CARE EDUCATION/TRAINING PROGRAM

## 2025-02-26 RX ORDER — EPINEPHRINE 0.3 MG/.3ML
0.3 INJECTION SUBCUTANEOUS ONCE AS NEEDED
OUTPATIENT
Start: 2025-02-27

## 2025-02-26 RX ORDER — HEPARIN 100 UNIT/ML
500 SYRINGE INTRAVENOUS
Status: DISCONTINUED | OUTPATIENT
Start: 2025-02-26 | End: 2025-02-26 | Stop reason: HOSPADM

## 2025-02-26 RX ORDER — DIPHENHYDRAMINE HYDROCHLORIDE 50 MG/ML
50 INJECTION INTRAMUSCULAR; INTRAVENOUS ONCE AS NEEDED
OUTPATIENT
Start: 2025-02-27

## 2025-02-26 RX ORDER — HEPARIN 100 UNIT/ML
500 SYRINGE INTRAVENOUS
OUTPATIENT
Start: 2025-02-27

## 2025-02-26 RX ORDER — DIPHENHYDRAMINE HYDROCHLORIDE 50 MG/ML
50 INJECTION INTRAMUSCULAR; INTRAVENOUS ONCE AS NEEDED
Status: DISCONTINUED | OUTPATIENT
Start: 2025-02-26 | End: 2025-02-26 | Stop reason: HOSPADM

## 2025-02-26 RX ORDER — EPINEPHRINE 0.3 MG/.3ML
0.3 INJECTION SUBCUTANEOUS ONCE AS NEEDED
Status: DISCONTINUED | OUTPATIENT
Start: 2025-02-26 | End: 2025-02-26 | Stop reason: HOSPADM

## 2025-02-26 RX ADMIN — DEXTROSE MONOHYDRATE 450 MG: 50 INJECTION, SOLUTION INTRAVENOUS at 10:02

## 2025-03-06 ENCOUNTER — RESULTS FOLLOW-UP (OUTPATIENT)
Dept: NEPHROLOGY | Facility: HOSPITAL | Age: 65
End: 2025-03-06

## 2025-03-26 ENCOUNTER — INFUSION (OUTPATIENT)
Dept: INFUSION THERAPY | Facility: HOSPITAL | Age: 65
End: 2025-03-26
Payer: MEDICARE

## 2025-03-26 VITALS
OXYGEN SATURATION: 97 % | WEIGHT: 198.19 LBS | DIASTOLIC BLOOD PRESSURE: 82 MMHG | HEIGHT: 67 IN | RESPIRATION RATE: 18 BRPM | BODY MASS INDEX: 31.11 KG/M2 | HEART RATE: 73 BPM | TEMPERATURE: 98 F | SYSTOLIC BLOOD PRESSURE: 155 MMHG

## 2025-03-26 DIAGNOSIS — Z29.89 PROPHYLACTIC IMMUNOTHERAPY: Primary | ICD-10-CM

## 2025-03-26 PROCEDURE — 25000003 PHARM REV CODE 250: Performed by: STUDENT IN AN ORGANIZED HEALTH CARE EDUCATION/TRAINING PROGRAM

## 2025-03-26 PROCEDURE — 63600175 PHARM REV CODE 636 W HCPCS: Mod: TB | Performed by: STUDENT IN AN ORGANIZED HEALTH CARE EDUCATION/TRAINING PROGRAM

## 2025-03-26 PROCEDURE — 96365 THER/PROPH/DIAG IV INF INIT: CPT

## 2025-03-26 RX ORDER — EPINEPHRINE 0.3 MG/.3ML
0.3 INJECTION SUBCUTANEOUS ONCE AS NEEDED
Status: DISCONTINUED | OUTPATIENT
Start: 2025-03-26 | End: 2025-03-26 | Stop reason: HOSPADM

## 2025-03-26 RX ORDER — EPINEPHRINE 0.3 MG/.3ML
0.3 INJECTION SUBCUTANEOUS ONCE AS NEEDED
OUTPATIENT
Start: 2025-04-23

## 2025-03-26 RX ORDER — HEPARIN 100 UNIT/ML
500 SYRINGE INTRAVENOUS
OUTPATIENT
Start: 2025-04-23

## 2025-03-26 RX ORDER — DIPHENHYDRAMINE HYDROCHLORIDE 50 MG/ML
50 INJECTION, SOLUTION INTRAMUSCULAR; INTRAVENOUS ONCE AS NEEDED
OUTPATIENT
Start: 2025-04-23

## 2025-03-26 RX ORDER — DIPHENHYDRAMINE HYDROCHLORIDE 50 MG/ML
50 INJECTION, SOLUTION INTRAMUSCULAR; INTRAVENOUS ONCE AS NEEDED
Status: DISCONTINUED | OUTPATIENT
Start: 2025-03-26 | End: 2025-03-26 | Stop reason: HOSPADM

## 2025-03-26 RX ORDER — HEPARIN 100 UNIT/ML
500 SYRINGE INTRAVENOUS
Status: DISCONTINUED | OUTPATIENT
Start: 2025-03-26 | End: 2025-03-26 | Stop reason: HOSPADM

## 2025-03-26 RX ADMIN — DEXTROSE 450 MG: 50 INJECTION, SOLUTION INTRAVENOUS at 10:03

## 2025-04-11 NOTE — PROGRESS NOTES
Kidney Post-Transplant Assessment    Referring Physician: Cathryn Adair  Current Nephrologist: Cathryn Adair    ORGAN: RIGHT KIDNEY  Donor Type: donation after circulatory death  PHS Increased Risk: no  Cold Ischemia: 1,030 mins  Induction Medications: thymo    Subjective:     CC:  Reassessment of renal allograft function and management of chronic immunosuppression.    HPI:  Mr. Ha is a 64 y.o. year old Black or  male with history of ESRD secondary to DM who received a donation after circulatory death kidney transplant on 4/14/24. His most recent creatinine is 2.0. He takes mycophenolic acid, prednisone, tacrolimus, and belatacept  for maintenance immunosuppression. His post transplant course has been complicated by delayed graft function requiring dialysis (resolved).    Here today for his 1 year post transplant visit. Started belatacept infusions 9/13/24, has been tolerating well. Planning to DC prograf on 4/23 with next roberto infusion.  Has not re-established with general nephrology post transplant, plans to return to Dr. Adair. Feels great today without  complaints. Staying hydrated, no problems with urination. Home -140, no peripheral edema. Staying busy with the grandkids, no problems with energy. Tolerating IS without issue, no diarrhea or vomiting.     Current Outpatient Medications   Medication Sig Dispense Refill    alprostadiL 500 mcg/mL Soln 50 mcg, phentolamine 5 mg SolR 5 mg, papaverine 30 mg/mL Soln 30 mcg       aspirin (ECOTRIN) 81 MG EC tablet Take 1 tablet (81 mg total) by mouth once daily.      atorvastatin (LIPITOR) 80 MG tablet Take 1 tablet (80 mg total) by mouth every evening. 90 tablet 3    blood-glucose meter,continuous (DEXCOM G7 ) Misc Use to check glucose with sensors 1 each 0    calcitRIOL (ROCALTROL) 0.25 MCG Cap Take 1 capsule (0.25 mcg total) by mouth once daily. 30 capsule 5    carvediloL (COREG) 25 MG tablet Take 1 tablet (25 mg total)  "by mouth 2 (two) times daily. 60 tablet 11    chlorthalidone (HYGROTEN) 25 MG Tab Take 0.5 tablets (12.5 mg total) by mouth once daily. Take half a tablet by mouth once a day. 15 tablet 11    cholecalciferol, vitamin D3, (VITAMIN D3) 125 mcg (5,000 unit) Tab Take 1 tablet (5,000 Units total) by mouth once daily. 30 tablet 5    COMIRNATY 2024-25, 12Y UP,,PF, 30 mcg/0.3 mL IM vaccine (>/= 13 yo)       divalproex (DEPAKOTE) 250 MG EC tablet Take 1 tablet (250 mg total) by mouth 2 (two) times daily. 180 tablet 3    docusate sodium (COLACE) 100 MG capsule Take 100 mg by mouth 3 (three) times daily as needed for Constipation.      dorzolamide-timolol 2-0.5% (COSOPT) 22.3-6.8 mg/mL ophthalmic solution Place 1 drop into the left eye 2 (two) times daily. 10 mL 8    famotidine (PEPCID) 20 MG tablet Take 2 tablets (40 mg total) by mouth every evening. 60 tablet 11    gabapentin (NEURONTIN) 300 MG capsule Take 1 capsule (300 mg total) by mouth every evening. 90 capsule 3    glucagon 3 mg/actuation Spry 1 each by Nasal route as needed. 1 each 11    HYDROcodone-acetaminophen (NORCO)  mg per tablet Take 1 tablet by mouth every 12 (twelve) hours as needed for Pain. 60 tablet 0    insulin aspart U-100 (NOVOLOG U-100 INSULIN ASPART) 100 unit/mL injection Inject 4-6 Units into the skin 3 (three) times daily before meals. + sliding scale.  MDD 36 units/day 10 mL 5    insulin glargine U-100, Lantus, 100 unit/mL injection Inject 32-36 Units into the skin every evening. 32.4 mL 3    insulin syringe-needle U-100 (BD INSULIN SYRINGE ULTRA-FINE) 1 mL 30 gauge x 1/2" Syrg 1 each by Misc.(Non-Drug; Combo Route) route 4 (four) times daily with meals and nightly. 100 each 1    loperamide (IMODIUM) 2 mg capsule Take 1 capsule (2 mg total) by mouth as needed for Diarrhea. Take 2 mg after each bowel movement up to 8 times a day.  Stop if no bowel movement. 90 capsule 4    NIFEdipine (PROCARDIA-XL) 30 MG (OSM) 24 hr tablet Take 1 tablet (30 mg " total) by mouth 2 (two) times a day. 60 tablet 11    ondansetron (ZOFRAN-ODT) 4 MG TbDL Take 1 tablet (4 mg total) by mouth every 6 (six) hours as needed (Nausea). 30 tablet 0    tacrolimus (PROGRAF) 1 MG Cap Take 2 capsules (2 mg total) by mouth every 12 (twelve) hours. 120 capsule 11    tamsulosin (FLOMAX) 0.4 mg Cap Take 1 capsule (0.4 mg total) by mouth once daily. 90 capsule 3    blood-glucose sensor (DEXCOM G7 SENSOR) Lois 1 Device by Misc.(Non-Drug; Combo Route) route every 10 days. 9 each 3    mycophenolate sodium 180 MG TbEC Take 4 tablets (720 mg total) by mouth 2 (two) times daily. 240 tablet 11    predniSONE (DELTASONE) 5 MG tablet Take 1 tablet (5 mg total) by mouth once daily. 90 tablet 3     No current facility-administered medications for this visit.     Facility-Administered Medications Ordered in Other Visits   Medication Dose Route Frequency Provider Last Rate Last Admin    tamsulosin 24 hr capsule 0.4 mg  1 capsule Oral Daily Lorelei Cha PA-C   0.4 mg at 04/18/24 0840       Past Medical History:   Diagnosis Date    Anemia     Aqueous misdirection of right eye     Arthritis     Blind painful eye     Cataract     CKD (chronic kidney disease)     Diabetes mellitus, type 2     Disorder of kidney and ureter     Fever blister     GERD (gastroesophageal reflux disease)     Hyperlipidemia     Hypertension     Joint pain     Neuropathy        Review of Systems   Constitutional:  Negative for activity change and fever.   Eyes:  Positive for visual disturbance.        Wears glasses   Respiratory:  Negative for cough and shortness of breath.    Cardiovascular:  Negative for chest pain and leg swelling.   Gastrointestinal:  Negative for abdominal pain, constipation and diarrhea.   Genitourinary:  Negative for difficulty urinating, frequency and hematuria.   Musculoskeletal:  Negative for arthralgias and myalgias.   Skin:  Negative for wound.   Allergic/Immunologic: Positive for immunocompromised  "state.   Neurological:  Negative for weakness.   Psychiatric/Behavioral:  Negative for sleep disturbance.        Objective:   Blood pressure (!) 140/74, pulse 77, temperature 97.3 °F (36.3 °C), temperature source Temporal, resp. rate 18, height 5' 7" (1.702 m), weight 92 kg (202 lb 13.2 oz), SpO2 98%.body mass index is 31.77 kg/m².    Physical Exam  Vitals and nursing note reviewed.   Constitutional:       Appearance: Normal appearance.   Cardiovascular:      Rate and Rhythm: Normal rate and regular rhythm.      Heart sounds: Normal heart sounds.   Pulmonary:      Effort: Pulmonary effort is normal.      Breath sounds: Normal breath sounds.   Abdominal:      General: There is no distension.   Musculoskeletal:         General: Normal range of motion.   Skin:     General: Skin is warm and dry.   Neurological:      Mental Status: He is alert.         Labs:  Lab Results   Component Value Date    WBC 6.83 04/08/2025    HGB 13.1 (L) 04/08/2025    HCT 42.0 04/08/2025     04/08/2025    K 5.2 (H) 04/08/2025     (H) 04/08/2025    CO2 27 04/08/2025    BUN 30 (H) 04/08/2025    CREATININE 2.0 (H) 04/08/2025    EGFRNORACEVR 37 (L) 04/08/2025    GLUCOSE 116 (H) 04/08/2025    CALCIUM 9.5 04/08/2025    PHOS 2.4 (L) 04/08/2025    MG 1.9 04/08/2025    ALBUMIN 3.3 (L) 04/08/2025    AST 15 02/04/2025    ALT 26 02/04/2025    UTPCR  04/08/2025      Comment:      UNABLE TO CALCULATE    .1 (H) 07/28/2023    TACROLIMUS 3.7 (L) 04/08/2025       Labs were reviewed with the patient    Assessment:     1. Kidney replaced by transplant    2. Long-term use of immunosuppressant medication    3. Renovascular hypertension    4. Stage 3b chronic kidney disease    5. At risk for opportunistic infections      Plan:   Now 1 year post transplant, plans to return to Dr. Adair for CKD care  Decreased prograf 1/1 with plans to DC 4/23 with next roberto infusion   Stressed importance of routine cancer screenings while on immunosuppression (PSA, " colonoscopy, dermatology visits for skin checks)      1. Immunosuppression: Prograf trough 3.7. Continue Prograf 1/1 (planning to DC on 4/23), belatacept monthly infusions, MyF 720 mg BID, and Prednisone 5 mg QD. Will continue to monitor for drug toxicities    2. Allograft Function: started on roberto for GFR preservation with decrease in creatinine   - ESRD secondary to DM s/p donation after circulatory death  kidney transplant on 4/14/24.   - post transplant with DGF, resolved   Lab Results   Component Value Date    CREATININE 2.0 (H) 04/08/2025       3. Hypertension management:   - advise low salt diet and home BP monitoring      4. Hypophosphatemia     - high phosphorous diet    5. Acidosis   - resolved, DC sodium bicarbonate       6. Anemia: stable, no need for intervention  Lab Results   Component Value Date    WBC 6.83 04/08/2025    HGB 13.1 (L) 04/08/2025    HCT 42.0 04/08/2025    MCV 93 04/08/2025     04/08/2025         7. Proteinuria: continue p/c ratio as per guidelines    - last UPC unable to calculate     8. BK virus infection screening:  BK PCR per protocol   - last PCR not detected    9. Weight education: provided during the clinic visit   Body mass index is 31.77 kg/m².     10. Patient safety education regarding immunosuppression including prophylaxis posttransplant for CMV, PCP  - PCP prophylaxis: bactrim (stop 10/11/2024) - completed  - CMV prophylaxis: valcyte (stop 7/13/2024) - completed               Follow-up:   Clinic: return to transplant clinic weekly for the first month after transplant; every 2 weeks during months 2-3; then at 6-, 9-, 12-, 18-, 24-, and 36- months post-transplant to reassess for complications from immunosuppression toxicity and monitor for rejection.  Annually thereafter.    Labs: since patient remains at high risk for rejection and drug-related complications that warrant close monitoring, labs will be ordered as follows: continue twice weekly CBC, renal panel, and  drug level for first month; then same labs once weekly through 3rd month post-transplant.  Urine for UA and protein/creatinine ratio monthly.  Serum BK - PCR at 1-, 3-, 6-, 9-, 12-, 18-, 24-, 36-, 48-, and 60 months post-transplant.  Hepatic panel at 1-, 2-, 3-, 6-, 9-, 12-, 18-, 24-, and 36- months post-transplant.    Samia Wilkinson, NP

## 2025-04-14 ENCOUNTER — OFFICE VISIT (OUTPATIENT)
Dept: TRANSPLANT | Facility: CLINIC | Age: 65
End: 2025-04-14
Payer: MEDICARE

## 2025-04-14 ENCOUNTER — TELEPHONE (OUTPATIENT)
Dept: PHARMACY | Facility: CLINIC | Age: 65
End: 2025-04-14
Payer: MEDICARE

## 2025-04-14 VITALS
WEIGHT: 202.81 LBS | OXYGEN SATURATION: 98 % | HEIGHT: 67 IN | TEMPERATURE: 97 F | BODY MASS INDEX: 31.83 KG/M2 | DIASTOLIC BLOOD PRESSURE: 74 MMHG | HEART RATE: 77 BPM | SYSTOLIC BLOOD PRESSURE: 140 MMHG | RESPIRATION RATE: 18 BRPM

## 2025-04-14 DIAGNOSIS — Z91.89 AT RISK FOR OPPORTUNISTIC INFECTIONS: ICD-10-CM

## 2025-04-14 DIAGNOSIS — N18.32 STAGE 3B CHRONIC KIDNEY DISEASE: ICD-10-CM

## 2025-04-14 DIAGNOSIS — I15.0 RENOVASCULAR HYPERTENSION: ICD-10-CM

## 2025-04-14 DIAGNOSIS — Z94.0 KIDNEY REPLACED BY TRANSPLANT: Primary | ICD-10-CM

## 2025-04-14 DIAGNOSIS — Z79.60 LONG-TERM USE OF IMMUNOSUPPRESSANT MEDICATION: ICD-10-CM

## 2025-04-14 PROCEDURE — 3078F DIAST BP <80 MM HG: CPT | Mod: CPTII,S$GLB,, | Performed by: NURSE PRACTITIONER

## 2025-04-14 PROCEDURE — 3008F BODY MASS INDEX DOCD: CPT | Mod: CPTII,S$GLB,, | Performed by: NURSE PRACTITIONER

## 2025-04-14 PROCEDURE — 3077F SYST BP >= 140 MM HG: CPT | Mod: CPTII,S$GLB,, | Performed by: NURSE PRACTITIONER

## 2025-04-14 PROCEDURE — 99999 PR PBB SHADOW E&M-EST. PATIENT-LVL V: CPT | Mod: PBBFAC,,, | Performed by: NURSE PRACTITIONER

## 2025-04-14 PROCEDURE — 1159F MED LIST DOCD IN RCRD: CPT | Mod: CPTII,S$GLB,, | Performed by: NURSE PRACTITIONER

## 2025-04-14 PROCEDURE — 99215 OFFICE O/P EST HI 40 MIN: CPT | Mod: S$GLB,,, | Performed by: NURSE PRACTITIONER

## 2025-04-14 RX ORDER — PREDNISONE 5 MG/1
5 TABLET ORAL DAILY
Qty: 90 TABLET | Refills: 3 | Status: SHIPPED | OUTPATIENT
Start: 2025-04-14

## 2025-04-14 RX ORDER — MYCOPHENOLIC ACID 180 MG/1
720 TABLET, DELAYED RELEASE ORAL 2 TIMES DAILY
Qty: 240 TABLET | Refills: 11 | Status: SHIPPED | OUTPATIENT
Start: 2025-04-14

## 2025-04-14 NOTE — TELEPHONE ENCOUNTER
----- Message from Gaston Rodriguez sent at 4/10/2025 10:40 AM CDT -----  Regarding: RE: Lost coverage  Abran Mr. Ha's case has been assigned to Bryan Harp and referred to [facility] for assistance review. Thank you, Pharmacy Patient Assistance Team  ----- Message -----  From: Kaur Cadena  Sent: 4/9/2025   3:10 PM CDT  To: Pharmacy Patient Assistance Team  Subject: Lost coverage                                    Good evening, patient doesn't have any coverage at the moment. He is working on getting Medicare part C and D. I will work on  getting assistance for his Immunos Myfortic and Tacrolimus. Can you look over all of his other meds please?

## 2025-04-14 NOTE — LETTER
April 14, 2025    Murphy Ha  [facility] [place] [address]               Dear Mr. Ha,      My name is Bryan Harp  I am reaching out on behalf of [facility]s [facility] Patient Assistance Team in regards to your request for medication assistance. Our goal is to assist qualified [facility] patients with obtaining financial assistance for prescribed medications.     Please note that enrollment into available support may require the following documents:      Proof of household Income(such as social security award letter, pension statement or 3 consecutive pay stubs), Copy of all insurance cards(front and back), and Completed Medication Access Center Authorization Forms.       Please reach out to my phone number below if you are still in need of assistance with your medications. Follow-up call will be made in 5 business days. We look forward to hearing from you soon!    Thank you for choosing [facility] for your healthcare needs      Sincerely  Bryan NANCE @[phone]  Pharmacy Patient Assistance Team  [address] [place] [address]  [id number] [place] [address]  Fax: [phone]  Email: [email or web address]

## 2025-04-14 NOTE — LETTER
April 14, 2025        Cathryn Adair  [address]  [place] [address]  Phone: [phone]  Fax: [phone]             [facility]  [address]  [facility] [address]  Phone: [phone]   Patient: Murphy Ha   MR Number: [id number]   YOB: 1960   Date of Visit: 4/14/2025       Dear Dr. Cathryn Adair    Thank you for referring Murphy Ha to me for evaluation. Attached you will find relevant portions of my assessment and plan of care.    If you have questions, please do not hesitate to call me. I look forward to following Murphy Ha along with you.    Sincerely,    Samia Wilkinson, NAI    Enclosure    If you would like to receive this communication electronically, please contact [email or web address] or [phone] to request [employer] access.    [employer] Link is a tool which provides read-only access to select patient information with whom you have a relationship. Its easy to use and provides real time access to review your patients record including encounter summaries, notes, results, and demographic information.    If you feel you have received this communication in error or would no longer like to receive these types of communications, please e-mail [email or web address]

## 2025-04-14 NOTE — TELEPHONE ENCOUNTER
Welcome letter sent via phone and letter to inform patient of PAP application process for Gold plan, Lantus Pens and Novolog Pens. Please ensure the chart reflects a current order for the requested medication. Follow-up phone call will be made to patient in 5 business days.    Your help is appreciated  Bryan Harp  Pharmacy Patient Assistance Team

## 2025-04-21 ENCOUNTER — PATIENT MESSAGE (OUTPATIENT)
Dept: PHARMACY | Facility: CLINIC | Age: 65
End: 2025-04-21
Payer: MEDICARE

## 2025-04-21 ENCOUNTER — DOCUMENTATION ONLY (OUTPATIENT)
Dept: PHARMACY | Facility: CLINIC | Age: 65
End: 2025-04-21

## 2025-04-21 ENCOUNTER — DOCUMENTATION ONLY (OUTPATIENT)
Dept: TRANSPLANT | Facility: CLINIC | Age: 65
End: 2025-04-21

## 2025-04-23 ENCOUNTER — INFUSION (OUTPATIENT)
Dept: INFUSION THERAPY | Facility: HOSPITAL | Age: 65
End: 2025-04-23
Payer: MEDICARE

## 2025-04-23 VITALS
DIASTOLIC BLOOD PRESSURE: 81 MMHG | HEART RATE: 68 BPM | HEIGHT: 67 IN | BODY MASS INDEX: 30.65 KG/M2 | TEMPERATURE: 98 F | WEIGHT: 195.31 LBS | SYSTOLIC BLOOD PRESSURE: 143 MMHG | OXYGEN SATURATION: 99 % | RESPIRATION RATE: 18 BRPM

## 2025-04-23 DIAGNOSIS — Z29.89 PROPHYLACTIC IMMUNOTHERAPY: Primary | ICD-10-CM

## 2025-04-23 PROCEDURE — 25000003 PHARM REV CODE 250: Performed by: STUDENT IN AN ORGANIZED HEALTH CARE EDUCATION/TRAINING PROGRAM

## 2025-04-23 PROCEDURE — 63600175 PHARM REV CODE 636 W HCPCS: Mod: JZ,TB | Performed by: STUDENT IN AN ORGANIZED HEALTH CARE EDUCATION/TRAINING PROGRAM

## 2025-04-23 PROCEDURE — 96365 THER/PROPH/DIAG IV INF INIT: CPT

## 2025-04-23 RX ORDER — HEPARIN 100 UNIT/ML
500 SYRINGE INTRAVENOUS
Status: DISCONTINUED | OUTPATIENT
Start: 2025-04-23 | End: 2025-04-23 | Stop reason: HOSPADM

## 2025-04-23 RX ORDER — EPINEPHRINE 0.3 MG/.3ML
0.3 INJECTION SUBCUTANEOUS ONCE AS NEEDED
OUTPATIENT
Start: 2025-05-21

## 2025-04-23 RX ORDER — HEPARIN 100 UNIT/ML
500 SYRINGE INTRAVENOUS
OUTPATIENT
Start: 2025-05-21

## 2025-04-23 RX ORDER — DIPHENHYDRAMINE HYDROCHLORIDE 50 MG/ML
50 INJECTION, SOLUTION INTRAMUSCULAR; INTRAVENOUS ONCE AS NEEDED
Status: DISCONTINUED | OUTPATIENT
Start: 2025-04-23 | End: 2025-04-23 | Stop reason: HOSPADM

## 2025-04-23 RX ORDER — DIPHENHYDRAMINE HYDROCHLORIDE 50 MG/ML
50 INJECTION, SOLUTION INTRAMUSCULAR; INTRAVENOUS ONCE AS NEEDED
OUTPATIENT
Start: 2025-05-21

## 2025-04-23 RX ORDER — EPINEPHRINE 0.3 MG/.3ML
0.3 INJECTION SUBCUTANEOUS ONCE AS NEEDED
Status: DISCONTINUED | OUTPATIENT
Start: 2025-04-23 | End: 2025-04-23 | Stop reason: HOSPADM

## 2025-04-23 RX ADMIN — DEXTROSE MONOHYDRATE 437.5 MG: 50 INJECTION, SOLUTION INTRAVENOUS at 10:04

## 2025-04-28 ENCOUNTER — TELEPHONE (OUTPATIENT)
Dept: PHARMACY | Facility: CLINIC | Age: 65
End: 2025-04-28
Payer: MEDICARE

## 2025-04-28 ENCOUNTER — PATIENT MESSAGE (OUTPATIENT)
Dept: PHARMACY | Facility: CLINIC | Age: 65
End: 2025-04-28
Payer: MEDICARE

## 2025-04-28 RX ORDER — MYCOPHENOLATE MOFETIL 250 MG/1
1000 CAPSULE ORAL 2 TIMES DAILY
COMMUNITY

## 2025-04-28 NOTE — TELEPHONE ENCOUNTER
Hi,    I am writing to inform you that the requested free Drug for Cellcept has been approved through 4/10/2026 &  Belatacept approved through 4/16/2026. The requested drugs are being provided through the assistance program and arrangements has been made for delivery to patients home/clinic.    Thanks,    Kaur Cadena, Transplant Med Access Specialist  [address] [place] [address]  [phone]  Email:[email or web address]

## 2025-05-01 ENCOUNTER — TELEPHONE (OUTPATIENT)
Dept: TRANSPLANT | Facility: CLINIC | Age: 65
End: 2025-05-01
Payer: MEDICARE

## 2025-05-01 NOTE — TELEPHONE ENCOUNTER
Spoke to patient to review taper schedule.  Pt reports stopping prograf on 4/23.  Will remove Prograf from patient's med list.     ----- Message from Iain Pierre MD sent at 4/28/2025  2:29 PM CDT -----  Vida. Just let us know which one you pick [facility]  ----- Message -----  From: Mckinley AUSTIN PharmD  Sent: 4/28/2025   2:24 PM CDT  To: Shanti Prieto RN; Iain Pierre Jr.#    That sounds good to me.  ----- Message -----  From: Iain Pierre Jr., MD  Sent: 4/28/2025   2:23 PM CDT  To: Mckinley AUSTIN PharmD; Shanti Prieto#    Let do 1/1 for 1 weeks then 1 daily for 1 week then stop if he has enough pills. Unless pharmacy has alternative plan.  ----- Message -----  From: Shanti Prieto RN  Sent: 4/28/2025   2:01 PM CDT  To: Mckinley AUSTIN PharmD; Iain Mean#    I just saw a note that Cellcept and Roberto were approved for assistance until 4/2026.  So we should be good.  Let me know if we need to taper or stop tacro.  ----- Message -----  From: Mckinley AUSTIN PharmD  Sent: 4/28/2025   9:00 AM CDT  To: Shanti Prieto RN; Iain Pierre Jr.#    Hi, can I remove the Prograf rx from his med list?  He is on blea and is 1 year post txp and looks like the plan was to d/c at 1 year / after his 4/23 roberto infusion. Thanks,Vianca

## 2025-05-21 ENCOUNTER — INFUSION (OUTPATIENT)
Dept: INFUSION THERAPY | Facility: HOSPITAL | Age: 65
End: 2025-05-21
Payer: MEDICARE

## 2025-05-21 VITALS
DIASTOLIC BLOOD PRESSURE: 78 MMHG | SYSTOLIC BLOOD PRESSURE: 163 MMHG | WEIGHT: 196.44 LBS | OXYGEN SATURATION: 99 % | HEART RATE: 74 BPM | RESPIRATION RATE: 18 BRPM | BODY MASS INDEX: 30.83 KG/M2 | TEMPERATURE: 98 F | HEIGHT: 67 IN

## 2025-05-21 DIAGNOSIS — Z29.89 PROPHYLACTIC IMMUNOTHERAPY: Primary | ICD-10-CM

## 2025-05-21 PROCEDURE — 96365 THER/PROPH/DIAG IV INF INIT: CPT

## 2025-05-21 PROCEDURE — 25000003 PHARM REV CODE 250: Performed by: STUDENT IN AN ORGANIZED HEALTH CARE EDUCATION/TRAINING PROGRAM

## 2025-05-21 PROCEDURE — 63600175 PHARM REV CODE 636 W HCPCS: Mod: JZ,TB | Performed by: STUDENT IN AN ORGANIZED HEALTH CARE EDUCATION/TRAINING PROGRAM

## 2025-05-21 RX ORDER — HEPARIN 100 UNIT/ML
500 SYRINGE INTRAVENOUS
OUTPATIENT
Start: 2025-06-18

## 2025-05-21 RX ORDER — DIPHENHYDRAMINE HYDROCHLORIDE 50 MG/ML
50 INJECTION, SOLUTION INTRAMUSCULAR; INTRAVENOUS ONCE AS NEEDED
OUTPATIENT
Start: 2025-06-18

## 2025-05-21 RX ORDER — EPINEPHRINE 0.3 MG/.3ML
0.3 INJECTION SUBCUTANEOUS ONCE AS NEEDED
Status: DISCONTINUED | OUTPATIENT
Start: 2025-05-21 | End: 2025-05-21 | Stop reason: HOSPADM

## 2025-05-21 RX ORDER — HEPARIN 100 UNIT/ML
500 SYRINGE INTRAVENOUS
Status: DISCONTINUED | OUTPATIENT
Start: 2025-05-21 | End: 2025-05-21 | Stop reason: HOSPADM

## 2025-05-21 RX ORDER — EPINEPHRINE 0.3 MG/.3ML
0.3 INJECTION SUBCUTANEOUS ONCE AS NEEDED
OUTPATIENT
Start: 2025-06-18

## 2025-05-21 RX ORDER — DIPHENHYDRAMINE HYDROCHLORIDE 50 MG/ML
50 INJECTION, SOLUTION INTRAMUSCULAR; INTRAVENOUS ONCE AS NEEDED
Status: DISCONTINUED | OUTPATIENT
Start: 2025-05-21 | End: 2025-05-21 | Stop reason: HOSPADM

## 2025-05-21 RX ADMIN — DEXTROSE MONOHYDRATE 450 MG: 50 INJECTION, SOLUTION INTRAVENOUS at 09:05

## 2025-05-22 DIAGNOSIS — Z94.0 KIDNEY REPLACED BY TRANSPLANT: ICD-10-CM

## 2025-05-22 RX ORDER — CARVEDILOL 25 MG/1
25 TABLET ORAL 2 TIMES DAILY
Qty: 60 TABLET | Refills: 11 | OUTPATIENT
Start: 2025-05-22

## 2025-05-22 RX ORDER — CARVEDILOL 25 MG/1
25 TABLET ORAL 2 TIMES DAILY
Qty: 60 TABLET | Refills: 11 | Status: CANCELLED | OUTPATIENT
Start: 2025-05-19

## 2025-05-23 RX ORDER — CARVEDILOL 25 MG/1
25 TABLET ORAL 2 TIMES DAILY
Qty: 60 TABLET | Refills: 11 | Status: SHIPPED | OUTPATIENT
Start: 2025-05-23

## 2025-06-18 ENCOUNTER — INFUSION (OUTPATIENT)
Dept: INFUSION THERAPY | Facility: HOSPITAL | Age: 65
End: 2025-06-18
Payer: MEDICARE

## 2025-06-18 VITALS
SYSTOLIC BLOOD PRESSURE: 130 MMHG | HEIGHT: 67 IN | OXYGEN SATURATION: 97 % | WEIGHT: 196.44 LBS | RESPIRATION RATE: 18 BRPM | HEART RATE: 67 BPM | TEMPERATURE: 98 F | BODY MASS INDEX: 30.83 KG/M2 | DIASTOLIC BLOOD PRESSURE: 73 MMHG

## 2025-06-18 DIAGNOSIS — Z29.89 PROPHYLACTIC IMMUNOTHERAPY: Primary | ICD-10-CM

## 2025-06-18 PROCEDURE — 25000003 PHARM REV CODE 250: Performed by: STUDENT IN AN ORGANIZED HEALTH CARE EDUCATION/TRAINING PROGRAM

## 2025-06-18 PROCEDURE — 63600175 PHARM REV CODE 636 W HCPCS: Mod: JZ,TB | Performed by: STUDENT IN AN ORGANIZED HEALTH CARE EDUCATION/TRAINING PROGRAM

## 2025-06-18 PROCEDURE — 96365 THER/PROPH/DIAG IV INF INIT: CPT

## 2025-06-18 RX ORDER — DIPHENHYDRAMINE HYDROCHLORIDE 50 MG/ML
50 INJECTION, SOLUTION INTRAMUSCULAR; INTRAVENOUS ONCE AS NEEDED
Status: DISCONTINUED | OUTPATIENT
Start: 2025-06-18 | End: 2025-06-18 | Stop reason: HOSPADM

## 2025-06-18 RX ORDER — HEPARIN 100 UNIT/ML
500 SYRINGE INTRAVENOUS
Status: DISCONTINUED | OUTPATIENT
Start: 2025-06-18 | End: 2025-06-18 | Stop reason: HOSPADM

## 2025-06-18 RX ORDER — EPINEPHRINE 0.3 MG/.3ML
0.3 INJECTION SUBCUTANEOUS ONCE AS NEEDED
Status: DISCONTINUED | OUTPATIENT
Start: 2025-06-18 | End: 2025-06-18 | Stop reason: HOSPADM

## 2025-06-18 RX ORDER — EPINEPHRINE 0.3 MG/.3ML
0.3 INJECTION SUBCUTANEOUS ONCE AS NEEDED
OUTPATIENT
Start: 2025-07-16

## 2025-06-18 RX ORDER — DIPHENHYDRAMINE HYDROCHLORIDE 50 MG/ML
50 INJECTION, SOLUTION INTRAMUSCULAR; INTRAVENOUS ONCE AS NEEDED
OUTPATIENT
Start: 2025-07-16

## 2025-06-18 RX ORDER — HEPARIN 100 UNIT/ML
500 SYRINGE INTRAVENOUS
OUTPATIENT
Start: 2025-07-16

## 2025-06-18 RX ADMIN — DEXTROSE 450 MG: 50 INJECTION, SOLUTION INTRAVENOUS at 10:06

## 2025-07-07 ENCOUNTER — RESULTS FOLLOW-UP (OUTPATIENT)
Dept: TRANSPLANT | Facility: HOSPITAL | Age: 65
End: 2025-07-07

## 2025-07-16 ENCOUNTER — INFUSION (OUTPATIENT)
Dept: INFUSION THERAPY | Facility: HOSPITAL | Age: 65
End: 2025-07-16
Payer: MEDICARE

## 2025-07-16 VITALS
RESPIRATION RATE: 18 BRPM | BODY MASS INDEX: 30.1 KG/M2 | OXYGEN SATURATION: 98 % | DIASTOLIC BLOOD PRESSURE: 66 MMHG | SYSTOLIC BLOOD PRESSURE: 103 MMHG | TEMPERATURE: 98 F | HEIGHT: 67 IN | WEIGHT: 191.81 LBS | HEART RATE: 67 BPM

## 2025-07-16 DIAGNOSIS — Z29.89 PROPHYLACTIC IMMUNOTHERAPY: Primary | ICD-10-CM

## 2025-07-16 DIAGNOSIS — Z94.0 KIDNEY REPLACED BY TRANSPLANT: Primary | ICD-10-CM

## 2025-07-16 PROCEDURE — 25000003 PHARM REV CODE 250: Performed by: STUDENT IN AN ORGANIZED HEALTH CARE EDUCATION/TRAINING PROGRAM

## 2025-07-16 PROCEDURE — 96365 THER/PROPH/DIAG IV INF INIT: CPT

## 2025-07-16 PROCEDURE — 63600175 PHARM REV CODE 636 W HCPCS: Mod: JZ,TB | Performed by: STUDENT IN AN ORGANIZED HEALTH CARE EDUCATION/TRAINING PROGRAM

## 2025-07-16 RX ORDER — EPINEPHRINE 0.3 MG/.3ML
0.3 INJECTION SUBCUTANEOUS ONCE AS NEEDED
OUTPATIENT
Start: 2025-08-13

## 2025-07-16 RX ORDER — DIPHENHYDRAMINE HYDROCHLORIDE 50 MG/ML
50 INJECTION, SOLUTION INTRAMUSCULAR; INTRAVENOUS ONCE AS NEEDED
OUTPATIENT
Start: 2025-08-13

## 2025-07-16 RX ORDER — EPINEPHRINE 0.3 MG/.3ML
0.3 INJECTION SUBCUTANEOUS ONCE AS NEEDED
Status: DISCONTINUED | OUTPATIENT
Start: 2025-07-16 | End: 2025-07-16 | Stop reason: HOSPADM

## 2025-07-16 RX ORDER — DIPHENHYDRAMINE HYDROCHLORIDE 50 MG/ML
50 INJECTION, SOLUTION INTRAMUSCULAR; INTRAVENOUS ONCE AS NEEDED
Status: DISCONTINUED | OUTPATIENT
Start: 2025-07-16 | End: 2025-07-16 | Stop reason: HOSPADM

## 2025-07-16 RX ORDER — HEPARIN 100 UNIT/ML
500 SYRINGE INTRAVENOUS
OUTPATIENT
Start: 2025-08-13

## 2025-07-16 RX ORDER — HEPARIN 100 UNIT/ML
500 SYRINGE INTRAVENOUS
Status: DISCONTINUED | OUTPATIENT
Start: 2025-07-16 | End: 2025-07-16 | Stop reason: HOSPADM

## 2025-07-16 RX ADMIN — DEXTROSE MONOHYDRATE 437.5 MG: 50 INJECTION, SOLUTION INTRAVENOUS at 10:07

## 2025-07-22 ENCOUNTER — TELEPHONE (OUTPATIENT)
Dept: TRANSPLANT | Facility: CLINIC | Age: 65
End: 2025-07-22
Payer: MEDICARE

## 2025-07-22 ENCOUNTER — HOSPITAL ENCOUNTER (OUTPATIENT)
Dept: RADIOLOGY | Facility: HOSPITAL | Age: 65
Discharge: HOME | End: 2025-07-22
Attending: STUDENT IN AN ORGANIZED HEALTH CARE EDUCATION/TRAINING PROGRAM
Payer: MEDICARE

## 2025-07-22 DIAGNOSIS — Z94.0 KIDNEY REPLACED BY TRANSPLANT: ICD-10-CM

## 2025-07-22 DIAGNOSIS — Z94.0 KIDNEY REPLACED BY TRANSPLANT: Primary | ICD-10-CM

## 2025-07-22 PROCEDURE — 76776 US EXAM K TRANSPL W/DOPPLER: CPT | Mod: 26,,, | Performed by: RADIOLOGY

## 2025-07-22 PROCEDURE — 76776 US EXAM K TRANSPL W/DOPPLER: CPT | Mod: TC

## 2025-08-13 ENCOUNTER — INFUSION (OUTPATIENT)
Dept: INFUSION THERAPY | Facility: HOSPITAL | Age: 65
End: 2025-08-13
Payer: MEDICARE

## 2025-08-13 VITALS
HEIGHT: 67 IN | DIASTOLIC BLOOD PRESSURE: 76 MMHG | HEART RATE: 65 BPM | BODY MASS INDEX: 31.11 KG/M2 | OXYGEN SATURATION: 99 % | RESPIRATION RATE: 18 BRPM | TEMPERATURE: 98 F | WEIGHT: 198.19 LBS | SYSTOLIC BLOOD PRESSURE: 132 MMHG

## 2025-08-13 DIAGNOSIS — Z29.89 PROPHYLACTIC IMMUNOTHERAPY: Primary | ICD-10-CM

## 2025-08-13 PROCEDURE — 63600175 PHARM REV CODE 636 W HCPCS: Mod: JZ,TB | Performed by: STUDENT IN AN ORGANIZED HEALTH CARE EDUCATION/TRAINING PROGRAM

## 2025-08-13 PROCEDURE — 63600175 PHARM REV CODE 636 W HCPCS: Mod: JW,TB

## 2025-08-13 PROCEDURE — 96365 THER/PROPH/DIAG IV INF INIT: CPT

## 2025-08-13 PROCEDURE — 25000003 PHARM REV CODE 250: Performed by: STUDENT IN AN ORGANIZED HEALTH CARE EDUCATION/TRAINING PROGRAM

## 2025-08-13 RX ORDER — EPINEPHRINE 0.3 MG/.3ML
0.3 INJECTION SUBCUTANEOUS ONCE AS NEEDED
Status: DISCONTINUED | OUTPATIENT
Start: 2025-08-13 | End: 2025-08-13 | Stop reason: HOSPADM

## 2025-08-13 RX ORDER — HEPARIN 100 UNIT/ML
500 SYRINGE INTRAVENOUS
Status: DISCONTINUED | OUTPATIENT
Start: 2025-08-13 | End: 2025-08-13 | Stop reason: HOSPADM

## 2025-08-13 RX ORDER — EPINEPHRINE 0.3 MG/.3ML
0.3 INJECTION SUBCUTANEOUS ONCE AS NEEDED
OUTPATIENT
Start: 2025-09-10

## 2025-08-13 RX ORDER — CALCITRIOL 0.25 UG/1
0.25 CAPSULE ORAL DAILY
Qty: 30 CAPSULE | Refills: 5 | Status: SHIPPED | OUTPATIENT
Start: 2025-08-13

## 2025-08-13 RX ORDER — DIPHENHYDRAMINE HYDROCHLORIDE 50 MG/ML
50 INJECTION, SOLUTION INTRAMUSCULAR; INTRAVENOUS ONCE AS NEEDED
OUTPATIENT
Start: 2025-09-10

## 2025-08-13 RX ORDER — HEPARIN 100 UNIT/ML
500 SYRINGE INTRAVENOUS
OUTPATIENT
Start: 2025-09-10

## 2025-08-13 RX ORDER — DIPHENHYDRAMINE HYDROCHLORIDE 50 MG/ML
50 INJECTION, SOLUTION INTRAMUSCULAR; INTRAVENOUS ONCE AS NEEDED
Status: DISCONTINUED | OUTPATIENT
Start: 2025-08-13 | End: 2025-08-13 | Stop reason: HOSPADM

## 2025-08-13 RX ADMIN — DEXTROSE 450 MG: 50 INJECTION, SOLUTION INTRAVENOUS at 10:08

## 2025-08-27 DIAGNOSIS — Z94.0 KIDNEY REPLACED BY TRANSPLANT: Primary | ICD-10-CM

## (undated) DEVICE — PACK KIDNEY TRANSPLANT CUSTOM

## (undated) DEVICE — ELECTRODE REM PLYHSV RETURN 9

## (undated) DEVICE — COVER PROBE US 5.5X58L NON LTX

## (undated) DEVICE — TOWEL OR XRAY WHITE 17X26IN

## (undated) DEVICE — SUT 4/0 27IN PDS II VIO MON

## (undated) DEVICE — SUT 1 36IN PDS II VIO MONO

## (undated) DEVICE — SUT SILK 3-0 STRANDS 30IN

## (undated) DEVICE — CLIPPER BLADE MOD 4406 (CAREF)

## (undated) DEVICE — DRAPE PED LAP SURG 108X77IN

## (undated) DEVICE — PUNCH AORTIC 4.0MM 6/CASE

## (undated) DEVICE — PLUG CATHETER STERILE FOLEY

## (undated) DEVICE — FOLEY BLLN 20FR 3WAY 5CC

## (undated) DEVICE — SUT 4-0 12-18IN SILK BLACK

## (undated) DEVICE — STOCKINETTE 2INX36

## (undated) DEVICE — SUT SILK 3-0 SH 18IN BLACK

## (undated) DEVICE — SUT ETHILON 3-0 FS-1 30

## (undated) DEVICE — SYR ONLY LUER LOCK 20CC

## (undated) DEVICE — STAPLER SKIN PROXIMATE WIDE

## (undated) DEVICE — TRAY CATH 1-LYR URIMTR 16FR

## (undated) DEVICE — SUT VICRYL + 2-0 36IN CP-1

## (undated) DEVICE — SUT PDS BV 6-0

## (undated) DEVICE — Device

## (undated) DEVICE — DECANTER FLUID TRNSF WHITE 9IN

## (undated) DEVICE — SUT PROLENE 5-0 36IN C-1

## (undated) DEVICE — DRESSING ABSRBNT ISLAND 3.6X8

## (undated) DEVICE — ADHESIVE DERMABOND ADVANCED

## (undated) DEVICE — SUT 2-0 12-18IN SILK

## (undated) DEVICE — SUT PROLENE 6-0 BV-1 30IN

## (undated) DEVICE — TRAY CATH LAB OMC

## (undated) DEVICE — SUT SILK 2-0 STRANDS 30IN

## (undated) DEVICE — TIP YANKAUERS BULB NO VENT

## (undated) DEVICE — HEMOSTAT SURGICEL NU-KNIT 6X9

## (undated) DEVICE — INSERTS STEALTH FIBRA SIZE 1.

## (undated) DEVICE — DRAPE SLUSH WARMER WITH DISC

## (undated) DEVICE — GUIDEWIRE EMERALD 150CM PTFE

## (undated) DEVICE — SET IRR URLGY 2LINE UNIV SPIKE

## (undated) DEVICE — EVACUATOR WOUND BULB 100CC

## (undated) DEVICE — SOL NS 1000CC